# Patient Record
Sex: FEMALE | Race: WHITE | NOT HISPANIC OR LATINO | Employment: PART TIME | URBAN - METROPOLITAN AREA
[De-identification: names, ages, dates, MRNs, and addresses within clinical notes are randomized per-mention and may not be internally consistent; named-entity substitution may affect disease eponyms.]

---

## 2017-12-07 ENCOUNTER — TELEPHONE (OUTPATIENT)
Dept: INTERNAL MEDICINE | Facility: CLINIC | Age: 62
End: 2017-12-07

## 2017-12-07 NOTE — TELEPHONE ENCOUNTER
----- Message from Kamla Zhong MA sent at 12/7/2017 11:20 AM EST -----  Pt calling for lab order to have drawn next wk prior to appt 12/28

## 2017-12-13 DIAGNOSIS — Z00.00 PERIODIC HEALTH ASSESSMENT, GENERAL SCREENING, ADULT: Primary | ICD-10-CM

## 2017-12-27 ENCOUNTER — OFFICE VISIT (OUTPATIENT)
Dept: INTERNAL MEDICINE | Facility: CLINIC | Age: 62
End: 2017-12-27

## 2017-12-27 VITALS — SYSTOLIC BLOOD PRESSURE: 134 MMHG | WEIGHT: 293 LBS | DIASTOLIC BLOOD PRESSURE: 84 MMHG

## 2017-12-27 DIAGNOSIS — Z00.00 PERIODIC HEALTH ASSESSMENT, GENERAL SCREENING, ADULT: ICD-10-CM

## 2017-12-27 DIAGNOSIS — E66.01 MORBID OBESITY (HCC): Primary | ICD-10-CM

## 2017-12-27 LAB
ALBUMIN SERPL-MCNC: 4 G/DL (ref 3.5–5.2)
ALBUMIN/GLOB SERPL: 1.4 G/DL
ALP SERPL-CCNC: 93 U/L (ref 39–117)
ALT SERPL-CCNC: 26 U/L (ref 1–33)
APPEARANCE UR: CLEAR
AST SERPL-CCNC: 19 U/L (ref 1–32)
BILIRUB SERPL-MCNC: 0.3 MG/DL (ref 0.1–1.2)
BILIRUB UR QL STRIP: NEGATIVE
BUN SERPL-MCNC: 11 MG/DL (ref 8–23)
BUN/CREAT SERPL: 16.7 (ref 7–25)
CALCIUM SERPL-MCNC: 9.2 MG/DL (ref 8.6–10.5)
CHLORIDE SERPL-SCNC: 104 MMOL/L (ref 98–107)
CHOLEST SERPL-MCNC: 170 MG/DL (ref 0–200)
CO2 SERPL-SCNC: 30.1 MMOL/L (ref 22–29)
COLOR UR: YELLOW
CREAT SERPL-MCNC: 0.66 MG/DL (ref 0.57–1)
ERYTHROCYTE [DISTWIDTH] IN BLOOD BY AUTOMATED COUNT: 16.8 % (ref 11.7–13)
GLOBULIN SER CALC-MCNC: 2.8 GM/DL
GLUCOSE SERPL-MCNC: 94 MG/DL (ref 65–99)
GLUCOSE UR QL: NEGATIVE
HCT VFR BLD AUTO: 43.6 % (ref 35.6–45.5)
HDLC SERPL-MCNC: 56 MG/DL (ref 40–60)
HGB BLD-MCNC: 13.2 G/DL (ref 11.9–15.5)
HGB UR QL STRIP: NEGATIVE
KETONES UR QL STRIP: NEGATIVE
LDLC SERPL CALC-MCNC: 93 MG/DL (ref 0–100)
LEUKOCYTE ESTERASE UR QL STRIP: NEGATIVE
MCH RBC QN AUTO: 26.7 PG (ref 26.9–32)
MCHC RBC AUTO-ENTMCNC: 30.3 G/DL (ref 32.4–36.3)
MCV RBC AUTO: 88.1 FL (ref 80.5–98.2)
NITRITE UR QL STRIP: NEGATIVE
PH UR STRIP.AUTO: 6.5 [PH] (ref 5–8)
PLATELET # BLD AUTO: 250 10*3/MM3 (ref 140–500)
POTASSIUM SERPL-SCNC: 5.5 MMOL/L (ref 3.5–5.2)
PROT SERPL-MCNC: 6.8 G/DL (ref 6–8.5)
PROTEIN: NEGATIVE
RBC # BLD AUTO: 4.95 10*6/MM3 (ref 3.9–5.2)
SODIUM SERPL-SCNC: 144 MMOL/L (ref 136–145)
SP GR UR: 1.01 (ref 1–1.03)
TRIGL SERPL-MCNC: 105 MG/DL (ref 0–150)
UROBILINOGEN UR STRIP-MCNC: NORMAL MG/DL
VLDLC SERPL-MCNC: 21 MG/DL (ref 5–40)
WBC # BLD AUTO: 6.35 10*3/MM3 (ref 4.5–10.7)

## 2017-12-27 PROCEDURE — 99213 OFFICE O/P EST LOW 20 MIN: CPT

## 2017-12-27 RX ORDER — IBUPROFEN 200 MG
200 TABLET ORAL EVERY 6 HOURS PRN
COMMUNITY

## 2017-12-27 RX ORDER — ASPIRIN 81 MG/1
81 TABLET ORAL DAILY
COMMUNITY

## 2017-12-27 RX ORDER — FLUCONAZOLE 100 MG/1
TABLET ORAL
Refills: 5 | COMMUNITY
Start: 2017-10-06 | End: 2018-03-22

## 2017-12-27 RX ORDER — FEXOFENADINE HCL AND PSEUDOEPHEDRINE HCI 180; 240 MG/1; MG/1
1 TABLET, EXTENDED RELEASE ORAL DAILY
COMMUNITY
End: 2022-03-02 | Stop reason: ALTCHOICE

## 2017-12-27 RX ORDER — PIMECROLIMUS 1 %
CREAM (GRAM) TOPICAL
Refills: 5 | COMMUNITY
Start: 2017-10-09 | End: 2022-03-02 | Stop reason: ALTCHOICE

## 2017-12-27 NOTE — PROGRESS NOTES
Subjective   Kortney Charlton is a 62 y.o. female.     History of Present Illness Presents to [facility] for counselling. She is considering  Lap band or gastric sleeve surgery. She is actively researching programs    The following portions of the patient's history were reviewed and updated as appropriate: allergies, current medications, past family history, past medical history, past social history, past surgical history and problem list.    Review of Systems   Constitutional: Negative for activity change, appetite change, chills, diaphoresis, fatigue, fever and unexpected weight change.   HENT: Negative for congestion, dental problem, drooling, ear discharge, ear pain, facial swelling, hearing loss, mouth sores, nosebleeds, postnasal drip, rhinorrhea, sinus pressure, sore throat, tinnitus and trouble swallowing.    Eyes: Negative for photophobia, pain, discharge, redness, itching and visual disturbance.   Respiratory: Negative for apnea, cough, choking, chest tightness, shortness of breath and wheezing.    Cardiovascular: Negative for chest pain, palpitations and leg swelling.        No orthopnea, PND, RAINES   Gastrointestinal: Negative for abdominal pain, blood in stool, constipation, diarrhea, nausea and vomiting.   Endocrine: Negative for cold intolerance, heat intolerance, polydipsia and polyuria.   Genitourinary: Negative for decreased urine volume, dysuria, enuresis, flank pain, frequency, hematuria and urgency.   Musculoskeletal: Negative for arthralgias, back pain, gait problem, joint swelling, myalgias, neck pain and neck stiffness.   Skin: Negative for color change and rash.        No hair changes, no nail changes   Allergic/Immunologic: Negative for environmental allergies, food allergies and immunocompromised state.   Neurological: Negative for dizziness, tremors, seizures, syncope, speech difficulty, weakness, light-headedness, numbness and headaches.   Hematological: Negative for adenopathy. Does not  bruise/bleed easily.   Psychiatric/Behavioral: Negative for agitation, confusion, decreased concentration, dysphoric mood, sleep disturbance and suicidal ideas. The patient is not nervous/anxious.        Objective   Physical Exam   Constitutional: She appears well-developed and well-nourished. No distress.   HENT:   Head: Normocephalic and atraumatic.   Eyes: Conjunctivae and EOM are normal. Pupils are equal, round, and reactive to light.   Neck: No thyromegaly present.   Cardiovascular: Normal rate, regular rhythm and normal heart sounds.  Exam reveals no gallop and no friction rub.    No murmur heard.  Pulmonary/Chest: Effort normal and breath sounds normal. No respiratory distress. She has no wheezes. She has no rales. She exhibits no tenderness.   Abdominal: Soft. Bowel sounds are normal. She exhibits no distension. There is no tenderness.   Lymphadenopathy:     She has cervical adenopathy.   Skin: She is not diaphoretic.       Assessment/Plan   Diagnoses and all orders for this visit:    Morbid obesity  Comments:  Long discussion regarding the pros and cons of bariatric surgery.     Periodic health assessment, general screening, adult  -     CBC (No Diff)  -     Comprehensive Metabolic Panel  -     Lipid Panel  -     Urinalysis With / Microscopic If Indicated - Urine, Clean Catch

## 2018-03-22 ENCOUNTER — OFFICE VISIT (OUTPATIENT)
Dept: INTERNAL MEDICINE | Facility: CLINIC | Age: 63
End: 2018-03-22

## 2018-03-22 VITALS
SYSTOLIC BLOOD PRESSURE: 152 MMHG | HEART RATE: 69 BPM | HEIGHT: 68 IN | TEMPERATURE: 97.3 F | RESPIRATION RATE: 15 BRPM | DIASTOLIC BLOOD PRESSURE: 100 MMHG | WEIGHT: 293 LBS | BODY MASS INDEX: 44.41 KG/M2 | OXYGEN SATURATION: 95 %

## 2018-03-22 DIAGNOSIS — R07.9 CHEST PAIN, UNSPECIFIED TYPE: Primary | ICD-10-CM

## 2018-03-22 DIAGNOSIS — R03.0 ELEVATED BLOOD PRESSURE, SITUATIONAL: ICD-10-CM

## 2018-03-22 LAB
ANION GAP SERPL CALCULATED.3IONS-SCNC: 12 MMOL/L
BUN BLD-MCNC: 8 MG/DL (ref 8–23)
BUN/CREAT SERPL: 11.8 (ref 7–25)
CALCIUM SPEC-SCNC: 9.4 MG/DL (ref 8.6–10.5)
CHLORIDE SERPL-SCNC: 105 MMOL/L (ref 98–107)
CO2 SERPL-SCNC: 27 MMOL/L (ref 22–29)
CREAT BLD-MCNC: 0.68 MG/DL (ref 0.57–1)
GFR SERPL CREATININE-BSD FRML MDRD: 87 ML/MIN/1.73
GLUCOSE BLD-MCNC: 90 MG/DL (ref 65–99)
POTASSIUM BLD-SCNC: 4.2 MMOL/L (ref 3.5–5.2)
SODIUM BLD-SCNC: 144 MMOL/L (ref 136–145)
TSH SERPL-ACNC: 3.25 MIU/ML (ref 0.27–4.2)

## 2018-03-22 PROCEDURE — 99214 OFFICE O/P EST MOD 30 MIN: CPT | Performed by: NURSE PRACTITIONER

## 2018-03-22 PROCEDURE — 93000 ELECTROCARDIOGRAM COMPLETE: CPT | Performed by: NURSE PRACTITIONER

## 2018-03-22 PROCEDURE — 80048 BASIC METABOLIC PNL TOTAL CA: CPT | Performed by: NURSE PRACTITIONER

## 2018-03-22 RX ORDER — MULTIPLE VITAMINS W/ MINERALS TAB 9MG-400MCG
1 TAB ORAL DAILY
COMMUNITY

## 2018-03-22 NOTE — PROGRESS NOTES
Subjective   Kortney Charlton is a 62 y.o. female.     She has increased stress with mother (going back and forth to [place]). She has fallen asleep in recliner this week several nights this week. She only notices at night time. She reports the the chest discomfort doesn't always occur with back discomfort. She had similar symptoms about 3 years and had full work up. She saw cardiologist Dr Morley.       Chest Pain    This is a new problem. Episode onset: 4 days  The onset quality is sudden. The problem occurs intermittently. The problem has been unchanged. The pain is present in the lateral region (left chest wall ). The pain is at a severity of 3/10. The pain is mild. Quality: achy  Associated symptoms include back pain (middle between shoulder ) and numbness (chronic, no worst ). Pertinent negatives include no abdominal pain, cough, dizziness, exertional chest pressure, fever, headaches, nausea, orthopnea, palpitations, PND, shortness of breath, vomiting or weakness. Lower extremity edema: chronic, no changes  Associated symptoms comments: She denies any reflux or indigestion   No jaw pain . The pain is aggravated by nothing. She has tried NSAIDs (advil ) for the symptoms. The treatment provided moderate relief. Risk factors include stress, sedentary lifestyle and obesity.   Her family medical history is significant for CAD, hypertension, early MI (day) and TIA.        The following portions of the patient's history were reviewed and updated as appropriate: allergies, current medications, past family history, past medical history, past social history, past surgical history and problem list.    Review of Systems   Constitutional: Negative for chills and fever.   Respiratory: Negative for cough and shortness of breath.    Cardiovascular: Positive for chest pain. Negative for palpitations, orthopnea and PND.   Gastrointestinal: Negative for abdominal pain, nausea and vomiting.   Genitourinary:        Nocturia  (chronic).    Musculoskeletal: Positive for back pain (middle between shoulder ). Negative for neck pain and neck stiffness.   Neurological: Positive for numbness (chronic, no worst ). Negative for dizziness, weakness and headaches.   Psychiatric/Behavioral: Negative for sleep disturbance and suicidal ideas. The patient is not nervous/anxious.        Objective    Physical Exam   Constitutional: She is oriented to person, place, and time. She appears well-developed and well-nourished.   HENT:   Head: Normocephalic.   Nose: Nose normal.   Neck: Carotid bruit is not present. No thyroid mass and no thyromegaly present.   Cardiovascular: Regular rhythm and normal heart sounds.  Exam reveals no S3 and no S4.    No murmur heard.  Pulses:       Dorsalis pedis pulses are 2+ on the right side, and 2+ on the left side.        Posterior tibial pulses are 2+ on the right side, and 2+ on the left side.   Repeat bp left arm 128/82  Trace edema    Pulmonary/Chest: Effort normal and breath sounds normal. She has no decreased breath sounds. She has no wheezes. She has no rhonchi. She has no rales.       Musculoskeletal: She exhibits no edema.        Back:    Neurological: She is alert and oriented to person, place, and time. Gait normal.   Skin: Skin is warm and dry.   Psychiatric: She has a normal mood and affect.       Assessment/Plan   Kortney was seen today for chest pain.    Diagnoses and all orders for this visit:    Chest pain, unspecified type  Comments:  ECG normal; will obtain stress test; if persist will send to cardiologist (Dr Morley).   Orders:  -     ECG 12 Lead  -     Treadmill Stress Test; Future  -     TSH Rfx On Abnormal To Free T4; Future  -     Basic Metabolic Panel; Future  -     TSH Rfx On Abnormal To Free T4  -     Basic Metabolic Panel    Elevated blood pressure, situational    She was instructed to go to ER if worsening of symptoms (constant chest pain, nausea/vomiting, etc.). Her blood pressure was elevated  upon arrival but decreased at end of appointment. She has been instructed to monitor and follow low salt diet.     ECG 12 Lead  Date/Time: 3/22/2018 11:17 AM  Performed by: RAJIV NG  Authorized by: RAJIV NG   Comparison: compared with previous ECG from 9/20/2013  Similar to previous ECG  Rhythm: sinus rhythm  Rate: normal  Conduction: conduction normal  ST Segments: ST segments normal  T Waves: T waves normal  QRS axis: normal  Clinical impression: normal ECG

## 2018-03-27 ENCOUNTER — HOSPITAL ENCOUNTER (OUTPATIENT)
Dept: CARDIOLOGY | Facility: HOSPITAL | Age: 63
Discharge: HOME | End: 2018-03-27
Admitting: NURSE PRACTITIONER

## 2018-03-27 DIAGNOSIS — R07.9 CHEST PAIN, UNSPECIFIED TYPE: ICD-10-CM

## 2018-03-27 LAB
BH CV STRESS BP STAGE 1: NORMAL
BH CV STRESS BP STAGE 2: NORMAL
BH CV STRESS DURATION MIN STAGE 1: 3
BH CV STRESS DURATION MIN STAGE 2: 1
BH CV STRESS DURATION SEC STAGE 1: 0
BH CV STRESS DURATION SEC STAGE 2: 24
BH CV STRESS GRADE STAGE 1: 10
BH CV STRESS GRADE STAGE 2: 12
BH CV STRESS HR STAGE 1: 118
BH CV STRESS HR STAGE 2: 134
BH CV STRESS METS STAGE 1: 5
BH CV STRESS METS STAGE 2: 7.5
BH CV STRESS PROTOCOL 1: NORMAL
BH CV STRESS RECOVERY BP: NORMAL MMHG
BH CV STRESS RECOVERY HR: 81 BPM
BH CV STRESS SPEED STAGE 1: 1.7
BH CV STRESS SPEED STAGE 2: 2.5
BH CV STRESS STAGE 1: 1
BH CV STRESS STAGE 2: 2
MAXIMAL PREDICTED HEART RATE: 156 BPM
PERCENT MAX PREDICTED HR: 87.18 %
STRESS BASELINE BP: NORMAL MMHG
STRESS BASELINE HR: 77 BPM
STRESS PERCENT HR: 103 %
STRESS POST ESTIMATED WORKLOAD: 5.8 METS
STRESS POST EXERCISE DUR MIN: 4 MIN
STRESS POST EXERCISE DUR SEC: 24 SEC
STRESS POST PEAK BP: NORMAL MMHG
STRESS POST PEAK HR: 136 BPM
STRESS TARGET HR: 133 BPM

## 2018-03-27 PROCEDURE — 93018 CV STRESS TEST I&R ONLY: CPT | Performed by: INTERNAL MEDICINE

## 2018-03-27 PROCEDURE — 93016 CV STRESS TEST SUPVJ ONLY: CPT | Performed by: INTERNAL MEDICINE

## 2018-03-27 PROCEDURE — 93017 CV STRESS TEST TRACING ONLY: CPT

## 2018-04-11 ENCOUNTER — OFFICE VISIT (OUTPATIENT)
Dept: INTERNAL MEDICINE | Facility: CLINIC | Age: 63
End: 2018-04-11

## 2018-04-11 VITALS
DIASTOLIC BLOOD PRESSURE: 76 MMHG | BODY MASS INDEX: 44.41 KG/M2 | SYSTOLIC BLOOD PRESSURE: 154 MMHG | HEIGHT: 68 IN | WEIGHT: 293 LBS

## 2018-04-11 DIAGNOSIS — R03.0 ELEVATED BLOOD PRESSURE, SITUATIONAL: ICD-10-CM

## 2018-04-11 DIAGNOSIS — R07.9 CHEST PAIN, UNSPECIFIED TYPE: Primary | ICD-10-CM

## 2018-04-11 PROCEDURE — 99213 OFFICE O/P EST LOW 20 MIN: CPT | Performed by: NURSE PRACTITIONER

## 2018-04-11 NOTE — PROGRESS NOTES
Subjective   Kortney Charlton is a 62 y.o. female.     She has had stress test performed on a couple weeks ago and it was ok. She has continued to sleep in recliner and noticed most discomfort those days.       Chest Pain    This is a new problem. The current episode started 1 to 4 weeks ago. The problem occurs intermittently. The problem has been gradually improving. Pain location: right chest wall  The pain is at a severity of 0/10. The patient is experiencing no pain. Associated symptoms include back pain, lower extremity edema (chronic ) and numbness (chronic ). Pertinent negatives include no abdominal pain, cough, dizziness, fever, leg pain, nausea, palpitations, PND, shortness of breath or sputum production.   Pertinent negatives for past medical history include no MI.   Her family medical history is significant for early MI. Prior diagnostic workup includes exercise treadmill test.        The following portions of the patient's history were reviewed and updated as appropriate: allergies, current medications and problem list.    Review of Systems   Constitutional: Negative for fever.   Respiratory: Negative for cough, sputum production and shortness of breath.    Cardiovascular: Positive for chest pain and leg swelling (chronic ). Negative for palpitations and PND.   Gastrointestinal: Negative for abdominal pain and nausea.   Musculoskeletal: Positive for back pain.   Neurological: Positive for numbness (chronic ). Negative for dizziness and light-headedness.   Psychiatric/Behavioral: The patient is nervous/anxious.        Objective   Physical Exam   Constitutional: She is oriented to person, place, and time. She appears well-developed and well-nourished.   HENT:   Head: Normocephalic.   Nose: Nose normal.   Cardiovascular: Regular rhythm and normal heart sounds.  Exam reveals no S3 and no S4.    No murmur heard.  Trace edema   Repeat bp left arm 132/82   Pulmonary/Chest: Effort normal and breath sounds  normal. She has no decreased breath sounds. She has no wheezes. She has no rhonchi. She has no rales.   Musculoskeletal: She exhibits no edema.   Neurological: She is alert and oriented to person, place, and time. Gait normal.   Skin: Skin is warm and dry.   Psychiatric: She has a normal mood and affect.       Assessment/Plan   Kortney was seen today for chest pain.    Diagnoses and all orders for this visit:    Chest pain, unspecified type  Comments:  stress test ok; i suspect muscular; if symptoms persist will refer to cardiologist     Elevated blood pressure, situational  Comments:  will continue to monitor     I reviewed recent lab work and stress test report with patient. Needs to monitor blood pressure and work on weight loss.

## 2018-06-26 ENCOUNTER — OFFICE VISIT (OUTPATIENT)
Dept: INTERNAL MEDICINE | Facility: CLINIC | Age: 63
End: 2018-06-26

## 2018-06-26 VITALS
HEIGHT: 68 IN | BODY MASS INDEX: 44.41 KG/M2 | WEIGHT: 293 LBS | DIASTOLIC BLOOD PRESSURE: 76 MMHG | SYSTOLIC BLOOD PRESSURE: 134 MMHG

## 2018-06-26 DIAGNOSIS — E66.01 MORBID OBESITY (HCC): ICD-10-CM

## 2018-06-26 DIAGNOSIS — Z23 NEED FOR DIPHTHERIA-TETANUS-PERTUSSIS (TDAP) VACCINE, ADULT/ADOLESCENT: ICD-10-CM

## 2018-06-26 DIAGNOSIS — R07.9 CHEST PAIN, UNSPECIFIED TYPE: Primary | ICD-10-CM

## 2018-06-26 PROCEDURE — 90471 IMMUNIZATION ADMIN: CPT | Performed by: NURSE PRACTITIONER

## 2018-06-26 PROCEDURE — 99213 OFFICE O/P EST LOW 20 MIN: CPT | Performed by: NURSE PRACTITIONER

## 2018-06-26 PROCEDURE — 90715 TDAP VACCINE 7 YRS/> IM: CPT | Performed by: NURSE PRACTITIONER

## 2018-06-26 RX ORDER — FLUCONAZOLE 100 MG/1
100 TABLET ORAL AS NEEDED
COMMUNITY
End: 2020-10-27

## 2018-06-26 NOTE — PROGRESS NOTES
Subjective   Kortney Charlton is a 62 y.o. female.     She is current with [employer] system since January 2018. She has a total of 40 lbs. She is adjusted her daily regimen (stairs). Her most recent       Chest Pain    This is a new problem. The problem has been resolved. The pain is at a severity of 0/10. The patient is experiencing no pain. Pertinent negatives include no cough, dizziness, headaches, orthopnea, palpitations, PND or shortness of breath.   Hypertension   This is a new problem. Pertinent negatives include no anxiety, chest pain, headaches, orthopnea, palpitations, peripheral edema, PND or shortness of breath. Risk factors for coronary artery disease include obesity. Current antihypertension treatment includes nothing.        The following portions of the patient's history were reviewed and updated as appropriate: allergies, current medications, past family history, past medical history, past social history, past surgical history and problem list.    Review of Systems   Constitutional: Negative for activity change.        Steady weight loss    Respiratory: Negative for cough, shortness of breath and wheezing.    Cardiovascular: Positive for leg swelling (chronic, stable). Negative for chest pain, palpitations, orthopnea and PND.   Neurological: Negative for dizziness and headaches.       Objective   Physical Exam   Constitutional: She is oriented to person, place, and time. She appears well-developed and well-nourished.   HENT:   Head: Normocephalic.   Nose: Nose normal.   Neck: Carotid bruit is not present. No thyroid mass and no thyromegaly present.   Cardiovascular: Regular rhythm and normal heart sounds.  Exam reveals no S3 and no S4.    No murmur heard.  Repeat bp left arm 123/78  No pedal edema    Pulmonary/Chest: Effort normal and breath sounds normal. She has no decreased breath sounds. She has no wheezes. She has no rhonchi. She has no rales.   Musculoskeletal: She exhibits no edema.    Neurological: She is alert and oriented to person, place, and time. Gait normal.   Skin: Skin is warm and dry.   Psychiatric: She has a normal mood and affect.       Assessment/Plan   Kortney was seen today for chest pain and hypertension.    Diagnoses and all orders for this visit:    Chest pain, unspecified type  Comments:  resolved     Morbid obesity  Comments:  current with 40 lbs  weight loss since 1/2018 with nutrisystem; considering gastric sleeve, not interested in oral medications    Need for diphtheria-tetanus-pertussis (Tdap) vaccine, adult/adolescent  -     Tdap Vaccine Greater Than or Equal To 6yo IM    She will return for lab work at next appt.

## 2018-08-07 ENCOUNTER — TELEPHONE (OUTPATIENT)
Dept: OBSTETRICS AND GYNECOLOGY | Age: 63
End: 2018-08-07

## 2018-08-24 ENCOUNTER — OFFICE VISIT (OUTPATIENT)
Dept: INTERNAL MEDICINE | Facility: CLINIC | Age: 63
End: 2018-08-24

## 2018-08-24 VITALS
BODY MASS INDEX: 54.74 KG/M2 | SYSTOLIC BLOOD PRESSURE: 134 MMHG | WEIGHT: 293 LBS | TEMPERATURE: 97.8 F | DIASTOLIC BLOOD PRESSURE: 82 MMHG

## 2018-08-24 DIAGNOSIS — N63.10 BREAST MASS, RIGHT: Primary | ICD-10-CM

## 2018-08-24 PROCEDURE — 99213 OFFICE O/P EST LOW 20 MIN: CPT | Performed by: NURSE PRACTITIONER

## 2018-08-24 NOTE — PROGRESS NOTES
Subjective   Kortney Charlton is a 62 y.o. female.     She went to have mammogram at [facility] where she typically goes yearly, however when she informed them of lump of breast area they told her she had to diagnostic mammogram. She was due to see GYN this past Tuesday but had to cancel due to death in family.        Cyst   This is a new problem. The current episode started in the past 7 days. The problem has been unchanged. Pertinent negatives include no chest pain, chills, congestion, fever, myalgias or visual change. She has tried nothing for the symptoms.        The following portions of the patient's history were reviewed and updated as appropriate: allergies, current medications, past family history, past medical history, past social history, past surgical history and problem list.    Review of Systems   Constitutional: Negative for chills and fever.   HENT: Negative for congestion.    Cardiovascular: Negative for chest pain, palpitations and leg swelling.   Musculoskeletal: Negative for myalgias.   Skin:        Cyst to right breast    Neurological: Negative for dizziness and light-headedness.       Objective   Physical Exam   Constitutional: She is oriented to person, place, and time. She appears well-developed and well-nourished.   HENT:   Head: Normocephalic.   Nose: Nose normal.   Cardiovascular: Regular rhythm and normal heart sounds.  Exam reveals no S3 and no S4.    No murmur heard.  Pulmonary/Chest: Effort normal and breath sounds normal. She has no decreased breath sounds. She has no wheezes. She has no rhonchi. She has no rales. She exhibits no mass and no tenderness. Right breast exhibits no nipple discharge, no skin change and no tenderness. Left breast exhibits no nipple discharge, no skin change and no tenderness.       Less than 1cm moveable mass to right chest wall at 12 o clock, no pain   Less than 1 cm moveable to right chest wall, no pain   Musculoskeletal: She exhibits no edema.    Lymphadenopathy:     She has no axillary adenopathy.   Neurological: She is alert and oriented to person, place, and time. Gait normal.   Skin: Skin is warm and dry.   Psychiatric: She has a normal mood and affect.       Assessment/Plan   Kortney was seen today for cyst.    Diagnoses and all orders for this visit:    Breast mass, right  -     Mammo Diagnostic Bilateral With CAD; Future  -     US breast right complete; Future    I suspect cyst to right chest, however she is due to have mammogram.

## 2018-08-31 ENCOUNTER — APPOINTMENT (OUTPATIENT)
Dept: MAMMOGRAPHY | Facility: HOSPITAL | Age: 63
End: 2018-08-31

## 2018-08-31 ENCOUNTER — HOSPITAL ENCOUNTER (OUTPATIENT)
Dept: MAMMOGRAPHY | Facility: HOSPITAL | Age: 63
Discharge: HOME | End: 2018-08-31
Admitting: NURSE PRACTITIONER

## 2018-08-31 ENCOUNTER — HOSPITAL ENCOUNTER (OUTPATIENT)
Dept: ULTRASOUND IMAGING | Facility: HOSPITAL | Age: 63
Discharge: HOME | End: 2018-08-31

## 2018-08-31 ENCOUNTER — APPOINTMENT (OUTPATIENT)
Dept: ULTRASOUND IMAGING | Facility: HOSPITAL | Age: 63
End: 2018-08-31

## 2018-08-31 DIAGNOSIS — N63.10 BREAST MASS, RIGHT: ICD-10-CM

## 2018-08-31 PROCEDURE — 77066 DX MAMMO INCL CAD BI: CPT

## 2018-08-31 PROCEDURE — 76642 ULTRASOUND BREAST LIMITED: CPT

## 2018-09-05 DIAGNOSIS — N63.10 BREAST MASS, RIGHT: Primary | ICD-10-CM

## 2018-10-18 ENCOUNTER — OFFICE VISIT (OUTPATIENT)
Dept: INTERNAL MEDICINE | Facility: CLINIC | Age: 63
End: 2018-10-18

## 2018-10-18 VITALS
SYSTOLIC BLOOD PRESSURE: 130 MMHG | HEIGHT: 68 IN | DIASTOLIC BLOOD PRESSURE: 84 MMHG | WEIGHT: 293 LBS | BODY MASS INDEX: 44.41 KG/M2

## 2018-10-18 DIAGNOSIS — Z96.653 HISTORY OF TOTAL BILATERAL KNEE REPLACEMENT (TKR): Chronic | ICD-10-CM

## 2018-10-18 DIAGNOSIS — E66.01 MORBID OBESITY (HCC): Chronic | ICD-10-CM

## 2018-10-18 DIAGNOSIS — R03.0 ELEVATED BP WITHOUT DIAGNOSIS OF HYPERTENSION: Primary | ICD-10-CM

## 2018-10-18 DIAGNOSIS — I87.2 VENOUS STASIS DERMATITIS OF BOTH LOWER EXTREMITIES: ICD-10-CM

## 2018-10-18 PROCEDURE — 99214 OFFICE O/P EST MOD 30 MIN: CPT | Performed by: INTERNAL MEDICINE

## 2018-10-18 NOTE — PROGRESS NOTES
Subjective   Kortney Charlton is a 63 y.o. female.presents with a chief complaint of stasis dermatitis of her lower extremities, atopic dermatitis, morbid obesity, s/p TKR's here for follow up.     History of Present Illness     The following portions of the patient's history were reviewed and updated as appropriate: allergies, current medications, past family history, past medical history, past social history, past surgical history and problem list.    Review of Systems   Constitutional: Negative.    HENT: Negative.    Eyes: Negative.    Respiratory: Negative.    Cardiovascular: Negative.    Gastrointestinal: Negative.    Endocrine: Negative.    Genitourinary: Negative.    Musculoskeletal: Positive for arthralgias.   Skin: Positive for dry skin.   Allergic/Immunologic: Negative.    Neurological: Negative.    Hematological: Negative.    Psychiatric/Behavioral: Negative.        Objective   Physical Exam   Constitutional: She is oriented to person, place, and time. She appears well-developed and well-nourished.   HENT:   Head: Normocephalic and atraumatic.   Eyes: Pupils are equal, round, and reactive to light. EOM are normal.   Neck: Normal range of motion. Neck supple.   Cardiovascular: Normal rate, regular rhythm and normal heart sounds.    Pulmonary/Chest: Effort normal and breath sounds normal.   Abdominal: Soft. Bowel sounds are normal.   Musculoskeletal:   Bilateral TKR's   Neurological: She is alert and oriented to person, place, and time.   Skin: Skin is warm and dry.   Scattered psoriatic   Psychiatric: She has a normal mood and affect. Her behavior is normal. Judgment and thought content normal.   Nursing note and vitals reviewed.        Assessment/Plan   Diagnoses and all orders for this visit:    Elevated BP without diagnosis of hypertension  Comments:  stable for now    Morbid obesity (CMS/HCC)  Comments:  continue to encourage dieting    Venous stasis dermatitis of both lower extremities  Comments:  no  change in therapy    History of total bilateral knee replacement (TKR)  Comments:  doing very well overall

## 2019-01-14 ENCOUNTER — OFFICE VISIT (OUTPATIENT)
Dept: OBSTETRICS AND GYNECOLOGY | Age: 64
End: 2019-01-14

## 2019-01-14 VITALS
HEIGHT: 67 IN | DIASTOLIC BLOOD PRESSURE: 90 MMHG | BODY MASS INDEX: 45.99 KG/M2 | WEIGHT: 293 LBS | SYSTOLIC BLOOD PRESSURE: 140 MMHG

## 2019-01-14 DIAGNOSIS — Z12.4 ROUTINE CERVICAL SMEAR: ICD-10-CM

## 2019-01-14 DIAGNOSIS — Z11.51 SPECIAL SCREENING EXAMINATION FOR HUMAN PAPILLOMAVIRUS (HPV): ICD-10-CM

## 2019-01-14 DIAGNOSIS — N63.0 BREAST LUMP IN FEMALE: ICD-10-CM

## 2019-01-14 DIAGNOSIS — Z00.00 ANNUAL PHYSICAL EXAM: Primary | ICD-10-CM

## 2019-01-14 PROCEDURE — G0101 CA SCREEN;PELVIC/BREAST EXAM: HCPCS | Performed by: OBSTETRICS & GYNECOLOGY

## 2019-01-14 NOTE — PROGRESS NOTES
Subjective   Kortney Charlton is a 63 y.o. female is being seen today for   Chief Complaint   Patient presents with   • Establish Care     Re-establish Care--Annual exam    .    History of Present Illness  Patient presents for an annual check after many years of not being here.  We over the past history little bit as I do not have her old chart.  Family history there is no real diabetes septum 1 cousin, heart disease both parents prostate cancer in father and mother had bladder cancer.  Mother is 88 and doing okay.  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    Today she has no particular complaints just thought it was time to come back in get checked out.  Her blood pressures up a little bit today as was her last visit with her PCP and they are monitoring that.  We did talk about weight try to work on that.  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    Vitals:    19 1146   BP: 140/90       PAST MEDICAL HISTORY  Past Medical History:   Diagnosis Date   • Chest pain 2018   • Chronic nasopharyngitis    • Elevated BP without diagnosis of hypertension    • Morbid obesity (CMS/HCC)    • Osteoarthritis    • Stasis dermatitis      OB History      Para Term  AB Living    2 2 2          SAB TAB Ectopic Molar Multiple Live Births                       Past Surgical History:   Procedure Laterality Date   • HYSTEROSCOPY ENDOMETRIAL ABLATION     • KNEE SURGERY Bilateral    • LAPAROSCOPIC CHOLECYSTECTOMY  10/2011   • OVARIAN CYST REMOVAL        Family History   Problem Relation Age of Onset   • Hypertension Mother    • Arthritis Mother    • Prostate cancer Father    • Hypertension Father      Social History     Tobacco Use   Smoking Status Never Smoker   Smokeless Tobacco Never Used       Current Outpatient Medications:   •  aspirin 81 MG EC tablet, Take 81 mg by mouth Daily., Disp: , Rfl:   •  ELIDEL 1 % cream, APPLY TWICE A DAY, Disp: , Rfl: 5  •  fexofenadine-pseudoephedrine (ALLEGRA-D 24) 180-240 MG per 24 hr tablet, Take 1 tablet by mouth Daily., Disp: , Rfl:   •  Multiple Vitamins-Minerals (MULTIVITAMIN WITH MINERALS) tablet tablet, Take 1 tablet by mouth Daily., Disp: , Rfl:   •  fluconazole (DIFLUCAN) 100 MG tablet, Take 100 mg by mouth As Needed (skin rash)., Disp: , Rfl:   •  ibuprofen (ADVIL,MOTRIN) 200 MG tablet, Take 200 mg by mouth Every 6 (Six) Hours As Needed for Mild Pain ., Disp: , Rfl:   Immunization History   Administered Date(s) Administered   • PPD Test 09/05/2014, 10/21/2015, 01/23/2017, 01/28/2017   • Tdap 06/26/2018       Review of Systems   Constitutional: Negative for chills, fatigue, fever and unexpected weight change.   Respiratory: Negative for shortness of breath and wheezing.    Cardiovascular: Negative for chest pain.   Gastrointestinal: Negative for abdominal distention, abdominal pain, blood in stool, constipation, diarrhea and nausea.   Genitourinary: Negative for difficulty urinating, dyspareunia, dysuria, frequency, hematuria, menstrual problem, pelvic pain, urgency and vaginal discharge.   Skin: Positive for rash.       Objective   Physical Exam   Constitutional: She is oriented to person, place, and time. Vital signs are normal. She appears well-developed and well-nourished.   Neck: No thyromegaly present.   Cardiovascular: Normal rate, regular rhythm and normal heart sounds.   Pulmonary/Chest: Effort normal. Right breast exhibits no inverted nipple, no mass, no nipple discharge, no skin change and no tenderness.  Left breast exhibits no inverted nipple, no mass, no nipple discharge, no skin change and no tenderness. Breasts are symmetrical. There is no breast swelling.   Abdominal: Soft.   Genitourinary: Vagina normal and uterus normal. No breast tenderness, discharge or bleeding. Pelvic exam was performed with patient supine. No labial fusion. There is no rash, tenderness, lesion or injury on the right labia. There is no rash, tenderness, lesion or injury on the left labia. Cervix exhibits no motion tenderness, no discharge and no friability. Right adnexum displays no mass, no tenderness and no fullness. Left adnexum displays no mass, no tenderness and no fullness.   Neurological: She is alert and oriented to person, place, and time.   Psychiatric: She has a normal mood and affect.   Vitals reviewed.        Assessment/Plan   Kortney was seen today for establish care.    Diagnoses and all orders for this visit:    Annual physical exam    Breast lump in female      [place] she did mention there was a lump her to use they looked at everything probably couple lipomas she has appointment to go back for six-month check.  She also leaks some urine.  She is status post a TVT last some leakage and sheis somewhat related to the weight.  My exam is normal far as I could tell but the obesity was a problem.  I attempted the Pap smear but the length of vagina posed a problem.  Again mammograms up-to-date.  Colonoscopy was 3-4 years ago she is up-to-date and she's not had a bone density but there is a weight limit on the machine and the poses a problem.  So talked about trying to get that weight down via diet and exercise come back in year

## 2019-01-17 LAB
CYTOLOGIST CVX/VAG CYTO: NORMAL
CYTOLOGY CVX/VAG DOC THIN PREP: NORMAL
DX ICD CODE: NORMAL
HIV 1 & 2 AB SER-IMP: NORMAL
HPV I/H RISK 4 DNA CVX QL PROBE+SIG AMP: NEGATIVE
OTHER STN SPEC: NORMAL
PATH REPORT.FINAL DX SPEC: NORMAL
STAT OF ADQ CVX/VAG CYTO-IMP: NORMAL

## 2020-08-20 ENCOUNTER — TELEPHONE (OUTPATIENT)
Dept: INTERNAL MEDICINE | Facility: CLINIC | Age: 65
End: 2020-08-20

## 2020-08-20 NOTE — TELEPHONE ENCOUNTER
Caller: Kortney Charlton    Relationship to patient: Self    Best call back number: [phone]    What is the patient's temperature: 100.7 THIS MORNING    How was the temperature taken: ORALLY    Does the patient have any other symptoms:   [x] Yes  [] No   If yes, what are the symptoms: MUSCLE ACHES    Is the patient not acting like himself/herself:   [] Yes  [x] No      What medications has the patient tried to bring the fever down: ADVIS    Has the fever come down in response to medication: WENT DOWN TO 99.7

## 2020-08-21 ENCOUNTER — OFFICE VISIT (OUTPATIENT)
Dept: INTERNAL MEDICINE | Facility: CLINIC | Age: 65
End: 2020-08-21

## 2020-08-21 DIAGNOSIS — R50.9 FEVER, UNSPECIFIED FEVER CAUSE: Primary | ICD-10-CM

## 2020-08-21 DIAGNOSIS — R52 BODY ACHES: ICD-10-CM

## 2020-08-21 PROCEDURE — 99442 PR PHYS/QHP TELEPHONE EVALUATION 11-20 MIN: CPT | Performed by: NURSE PRACTITIONER

## 2020-08-21 NOTE — PROGRESS NOTES
Subjective     Kortney Charlton is a 64 y.o. female.         You have chosen to receive care through a telephone visit. Do you consent to use a telephone visit for your medical care today? Yes    She presents with fever and body aches x 1 day. She has not had a fever today. She was tested for covid last week on 8/13 in  for ability to RTW. At that time pt was not symptomatic. Her test result was negative. Denies all other covid sx.    Fever    This is a new problem. The current episode started in the past 7 days. The problem occurs intermittently. The problem has been gradually improving. The maximum temperature noted was 100 to 100.9 F. Associated symptoms include muscle aches. Pertinent negatives include no abdominal pain, chest pain, coughing, diarrhea, headaches, nausea, sore throat, urinary pain or vomiting. Treatments tried: advil. The treatment provided mild relief.        The following portions of the patient's history were reviewed and updated as appropriate: allergies, current medications, past social history and problem list.    Past Medical History:   Diagnosis Date   • Chest pain 03/22/2018   • Chronic nasopharyngitis    • Elevated BP without diagnosis of hypertension    • Morbid obesity (CMS/[facility])    • Osteoarthritis    • Stasis dermatitis          Current Outpatient Medications:   •  aspirin 81 MG EC tablet, Take 81 mg by mouth Daily., Disp: , Rfl:   •  ELIDEL 1 % cream, APPLY TWICE A DAY, Disp: , Rfl: 5  •  fexofenadine-pseudoephedrine (ALLEGRA-D 24) 180-240 MG per 24 hr tablet, Take 1 tablet by mouth Daily., Disp: , Rfl:   •  fluconazole (DIFLUCAN) 100 MG tablet, Take 100 mg by mouth As Needed (skin rash)., Disp: , Rfl:   •  ibuprofen (ADVIL,MOTRIN) 200 MG tablet, Take 200 mg by mouth Every 6 (Six) Hours As Needed for Mild Pain ., Disp: , Rfl:   •  Multiple Vitamins-Minerals (MULTIVITAMIN WITH MINERALS) tablet tablet, Take 1 tablet by mouth Daily., Disp: , Rfl:     Allergies   Allergen Reactions    • Other Rash     Topical Steroids       Review of Systems   Constitutional: Positive for fever.   HENT: Negative for postnasal drip, sinus pressure, sinus pain and sore throat.    Respiratory: Negative for cough and shortness of breath.    Cardiovascular: Negative for chest pain.   Gastrointestinal: Negative for abdominal pain, diarrhea, nausea and vomiting.   Genitourinary: Negative for dysuria.   Neurological: Negative for headaches.       Objective     LMP  (LMP Unknown)   Wt Readings from Last 3 Encounters:   01/14/19 (!) 177 kg (390 lb)   10/18/18 (!) 167 kg (368 lb)   08/24/18 (!) 163 kg (360 lb)     Temp Readings from Last 3 Encounters:   08/24/18 97.8 °F (36.6 °C)   03/22/18 97.3 °F (36.3 °C) (Temporal Artery )     BP Readings from Last 3 Encounters:   01/14/19 140/90   10/18/18 130/84   08/24/18 134/82     Pulse Readings from Last 3 Encounters:   03/22/18 69       Physical Exam   Constitutional: She is oriented to person, place, and time. No distress.   Neurological: She is alert and oriented to person, place, and time.   Psychiatric: She has a normal mood and affect. Her behavior is normal. Judgment and thought content normal.       Assessment/Plan     Diagnoses and all orders for this visit:    Fever, unspecified fever cause  -     COVID-19,[facility] ROUTINE, NP/OP SWAB IN TRANSPORT MEDIA OR ESWAB 72 HR TAT - Swab, Nasopharynx; Future  -     QUESTIONNAIRE SERIES    Body aches  -     COVID-19,[facility] ROUTINE, NP/OP SWAB IN TRANSPORT MEDIA OR ESWAB 72 HR TAT - Swab, Nasopharynx; Future  -     QUESTIONNAIRE SERIES    She will self quarantine. Present to  for testing.    Increase fluid intake and rest.    Call for worsening sx.    I spent 11 min on the phone with pt.

## 2020-08-21 NOTE — TELEPHONE ENCOUNTER
Dr. Morales said she needed to be seen at . I did look at her chart and the fever is new from when she was tested last Thursday.

## 2020-09-30 ENCOUNTER — TELEPHONE (OUTPATIENT)
Dept: INTERNAL MEDICINE | Facility: CLINIC | Age: 65
End: 2020-09-30

## 2020-09-30 NOTE — TELEPHONE ENCOUNTER
PT CALLED STATING SHE GOT THE SHINGLES SHOT ON Sunday, AND HAS SINCE DEVELOPED A KNOT AT THE INJECTION SITE, REDNESS AROUND THE INJECTION SITE WITH ABOUT A 3-4 INCH DIAMETER, AND TODAY HAS REDNESS IN ONE EYE. SHE ALSO HAD A FEVER OF ABOUT 101 ON Monday.    SHE WANTS TO KNOW IF SHE NEEDS TO DO ANYTHING, AND ALSO IF IT IS POSSIBLE TO GET SHINGLES FROM THE VACCINE.    PLEASE CALL BACK ASAP TO ADVISE. SHE CAN BE REACHED [phone]

## 2020-10-27 ENCOUNTER — OFFICE VISIT (OUTPATIENT)
Dept: INTERNAL MEDICINE | Facility: CLINIC | Age: 65
End: 2020-10-27

## 2020-10-27 VITALS
OXYGEN SATURATION: 95 % | WEIGHT: 293 LBS | DIASTOLIC BLOOD PRESSURE: 86 MMHG | HEART RATE: 63 BPM | BODY MASS INDEX: 61.71 KG/M2 | SYSTOLIC BLOOD PRESSURE: 122 MMHG | TEMPERATURE: 96.4 F

## 2020-10-27 DIAGNOSIS — Z00.00 INITIAL MEDICARE ANNUAL WELLNESS VISIT: Primary | ICD-10-CM

## 2020-10-27 PROCEDURE — G0439 PPPS, SUBSEQ VISIT: HCPCS | Performed by: INTERNAL MEDICINE

## 2020-10-27 NOTE — PROGRESS NOTES
The ABCs of the Annual Wellness Visit  Initial Medicare Wellness Visit    Chief Complaint   Patient presents with   • Medicare Wellness-Initial Visit   • Obesity       Subjective   History of Present Illness:  Kortney Charlton is a 65 y.o. female who presents for an Initial Medicare Wellness Visit.    HEALTH RISK ASSESSMENT    Recent Hospitalizations:  No hospitalization(s) within the last year.    Current Medical Providers:  Patient Care Team:  Tuan Morales MD as PCP - General (Internal Medicine)    Smoking Status:  Social History     Tobacco Use   Smoking Status Never Smoker   Smokeless Tobacco Never Used       Alcohol Consumption:  Social History     Substance and Sexual Activity   Alcohol Use Yes    Comment: occasional       Depression Screen:   PHQ-2/PHQ-9 Depression Screening 10/27/2020   Little interest or pleasure in doing things 0   Feeling down, depressed, or hopeless 0   Trouble falling or staying asleep, or sleeping too much 0   Feeling tired or having little energy 0   Poor appetite or overeating 0   Feeling bad about yourself - or that you are a failure or have let yourself or your family down 0   Trouble concentrating on things, such as reading the newspaper or watching television 0   Moving or speaking so slowly that other people could have noticed. Or the opposite - being so fidgety or restless that you have been moving around a lot more than usual 0   Thoughts that you would be better off dead, or of hurting yourself in some way 0   Total Score 0       Fall Risk Screen:  [facility] Fall Risk Assessment has not been completed.    Health Habits and Functional and Cognitive Screening:  Functional & Cognitive Status 10/27/2020   Do you have difficulty preparing food and eating? No   Do you have difficulty bathing yourself, getting dressed or grooming yourself? No   Do you have difficulty using the toilet? No   Do you have difficulty moving around from place to place? No   Do you have trouble with  steps or getting out of a bed or a chair? No   Current Diet Well Balanced Diet   Dental Exam Up to date   Eye Exam Up to date   Exercise (times per week) 3 times per week   Current Exercises Include Walking   Do you need help using the phone?  No   Are you deaf or do you have serious difficulty hearing?  No   Do you need help with transportation? No   Do you need help shopping? No   Do you need help preparing meals?  No   Do you need help with housework?  No   Do you need help with laundry? No   Do you need help taking your medications? No   Do you need help managing money? No   Do you ever drive or ride in a car without wearing a seat belt? No   Have you felt unusual stress, anger or loneliness in the last month? No   Who do you live with? Spouse   If you need help, do you have trouble finding someone available to you? No   Have you been bothered in the last four weeks by sexual problems? No   Do you have difficulty concentrating, remembering or making decisions? No         Does the patient have evidence of cognitive impairment? No    Asprin use counseling:Does not need ASA (and currently is not on it)    Age-appropriate Screening Schedule:  Refer to the list below for future screening recommendations based on patient's age, sex and/or medical conditions. Orders for these recommended tests are listed in the plan section. The patient has been provided with a written plan.    Health Maintenance   Topic Date Due   • ZOSTER VACCINE (1 of 2) 09/11/2003   • MAMMOGRAM  08/31/2020   • COLONOSCOPY  12/18/2024   • TDAP/TD VACCINES (2 - Td) 06/26/2028   • INFLUENZA VACCINE  Completed          The following portions of the patient's history were reviewed and updated as appropriate: allergies, current medications, past family history, past medical history, past social history, past surgical history and problem list.    Outpatient Medications Prior to Visit   Medication Sig Dispense Refill   • aspirin 81 MG EC tablet Take 81 mg  by mouth Daily.     • ELIDEL 1 % cream APPLY TWICE A DAY  5   • fexofenadine-pseudoephedrine (ALLEGRA-D 24) 180-240 MG per 24 hr tablet Take 1 tablet by mouth Daily.     • ibuprofen (ADVIL,MOTRIN) 200 MG tablet Take 200 mg by mouth Every 6 (Six) Hours As Needed for Mild Pain .     • Multiple Vitamins-Minerals (MULTIVITAMIN WITH MINERALS) tablet tablet Take 1 tablet by mouth Daily.     • fluconazole (DIFLUCAN) 100 MG tablet Take 100 mg by mouth As Needed (skin rash).       No facility-administered medications prior to visit.        Patient Active Problem List   Diagnosis   • Morbid obesity (CMS/HCC)   • Elevated BP without diagnosis of hypertension   • Venous stasis dermatitis of both lower extremities   • History of total bilateral knee replacement (TKR)   • Breast lump in female       Advanced Care Planning:  ACP discussion was held with the patient during this visit. Patient has an advance directive in EMR which is still valid.     Review of Systems    Compared to one year ago, the patient feels her physical health is the same.  Compared to one year ago, the patient feels her mental health is the same.    Reviewed chart for potential of high risk medication in the elderly: not applicable  Reviewed chart for potential of harmful drug interactions in the elderly:not applicable    Objective         Vitals:    10/27/20 1512   BP: 122/86   Pulse: 63   Temp: 96.4 °F (35.8 °C)   SpO2: 95%   Weight: (!) 179 kg (394 lb)   PainSc: 0-No pain       Body mass index is 61.71 kg/m².  Discussed the patient's BMI with her. The BMI is above average; BMI management plan is completed.    Physical Exam  Vitals signs and nursing note reviewed.   HENT:      Head: Normocephalic and atraumatic.   Cardiovascular:      Rate and Rhythm: Normal rate and regular rhythm.   Pulmonary:      Effort: Pulmonary effort is normal.      Breath sounds: Normal breath sounds.   Abdominal:      General: Abdomen is flat.      Palpations: Abdomen is soft.    Musculoskeletal: Normal range of motion.   Skin:     General: Skin is warm and dry.   Neurological:      General: No focal deficit present.      Mental Status: She is oriented to person, place, and time.   Psychiatric:         Mood and Affect: Mood normal.         Behavior: Behavior normal.               Assessment/Plan   Medicare Risks and Personalized Health Plan  CMS Preventative Services Quick Reference  Fall Risk  Glaucoma Risk  Hearing Problem    The above risks/problems have been discussed with the patient.  Pertinent information has been shared with the patient in the After Visit Summary.  Follow up plans and orders are seen below in the Assessment/Plan Section.    There are no diagnoses linked to this encounter.  Follow Up:  No follow-ups on file.     An After Visit Summary and PPPS were given to the patient.

## 2020-11-09 ENCOUNTER — OFFICE VISIT (OUTPATIENT)
Dept: INTERNAL MEDICINE | Facility: CLINIC | Age: 65
End: 2020-11-09

## 2020-11-09 VITALS
HEART RATE: 81 BPM | WEIGHT: 293 LBS | TEMPERATURE: 97.8 F | HEIGHT: 67 IN | BODY MASS INDEX: 45.99 KG/M2 | DIASTOLIC BLOOD PRESSURE: 82 MMHG | OXYGEN SATURATION: 97 % | SYSTOLIC BLOOD PRESSURE: 138 MMHG

## 2020-11-09 DIAGNOSIS — R53.83 FATIGUE, UNSPECIFIED TYPE: Primary | ICD-10-CM

## 2020-11-09 DIAGNOSIS — Z13.6 SCREENING FOR CARDIOVASCULAR CONDITION: ICD-10-CM

## 2020-11-09 DIAGNOSIS — R03.0 BORDERLINE HIGH BLOOD PRESSURE: ICD-10-CM

## 2020-11-09 LAB
ALBUMIN SERPL-MCNC: 3.7 G/DL (ref 3.5–5.2)
ALBUMIN/GLOB SERPL: 1.4 G/DL
ALP SERPL-CCNC: 87 U/L (ref 39–117)
ALT SERPL-CCNC: 20 U/L (ref 1–33)
AST SERPL-CCNC: 13 U/L (ref 1–32)
BASOPHILS # BLD AUTO: 0.05 10*3/MM3 (ref 0–0.2)
BASOPHILS NFR BLD AUTO: 0.7 % (ref 0–1.5)
BILIRUB SERPL-MCNC: 0.2 MG/DL (ref 0–1.2)
BUN SERPL-MCNC: 11 MG/DL (ref 8–23)
BUN/CREAT SERPL: 16.7 (ref 7–25)
CALCIUM SERPL-MCNC: 8.8 MG/DL (ref 8.6–10.5)
CHLORIDE SERPL-SCNC: 102 MMOL/L (ref 98–107)
CHOLEST SERPL-MCNC: 169 MG/DL (ref 0–200)
CHOLEST/HDLC SERPL: 3.25 {RATIO}
CO2 SERPL-SCNC: 29.9 MMOL/L (ref 22–29)
CREAT SERPL-MCNC: 0.66 MG/DL (ref 0.57–1)
EOSINOPHIL # BLD AUTO: 0.13 10*3/MM3 (ref 0–0.4)
EOSINOPHIL NFR BLD AUTO: 1.9 % (ref 0.3–6.2)
ERYTHROCYTE [DISTWIDTH] IN BLOOD BY AUTOMATED COUNT: 14.9 % (ref 12.3–15.4)
GLOBULIN SER CALC-MCNC: 2.7 GM/DL
GLUCOSE SERPL-MCNC: 103 MG/DL (ref 65–99)
HCT VFR BLD AUTO: 39.6 % (ref 34–46.6)
HDLC SERPL-MCNC: 52 MG/DL (ref 40–60)
HGB BLD-MCNC: 12.8 G/DL (ref 12–15.9)
IMM GRANULOCYTES # BLD AUTO: 0.04 10*3/MM3 (ref 0–0.05)
IMM GRANULOCYTES NFR BLD AUTO: 0.6 % (ref 0–0.5)
LDLC SERPL CALC-MCNC: 96 MG/DL (ref 0–100)
LYMPHOCYTES # BLD AUTO: 1.01 10*3/MM3 (ref 0.7–3.1)
LYMPHOCYTES NFR BLD AUTO: 14.5 % (ref 19.6–45.3)
MCH RBC QN AUTO: 26.2 PG (ref 26.6–33)
MCHC RBC AUTO-ENTMCNC: 32.3 G/DL (ref 31.5–35.7)
MCV RBC AUTO: 81 FL (ref 79–97)
MONOCYTES # BLD AUTO: 0.57 10*3/MM3 (ref 0.1–0.9)
MONOCYTES NFR BLD AUTO: 8.2 % (ref 5–12)
NEUTROPHILS # BLD AUTO: 5.18 10*3/MM3 (ref 1.7–7)
NEUTROPHILS NFR BLD AUTO: 74.1 % (ref 42.7–76)
NRBC BLD AUTO-RTO: 0.1 /100 WBC (ref 0–0.2)
PLATELET # BLD AUTO: 294 10*3/MM3 (ref 140–450)
POTASSIUM SERPL-SCNC: 4.4 MMOL/L (ref 3.5–5.2)
PROT SERPL-MCNC: 6.4 G/DL (ref 6–8.5)
RBC # BLD AUTO: 4.89 10*6/MM3 (ref 3.77–5.28)
SODIUM SERPL-SCNC: 137 MMOL/L (ref 136–145)
TRIGL SERPL-MCNC: 119 MG/DL (ref 0–150)
TSH SERPL DL<=0.005 MIU/L-ACNC: 5.03 UIU/ML (ref 0.27–4.2)
VLDLC SERPL CALC-MCNC: 21 MG/DL (ref 5–40)
WBC # BLD AUTO: 6.98 10*3/MM3 (ref 3.4–10.8)

## 2020-11-09 PROCEDURE — 99213 OFFICE O/P EST LOW 20 MIN: CPT | Performed by: FAMILY MEDICINE

## 2020-11-09 NOTE — PROGRESS NOTES
Subjective   Kortney Charlton is a 65 y.o. female.   CC: blood pressure  History of Present Illness   Kortney is a 65 year old female who comes in today for blood pressure check.      She went to donate blood on Sunday and was told that her blood pressure was elevated.  It was 168/104.  They had her sit and wait a bit and rechecked it and it was about the same.  She ended up going to the [facility] on Sunday evening.  She saw a NP.  BP there was 158/72.  She was given reassurance and sent home.  Has not checked her blood pressure today.  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  She feels fine and is not having any symptoms whatsoever.  No personal history of hypertension.  Her mother had hypertension.  Had taken allegra D on Sunday but takes it most days in the fall.  NP told her to stop this.      The following portions of the patient's history were reviewed and updated as appropriate: allergies, current medications, past family history, past medical history, past social history, past surgical history and problem list.    Review of Systems   Constitutional: Positive for fatigue. Negative for chills, diaphoresis and fever.   HENT: Negative for congestion.    Respiratory: Negative for cough, chest tightness, shortness of breath and wheezing.    Cardiovascular: Negative for chest pain, palpitations and leg swelling.   Gastrointestinal: Negative for abdominal pain, constipation, diarrhea, nausea and vomiting.   Skin: Negative.  Negative for rash.   Neurological: Negative for dizziness, tremors, syncope, speech difficulty, weakness, light-headedness, numbness and headaches.       Objective   Physical Exam  Vitals signs and nursing note reviewed.   Constitutional:       Appearance: Normal appearance. She is obese.   Neck:      Musculoskeletal: Normal range of motion and neck supple.   Cardiovascular:      Rate and Rhythm: Normal rate and regular rhythm.   Pulmonary:      Effort: Pulmonary effort is normal.      Breath sounds:  "Normal breath sounds.   Skin:     General: Skin is warm and dry.      Findings: No rash.   Neurological:      General: No focal deficit present.      Mental Status: She is alert and oriented to person, place, and time.   Psychiatric:         Mood and Affect: Mood normal.         Thought Content: Thought content normal.       Vitals:    11/09/20 1527   BP: 138/82   BP Location: Left arm   Patient Position: Sitting   Cuff Size: Adult   Pulse: 81   Temp: 97.8 °F (36.6 °C)   SpO2: 97%   Weight: (!) 181 kg (399 lb)   Height: 170.2 cm (67\")     Assessment/Plan   Diagnoses and all orders for this visit:    1. Fatigue, unspecified type (Primary)  -     TSH  -     CBC & Differential    2. Borderline high blood pressure  -     Comprehensive Metabolic Panel  -     Lipid Panel With / Chol / HDL Ratio    3. Screening for cardiovascular condition  -     Lipid Panel With / Chol / HDL Ratio      She will continue to monitor her blood pressure at home or at [facility].  If it remains elevated and her labs are normal she may need to see cardiologist for further evaluation.         "

## 2021-02-17 ENCOUNTER — OFFICE VISIT (OUTPATIENT)
Dept: FAMILY MEDICINE CLINIC | Facility: CLINIC | Age: 66
End: 2021-02-17

## 2021-02-17 VITALS
HEART RATE: 74 BPM | OXYGEN SATURATION: 92 % | BODY MASS INDEX: 45.99 KG/M2 | HEIGHT: 67 IN | TEMPERATURE: 97.8 F | SYSTOLIC BLOOD PRESSURE: 148 MMHG | DIASTOLIC BLOOD PRESSURE: 80 MMHG | WEIGHT: 293 LBS

## 2021-02-17 DIAGNOSIS — R79.89 ABNORMAL TSH: ICD-10-CM

## 2021-02-17 DIAGNOSIS — Z12.31 VISIT FOR SCREENING MAMMOGRAM: ICD-10-CM

## 2021-02-17 DIAGNOSIS — R92.8 ABNORMAL MAMMOGRAM: ICD-10-CM

## 2021-02-17 DIAGNOSIS — E66.01 MORBID OBESITY (HCC): Primary | ICD-10-CM

## 2021-02-17 DIAGNOSIS — I87.2 VENOUS STASIS DERMATITIS OF BOTH LOWER EXTREMITIES: ICD-10-CM

## 2021-02-17 DIAGNOSIS — R94.6 ABNORMAL RESULTS OF THYROID FUNCTION STUDIES: ICD-10-CM

## 2021-02-17 DIAGNOSIS — R03.0 ELEVATED BP WITHOUT DIAGNOSIS OF HYPERTENSION: ICD-10-CM

## 2021-02-17 DIAGNOSIS — Z78.0 POST-MENOPAUSAL: ICD-10-CM

## 2021-02-17 DIAGNOSIS — L13.1 SNEDDON-WILKINSON DISEASE: ICD-10-CM

## 2021-02-17 PROCEDURE — 99214 OFFICE O/P EST MOD 30 MIN: CPT | Performed by: FAMILY MEDICINE

## 2021-02-17 RX ORDER — FUROSEMIDE 20 MG/1
20 TABLET ORAL DAILY
Qty: 90 TABLET | Refills: 1 | Status: SHIPPED | OUTPATIENT
Start: 2021-02-17 | End: 2021-07-19

## 2021-02-17 NOTE — PROGRESS NOTES
Subjective   Kortney Charlton is a 65 y.o. female.     Chief Complaint   Patient presents with   • Establish Care       History of Present Illness   Obesity- chronic. Is working on diet and exercise. Has been able to loose 100-140 pounds at a time on different diets but it always comes back.     Borderline htn- mostly at home 130/80.     Edema- in legs and tries leg wraps. Not bothersome. Has seen cardiology and is never SOA. Can lay flat at night.     Sneddon-barragan disease- follows with derm, is a psoriatic disease. 2-3 flairs a year.     Slightly abnormal thyroid a few months ago, no symptoms.     The following portions of the patient's history were reviewed and updated as appropriate: allergies, current medications, past family history, past medical history, past social history, past surgical history and problem list.    Past Medical History:   Diagnosis Date   • Chest pain 03/22/2018   • Chronic nasopharyngitis    • Elevated BP without diagnosis of hypertension    • Morbid obesity (CMS/HCC)    • Osteoarthritis    • Stasis dermatitis        Past Surgical History:   Procedure Laterality Date   • HYSTEROSCOPY ENDOMETRIAL ABLATION     • KNEE SURGERY Bilateral    • LAPAROSCOPIC CHOLECYSTECTOMY  10/2011   • OVARIAN CYST REMOVAL         Family History   Problem Relation Age of Onset   • Hypertension Mother    • Arthritis Mother    • Prostate cancer Father    • Hypertension Father        Social History     Socioeconomic History   • Marital status:      Spouse name: Not on file   • Number of children: Not on file   • Years of education: Not on file   • Highest education level: Not on file   Tobacco Use   • Smoking status: Never Smoker   • Smokeless tobacco: Never Used   Substance and Sexual Activity   • Alcohol use: Yes     Comment: occasional   • Drug use: No   • Sexual activity: Not Currently     Partners: Male       Review of Systems   Constitutional: Negative for fatigue and fever.   HENT: Negative for  "ear pain and hearing loss.    Eyes: Negative for pain and visual disturbance.   Respiratory: Negative for chest tightness and shortness of breath.    Cardiovascular: Negative for chest pain and palpitations.   Gastrointestinal: Negative for constipation and diarrhea.   Genitourinary: Negative for dysuria and urinary incontinence.   Musculoskeletal: Negative for arthralgias and myalgias.   Skin: Negative for rash and skin lesions.   Neurological: Negative for headache and memory problem.   Psychiatric/Behavioral: Negative for depressed mood. The patient is not nervous/anxious.        Objective   Visit Vitals  /80 (BP Location: Right arm, Patient Position: Sitting)   Pulse 74   Temp 97.8 °F (36.6 °C)   Ht 170.2 cm (67.01\")   Wt (!) 188 kg (414 lb)   LMP  (LMP Unknown)   SpO2 92%   BMI 64.83 kg/m²     Body mass index is 64.83 kg/m².  Physical Exam  Constitutional:       Appearance: Normal appearance. She is well-developed.   Cardiovascular:      Rate and Rhythm: Normal rate and regular rhythm.      Heart sounds: Normal heart sounds.   Pulmonary:      Effort: Pulmonary effort is normal.      Breath sounds: Normal breath sounds.   Musculoskeletal: Normal range of motion.      Comments: Pitting edema 4+ in bilat legs.    Skin:     General: Skin is warm and dry.      Findings: No rash.   Neurological:      General: No focal deficit present.      Mental Status: She is alert and oriented to person, place, and time.   Psychiatric:         Mood and Affect: Mood normal.         Behavior: Behavior normal.           Assessment/Plan   Diagnoses and all orders for this visit:    1. Morbid obesity (CMS/HCC) (Primary)    2. Elevated BP without diagnosis of hypertension    3. Venous stasis dermatitis of both lower extremities  -     furosemide (Lasix) 20 MG tablet; Take 1 tablet by mouth Daily.  Dispense: 90 tablet; Refill: 1    4. Sneddon-Hanna disease    5. Abnormal TSH  -     Cancel: TSH Rfx On Abnormal To Free T4    6. " Visit for screening mammogram  -     Mammo Diagnostic Bilateral With CAD; Future    7. Post-menopausal  -     DEXA Bone Density Axial; Future    8. Abnormal mammogram  -     Mammo Diagnostic Bilateral With CAD; Future    9. Abnormal results of thyroid function studies   -     Cancel: TSH Rfx On Abnormal To Free T4          Pt will f/u in 1 month and will get a cmp. Risks and benefits of lasix discussed  And pt declines K. Monitor BP and f/u for highs. Pt will consider contrave or gastric bypass/sleeve surgery. Discussed diet and exercise. Will recheck TSH at that time. Will get shots utd after she finished covid shots.

## 2021-03-02 ENCOUNTER — TELEPHONE (OUTPATIENT)
Dept: FAMILY MEDICINE CLINIC | Facility: CLINIC | Age: 66
End: 2021-03-02

## 2021-03-02 DIAGNOSIS — R03.0 ELEVATED BP WITHOUT DIAGNOSIS OF HYPERTENSION: Primary | ICD-10-CM

## 2021-03-02 DIAGNOSIS — I87.2 VENOUS STASIS DERMATITIS OF BOTH LOWER EXTREMITIES: ICD-10-CM

## 2021-03-02 NOTE — TELEPHONE ENCOUNTER
lvm for pt to call and schedule lab appointment, Dr. Hastings put lab orders in computer.    Ok for hub to read

## 2021-03-02 NOTE — TELEPHONE ENCOUNTER
Patient states that Dr Hastings was wanting her to get some labs redone.  She was hoping to get that done before her appointment scheduled on 03/12.  She wants to know if the orders could be put in and someone let her know if she can get these done.  Phone number is [phone].

## 2021-03-12 ENCOUNTER — OFFICE VISIT (OUTPATIENT)
Dept: FAMILY MEDICINE CLINIC | Facility: CLINIC | Age: 66
End: 2021-03-12

## 2021-03-12 VITALS
SYSTOLIC BLOOD PRESSURE: 130 MMHG | OXYGEN SATURATION: 93 % | WEIGHT: 293 LBS | TEMPERATURE: 96.9 F | DIASTOLIC BLOOD PRESSURE: 76 MMHG | HEIGHT: 67 IN | HEART RATE: 71 BPM | BODY MASS INDEX: 45.99 KG/M2

## 2021-03-12 DIAGNOSIS — R03.0 ELEVATED BP WITHOUT DIAGNOSIS OF HYPERTENSION: ICD-10-CM

## 2021-03-12 DIAGNOSIS — I87.2 VENOUS STASIS DERMATITIS OF BOTH LOWER EXTREMITIES: ICD-10-CM

## 2021-03-12 DIAGNOSIS — E66.01 MORBID OBESITY (HCC): Primary | ICD-10-CM

## 2021-03-12 PROCEDURE — 99214 OFFICE O/P EST MOD 30 MIN: CPT | Performed by: FAMILY MEDICINE

## 2021-03-12 NOTE — PROGRESS NOTES
"Subjective   Kortney Charlton is a 65 y.o. female.     Chief Complaint   Patient presents with   • Obesity       History of Present Illness   Obesity-still working hard on diet and exercise.  Has lost 1 pound since her last visit.    Started Lasix 1 month ago. Swelling in legs much better. Got labs ahead of visit and reviewed cmp.     High blood pressure without diagnosis of hypertension -has been better at home.      The following portions of the patient's history were reviewed and updated as appropriate: allergies, current medications, past family history, past medical history, past social history, past surgical history and problem list.    Past Medical History:   Diagnosis Date   • Chest pain 03/22/2018   • Chronic nasopharyngitis    • Elevated BP without diagnosis of hypertension    • Morbid obesity (CMS/HCC)    • Osteoarthritis    • Stasis dermatitis        Past Surgical History:   Procedure Laterality Date   • HYSTEROSCOPY ENDOMETRIAL ABLATION     • KNEE SURGERY Bilateral    • LAPAROSCOPIC CHOLECYSTECTOMY  10/2011   • OVARIAN CYST REMOVAL         Family History   Problem Relation Age of Onset   • Hypertension Mother    • Arthritis Mother    • Prostate cancer Father    • Hypertension Father        Social History     Socioeconomic History   • Marital status:      Spouse name: Not on file   • Number of children: Not on file   • Years of education: Not on file   • Highest education level: Not on file   Tobacco Use   • Smoking status: Never Smoker   • Smokeless tobacco: Never Used   Substance and Sexual Activity   • Alcohol use: Yes     Comment: occasional   • Drug use: No   • Sexual activity: Not Currently     Partners: Male       Review of Systems   Constitutional: Negative for fever.   Respiratory: Negative for shortness of breath.        Objective   Visit Vitals  /76 (BP Location: Right arm, Patient Position: Sitting)   Pulse 71   Temp 96.9 °F (36.1 °C)   Ht 170.2 cm (67.01\")   Wt (!) 188 kg (413 " lb 6.4 oz)   LMP  (LMP Unknown)   SpO2 93%   BMI 64.73 kg/m²     Body mass index is 64.73 kg/m².  Physical Exam  Constitutional:       Appearance: Normal appearance. She is well-developed.   Cardiovascular:      Rate and Rhythm: Normal rate and regular rhythm.      Heart sounds: Normal heart sounds.   Pulmonary:      Effort: Pulmonary effort is normal.      Breath sounds: Normal breath sounds.   Musculoskeletal:         General: No swelling. Normal range of motion.      Left lower leg: Edema present.   Skin:     General: Skin is warm and dry.      Findings: No rash.   Neurological:      General: No focal deficit present.      Mental Status: She is alert and oriented to person, place, and time.   Psychiatric:         Mood and Affect: Mood normal.         Behavior: Behavior normal.           Assessment/Plan   Diagnoses and all orders for this visit:    1. Morbid obesity (CMS/HCC) (Primary)    2. Venous stasis dermatitis of both lower extremities    3. Elevated BP without diagnosis of hypertension    Other orders  -     Cancel: Comprehensive Metabolic Panel  -     Cancel: TSH Rfx On Abnormal To Free T4        Doing well, cont meds, reviewed labs done on Monday and f/u in 6 months.

## 2021-03-22 ENCOUNTER — BULK ORDERING (OUTPATIENT)
Dept: CASE MANAGEMENT | Facility: OTHER | Age: 66
End: 2021-03-22

## 2021-03-22 DIAGNOSIS — Z23 IMMUNIZATION DUE: ICD-10-CM

## 2021-05-11 ENCOUNTER — APPOINTMENT (OUTPATIENT)
Dept: WOMENS IMAGING | Facility: HOSPITAL | Age: 66
End: 2021-05-11

## 2021-05-11 PROCEDURE — 77067 SCR MAMMO BI INCL CAD: CPT | Performed by: RADIOLOGY

## 2021-05-11 PROCEDURE — 77063 BREAST TOMOSYNTHESIS BI: CPT | Performed by: RADIOLOGY

## 2021-05-18 DIAGNOSIS — R92.8 ABNORMAL MAMMOGRAM: ICD-10-CM

## 2021-05-18 DIAGNOSIS — Z12.31 VISIT FOR SCREENING MAMMOGRAM: ICD-10-CM

## 2021-07-19 DIAGNOSIS — I87.2 VENOUS STASIS DERMATITIS OF BOTH LOWER EXTREMITIES: ICD-10-CM

## 2021-07-19 RX ORDER — FUROSEMIDE 20 MG/1
TABLET ORAL
Qty: 90 TABLET | Refills: 0 | Status: SHIPPED | OUTPATIENT
Start: 2021-07-19 | End: 2021-09-22 | Stop reason: SDUPTHER

## 2021-09-22 ENCOUNTER — OFFICE VISIT (OUTPATIENT)
Dept: FAMILY MEDICINE CLINIC | Facility: CLINIC | Age: 66
End: 2021-09-22

## 2021-09-22 VITALS
OXYGEN SATURATION: 94 % | TEMPERATURE: 97.1 F | HEART RATE: 72 BPM | DIASTOLIC BLOOD PRESSURE: 74 MMHG | WEIGHT: 293 LBS | HEIGHT: 67 IN | BODY MASS INDEX: 45.99 KG/M2 | SYSTOLIC BLOOD PRESSURE: 142 MMHG

## 2021-09-22 DIAGNOSIS — E66.01 MORBID OBESITY (HCC): Primary | ICD-10-CM

## 2021-09-22 DIAGNOSIS — L13.1 SNEDDON-WILKINSON DISEASE: ICD-10-CM

## 2021-09-22 DIAGNOSIS — R79.89 ABNORMAL TSH: ICD-10-CM

## 2021-09-22 DIAGNOSIS — I87.2 VENOUS STASIS DERMATITIS OF BOTH LOWER EXTREMITIES: ICD-10-CM

## 2021-09-22 DIAGNOSIS — R03.0 ELEVATED BP WITHOUT DIAGNOSIS OF HYPERTENSION: ICD-10-CM

## 2021-09-22 DIAGNOSIS — L29.9 PRURITUS, UNSPECIFIED: ICD-10-CM

## 2021-09-22 PROCEDURE — 99214 OFFICE O/P EST MOD 30 MIN: CPT | Performed by: FAMILY MEDICINE

## 2021-09-22 RX ORDER — FUROSEMIDE 20 MG/1
20 TABLET ORAL DAILY
Qty: 90 TABLET | Refills: 3 | Status: SHIPPED | OUTPATIENT
Start: 2021-09-22 | End: 2022-12-08

## 2021-09-22 NOTE — PROGRESS NOTES
"Subjective   Kortney Charlton is a 66 y.o. female.     Chief Complaint   Patient presents with   • Obesity       History of Present Illness   Obesity-still working hard on diet and exercise.  Has lost 15 pounds since her last visit!     Swelling in legs much better. On meds, due labs    Sneddon-barragan disease- follows with derm, is a psoriatic disease. 2-3 flairs a year. Stable. Last flair a month ago.          The following portions of the patient's history were reviewed and updated as appropriate: allergies, current medications, past family history, past medical history, past social history, past surgical history and problem list.    Past Medical History:   Diagnosis Date   • Chest pain 03/22/2018   • Chronic nasopharyngitis    • Elevated BP without diagnosis of hypertension    • Morbid obesity (CMS/HCC)    • Osteoarthritis    • Stasis dermatitis        Past Surgical History:   Procedure Laterality Date   • HYSTEROSCOPY ENDOMETRIAL ABLATION     • KNEE SURGERY Bilateral    • LAPAROSCOPIC CHOLECYSTECTOMY  10/2011   • OVARIAN CYST REMOVAL         Family History   Problem Relation Age of Onset   • Hypertension Mother    • Arthritis Mother    • Prostate cancer Father    • Hypertension Father        Social History     Socioeconomic History   • Marital status:      Spouse name: Not on file   • Number of children: Not on file   • Years of education: Not on file   • Highest education level: Not on file   Tobacco Use   • Smoking status: Never Smoker   • Smokeless tobacco: Never Used   Substance and Sexual Activity   • Alcohol use: Yes     Comment: occasional   • Drug use: No   • Sexual activity: Not Currently     Partners: Male       Review of Systems   Constitutional: Negative for fever.   Respiratory: Negative for shortness of breath.        Objective   Visit Vitals  /74 (BP Location: Left arm, Patient Position: Sitting)   Pulse 72   Temp 97.1 °F (36.2 °C)   Ht 170.2 cm (67.01\")   Wt (!) 181 kg (398 lb " 6.4 oz)   LMP  (LMP Unknown)   SpO2 94%   BMI 62.38 kg/m²     Body mass index is 62.38 kg/m².  Physical Exam  Constitutional:       Appearance: Normal appearance. She is well-developed.   Cardiovascular:      Rate and Rhythm: Normal rate and regular rhythm.      Heart sounds: Normal heart sounds.   Pulmonary:      Effort: Pulmonary effort is normal.      Breath sounds: Normal breath sounds.   Musculoskeletal:         General: No swelling. Normal range of motion.      Right lower leg: Edema present.      Left lower leg: Edema present.   Skin:     General: Skin is warm and dry.      Findings: No rash.   Neurological:      General: No focal deficit present.      Mental Status: She is alert and oriented to person, place, and time.   Psychiatric:         Mood and Affect: Mood normal.         Behavior: Behavior normal.           Assessment/Plan   Diagnoses and all orders for this visit:    1. Morbid obesity (CMS/HCC) (Primary)  -     Comprehensive Metabolic Panel  -     TSH Rfx On Abnormal To Free T4  -     CBC & Differential    2. Venous stasis dermatitis of both lower extremities  -     Comprehensive Metabolic Panel  -     TSH Rfx On Abnormal To Free T4  -     furosemide (LASIX) 20 MG tablet; Take 1 tablet by mouth Daily.  Dispense: 90 tablet; Refill: 3  -     CBC & Differential    3. Abnormal TSH  -     Comprehensive Metabolic Panel  -     TSH Rfx On Abnormal To Free T4  -     CBC & Differential    4. Sneddon-Hanna disease  -     Comprehensive Metabolic Panel  -     TSH Rfx On Abnormal To Free T4  -     CBC & Differential    5. Elevated BP without diagnosis of hypertension  -     TSH Rfx On Abnormal To Free T4  -     CBC & Differential    6. Pruritus, unspecified   -     TSH Rfx On Abnormal To Free T4        Doing well, cont meds, and f/u in 6 months. Will add in K if needed.

## 2021-09-23 LAB
BASOPHILS # BLD AUTO: 0.06 10*3/MM3 (ref 0–0.2)
BASOPHILS NFR BLD AUTO: 0.9 % (ref 0–1.5)
EOSINOPHIL # BLD AUTO: 0.15 10*3/MM3 (ref 0–0.4)
EOSINOPHIL NFR BLD AUTO: 2.2 % (ref 0.3–6.2)
ERYTHROCYTE [DISTWIDTH] IN BLOOD BY AUTOMATED COUNT: 14.3 % (ref 12.3–15.4)
HCT VFR BLD AUTO: 44.1 % (ref 34–46.6)
HGB BLD-MCNC: 14 G/DL (ref 12–15.9)
IMM GRANULOCYTES # BLD AUTO: 0.03 10*3/MM3 (ref 0–0.05)
IMM GRANULOCYTES NFR BLD AUTO: 0.4 % (ref 0–0.5)
LYMPHOCYTES # BLD AUTO: 0.85 10*3/MM3 (ref 0.7–3.1)
LYMPHOCYTES NFR BLD AUTO: 12.4 % (ref 19.6–45.3)
MCH RBC QN AUTO: 26 PG (ref 26.6–33)
MCHC RBC AUTO-ENTMCNC: 31.7 G/DL (ref 31.5–35.7)
MCV RBC AUTO: 82 FL (ref 79–97)
MONOCYTES # BLD AUTO: 0.48 10*3/MM3 (ref 0.1–0.9)
MONOCYTES NFR BLD AUTO: 7 % (ref 5–12)
NEUTROPHILS # BLD AUTO: 5.3 10*3/MM3 (ref 1.7–7)
NEUTROPHILS NFR BLD AUTO: 77.1 % (ref 42.7–76)
NRBC BLD AUTO-RTO: 0 /100 WBC (ref 0–0.2)
PLATELET # BLD AUTO: 240 10*3/MM3 (ref 140–450)
RBC # BLD AUTO: 5.38 10*6/MM3 (ref 3.77–5.28)
REQUEST PROBLEM: NORMAL
T4 FREE SERPL-MCNC: 1.07 NG/DL (ref 0.93–1.7)
TSH SERPL DL<=0.005 MIU/L-ACNC: 4.82 UIU/ML (ref 0.27–4.2)
WBC # BLD AUTO: 6.87 10*3/MM3 (ref 3.4–10.8)

## 2021-09-24 ENCOUNTER — TELEPHONE (OUTPATIENT)
Dept: FAMILY MEDICINE CLINIC | Facility: CLINIC | Age: 66
End: 2021-09-24

## 2021-09-24 NOTE — TELEPHONE ENCOUNTER
Caller: Kortney Charlton    Relationship: Self    Best call back number: [phone]    Caller requesting test results: LAB RESULTS FROM 9/22    What test was performed: T4F, CBC 7 DIFFERENTIAL, TSH RFX, COMPREHENSIVE METABOLIC PANEL    Additional notes: PATIENT WOULD LIKE RESULTS MAILED TO HER   [address] [place] [id number]

## 2021-09-27 DIAGNOSIS — L13.1 SNEDDON-WILKINSON DISEASE: ICD-10-CM

## 2021-09-27 DIAGNOSIS — R03.0 ELEVATED BP WITHOUT DIAGNOSIS OF HYPERTENSION: Primary | ICD-10-CM

## 2021-09-28 DIAGNOSIS — L13.1 SNEDDON-WILKINSON DISEASE: ICD-10-CM

## 2021-09-28 DIAGNOSIS — R03.0 ELEVATED BP WITHOUT DIAGNOSIS OF HYPERTENSION: ICD-10-CM

## 2021-09-29 LAB
ALBUMIN SERPL-MCNC: 4.1 G/DL (ref 3.5–5.2)
ALBUMIN/GLOB SERPL: 1.6 G/DL
ALP SERPL-CCNC: 89 U/L (ref 39–117)
ALT SERPL-CCNC: 19 U/L (ref 1–33)
AST SERPL-CCNC: 17 U/L (ref 1–32)
BILIRUB SERPL-MCNC: 0.2 MG/DL (ref 0–1.2)
BUN SERPL-MCNC: 13 MG/DL (ref 8–23)
BUN/CREAT SERPL: 20.6 (ref 7–25)
CALCIUM SERPL-MCNC: 9.2 MG/DL (ref 8.6–10.5)
CHLORIDE SERPL-SCNC: 105 MMOL/L (ref 98–107)
CO2 SERPL-SCNC: 27.6 MMOL/L (ref 22–29)
CREAT SERPL-MCNC: 0.63 MG/DL (ref 0.57–1)
GLOBULIN SER CALC-MCNC: 2.6 GM/DL
GLUCOSE SERPL-MCNC: 103 MG/DL (ref 65–99)
POTASSIUM SERPL-SCNC: 4.2 MMOL/L (ref 3.5–5.2)
PROT SERPL-MCNC: 6.7 G/DL (ref 6–8.5)
SODIUM SERPL-SCNC: 144 MMOL/L (ref 136–145)

## 2021-12-03 ENCOUNTER — APPOINTMENT (OUTPATIENT)
Dept: GENERAL RADIOLOGY | Facility: HOSPITAL | Age: 66
End: 2021-12-03

## 2021-12-03 ENCOUNTER — HOSPITAL ENCOUNTER (OUTPATIENT)
Facility: HOSPITAL | Age: 66
Setting detail: OBSERVATION
Discharge: HOME | End: 2021-12-04
Attending: EMERGENCY MEDICINE | Admitting: EMERGENCY MEDICINE

## 2021-12-03 DIAGNOSIS — R07.9 CHEST PAIN IN ADULT: Primary | ICD-10-CM

## 2021-12-03 PROBLEM — R07.89 ATYPICAL CHEST PAIN: Status: ACTIVE | Noted: 2021-12-03

## 2021-12-03 LAB
ALBUMIN SERPL-MCNC: 4 G/DL (ref 3.5–5.2)
ALBUMIN/GLOB SERPL: 1.1 G/DL
ALP SERPL-CCNC: 91 U/L (ref 39–117)
ALT SERPL W P-5'-P-CCNC: 19 U/L (ref 1–33)
ANION GAP SERPL CALCULATED.3IONS-SCNC: 8.8 MMOL/L (ref 5–15)
AST SERPL-CCNC: 18 U/L (ref 1–32)
BASOPHILS # BLD AUTO: 0.05 10*3/MM3 (ref 0–0.2)
BASOPHILS NFR BLD AUTO: 0.6 % (ref 0–1.5)
BILIRUB SERPL-MCNC: 0.3 MG/DL (ref 0–1.2)
BUN SERPL-MCNC: 13 MG/DL (ref 8–23)
BUN/CREAT SERPL: 19.4 (ref 7–25)
CALCIUM SPEC-SCNC: 9.5 MG/DL (ref 8.6–10.5)
CHLORIDE SERPL-SCNC: 100 MMOL/L (ref 98–107)
CHOLEST SERPL-MCNC: 160 MG/DL (ref 0–200)
CO2 SERPL-SCNC: 30.2 MMOL/L (ref 22–29)
CREAT SERPL-MCNC: 0.67 MG/DL (ref 0.57–1)
DEPRECATED RDW RBC AUTO: 39.3 FL (ref 37–54)
EOSINOPHIL # BLD AUTO: 0.18 10*3/MM3 (ref 0–0.4)
EOSINOPHIL NFR BLD AUTO: 2.2 % (ref 0.3–6.2)
ERYTHROCYTE [DISTWIDTH] IN BLOOD BY AUTOMATED COUNT: 13.8 % (ref 12.3–15.4)
GFR SERPL CREATININE-BSD FRML MDRD: 88 ML/MIN/1.73
GLOBULIN UR ELPH-MCNC: 3.6 GM/DL
GLUCOSE SERPL-MCNC: 97 MG/DL (ref 65–99)
HCT VFR BLD AUTO: 41.9 % (ref 34–46.6)
HDLC SERPL-MCNC: 47 MG/DL (ref 40–60)
HGB BLD-MCNC: 13.6 G/DL (ref 12–15.9)
HOLD SPECIMEN: NORMAL
HOLD SPECIMEN: NORMAL
IMM GRANULOCYTES # BLD AUTO: 0.04 10*3/MM3 (ref 0–0.05)
IMM GRANULOCYTES NFR BLD AUTO: 0.5 % (ref 0–0.5)
LDLC SERPL CALC-MCNC: 89 MG/DL (ref 0–100)
LDLC/HDLC SERPL: 1.83 {RATIO}
LYMPHOCYTES # BLD AUTO: 0.99 10*3/MM3 (ref 0.7–3.1)
LYMPHOCYTES NFR BLD AUTO: 12.2 % (ref 19.6–45.3)
MCH RBC QN AUTO: 26 PG (ref 26.6–33)
MCHC RBC AUTO-ENTMCNC: 32.5 G/DL (ref 31.5–35.7)
MCV RBC AUTO: 80.1 FL (ref 79–97)
MONOCYTES # BLD AUTO: 0.59 10*3/MM3 (ref 0.1–0.9)
MONOCYTES NFR BLD AUTO: 7.3 % (ref 5–12)
NEUTROPHILS NFR BLD AUTO: 6.25 10*3/MM3 (ref 1.7–7)
NEUTROPHILS NFR BLD AUTO: 77.2 % (ref 42.7–76)
NRBC BLD AUTO-RTO: 0 /100 WBC (ref 0–0.2)
PLATELET # BLD AUTO: 295 10*3/MM3 (ref 140–450)
PMV BLD AUTO: 9.8 FL (ref 6–12)
POTASSIUM SERPL-SCNC: 4 MMOL/L (ref 3.5–5.2)
PROT SERPL-MCNC: 7.6 G/DL (ref 6–8.5)
QT INTERVAL: 422 MS
RBC # BLD AUTO: 5.23 10*6/MM3 (ref 3.77–5.28)
SODIUM SERPL-SCNC: 139 MMOL/L (ref 136–145)
TRIGL SERPL-MCNC: 134 MG/DL (ref 0–150)
TROPONIN T SERPL-MCNC: <0.01 NG/ML (ref 0–0.03)
TROPONIN T SERPL-MCNC: <0.01 NG/ML (ref 0–0.03)
VLDLC SERPL-MCNC: 24 MG/DL (ref 5–40)
WBC NRBC COR # BLD: 8.1 10*3/MM3 (ref 3.4–10.8)
WHOLE BLOOD HOLD SPECIMEN: NORMAL
WHOLE BLOOD HOLD SPECIMEN: NORMAL

## 2021-12-03 PROCEDURE — 80053 COMPREHEN METABOLIC PANEL: CPT

## 2021-12-03 PROCEDURE — 80061 LIPID PANEL: CPT | Performed by: NURSE PRACTITIONER

## 2021-12-03 PROCEDURE — 36415 COLL VENOUS BLD VENIPUNCTURE: CPT

## 2021-12-03 PROCEDURE — 84484 ASSAY OF TROPONIN QUANT: CPT | Performed by: NURSE PRACTITIONER

## 2021-12-03 PROCEDURE — G0378 HOSPITAL OBSERVATION PER HR: HCPCS

## 2021-12-03 PROCEDURE — 84484 ASSAY OF TROPONIN QUANT: CPT

## 2021-12-03 PROCEDURE — 99283 EMERGENCY DEPT VISIT LOW MDM: CPT

## 2021-12-03 PROCEDURE — 71046 X-RAY EXAM CHEST 2 VIEWS: CPT

## 2021-12-03 PROCEDURE — 93005 ELECTROCARDIOGRAM TRACING: CPT | Performed by: EMERGENCY MEDICINE

## 2021-12-03 PROCEDURE — 85025 COMPLETE CBC W/AUTO DIFF WBC: CPT

## 2021-12-03 PROCEDURE — 93005 ELECTROCARDIOGRAM TRACING: CPT

## 2021-12-03 PROCEDURE — 93010 ELECTROCARDIOGRAM REPORT: CPT | Performed by: INTERNAL MEDICINE

## 2021-12-03 RX ORDER — ASPIRIN 81 MG/1
81 TABLET ORAL DAILY
Status: DISCONTINUED | OUTPATIENT
Start: 2021-12-03 | End: 2021-12-03

## 2021-12-03 RX ORDER — SODIUM CHLORIDE 0.9 % (FLUSH) 0.9 %
10 SYRINGE (ML) INJECTION EVERY 12 HOURS SCHEDULED
Status: DISCONTINUED | OUTPATIENT
Start: 2021-12-03 | End: 2021-12-04 | Stop reason: HOSPADM

## 2021-12-03 RX ORDER — NITROGLYCERIN 0.4 MG/1
0.4 TABLET SUBLINGUAL
Status: DISCONTINUED | OUTPATIENT
Start: 2021-12-03 | End: 2021-12-04 | Stop reason: HOSPADM

## 2021-12-03 RX ORDER — ASPIRIN 325 MG
325 TABLET ORAL ONCE
Status: DISCONTINUED | OUTPATIENT
Start: 2021-12-03 | End: 2021-12-03

## 2021-12-03 RX ORDER — SODIUM CHLORIDE 0.9 % (FLUSH) 0.9 %
10 SYRINGE (ML) INJECTION AS NEEDED
Status: DISCONTINUED | OUTPATIENT
Start: 2021-12-03 | End: 2021-12-04 | Stop reason: HOSPADM

## 2021-12-03 RX ORDER — SODIUM CHLORIDE, SODIUM LACTATE, POTASSIUM CHLORIDE, CALCIUM CHLORIDE 600; 310; 30; 20 MG/100ML; MG/100ML; MG/100ML; MG/100ML
100 INJECTION, SOLUTION INTRAVENOUS CONTINUOUS
Status: DISCONTINUED | OUTPATIENT
Start: 2021-12-04 | End: 2021-12-04 | Stop reason: HOSPADM

## 2021-12-03 RX ORDER — ASPIRIN 81 MG/1
81 TABLET ORAL DAILY
Status: DISCONTINUED | OUTPATIENT
Start: 2021-12-04 | End: 2021-12-04 | Stop reason: HOSPADM

## 2021-12-03 RX ORDER — FUROSEMIDE 20 MG/1
20 TABLET ORAL DAILY
Status: DISCONTINUED | OUTPATIENT
Start: 2021-12-03 | End: 2021-12-04 | Stop reason: HOSPADM

## 2021-12-03 RX ORDER — ONDANSETRON 4 MG/1
4 TABLET, FILM COATED ORAL EVERY 6 HOURS PRN
Status: DISCONTINUED | OUTPATIENT
Start: 2021-12-03 | End: 2021-12-04 | Stop reason: HOSPADM

## 2021-12-03 RX ORDER — GUAIFENESIN/DEXTROMETHORPHAN 100-10MG/5
5 SYRUP ORAL EVERY 4 HOURS PRN
Status: DISCONTINUED | OUTPATIENT
Start: 2021-12-03 | End: 2021-12-04 | Stop reason: HOSPADM

## 2021-12-03 RX ORDER — ONDANSETRON 2 MG/ML
4 INJECTION INTRAMUSCULAR; INTRAVENOUS EVERY 6 HOURS PRN
Status: DISCONTINUED | OUTPATIENT
Start: 2021-12-03 | End: 2021-12-04 | Stop reason: HOSPADM

## 2021-12-03 NOTE — ED NOTES
Nursing report ED to floor  Kortney Charlton  66 y.o.  female    HPI :   Chief Complaint   Patient presents with   • Chest Pain       Admitting doctor:   Ayde Cooper MD    Admitting diagnosis:   The encounter diagnosis was Chest pain in adult.    Code status:   Current Code Status     Date Active Code Status Order ID Comments User Context       Not on file    Advance Care Planning Activity          Allergies:   Zoster vac recomb adjuvanted and Other    Intake and Output  No intake or output data in the 24 hours ending 12/03/21 1833    Weight:   There were no vitals filed for this visit.    Most recent vitals:   Vitals:    12/03/21 1255 12/03/21 1303   BP:  130/79   Pulse: 70    Resp: 18    Temp: 97.7 °F (36.5 °C)    SpO2: 96%        Active LDAs/IV Access:   Lines, Drains & Airways     Active LDAs     Name Placement date Placement time Site Days    Peripheral IV 12/03/21 1830 Right; Medial; Posterior Forearm 12/03/21 1830  Forearm  less than 1                Labs (abnormal labs have a star):   Labs Reviewed   COMPREHENSIVE METABOLIC PANEL - Abnormal; Notable for the following components:       Result Value    CO2 30.2 (*)     All other components within normal limits    Narrative:     GFR Normal >60  Chronic Kidney Disease <60  Kidney Failure <15     CBC WITH AUTO DIFFERENTIAL - Abnormal; Notable for the following components:    MCH 26.0 (*)     Neutrophil % 77.2 (*)     Lymphocyte % 12.2 (*)     All other components within normal limits   TROPONIN (IN-HOUSE) - Normal    Narrative:     Troponin T Reference Range:  <= 0.03 ng/mL-   Negative for AMI  >0.03 ng/mL-     Abnormal for myocardial necrosis.  Clinicians would have to utilize clinical acumen, EKG, Troponin and serial changes to determine if it is an Acute Myocardial Infarction or myocardial injury due to an underlying chronic condition.       Results may be falsely decreased if patient taking Biotin.     RAINBOW DRAW    Narrative:     The following orders  were created for panel order [place] Draw.  Procedure                               Abnormality         Status                     ---------                               -----------         ------                     Green Top (Gel)[[id number]]                                  Final result               Lavender Top[[id number]]                                     Final result               Gold Top - SST[[id number]]                                   Final result               Light Blue Top[[id number]]                                   Final result                 Please view results for these tests on the individual orders.   TROPONIN (IN-HOUSE)   CBC AND DIFFERENTIAL    Narrative:     The following orders were created for panel order CBC & Differential.  Procedure                               Abnormality         Status                     ---------                               -----------         ------                     CBC Auto Differential[[id number]]        Abnormal            Final result                 Please view results for these tests on the individual orders.   GREEN TOP   LAVENDER TOP   GOLD TOP - SST   LIGHT BLUE TOP       EKG:   ECG 12 Lead   Final Result   HEART RATE= 65  bpm   RR Interval= 916  ms   NE Interval= 162  ms   P Horizontal Axis= 18  deg   P Front Axis= 7  deg   QRSD Interval= 108  ms   QT Interval= 422  ms   QRS Axis= 13  deg   T Wave Axis= 59  deg   - ABNORMAL ECG -   Sinus rhythm   Incomplete left bundle branch block   NO SIGNIFICANT CHANGE FROM PREVIOUS ECG   Electronically Signed By: Althea Cain ([facility]) 03-Dec-2021 15:51:23   Date and Time of Study: 2021-12-03 13:00:57          Meds given in ED:   Medications   sodium chloride 0.9 % flush 10 mL (has no administration in time range)   aspirin tablet 325 mg (has no administration in time range)       Imaging results:  XR Chest 2 View    Result Date: 12/3/2021  Minimal likely atelectasis at the left base. Follow-up as clinical  indications persist.  This report was finalized on 12/3/2021 2:02 PM by Dr. Piter Fuchs M.D.        Ambulatory status:   - ad opal      Social issues:   Social History     Socioeconomic History   • Marital status:    Tobacco Use   • Smoking status: Never Smoker   • Smokeless tobacco: Never Used   Substance and Sexual Activity   • Alcohol use: Yes     Comment: occasional   • Drug use: No   • Sexual activity: Not Currently     Partners: Male       NIH Stroke Scale:        Nursing report ED to floor:       Daniela Plummer RN  12/03/21 9612

## 2021-12-03 NOTE — ED PROVIDER NOTES
" EMERGENCY DEPARTMENT ENCOUNTER    Room Number:  31/31  Date of encounter:  12/3/2021  PCP: Dorinda Hastings MD  Historian: Patient      HPI:  Chief Complaint: Chest pain  A complete HPI/ROS/PMH/PSH/SH/FH are unobtainable due to: Nothing    Context: Kortney Charlton is a 66 y.o. female who presents to the ED c/o sharp substernal chest pain that started this morning and has been off and on all day.  The pain does not radiate, there is no shortness of breath, and it really does not get better or worse with anything in particular.  She said when it comes on it is fairly sudden and it last for a few seconds to a couple minutes at a time and then goes away on its own.  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  Currently she has no pain.    Patient has no history of coronary artery disease.  She is a non-smoker.  There is a strong family history of coronary artery disease with both parents, but no personal history of coronary disease.  Patient states that she was admitted to the hospital \"a few years ago\" for chest pain\" everything checked out okay\".      PAST MEDICAL HISTORY  Active Ambulatory Problems     Diagnosis Date Noted   • Morbid obesity (HCC)    • Elevated BP without diagnosis of hypertension    • Venous stasis dermatitis of both lower extremities 10/18/2018   • History of total bilateral knee replacement (TKR) 10/18/2018   • Breast lump in female 01/14/2019   • Sneddon-Hanna disease 02/17/2021   • Abnormal TSH 02/17/2021     Resolved Ambulatory Problems     Diagnosis Date Noted   • No Resolved Ambulatory Problems     Past Medical History:   Diagnosis Date   • Chest pain 03/22/2018   • Chronic nasopharyngitis    • Osteoarthritis    • Stasis dermatitis          PAST SURGICAL HISTORY  Past Surgical History:   Procedure Laterality Date   • HYSTEROSCOPY ENDOMETRIAL ABLATION     • KNEE SURGERY Bilateral    • " LAPAROSCOPIC CHOLECYSTECTOMY  10/2011   • OVARIAN CYST REMOVAL           FAMILY HISTORY  Family History   Problem Relation Age of Onset   • Hypertension Mother    • Arthritis Mother    • Prostate cancer Father    • Hypertension Father          SOCIAL HISTORY  Social History     Socioeconomic History   • Marital status:    Tobacco Use   • Smoking status: Never Smoker   • Smokeless tobacco: Never Used   Substance and Sexual Activity   • Alcohol use: Yes     Comment: occasional   • Drug use: No   • Sexual activity: Not Currently     Partners: Male         ALLERGIES  Zoster vac recomb adjuvanted and Other        REVIEW OF SYSTEMS  Review of Systems     All systems reviewed and negative except for those discussed in HPI.       PHYSICAL EXAM    I have reviewed the triage vital signs and nursing notes.    ED Triage Vitals   Temp Heart Rate Resp BP SpO2   12/03/21 1255 12/03/21 1255 12/03/21 1255 12/03/21 1303 12/03/21 1255   97.7 °F (36.5 °C) 70 18 130/79 96 %      Temp src Heart Rate Source Patient Position BP Location FiO2 (%)   -- -- -- -- --              Physical Exam  GENERAL: not distressed, morbidly obese  HENT: nares patent  EYES: no scleral icterus  CV: regular rhythm, regular rate  RESPIRATORY: normal effort  ABDOMEN: soft  MUSCULOSKELETAL: no deformity chronic appearing lower extremity edema.  Distal pulses symmetric and intact  NEURO: alert, moves all extremities, follows commands  SKIN: warm, dry        LAB RESULTS  Recent Results (from the past 24 hour(s))   ECG 12 Lead    Collection Time: 12/03/21  1:00 PM   Result Value Ref Range    QT Interval 422 ms   Comprehensive Metabolic Panel    Collection Time: 12/03/21  2:16 PM    Specimen: Blood   Result Value Ref Range    Glucose 97 65 - 99 mg/dL    BUN 13 8 - 23 mg/dL    Creatinine 0.67 0.57 - 1.00 mg/dL    Sodium 139 136 - 145 mmol/L    Potassium 4.0 3.5 - 5.2 mmol/L    Chloride 100 98 - 107 mmol/L    CO2 30.2 (H) 22.0 - 29.0 mmol/L    Calcium 9.5 8.6 -  10.5 mg/dL    Total Protein 7.6 6.0 - 8.5 g/dL    Albumin 4.00 3.50 - 5.20 g/dL    ALT (SGPT) 19 1 - 33 U/L    AST (SGOT) 18 1 - 32 U/L    Alkaline Phosphatase 91 39 - 117 U/L    Total Bilirubin 0.3 0.0 - 1.2 mg/dL    eGFR Non African Amer 88 >60 mL/min/1.73    Globulin 3.6 gm/dL    A/G Ratio 1.1 g/dL    BUN/Creatinine Ratio 19.4 7.0 - 25.0    Anion Gap 8.8 5.0 - 15.0 mmol/L   Troponin    Collection Time: 12/03/21  2:16 PM    Specimen: Blood   Result Value Ref Range    Troponin T <0.010 0.000 - 0.030 ng/mL   Green Top (Gel)    Collection Time: 12/03/21  2:16 PM   Result Value Ref Range    Extra Tube Hold for add-ons.    Lavender Top    Collection Time: 12/03/21  2:16 PM   Result Value Ref Range    Extra Tube hold for add-on    Gold Top - SST    Collection Time: 12/03/21  2:16 PM   Result Value Ref Range    Extra Tube Hold for add-ons.    Light Blue Top    Collection Time: 12/03/21  2:16 PM   Result Value Ref Range    Extra Tube hold for add-on    CBC Auto Differential    Collection Time: 12/03/21  2:16 PM    Specimen: Blood   Result Value Ref Range    WBC 8.10 3.40 - 10.80 10*3/mm3    RBC 5.23 3.77 - 5.28 10*6/mm3    Hemoglobin 13.6 12.0 - 15.9 g/dL    Hematocrit 41.9 34.0 - 46.6 %    MCV 80.1 79.0 - 97.0 fL    MCH 26.0 (L) 26.6 - 33.0 pg    MCHC 32.5 31.5 - 35.7 g/dL    RDW 13.8 12.3 - 15.4 %    RDW-SD 39.3 37.0 - 54.0 fl    MPV 9.8 6.0 - 12.0 fL    Platelets 295 140 - 450 10*3/mm3    Neutrophil % 77.2 (H) 42.7 - 76.0 %    Lymphocyte % 12.2 (L) 19.6 - 45.3 %    Monocyte % 7.3 5.0 - 12.0 %    Eosinophil % 2.2 0.3 - 6.2 %    Basophil % 0.6 0.0 - 1.5 %    Immature Grans % 0.5 0.0 - 0.5 %    Neutrophils, Absolute 6.25 1.70 - 7.00 10*3/mm3    Lymphocytes, Absolute 0.99 0.70 - 3.10 10*3/mm3    Monocytes, Absolute 0.59 0.10 - 0.90 10*3/mm3    Eosinophils, Absolute 0.18 0.00 - 0.40 10*3/mm3    Basophils, Absolute 0.05 0.00 - 0.20 10*3/mm3    Immature Grans, Absolute 0.04 0.00 - 0.05 10*3/mm3    nRBC 0.0 0.0 - 0.2 /100 WBC        Ordered the above labs and independently reviewed the results.        RADIOLOGY  XR Chest 2 View    Result Date: 12/3/2021  XR CHEST 2 VW-  HISTORY: Female who is 66 years-old,  chest pain  TECHNIQUE: Frontal and lateral views of the chest  COMPARISON: 10/11/2014  FINDINGS: The heart size is normal. Aorta is calcified. Heart, mediastinum and pulmonary vasculature are unremarkable. Minimal likely atelectasis at the left base. No pleural effusion, or pneumothorax. No acute osseous process.      Minimal likely atelectasis at the left base. Follow-up as clinical indications persist.  This report was finalized on 12/3/2021 2:02 PM by Dr. Piter Fuchs M.D.        I ordered the above noted radiological studies. Reviewed by me and discussed with radiologist.  See dictation for official radiology interpretation.      PROCEDURES    Procedures      MEDICATIONS GIVEN IN ER    Medications   sodium chloride 0.9 % flush 10 mL (has no administration in time range)   aspirin tablet 325 mg (has no administration in time range)         PROGRESS, DATA ANALYSIS, CONSULTS, AND MEDICAL DECISION MAKING    All labs have been independently reviewed by me.  All radiology studies have been reviewed by me and discussed with radiologist dictating the report.   EKG's independently viewed and interpreted by me.  Discussion below represents my analysis of pertinent findings related to patient's condition, differential diagnosis, treatment plan and final disposition.        ED Course as of 12/03/21 1832   Fri Dec 03, 2021   1830 CBC and chemistry normal [DP]   1831 Troponin negative [DP]   1831 EKG on arrival  Normal sinus rhythm  Normal WV and QRS.  There is a nonspecific IVCD, no acute ST segment changes are present to suggest ischemia.  This is slightly changed when compared to comparison from March 22, 2018 [DP]   1831 Chest x-ray is negative [DP]   1831 Patient has a heart score of 4, and I am concerned enough about her pain to  obtain further evaluation in the hospital.  I spoke with a nurse practitioner in the ED observation unit who agrees to admit the patient for further evaluation and cardiology consult.  Patient is chest pain-free at this time and second troponin is pending [DP]      ED Course User Index  [DP] Boom Gonsalez MD           PPE: The patient wore a surgical mask throughout the entire patient encounter. I wore an N95.    AS OF 18:32 EST VITALS:    BP - 130/79  HR - 70  TEMP - 97.7 °F (36.5 °C)  O2 SATS - 96%        DIAGNOSIS  Final diagnoses:   Chest pain in adult         DISPOSITION  Admit to ED observation           Boom Gonsalez MD  12/03/21 7214

## 2021-12-03 NOTE — CASE MANAGEMENT/SOCIAL WORK
Discharge Planning Assessment  [facility]     Patient Name: Kortney Charlton  MRN: [id number]  Today's Date: 12/3/2021    Admit Date: 12/3/2021     Discharge Needs Assessment     Row Name 12/03/21 1846       Living Environment    Lives With spouse    Name(s) of Who Lives With Patient Varinder (spouse)    Current Living Arrangements home/apartment/condo    Primary Care Provided by self    Provides Primary Care For no one    Family Caregiver if Needed spouse    Family Caregiver Names Varinder (spouse)    Quality of Family Relationships helpful; involved; supportive    Able to Return to Prior Arrangements yes    Living Arrangement Comments Resides with spouse in private residence.       Resource/Environmental Concerns    Resource/Environmental Concerns none    Transportation Concerns car, none       Transition Planning    Patient/Family Anticipates Transition to home with family    Patient/Family Anticipated Services at Transition none    Transportation Anticipated family or friend will provide; car, drives self       Discharge Needs Assessment    Readmission Within the Last 30 Days no previous admission in last 30 days    Equipment Currently Used at Home none    Concerns to be Addressed no discharge needs identified; denies needs/concerns at this time    Anticipated Changes Related to Illness none    Equipment Needed After Discharge none               Discharge Plan     Row Name 12/03/21 1846       Plan    Plan Plan to return home with spouse upon discharge. No environmental/financial/medication concerns. Denies DC needs. Can arrange own transportation.    Patient/Family in Agreement with Plan yes    Plan Comments Home with spouse upon discharge without services. Can arrange own transportation. No DC needs identified.              Continued Care and Services - Admitted Since 12/3/2021    Coordination has not been started for this encounter.          Demographic Summary     Row Name 12/03/21 1844       General  Information    Admission Type observation    Arrived From urgent care    Required Notices Provided Observation Status Notice    Expected Length of Stay (LOS) Less than 24 hours. Admitted to ED Observation Unit.    Referral Source admission list    Reason for Consult discharge planning    Preferred Language English     Used During This Interaction no    General Information Comments Facesheet verified. Role of CCP explained. Appropriate PPE worn at all times.       Contact Information    Permission Granted to Share Info With family/designee    Contact Information Obtained for other (see comments)    Contact Information Comments Verified contact information.               Functional Status     Row Name 12/03/21 1847       Functional Status    Usual Activity Tolerance good    Current Activity Tolerance moderate       Functional Status, IADL    Medications independent    Meal Preparation independent    Housekeeping independent    Laundry independent    Shopping independent       Mental Status    General Appearance WDL WDL       Mental Status Summary    Recent Changes in Mental Status/Cognitive Functioning no changes       Employment/    Employment Status employed full-time    Shift Worked first shift    Current or Previous Occupation education    Employment/ Comments [employer]               Psychosocial     Row Name 12/03/21 1848       Values/Beliefs    Spiritual, Cultural Beliefs, Bahai Practices, Values that Affect Care no       Behavior WDL    Behavior WDL WDL       Emotion Mood WDL    Emotion/Mood/Affect WDL WDL       Speech WDL    Speech WDL WDL       Perceptual State WDL    Perceptual State WDL WDL       Thought Process WDL    Thought Process WDL WDL       Intellectual Performance WDL    Intellectual Performance WDL WDL       Coping/Stress    Major Change/Loss/Stressor none    Patient Personal Strengths expressive of emotions; expressive of needs    Sources of Support  spouse    Reaction to Health Status accepting; adjusting    Understanding of Condition and Treatment adequate understanding of medical condition; adequate understanding of treatment       Developmental Stage (Eriksson's)    Developmental Stage Stage 8 (65 years-death/Late Adulthood) Integrity vs. Despair       C-SSRS (Recent)    Q1 Wished to be Dead (Past Month) no    Q2 Suicidal Thoughts (Past Month) no    Q6 Suicide Behavior (Lifetime) no       Violence Risk    Feels Like Hurting Others no    Previous Attempt to Harm Others no               Abuse/Neglect     Row Name 12/03/21 1846       Personal Safety    Feels Unsafe at Home or Work/School no    Feels Threatened by Someone no    Does Anyone Try to Keep You From Having Contact with Others or Doing Things Outside Your Home? no    Physical Signs of Abuse Present no               Legal     Row Name 12/03/21 1846       Financial/Legal    Source of Income salary/wages               Substance Abuse    No documentation.                Patient Forms    No documentation.                   Fredis Montelongo RN

## 2021-12-03 NOTE — ED NOTES
Covid test performed @ [facility] at the [facility] this morning and both negative, per Dr. Gonsalez no repeat of Covid test needed.         Daniela Plummer RN  12/03/21 1813       Daniela Plummer RN  12/03/21 1828

## 2021-12-03 NOTE — ED TRIAGE NOTES
Patient went to [facility] today, they did a ekg and also a covid test that were both normal. She states that she went in for chest pain that comes and goes. She states the pain is in the center of her chest and sometimes goes into her back..    Patient has on mask nurse has on proper ppe.

## 2021-12-04 ENCOUNTER — READMISSION MANAGEMENT (OUTPATIENT)
Dept: CALL CENTER | Facility: HOSPITAL | Age: 66
End: 2021-12-04

## 2021-12-04 ENCOUNTER — APPOINTMENT (OUTPATIENT)
Dept: NUCLEAR MEDICINE | Facility: HOSPITAL | Age: 66
End: 2021-12-04

## 2021-12-04 VITALS
WEIGHT: 293 LBS | HEART RATE: 82 BPM | OXYGEN SATURATION: 95 % | DIASTOLIC BLOOD PRESSURE: 54 MMHG | HEIGHT: 67 IN | SYSTOLIC BLOOD PRESSURE: 109 MMHG | RESPIRATION RATE: 16 BRPM | BODY MASS INDEX: 45.99 KG/M2 | TEMPERATURE: 97.9 F

## 2021-12-04 LAB
D DIMER PPP FEU-MCNC: 0.67 MCGFEU/ML (ref 0–0.49)
DEPRECATED RDW RBC AUTO: 41.2 FL (ref 37–54)
ERYTHROCYTE [DISTWIDTH] IN BLOOD BY AUTOMATED COUNT: 14.1 % (ref 12.3–15.4)
HBA1C MFR BLD: 6.27 % (ref 4.8–5.6)
HCT VFR BLD AUTO: 40.6 % (ref 34–46.6)
HGB BLD-MCNC: 13.2 G/DL (ref 12–15.9)
MCH RBC QN AUTO: 26.4 PG (ref 26.6–33)
MCHC RBC AUTO-ENTMCNC: 32.5 G/DL (ref 31.5–35.7)
MCV RBC AUTO: 81.2 FL (ref 79–97)
PLATELET # BLD AUTO: 288 10*3/MM3 (ref 140–450)
PMV BLD AUTO: 10.5 FL (ref 6–12)
RBC # BLD AUTO: 5 10*6/MM3 (ref 3.77–5.28)
TROPONIN T SERPL-MCNC: <0.01 NG/ML (ref 0–0.03)
WBC NRBC COR # BLD: 8.67 10*3/MM3 (ref 3.4–10.8)

## 2021-12-04 PROCEDURE — 99204 OFFICE O/P NEW MOD 45 MIN: CPT | Performed by: INTERNAL MEDICINE

## 2021-12-04 PROCEDURE — 84484 ASSAY OF TROPONIN QUANT: CPT | Performed by: PHYSICIAN ASSISTANT

## 2021-12-04 PROCEDURE — 85379 FIBRIN DEGRADATION QUANT: CPT | Performed by: NURSE PRACTITIONER

## 2021-12-04 PROCEDURE — G0378 HOSPITAL OBSERVATION PER HR: HCPCS

## 2021-12-04 PROCEDURE — 85027 COMPLETE CBC AUTOMATED: CPT | Performed by: NURSE PRACTITIONER

## 2021-12-04 PROCEDURE — 83036 HEMOGLOBIN GLYCOSYLATED A1C: CPT | Performed by: NURSE PRACTITIONER

## 2021-12-04 RX ADMIN — SODIUM CHLORIDE, POTASSIUM CHLORIDE, SODIUM LACTATE AND CALCIUM CHLORIDE 100 ML/HR: 600; 310; 30; 20 INJECTION, SOLUTION INTRAVENOUS at 00:25

## 2021-12-04 RX ADMIN — GUAIFENESIN SYRUP AND DEXTROMETHORPHAN 5 ML: 100; 10 SYRUP ORAL at 00:19

## 2021-12-04 NOTE — CONSULTS
Patient Name: Kortney Charlton  Age/Sex: 66 y.o. female  : 1955  MRN: [id number]    Date of Admission: 12/3/2021  Date of Encounter Visit: 21  Encounter Provider: Dustin Herr MD  Place of Service: [facility]      Referring Provider: No ref. provider found  Patient Care Team:  Dorinda Hastings MD as PCP - General (Family Medicine)    Subjective:     Admitted/Consulted for: chest pain    Chief Complaint: chest pain     History of Present Illness:   66 y.o. female with a history of elevated blood pressure without diagnosis of hypertension, morbid obesity, and osteoarthritis. She denies history of hypertension, hypolipidemia and DM. She denies family history of cardiac disease. She has not been seen by a cardiologist before, but has had a stress test in 2018 that was ordered by her PCP. No EKG evidence of myocardial ischemic was found on this test.    She presented to the ED with complaints of sharp, intermittent substernal chest pain. Patient reports the first time she felt the pain, she was watching TV and it lasted for a few minutes before it self resolved. 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 Patient denies any alleviating or worsening factors. Patient was given 324mg of aspirin in the ER per ACS protocol. Patient's troponin has been negative x3. EKG shows SR without ST abnormalities. CXR was unremarkable.  No additional issues with chest discomfort overnight or this morning.      Previous testing:   Cardiac Stress Test on 3/27/18  The Duke Treadmill Score of 4.4 is consistent with a Moderate risk for ischemic heart disease.  No ECG evidence of myocardial ischemia.Negative clinical evidence of myocardial ischemia.  Overall, the patient's exercise capacity was moderately impaired.    Past Medical History:  Past Medical History:   Diagnosis Date   • Chest pain  03/22/2018   • Chronic nasopharyngitis    • Elevated BP without diagnosis of hypertension    • Morbid obesity (HCC)    • Osteoarthritis    • Stasis dermatitis        Past Surgical History:   Procedure Laterality Date   • HYSTEROSCOPY ENDOMETRIAL ABLATION     • KNEE SURGERY Bilateral    • LAPAROSCOPIC CHOLECYSTECTOMY  10/2011   • OVARIAN CYST REMOVAL         Home Medications:   Medications Prior to Admission   Medication Sig Dispense Refill Last Dose   • aspirin 81 MG EC tablet Take 81 mg by mouth Daily.   12/3/2021 at Unknown time   • ELIDEL 1 % cream APPLY TWICE A DAY  5 12/3/2021 at Unknown time   • fexofenadine-pseudoephedrine (ALLEGRA-D 24) 180-240 MG per 24 hr tablet Take 1 tablet by mouth Daily.   12/2/2021 at Unknown time   • furosemide (LASIX) 20 MG tablet Take 1 tablet by mouth Daily. 90 tablet 3 12/3/2021 at Unknown time   • ibuprofen (ADVIL,MOTRIN) 200 MG tablet Take 200 mg by mouth Every 6 (Six) Hours As Needed for Mild Pain .   Past Month at Unknown time   • Multiple Vitamins-Minerals (MULTIVITAMIN WITH MINERALS) tablet tablet Take 1 tablet by mouth Daily.   12/3/2021 at Unknown time       Allergies:  Allergies   Allergen Reactions   • Zoster Vac Recomb Adjuvanted Other (See Comments)     Redness around sight, fever the next day.     • Other Rash     Topical Steroids       Past Social History:  Social History     Socioeconomic History   • Marital status:    Tobacco Use   • Smoking status: Never Smoker   • Smokeless tobacco: Never Used   Substance and Sexual Activity   • Alcohol use: Yes     Comment: occasional   • Drug use: No   • Sexual activity: Not Currently     Partners: Male        Past Family History:  Family History   Problem Relation Age of Onset   • Hypertension Mother    • Arthritis Mother    • Prostate cancer Father    • Hypertension Father          Review of Systems:  All systems reviewed. Pertinent positives identified in HPI. All other systems are negative.       Objective:  "    Objective:  Temp:  [97.7 °F (36.5 °C)-98.6 °F (37 °C)] 97.88 °F (36.6 °C)  Heart Rate:  [68-76] 76  Resp:  [16-18] 16  BP: (118-139)/(46-79) 129/46  No intake or output data in the 24 hours ending 12/04/21 1013  Body mass index is 60.3 kg/m².      12/03/21  2223   Weight: (!) 175 kg (385 lb)           Physical Exam:   Temp:  [97.7 °F (36.5 °C)-98.6 °F (37 °C)] 97.88 °F (36.6 °C)  Heart Rate:  [68-76] 76  Resp:  [16-18] 16  BP: (118-139)/(46-79) 129/46  No intake or output data in the 24 hours ending 12/04/21 1013  Flowsheet Rows      First Filed Value   Admission Height 170.2 cm (67\") Documented at 12/03/2021 2223   Admission Weight 175 kg (385 lb) Documented at 12/03/2021 2223          General Appearance:   Morbidly obese.  No distress.   Head:    Normocephalic, without obvious abnormality, atraumatic       Neck/Lymph   No adenopathy, supple, no thyromegaly, no carotid bruit, no    JVD   Lungs:     Clear to auscultation bilaterally, no wheezes, rales, or     rhonchi    Cardiac:    Normal rate, regular rhythm, no murmur, no rub, no gallop   Chest Wall:    No abnormalities observed   GI:     Normal bowel sounds, soft, nontender, nondistended,            no rebound tenderness   Extremities:   No cyanosis, clubbing 1+ bilateral lower extremity edema.   Circulatory/Peripheral Vascular :   Pulses palpable and equal bilaterally   Integumentary:   No bleeding or rash. Normal temperature.  Venous stasis changes bilateral lower extremities.       Neurologic:   Cranial nerves 2 - 12 grossly intact, sensation intact             Lab Review:     Results from last 7 days   Lab Units 12/03/21  1416   SODIUM mmol/L 139   POTASSIUM mmol/L 4.0   CHLORIDE mmol/L 100   CO2 mmol/L 30.2*   BUN mg/dL 13   CREATININE mg/dL 0.67   GLUCOSE mg/dL 97   CALCIUM mg/dL 9.5       Results from last 7 days   Lab Units 12/04/21  0018 12/03/21  1830 12/03/21  1416   TROPONIN T ng/mL <0.010 <0.010 <0.010     Results from last 7 days   Lab Units " 12/04/21  0508   WBC 10*3/mm3 8.67   HEMOGLOBIN g/dL 13.2   HEMATOCRIT % 40.6   PLATELETS 10*3/mm3 288         Results from last 7 days   Lab Units 12/03/21  1830   CHOLESTEROL mg/dL 160         Results from last 7 days   Lab Units 12/03/21  1830   CHOLESTEROL mg/dL 160   TRIGLYCERIDES mg/dL 134   HDL CHOL mg/dL 47   LDL CHOL mg/dL 89                   Imaging:    Imaging Results (Most Recent)     Procedure Component Value Units Date/Time    XR Chest 2 View [[id number]] Collected: 12/03/21 1401     Updated: 12/03/21 1405    Narrative:      XR CHEST 2 VW-     HISTORY: Female who is 66 years-old,  chest pain     TECHNIQUE: Frontal and lateral views of the chest     COMPARISON: 10/11/2014     FINDINGS: The heart size is normal. Aorta is calcified. Heart,  mediastinum and pulmonary vasculature are unremarkable. Minimal likely  atelectasis at the left base. No pleural effusion, or pneumothorax. No  acute osseous process.       Impression:      Minimal likely atelectasis at the left base. Follow-up as  clinical indications persist.     This report was finalized on 12/3/2021 2:02 PM by Dr. Piter Fuchs M.D.                 EKG:         BASELINE:       I personally viewed and interpreted the patient's EKG/Telemetry data.    Assessment:   Assessment/Plan   1.  Atypical chest pain  2.  Morbid obesity  3.  Essential hypertension: Blood pressure currently normal.      -Patient's chest pain is noncardiac.  It is short in duration, sharp and very atypical.  It does not increase with exertion.  Cardiac biomarkers and electrocardiogram are normal.  I would not recommend ischemic work-up.  -I would recommend getting her set up for a D-dimer here and if negative she can be discharged home.  She can follow-up with me in a couple months if she wants.    Thank you for allowing me to participate in the care of Kortney Charlton. Feel free to contact me directly with any further questions or concerns.    Dustin Herr,  MD [facility]  12/04/21  10:13 EST

## 2021-12-04 NOTE — PROGRESS NOTES
ED OBSERVATION PROGRESS/DISCHARGE SUMMARY    Date of Admission: 12/3/2021  PCP: Dorinda Hastings MD    Final Diagnosis Atypical Chest pain    Patient seen at:  Subjective   Patient is a pleasant afebrile ambulatory 66-year-old  female being evaluated in the observation unit for chest pain.  She had reassuring labs and EKGs overnight.  She denies any pain today.  She was seen by cardiology and cleared from their standpoint.  Plan to discharge home today.  Patient is agreeable to plan    Hospital Outcome: Improved    ROS:  General: no fevers, chills  Respiratory: no cough, dyspnea  Cardiovascular: no chest pain, palpitations  Abdomen: No abdominal pain, nausea, vomiting, or diarrhea  Neurologic: No focal weakness    Objective   Physical Exam:  I have reviewed the vital signs.  Temp:  [97.7 °F (36.5 °C)-98.6 °F (37 °C)] 97.88 °F (36.6 °C)  Heart Rate:  [68-76] 76  Resp:  [16-18] 16  BP: (118-139)/(46-79) 129/46  General Appearance:    Alert, cooperative, no distress  Head:    Normocephalic, atraumatic  Eyes:    Sclerae anicteric  Neck:   Supple, no mass  Lungs: Clear to auscultation bilaterally, respirations unlabored  Heart: Regular rate and rhythm, S1 and S2 normal, no murmur, rub or gallop  Abdomen:  Soft, non-tender, bowel sounds active, nondistended, obese  Extremities: No clubbing, cyanosis, or edema to lower extremities  Pulses:  2+ and symmetric in distal lower extremities, 1+ BLE edema  Skin: No rashes   Neurologic: Oriented x3, Normal strength to extremities    Results Review:    I have reviewed the labs, radiology results and diagnostic studies.    Results from last 7 days   Lab Units 12/04/21  0508   WBC 10*3/mm3 8.67   HEMOGLOBIN g/dL 13.2   HEMATOCRIT % 40.6   PLATELETS 10*3/mm3 288     Results from last 7 days   Lab Units 12/03/21  1416   SODIUM mmol/L 139   POTASSIUM mmol/L 4.0   CHLORIDE mmol/L 100   CO2 mmol/L 30.2*   BUN mg/dL 13   CREATININE mg/dL 0.67   CALCIUM mg/dL 9.5   BILIRUBIN  mg/dL 0.3   ALK PHOS U/L 91   ALT (SGPT) U/L 19   AST (SGOT) U/L 18   GLUCOSE mg/dL 97     Imaging Results (Last 24 Hours)     Procedure Component Value Units Date/Time    XR Chest 2 View [[id number]] Collected: 12/03/21 1401     Updated: 12/03/21 1405    Narrative:      XR CHEST 2 VW-     HISTORY: Female who is 66 years-old,  chest pain     TECHNIQUE: Frontal and lateral views of the chest     COMPARISON: 10/11/2014     FINDINGS: The heart size is normal. Aorta is calcified. Heart,  mediastinum and pulmonary vasculature are unremarkable. Minimal likely  atelectasis at the left base. No pleural effusion, or pneumothorax. No  acute osseous process.       Impression:      Minimal likely atelectasis at the left base. Follow-up as  clinical indications persist.     This report was finalized on 12/3/2021 2:02 PM by Dr. Piter Fuchs M.D.             I have reviewed the medications.  ---------------------------------------------------------------------------------------------  Assessment/Plan   Assessment/Problem List    Atypical chest pain      Plan:    1. Chest pain  -atypical in nature  -troponin <0.010x2, trend  -Seen by Dr. Herr with cardiology, agrees chest pain is non-cardiac in nature  -lipid panel pending  -aspirin  -monitor   - d dimer not high enough o get a CTA per cardiology recommendations     2. Chronic bilateral lower extremity edema  -continue home Lasix      DVT prophylaxis:  Mechanical DVT prophylaxis orders are present.      Disposition: Home    Follow-up after Discharge: Cardiology in a few months    This note will serve as a discharge summary.     Ana Perrin, APRN 12/04/21 12:10 EST          I have worn appropriate PPE during this patient encounter, sanitized my hands both with entering and exiting patient's room.

## 2021-12-04 NOTE — OUTREACH NOTE
Prep Survey      Responses   [facility] patient discharged from? [place]   Is LACE score < 7 ? Yes   Emergency Room discharge w/ pulse ox? No   Eligibility [facility]   Date of Admission 12/03/21   Date of Discharge 12/04/21   Discharge Disposition Home or Self Care   Discharge diagnosis Atypical chest pain   Does the patient have one of the following disease processes/diagnoses(primary or secondary)? Other   Does the patient have Home health ordered? No   Is there a DME ordered? No   Prep survey completed? Yes          Brigid Chandler RN

## 2021-12-04 NOTE — H&P
[facility]   HISTORY AND PHYSICAL    Patient Name: Kortney Charlton  : 1955  MRN: [id number]  Primary Care Physician:  Dorinda Hastings MD  Date of admission: 12/3/2021    Subjective   Subjective     Chief Complaint: chest pain    History of Present Illness     Patient is a 67 YO female with history of osteoarhritis s/p bilateral TKA, obesity, and BLE stasis dermatitis and edema, who presented to the ED today complaining of chest pain for one day. Patient states last night around 11pm while she was watching TV she experienced sudden onset left sided substernal chest pain that was localized to a small area. Patient states pain lasted a few minutes and went away on its own. Patient states pain was intermittent throughout the day. 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 She states she last experienced the pain around 2pm today with some radiation to her back. Patient can point to exactly where the pain occurs but states pain is not reproducible with palpation. Denies associated shortness of breath, nausea, and diaphoresis. Patient denies history of HTN, HLD, and DM. She does have family history of heart disease but denies personal history. Patient takes Lasix for BLE edema. Patient had treadmill stress test in 2018.     Troponin <0.010 x2, ECG on arrival to ED without acute ST segment elevation or depression. Chest x ray negative acute.    Review of Systems   All other systems reviewed and are negative.       Personal History     Past Medical History:   Diagnosis Date   • Chest pain 2018   • Chronic nasopharyngitis    • Elevated BP without diagnosis of hypertension    • Morbid obesity (HCC)    • Osteoarthritis    • Stasis dermatitis        Past Surgical History:   Procedure Laterality Date   • HYSTEROSCOPY ENDOMETRIAL ABLATION     • KNEE SURGERY Bilateral    • LAPAROSCOPIC CHOLECYSTECTOMY  10/2011   • OVARIAN CYST REMOVAL         Family  History: family history includes Arthritis in her mother; Hypertension in her father and mother; Prostate cancer in her father. Otherwise pertinent FHx was reviewed and not pertinent to current issue.    Social History:  reports that she has never smoked. She has never used smokeless tobacco. She reports current alcohol use. She reports that she does not use drugs.    Home Medications:  aspirin, fexofenadine-pseudoephedrine, furosemide, ibuprofen, multivitamin with minerals, and pimecrolimus    Allergies:  Allergies   Allergen Reactions   • Zoster Vac Recomb Adjuvanted Other (See Comments)     Redness around sight, fever the next day.     • Other Rash     Topical Steroids       Objective    Objective     Vitals:   Temp:  [97.7 °F (36.5 °C)] 97.7 °F (36.5 °C)  Heart Rate:  [70] 70  Resp:  [18] 18  BP: (130)/(79) 130/79    Physical Exam     GENERAL: 65 YO female, a/o x 4, NAD  SKIN: Warm pink and dry, BLE erythema c/w stasis dermatitis  HEENT:  PERRLA, EOM intact, conjunctiva normal, sclera clear  NECK: supple, no JVD  LUNGS: Clear to auscultation bilaterally without wheezes, rales or rhonchi.  No accessory muscle use and no nasal flaring.  CARDIAC:  Regular rate and rhythm, S1-S2.  No murmurs, rubs or gallops.  BLE edema peripheral edema.  Equal pulses bilaterally.  CHEST: no tenderness to palpation  ABDOMEN: Soft, nontender, nondistended.  No guarding or rebound tenderness.  Normal bowel sounds.  MUSCULOSKELETAL: Moves all extremities well.  No deformity.  NEURO: Cranial nerves II through XII grossly intact.  No gross focal deficits.  Alert.  Normal speech and motor.  PSYCH: Normal mood and affect        Result Review    Result Review:  I have personally reviewed the results from the time of this admission to 12/3/2021 19:43 EST and agree with these findings:  [x]  Laboratory  []  Microbiology  [x]  Radiology  [x]  EKG/Telemetry   []  Cardiology/Vascular   []  Pathology  [x]  Old records  []  Other:  Most notable  findings include: Troponin <0.010 x2    ECG without ST depression or elevation.    XR Chest 2 View    Result Date: 12/3/2021  Minimal likely atelectasis at the left base. Follow-up as clinical indications persist.  This report was finalized on 12/3/2021 2:02 PM by Dr. Piter Fuchs M.D.        Stress test only, exercise 3/27/2018    Interpretation Summary    · The Duke Treadmill Score of 4.4 is consistent with a Moderate risk for ischemic heart disease.  · No ECG evidence of myocardial ischemia.Negative clinical evidence of myocardial ischemia.  · Overall, the patient's exercise capacity was moderately impaired.        Assessment/Plan   Assessment / Plan     Brief Patient Summary:  Kortney Charlton is a 66 y.o. female who is being admitted to the observation unit for chest pain.    Active Hospital Problems:  Active Hospital Problems    Diagnosis    • Atypical chest pain      Plan:     1. Chest pain  -atypical in nature  -troponin <0.010x2, trend  -consult cardiology  -NPO at midnight for possible stress test  -lipid panel pending  -aspirin  -monitor     2. Chronic bilateral lower extremity edema  -continue home Lasix      DVT prophylaxis:  Mechanical DVT prophylaxis orders are present.    CODE STATUS:    Level Of Support Discussed With: Patient  Code Status (Patient has no pulse and is not breathing): CPR (Attempt to Resuscitate)  Medical Interventions (Patient has pulse or is breathing): Full Support    Admission Status:  I believe this patient meets observation status.    NHAN Hart

## 2021-12-04 NOTE — PLAN OF CARE
Goal Outcome Evaluation:   Pt came to ED after going to [facility] for chest pain, EKG performed at [facility], no chest pain since ED and coming to the observation unit. Troponin drawn in ED and in observation unit, troponin's were negative. Vitals stable, Aox4, up without assistance, NPO since midnight, IVF started. Plan for cardiology to see this morning and possible stress test.

## 2021-12-06 ENCOUNTER — TRANSITIONAL CARE MANAGEMENT TELEPHONE ENCOUNTER (OUTPATIENT)
Dept: CALL CENTER | Facility: HOSPITAL | Age: 66
End: 2021-12-06

## 2021-12-06 NOTE — OUTREACH NOTE
Call Center TCM Note      Responses   [facility] patient discharged from? [place]   Does the patient have one of the following disease processes/diagnoses(primary or secondary)? Other   TCM attempt successful? Yes   Call start time 1525   Call end time 1527   Discharge diagnosis Atypical chest pain   Person spoke with today (if not patient) and relationship Patient   Meds reviewed with patient/caregiver? Yes   Is the patient having any side effects they believe may be caused by any medication additions or changes? No   Does the patient have all medications ordered at discharge? N/A   Is the patient taking all medications as directed (includes completed medication regime)? Yes   Does the patient have a primary care provider?  Yes   Does the patient have an appointment with their PCP within 7 days of discharge? No   What is preventing the patient from scheduling follow up appointments within 7 days of discharge? Unsure of when or with whom   Nursing Interventions Educated patient on importance of making appointment,  Advised patient to make appointment   Has the patient kept scheduled appointments due by today? N/A   Psychosocial issues? No   Did the patient receive a copy of their discharge instructions? Yes   Nursing interventions Reviewed instructions with patient   What is the patient's perception of their health status since discharge? Improving   Is the patient/caregiver able to teach back signs and symptoms related to disease process for when to call PCP? Yes   Is the patient/caregiver able to teach back signs and symptoms related to disease process for when to call 911? Yes   Is the patient/caregiver able to teach back the hierarchy of who to call/visit for symptoms/problems? PCP, Specialist, Home health nurse, Urgent Care, ED, 911 Yes   If the patient is a current smoker, are they able to teach back resources for cessation? Not a smoker   TCM call completed? Yes   Wrap up additional comments Pt  states she is doing better,  no chest pain since hospitalization. No questions/concerns.          Amna Augustin RN    12/6/2021, 15:27 EST

## 2022-01-04 ENCOUNTER — OFFICE VISIT (OUTPATIENT)
Dept: FAMILY MEDICINE CLINIC | Facility: CLINIC | Age: 67
End: 2022-01-04

## 2022-01-04 VITALS
WEIGHT: 293 LBS | DIASTOLIC BLOOD PRESSURE: 76 MMHG | TEMPERATURE: 97.1 F | SYSTOLIC BLOOD PRESSURE: 152 MMHG | BODY MASS INDEX: 45.99 KG/M2 | HEIGHT: 67 IN | OXYGEN SATURATION: 97 % | HEART RATE: 79 BPM

## 2022-01-04 DIAGNOSIS — J01.10 SUBACUTE FRONTAL SINUSITIS: Primary | ICD-10-CM

## 2022-01-04 DIAGNOSIS — R07.89 ATYPICAL CHEST PAIN: ICD-10-CM

## 2022-01-04 PROCEDURE — 99214 OFFICE O/P EST MOD 30 MIN: CPT | Performed by: FAMILY MEDICINE

## 2022-01-04 RX ORDER — AMOXICILLIN AND CLAVULANATE POTASSIUM 875; 125 MG/1; MG/1
1 TABLET, FILM COATED ORAL 2 TIMES DAILY
Qty: 20 TABLET | Refills: 0 | Status: SHIPPED | OUTPATIENT
Start: 2022-01-04 | End: 2022-03-02 | Stop reason: ALTCHOICE

## 2022-01-04 RX ORDER — PREDNISONE 20 MG/1
60 TABLET ORAL DAILY
Qty: 15 TABLET | Refills: 0 | Status: SHIPPED | OUTPATIENT
Start: 2022-01-04 | End: 2022-03-02 | Stop reason: ALTCHOICE

## 2022-01-04 NOTE — PROGRESS NOTES
Transitional Care Follow Up Visit  Subjective     Kortney Charlton is a 66 y.o. female who presents for a transitional care management visit.    Within 48 business hours after discharge our office contacted her via telephone to coordinate her care and needs.      I reviewed and discussed the details of that call along with the discharge summary, hospital problems, inpatient lab results, inpatient diagnostic studies, and consultation reports with Kortney.     Current outpatient and discharge medications have been reconciled for the patient.  Reviewed by: Dorinda Hastings MD      Date of TCM Phone Call 12/4/2021   [facility]   Date of Admission 12/3/2021   Date of Discharge 12/4/2021   Discharge Disposition Home or Self Care     Risk for Readmission (LACE) No data recorded    History of Present Illness   Course During Hospital Stay: Patient presented to the ER with chest pain and no shortness of breath.  She did have a family history of coronary artery disease.  Troponin and EKG negative.  Patient was admitted for further evaluation.  Cardiology was consulted.  Patient was found to have a normal stress test in 2018.  The cardiologist decided not to do a further stress test.  It was decided the chest pain was noncardiac in nature.  PE was ruled out with D-dimer.  She is to follow-up with cardiology in a few months. She feels back to baseline and thinks she may have been anxious.     Cough and sinus pain for 2 months. Has tested twice for covid and its been neg. Just not resolving.      The following portions of the patient's history were reviewed and updated as appropriate: allergies, current medications, past family history, past medical history, past social history, past surgical history and problem list.    Review of Systems   Constitutional: Negative for fever.   Respiratory: Negative for shortness of breath.    Cardiovascular: Negative for chest pain.       Objective   Physical  Exam  Constitutional:       Appearance: Normal appearance. She is well-developed.   Cardiovascular:      Rate and Rhythm: Normal rate and regular rhythm.      Heart sounds: Normal heart sounds.   Pulmonary:      Effort: Pulmonary effort is normal.      Breath sounds: Normal breath sounds.   Musculoskeletal:         General: No swelling. Normal range of motion.   Skin:     General: Skin is warm and dry.      Findings: No rash.   Neurological:      General: No focal deficit present.      Mental Status: She is alert and oriented to person, place, and time.   Psychiatric:         Mood and Affect: Mood normal.         Behavior: Behavior normal.         Assessment/Plan   Diagnoses and all orders for this visit:    1. Subacute frontal sinusitis (Primary)  -     amoxicillin-clavulanate (Augmentin) 875-125 MG per tablet; Take 1 tablet by mouth 2 (Two) Times a Day.  Dispense: 20 tablet; Refill: 0  -     predniSONE (DELTASONE) 20 MG tablet; Take 3 tablets by mouth Daily.  Dispense: 15 tablet; Refill: 0    2. Atypical chest pain  -     Ambulatory Referral to Cardiology      Rest, fluids and follow up if worse or no better.   ER warnings given.

## 2022-03-02 ENCOUNTER — OFFICE VISIT (OUTPATIENT)
Dept: CARDIOLOGY | Facility: CLINIC | Age: 67
End: 2022-03-02

## 2022-03-02 VITALS
DIASTOLIC BLOOD PRESSURE: 62 MMHG | HEART RATE: 70 BPM | WEIGHT: 293 LBS | SYSTOLIC BLOOD PRESSURE: 128 MMHG | HEIGHT: 67 IN | BODY MASS INDEX: 45.99 KG/M2

## 2022-03-02 DIAGNOSIS — R06.09 DOE (DYSPNEA ON EXERTION): Primary | ICD-10-CM

## 2022-03-02 DIAGNOSIS — I87.2 VENOUS STASIS DERMATITIS OF BOTH LOWER EXTREMITIES: ICD-10-CM

## 2022-03-02 PROCEDURE — 99214 OFFICE O/P EST MOD 30 MIN: CPT | Performed by: INTERNAL MEDICINE

## 2022-03-02 PROCEDURE — 93000 ELECTROCARDIOGRAM COMPLETE: CPT | Performed by: INTERNAL MEDICINE

## 2022-03-02 RX ORDER — APREMILAST 30 MG/1
1 TABLET, FILM COATED ORAL 2 TIMES DAILY
COMMUNITY
End: 2022-03-22 | Stop reason: ALTCHOICE

## 2022-03-02 RX ORDER — LOPERAMIDE HYDROCHLORIDE 2 MG/1
1 TABLET ORAL AS NEEDED
COMMUNITY

## 2022-03-02 RX ORDER — DAPSONE 25 MG/1
50 TABLET ORAL DAILY
COMMUNITY
Start: 2022-01-27

## 2022-03-02 RX ORDER — DAPSONE 50 MG/G
GEL TOPICAL
COMMUNITY
Start: 2022-02-11

## 2022-03-02 NOTE — PROGRESS NOTES
Kortney PADILLA Latesha  1955  Date of Office Visit: 03/02/22  Encounter Provider: Dustin Herr MD  Place of Service: [facility] CARDIOLOGY      CHIEF COMPLAINT:  Dyspnea on exertion  Chest pain    HISTORY OF PRESENT ILLNESS: 66-year-old female who presented in December 2021 to the ER with a complaint of chest discomfort.  I saw her at that time as she was admitted to the observation unit.  She has a medical history of hypertension, morbid obesity and and osteoarthritis.  She also had a prior stress test in 2018 with no evidence of ischemia.  She reported sharp intermittent substernal chest discomfort while watching TV that lasted a few minutes.  It resolved on its own.  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  Her cardiac biomarkers were negative.  Her EKG was normal.  This was felt to be noncardiac in etiology and she was subsequently discharged home.  She presents back and denies any chest discomfort.  She still has dyspnea on exertion that is moderate in intensity.  She does not exercise much.  Her blood pressure and heart rate are normal.  She has not lost any significant amount of weight    Review of Systems   Constitutional: Negative for fever and malaise/fatigue.   HENT: Negative for nosebleeds and sore throat.    Eyes: Negative for blurred vision and double vision.   Cardiovascular: Negative for chest pain, claudication, palpitations and syncope.   Respiratory: Negative for cough, shortness of breath and snoring.    Endocrine: Negative for cold intolerance, heat intolerance and polydipsia.   Skin: Negative for itching, poor wound healing and rash.   Musculoskeletal: Negative for joint pain, joint swelling, muscle weakness and myalgias.   Gastrointestinal: Negative for abdominal pain, melena, nausea and vomiting.   Neurological: Negative for light-headedness, loss of balance, seizures, vertigo and weakness.  "  Psychiatric/Behavioral: Negative for altered mental status and depression.       Past Medical History:   Diagnosis Date   • Chest pain 03/22/2018   • Chronic nasopharyngitis    • Elevated BP without diagnosis of hypertension    • Morbid obesity (HCC)    • Osteoarthritis    • Stasis dermatitis        The following portions of the patient's history were reviewed and updated as appropriate: Social history , Family history and Surgical history     Current Outpatient Medications on File Prior to Visit   Medication Sig Dispense Refill   • amoxicillin-clavulanate (Augmentin) 875-125 MG per tablet Take 1 tablet by mouth 2 (Two) Times a Day. 20 tablet 0   • aspirin 81 MG EC tablet Take 81 mg by mouth Daily.     • ELIDEL 1 % cream APPLY TWICE A DAY  5   • fexofenadine-pseudoephedrine (ALLEGRA-D 24) 180-240 MG per 24 hr tablet Take 1 tablet by mouth Daily.     • furosemide (LASIX) 20 MG tablet Take 1 tablet by mouth Daily. 90 tablet 3   • ibuprofen (ADVIL,MOTRIN) 200 MG tablet Take 200 mg by mouth Every 6 (Six) Hours As Needed for Mild Pain .     • Multiple Vitamins-Minerals (MULTIVITAMIN WITH MINERALS) tablet tablet Take 1 tablet by mouth Daily.     • predniSONE (DELTASONE) 20 MG tablet Take 3 tablets by mouth Daily. 15 tablet 0     No current facility-administered medications on file prior to visit.       Allergies   Allergen Reactions   • Zoster Vac Recomb Adjuvanted Other (See Comments)     Redness around sight, fever the next day.     • Other Rash     Topical Steroids       Vitals:    03/02/22 1145   BP: 128/62   Pulse: 70   Weight: (!) 175 kg (386 lb)   Height: 170.2 cm (67\")     Body mass index is 60.46 kg/m².   Constitutional:       Appearance: Well-developed.      Comments: Morbidly obese.   Eyes:      General: No scleral icterus.     Conjunctiva/sclera: Conjunctivae normal.   HENT:      Head: Normocephalic and atraumatic.   Neck:      Thyroid: No thyromegaly.      Vascular: Normal carotid pulses. No carotid bruit, " hepatojugular reflux or JVD.      Trachea: No tracheal deviation.   Pulmonary:      Effort: No respiratory distress.      Breath sounds: Normal breath sounds. No decreased breath sounds. No wheezing. No rhonchi. No rales.   Chest:      Chest wall: Not tender to palpatation.   Cardiovascular:      Normal rate. Regular rhythm.      No gallop.   Pulses:     Carotid: 2+ bilaterally.     Radial: 2+ bilaterally.     Femoral: 2+ bilaterally.     Dorsalis pedis: 2+ bilaterally.     Posterior tibial: 2+ bilaterally.  Edema:     Peripheral edema absent.   Abdominal:      General: Bowel sounds are normal. There is no distension.      Palpations: Abdomen is soft.      Tenderness: There is no abdominal tenderness.   Musculoskeletal:         General: No deformity.      Cervical back: Normal range of motion and neck supple. Skin:     Findings: No erythema or rash.   Neurological:      Mental Status: Alert and oriented to person, place, and time.      Sensory: No sensory deficit.   Psychiatric:         Behavior: Behavior normal.         Lab Results   Component Value Date    WBC 8.67 12/04/2021    HGB 13.2 12/04/2021    HCT 40.6 12/04/2021    MCV 81.2 12/04/2021     12/04/2021       Lab Results   Component Value Date    GLUCOSE 97 12/03/2021    BUN 13 12/03/2021    CREATININE 0.67 12/03/2021    EGFRIFNONA 88 12/03/2021    EGFRIFAFRI 114 09/28/2021    BCR 19.4 12/03/2021    K 4.0 12/03/2021    CO2 30.2 (H) 12/03/2021    CALCIUM 9.5 12/03/2021    PROTENTOTREF 6.7 09/28/2021    ALBUMIN 4.00 12/03/2021    LABIL2 1.6 09/28/2021    AST 18 12/03/2021    ALT 19 12/03/2021       Lab Results   Component Value Date    GLUCOSE 97 12/03/2021    CALCIUM 9.5 12/03/2021     12/03/2021    K 4.0 12/03/2021    CO2 30.2 (H) 12/03/2021     12/03/2021    BUN 13 12/03/2021    CREATININE 0.67 12/03/2021    EGFRIFAFRI 114 09/28/2021    EGFRIFNONA 88 12/03/2021    BCR 19.4 12/03/2021    ANIONGAP 8.8 12/03/2021       Lab Results   Component  Value Date    CHOL 160 12/03/2021    CHLPL 169 11/09/2020    TRIG 134 12/03/2021    HDL 47 12/03/2021    LDL 89 12/03/2021         ECG 12 Lead    Date/Time: 3/2/2022 12:02 PM  Performed by: Dustin Herr MD  Authorized by: Dustin Herr MD   Comparison: compared with previous ECG from 12/3/2021  Similar to previous ECG  Rhythm: sinus rhythm  Rate: normal  QRS axis: normal  Other findings: low voltage    Clinical impression: non-specific ECG                 DISCUSSION/SUMMARY  Very pleasant 66-year-old female who presents to me for evaluation of dyspnea on exertion and a prior history of chest pain.  She does not have any orthopnea or PND.  She has chronic bilateral lower extremity edema that is unchanged from prior.  She states her chest pain has resolved.  She does not feel like her dyspnea is any worse than it has been in the past    1.  RANIES: Likely secondary to deconditioning and morbid obesity.  -In the setting of prior chest pain and dyspnea we will get a transthoracic echocardiogram.  I would not recommend ischemic work-up.  EKG is normal.    2.  Morbid obesity: Dietary modification and weight loss have been recommended.  Patient is aware.

## 2022-03-18 ENCOUNTER — HOSPITAL ENCOUNTER (OUTPATIENT)
Dept: CARDIOLOGY | Facility: HOSPITAL | Age: 67
Discharge: HOME | End: 2022-03-18
Admitting: INTERNAL MEDICINE

## 2022-03-18 VITALS
DIASTOLIC BLOOD PRESSURE: 88 MMHG | BODY MASS INDEX: 45.99 KG/M2 | WEIGHT: 293 LBS | HEART RATE: 86 BPM | HEIGHT: 67 IN | SYSTOLIC BLOOD PRESSURE: 140 MMHG

## 2022-03-18 DIAGNOSIS — R06.09 DOE (DYSPNEA ON EXERTION): ICD-10-CM

## 2022-03-18 LAB
AORTIC ARCH: 2.6 CM
ASCENDING AORTA: 3.1 CM
BH CV ECHO MEAS - ACS: 2.12 CM
BH CV ECHO MEAS - AO MAX PG: 13.6 MMHG
BH CV ECHO MEAS - AO MEAN PG: 7.1 MMHG
BH CV ECHO MEAS - AO ROOT DIAM: 3.5 CM
BH CV ECHO MEAS - AO V2 MAX: 184.6 CM/SEC
BH CV ECHO MEAS - AO V2 VTI: 44.2 CM
BH CV ECHO MEAS - AVA(I,D): 1.75 CM2
BH CV ECHO MEAS - EDV(CUBED): 263.2 ML
BH CV ECHO MEAS - EDV(MOD-SP2): 114 ML
BH CV ECHO MEAS - EDV(MOD-SP4): 138 ML
BH CV ECHO MEAS - EF(MOD-BP): 59 %
BH CV ECHO MEAS - EF(MOD-SP2): 55.3 %
BH CV ECHO MEAS - EF(MOD-SP4): 63 %
BH CV ECHO MEAS - ESV(CUBED): 74.3 ML
BH CV ECHO MEAS - ESV(MOD-SP2): 51 ML
BH CV ECHO MEAS - ESV(MOD-SP4): 51 ML
BH CV ECHO MEAS - FS: 34.4 %
BH CV ECHO MEAS - IVS/LVPW: 0.95 CM
BH CV ECHO MEAS - IVSD: 1.22 CM
BH CV ECHO MEAS - LAT PEAK E' VEL: 8.6 CM/SEC
BH CV ECHO MEAS - LV DIASTOLIC VOL/BSA (35-75): 51.6 CM2
BH CV ECHO MEAS - LV MASS(C)D: 371.6 GRAMS
BH CV ECHO MEAS - LV MAX PG: 4.8 MMHG
BH CV ECHO MEAS - LV MEAN PG: 2.8 MMHG
BH CV ECHO MEAS - LV SYSTOLIC VOL/BSA (12-30): 19.1 CM2
BH CV ECHO MEAS - LV V1 MAX: 109.3 CM/SEC
BH CV ECHO MEAS - LV V1 VTI: 24.9 CM
BH CV ECHO MEAS - LVIDD: 6.4 CM
BH CV ECHO MEAS - LVIDS: 4.2 CM
BH CV ECHO MEAS - LVOT AREA: 3.1 CM2
BH CV ECHO MEAS - LVOT DIAM: 1.99 CM
BH CV ECHO MEAS - LVPWD: 1.29 CM
BH CV ECHO MEAS - MED PEAK E' VEL: 6.7 CM/SEC
BH CV ECHO MEAS - MR MAX PG: 99.3 MMHG
BH CV ECHO MEAS - MR MAX VEL: 498.2 CM/SEC
BH CV ECHO MEAS - MV A DUR: 0.1 SEC
BH CV ECHO MEAS - MV A MAX VEL: 114 CM/SEC
BH CV ECHO MEAS - MV DEC SLOPE: 201 CM/SEC2
BH CV ECHO MEAS - MV DEC TIME: 0.3 MSEC
BH CV ECHO MEAS - MV E MAX VEL: 83.1 CM/SEC
BH CV ECHO MEAS - MV E/A: 0.73
BH CV ECHO MEAS - MV MAX PG: 4.7 MMHG
BH CV ECHO MEAS - MV MEAN PG: 1.65 MMHG
BH CV ECHO MEAS - MV V2 VTI: 30.9 CM
BH CV ECHO MEAS - MVA(VTI): 2.5 CM2
BH CV ECHO MEAS - PA ACC TIME: 0.12 SEC
BH CV ECHO MEAS - PA PR(ACCEL): 25.5 MMHG
BH CV ECHO MEAS - PA V2 MAX: 131.7 CM/SEC
BH CV ECHO MEAS - PULM A REVS DUR: 0.1 SEC
BH CV ECHO MEAS - PULM A REVS VEL: 24.8 CM/SEC
BH CV ECHO MEAS - PULM DIAS VEL: 45.4 CM/SEC
BH CV ECHO MEAS - PULM SYS VEL: 64.7 CM/SEC
BH CV ECHO MEAS - RAP SYSTOLE: 3 MMHG
BH CV ECHO MEAS - RV MAX PG: 2.6 MMHG
BH CV ECHO MEAS - RV V1 MAX: 81 CM/SEC
BH CV ECHO MEAS - RV V1 VTI: 20.5 CM
BH CV ECHO MEAS - RVOT DIAM: 2.15 CM
BH CV ECHO MEAS - SI(MOD-SP2): 23.6 ML/M2
BH CV ECHO MEAS - SI(MOD-SP4): 32.5 ML/M2
BH CV ECHO MEAS - SUP REN AO DIAM: 2.6 CM
BH CV ECHO MEAS - SV(LVOT): 77.6 ML
BH CV ECHO MEAS - SV(MOD-SP2): 63 ML
BH CV ECHO MEAS - SV(MOD-SP4): 87 ML
BH CV ECHO MEAS - SV(RVOT): 74.2 ML
BH CV ECHO MEAS - TAPSE (>1.6): 2.7 CM
BH CV ECHO MEASUREMENTS AVERAGE E/E' RATIO: 10.86
BH CV XLRA - RV BASE: 3 CM
BH CV XLRA - RV LENGTH: 7.2 CM
BH CV XLRA - RV MID: 2.6 CM
BH CV XLRA - TDI S': 14.8 CM/SEC
LEFT ATRIUM VOLUME INDEX: 20.4 ML/M2
MAXIMAL PREDICTED HEART RATE: 152 BPM
SINUS: 2.7 CM
STJ: 2.7 CM
STRESS TARGET HR: 129 BPM

## 2022-03-18 PROCEDURE — 93306 TTE W/DOPPLER COMPLETE: CPT | Performed by: INTERNAL MEDICINE

## 2022-03-18 PROCEDURE — 93306 TTE W/DOPPLER COMPLETE: CPT

## 2022-03-22 ENCOUNTER — OFFICE VISIT (OUTPATIENT)
Dept: FAMILY MEDICINE CLINIC | Facility: CLINIC | Age: 67
End: 2022-03-22

## 2022-03-22 VITALS
OXYGEN SATURATION: 95 % | TEMPERATURE: 97.8 F | WEIGHT: 293 LBS | HEART RATE: 84 BPM | BODY MASS INDEX: 45.99 KG/M2 | DIASTOLIC BLOOD PRESSURE: 78 MMHG | HEIGHT: 67 IN | SYSTOLIC BLOOD PRESSURE: 110 MMHG

## 2022-03-22 DIAGNOSIS — Z12.31 VISIT FOR SCREENING MAMMOGRAM: ICD-10-CM

## 2022-03-22 DIAGNOSIS — Z23 IMMUNIZATION DUE: ICD-10-CM

## 2022-03-22 DIAGNOSIS — E66.01 MORBID OBESITY: ICD-10-CM

## 2022-03-22 DIAGNOSIS — Z78.0 POST-MENOPAUSAL: ICD-10-CM

## 2022-03-22 DIAGNOSIS — I87.2 VENOUS STASIS DERMATITIS OF BOTH LOWER EXTREMITIES: ICD-10-CM

## 2022-03-22 DIAGNOSIS — L13.1 SNEDDON-WILKINSON DISEASE: ICD-10-CM

## 2022-03-22 DIAGNOSIS — Z13.6 SCREENING FOR CARDIOVASCULAR CONDITION: ICD-10-CM

## 2022-03-22 DIAGNOSIS — Z11.59 ENCOUNTER FOR HEPATITIS C SCREENING TEST FOR LOW RISK PATIENT: ICD-10-CM

## 2022-03-22 DIAGNOSIS — E07.89 OTHER SPECIFIED DISORDERS OF THYROID: ICD-10-CM

## 2022-03-22 DIAGNOSIS — R39.15 URINARY URGENCY: ICD-10-CM

## 2022-03-22 DIAGNOSIS — R79.89 ABNORMAL TSH: ICD-10-CM

## 2022-03-22 DIAGNOSIS — Z00.00 MEDICARE ANNUAL WELLNESS VISIT, SUBSEQUENT: Primary | ICD-10-CM

## 2022-03-22 PROCEDURE — 90732 PPSV23 VACC 2 YRS+ SUBQ/IM: CPT | Performed by: FAMILY MEDICINE

## 2022-03-22 PROCEDURE — 99214 OFFICE O/P EST MOD 30 MIN: CPT | Performed by: FAMILY MEDICINE

## 2022-03-22 PROCEDURE — 1159F MED LIST DOCD IN RCRD: CPT | Performed by: FAMILY MEDICINE

## 2022-03-22 PROCEDURE — 1170F FXNL STATUS ASSESSED: CPT | Performed by: FAMILY MEDICINE

## 2022-03-22 PROCEDURE — G0009 ADMIN PNEUMOCOCCAL VACCINE: HCPCS | Performed by: FAMILY MEDICINE

## 2022-03-22 PROCEDURE — G0439 PPPS, SUBSEQ VISIT: HCPCS | Performed by: FAMILY MEDICINE

## 2022-03-22 NOTE — PROGRESS NOTES
QUICK REFERENCE INFORMATION:  The ABCs of the Annual Wellness Visit    Subsequent Medicare Wellness Visit    HEALTH RISK ASSESSMENT    1955    Recent Hospitalizations:  No hospitalization(s) within the last year..    Obesity-still working hard on diet and exercise.        Swelling in legs much better. On meds, due labs     Sneddon-barragan disease- follows with derm, is a psoriatic disease. 2-3 flairs a year. Stable. Doing so much better    Having a little urinary urgency and wanting u/a.     Current Medical Providers:  Patient Care Team:  Dorinda Hastings MD as PCP - General (Family Medicine)        Smoking Status:  Social History     Tobacco Use   Smoking Status Never Smoker   Smokeless Tobacco Never Used       Alcohol Consumption:  Social History     Substance and Sexual Activity   Alcohol Use Yes    Comment: maybe 2x year       Depression Screen:   PHQ-2/PHQ-9 Depression Screening 3/22/2022   Retired Total Score -   Little Interest or Pleasure in Doing Things 0-->not at all   Feeling Down, Depressed or Hopeless 0-->not at all   PHQ-9: Brief Depression Severity Measure Score 0       Health Habits and Functional and Cognitive Screening:  Functional & Cognitive Status 3/22/2022   Do you have difficulty preparing food and eating? No   Do you have difficulty bathing yourself, getting dressed or grooming yourself? No   Do you have difficulty using the toilet? No   Do you have difficulty moving around from place to place? No   Do you have trouble with steps or getting out of a bed or a chair? No   Current Diet Well Balanced Diet   Dental Exam Up to date   Eye Exam Up to date   Exercise (times per week) 0 times per week   Current Exercises Include No Regular Exercise   Do you need help using the phone?  No   Are you deaf or do you have serious difficulty hearing?  No   Do you need help with transportation? No   Do you need help shopping? No   Do you need help preparing meals?  No   Do you need help with  housework?  No   Do you need help with laundry? No   Do you need help taking your medications? No   Do you need help managing money? No   Do you ever drive or ride in a car without wearing a seat belt? No   Have you felt unusual stress, anger or loneliness in the last month? No   Who do you live with? Spouse   If you need help, do you have trouble finding someone available to you? No   Have you been bothered in the last four weeks by sexual problems? No   Do you have difficulty concentrating, remembering or making decisions? No           Does the patient have evidence of cognitive impairment? No    Aspirin use counseling: Taking ASA appropriately as indicated      Recent Lab Results:  CMP:  Lab Results   Component Value Date    BUN 13 12/03/2021    CREATININE 0.67 12/03/2021    EGFRIFNONA 88 12/03/2021    EGFRIFAFRI 114 09/28/2021    BCR 19.4 12/03/2021     12/03/2021    K 4.0 12/03/2021    CO2 30.2 (H) 12/03/2021    CALCIUM 9.5 12/03/2021    PROTENTOTREF 6.7 09/28/2021    ALBUMIN 4.00 12/03/2021    LABGLOBREF 2.6 09/28/2021    LABIL2 1.6 09/28/2021    BILITOT 0.3 12/03/2021    ALKPHOS 91 12/03/2021    AST 18 12/03/2021    ALT 19 12/03/2021     Lipid Panel:  Lab Results   Component Value Date    CHOL 160 12/03/2021    TRIG 134 12/03/2021    HDL 47 12/03/2021    VLDL 24 12/03/2021    LDLHDL 1.83 12/03/2021     HbA1c:  Lab Results   Component Value Date    HGBA1C 6.27 (H) 12/04/2021       Visual Acuity:  No exam data present    Age-appropriate Screening Schedule:  Refer to the list below for future screening recommendations based on patient's age, sex and/or medical conditions. Orders for these recommended tests are listed in the plan section. The patient has been provided with a written plan.    Health Maintenance   Topic Date Due   • DXA SCAN  Never done   • MAMMOGRAM  05/11/2023   • TDAP/TD VACCINES (2 - Td or Tdap) 06/26/2028   • INFLUENZA VACCINE  Completed        Subjective   History of Present  Illness    Kortney Charlton is a 66 y.o. female who presents for an Subsequent Wellness Visit.    The following portions of the patient's history were reviewed and updated as appropriate: allergies, current medications, past family history, past medical history, past social history, past surgical history and problem list.    Outpatient Medications Prior to Visit   Medication Sig Dispense Refill   • aspirin 81 MG EC tablet Take 81 mg by mouth Daily.     • dapsone 25 MG tablet Take 50 mg by mouth Daily.     • Dapsone 5 % topical gel APPLY TO FACE ONCE DAILY     • Fexofenadine HCl (ALLEGRA ALLERGY PO)      • furosemide (LASIX) 20 MG tablet Take 1 tablet by mouth Daily. 90 tablet 3   • ibuprofen (ADVIL,MOTRIN) 200 MG tablet Take 200 mg by mouth Every 6 (Six) Hours As Needed for Mild Pain .     • loperamide (IMODIUM A-D) 2 MG tablet Take 1 tablet by mouth As Needed.     • Multiple Vitamins-Minerals (MULTIVITAMIN WITH MINERALS) tablet tablet Take 1 tablet by mouth Daily.     • Apremilast (Otezla) 30 MG tablet Take 1 tablet by mouth 2 (Two) Times a Day.       No facility-administered medications prior to visit.       Patient Active Problem List   Diagnosis   • Morbid obesity (HCC)   • Elevated BP without diagnosis of hypertension   • Venous stasis dermatitis of both lower extremities   • History of total bilateral knee replacement (TKR)   • Breast lump in female   • Sneddon-Hanna disease   • Abnormal TSH   • Atypical chest pain   • Medicare annual wellness visit, subsequent       Advance Care Planning:  ACP discussion was held with the patient during this visit. Patient does not have an advance directive, information provided.    Identification of Risk Factors:  Risk factors include: Advance Directive Discussion.    Review of Systems   Respiratory: Negative for shortness of breath.    Cardiovascular: Negative for chest pain.       Compared to one year ago, the patient feels her physical health is worse.  Compared to  "one year ago, the patient feels her mental health is the same.    Objective     Physical Exam  Constitutional:       Appearance: Normal appearance. She is well-developed.   Cardiovascular:      Rate and Rhythm: Normal rate and regular rhythm.      Heart sounds: Normal heart sounds.   Pulmonary:      Effort: Pulmonary effort is normal.      Breath sounds: Normal breath sounds.   Musculoskeletal:         General: No swelling. Normal range of motion.   Skin:     General: Skin is warm and dry.      Findings: No rash.   Neurological:      General: No focal deficit present.      Mental Status: She is alert and oriented to person, place, and time.   Psychiatric:         Mood and Affect: Mood normal.         Behavior: Behavior normal.         Vitals:    03/22/22 1047   BP: 110/78   BP Location: Left arm   Patient Position: Sitting   Pulse: 84   Temp: 97.8 °F (36.6 °C)   SpO2: 95%   Weight: (!) 177 kg (391 lb 3.2 oz)   Height: 170.2 cm (67.01\")   PainSc: 0-No pain       Patient's Body mass index is 61.26 kg/m². indicating that she is morbidly obese (BMI > 40 or > 35 with obesity - related health condition). Obesity-related health conditions include the following: none. Obesity is worsening. BMI is is above average; BMI management plan is completed. We discussed portion control and increasing exercise..      Assessment/Plan   Patient Self-Management and Personalized Health Advice  The patient has been provided with information about: diet and exercise and preventive services including:   · Annual Wellness Visit (AWV).    Visit Diagnoses:    ICD-10-CM ICD-9-CM   1. Medicare annual wellness visit, subsequent  Z00.00 V70.0   2. Morbid obesity (HCC)  E66.01 278.01   3. Sneddon-Hanna disease  L13.1 694.1   4. Venous stasis dermatitis of both lower extremities  I87.2 454.1   5. Encounter for hepatitis C screening test for low risk patient  Z11.59 V73.89   6. Visit for screening mammogram  Z12.31 V76.12   7. Post-menopausal  " Z78.0 V49.81   8. Screening for cardiovascular condition  Z13.6 V81.2   9. Immunization due  Z23 V05.9   10. Urinary urgency  R39.15 788.63   11. Abnormal TSH  R79.89 790.6   12. Other specified disorders of thyroid   E07.89 246.8       Orders Placed This Encounter   Procedures   • Urine Culture - Urine, Urine, Clean Catch     Order Specific Question:   Release to patient     Answer:   Immediate   • Mammo Screening Bilateral With CAD     Standing Status:   Future     Standing Expiration Date:   3/22/2023     Scheduling Instructions:      Needs after may 12th.     Order Specific Question:   Reason for Exam:     Answer:   screen     Order Specific Question:   Release to patient     Answer:   Immediate   • DEXA Bone Density Axial     Standing Status:   Future     Standing Expiration Date:   3/22/2023     Order Specific Question:   Reason for Exam:     Answer:   screen     Order Specific Question:   Release to patient     Answer:   Immediate   • Pneumococcal Polysaccharide Vaccine 23-Valent Greater Than or Equal To 3yo Subcutaneous / IM   • HCV Antibody Reflex To LETITIA     Order Specific Question:   Release to patient     Answer:   Immediate   • Comprehensive Metabolic Panel     Order Specific Question:   Release to patient     Answer:   Immediate   • Lipid Panel   • TSH Rfx On Abnormal To Free T4     Order Specific Question:   Release to patient     Answer:   Immediate   • Vitamin B12     Order Specific Question:   Release to patient     Answer:   Immediate   • CBC & Differential     Order Specific Question:   Manual Differential     Answer:   No   • Urinalysis With Microscopic - Urine, Clean Catch     Order Specific Question:   Release to patient     Answer:   Immediate       Outpatient Encounter Medications as of 3/22/2022   Medication Sig Dispense Refill   • aspirin 81 MG EC tablet Take 81 mg by mouth Daily.     • dapsone 25 MG tablet Take 50 mg by mouth Daily.     • Dapsone 5 % topical gel APPLY TO FACE ONCE DAILY      • Fexofenadine HCl (ALLEGRA ALLERGY PO)      • furosemide (LASIX) 20 MG tablet Take 1 tablet by mouth Daily. 90 tablet 3   • ibuprofen (ADVIL,MOTRIN) 200 MG tablet Take 200 mg by mouth Every 6 (Six) Hours As Needed for Mild Pain .     • loperamide (IMODIUM A-D) 2 MG tablet Take 1 tablet by mouth As Needed.     • Multiple Vitamins-Minerals (MULTIVITAMIN WITH MINERALS) tablet tablet Take 1 tablet by mouth Daily.     • [DISCONTINUED] Apremilast (Otezla) 30 MG tablet Take 1 tablet by mouth 2 (Two) Times a Day.       No facility-administered encounter medications on file as of 3/22/2022.       Reviewed use of high risk medication in the elderly: yes  Reviewed for potential of harmful drug interactions in the elderly: yes    Follow Up:  Return in about 6 months (around 9/22/2022) for Recheck.     An After Visit Summary and PPPS with all of these plans were given to the patient.       stable, cont meds, f/u in 6 months. Discussed risk factors.

## 2022-03-25 ENCOUNTER — TELEPHONE (OUTPATIENT)
Dept: CARDIOLOGY | Facility: CLINIC | Age: 67
End: 2022-03-25

## 2022-03-25 LAB
ALBUMIN SERPL-MCNC: 4 G/DL (ref 3.8–4.8)
ALBUMIN/GLOB SERPL: 1.3 {RATIO} (ref 1.2–2.2)
ALP SERPL-CCNC: 86 IU/L (ref 44–121)
ALT SERPL-CCNC: 15 IU/L (ref 0–32)
APPEARANCE UR: CLEAR
AST SERPL-CCNC: 16 IU/L (ref 0–40)
BACTERIA #/AREA URNS HPF: NORMAL /[HPF]
BACTERIA UR CULT: ABNORMAL
BACTERIA UR CULT: ABNORMAL
BASOPHILS # BLD AUTO: 0.1 X10E3/UL (ref 0–0.2)
BASOPHILS NFR BLD AUTO: 1 %
BILIRUB SERPL-MCNC: 0.4 MG/DL (ref 0–1.2)
BILIRUB UR QL STRIP: NEGATIVE
BUN SERPL-MCNC: 16 MG/DL (ref 8–27)
BUN/CREAT SERPL: 22 (ref 12–28)
CALCIUM SERPL-MCNC: 9.3 MG/DL (ref 8.7–10.3)
CASTS URNS QL MICRO: NORMAL /LPF
CHLORIDE SERPL-SCNC: 102 MMOL/L (ref 96–106)
CHOLEST SERPL-MCNC: 173 MG/DL (ref 100–199)
CO2 SERPL-SCNC: 26 MMOL/L (ref 20–29)
COLOR UR: YELLOW
CREAT SERPL-MCNC: 0.72 MG/DL (ref 0.57–1)
EGFRCR SERPLBLD CKD-EPI 2021: 91 ML/MIN/1.73
EOSINOPHIL # BLD AUTO: 0.1 X10E3/UL (ref 0–0.4)
EOSINOPHIL NFR BLD AUTO: 2 %
EPI CELLS #/AREA URNS HPF: NORMAL /HPF (ref 0–10)
ERYTHROCYTE [DISTWIDTH] IN BLOOD BY AUTOMATED COUNT: 15.8 % (ref 11.7–15.4)
GLOBULIN SER CALC-MCNC: 3 G/DL (ref 1.5–4.5)
GLUCOSE SERPL-MCNC: 99 MG/DL (ref 65–99)
GLUCOSE UR QL STRIP: NEGATIVE
HCT VFR BLD AUTO: 43.5 % (ref 34–46.6)
HCV AB S/CO SERPL IA: <0.1 S/CO RATIO (ref 0–0.9)
HCV AB SERPL QL IA: NORMAL
HDLC SERPL-MCNC: 51 MG/DL
HGB BLD-MCNC: 13.4 G/DL (ref 11.1–15.9)
HGB UR QL STRIP: NEGATIVE
IMM GRANULOCYTES # BLD AUTO: 0 X10E3/UL (ref 0–0.1)
IMM GRANULOCYTES NFR BLD AUTO: 0 %
KETONES UR QL STRIP: NEGATIVE
LDLC SERPL CALC-MCNC: 100 MG/DL (ref 0–99)
LEUKOCYTE ESTERASE UR QL STRIP: NEGATIVE
LYMPHOCYTES # BLD AUTO: 1 X10E3/UL (ref 0.7–3.1)
LYMPHOCYTES NFR BLD AUTO: 15 %
MCH RBC QN AUTO: 25.1 PG (ref 26.6–33)
MCHC RBC AUTO-ENTMCNC: 30.8 G/DL (ref 31.5–35.7)
MCV RBC AUTO: 82 FL (ref 79–97)
MICRO URNS: NORMAL
MICRO URNS: NORMAL
MONOCYTES # BLD AUTO: 0.4 X10E3/UL (ref 0.1–0.9)
MONOCYTES NFR BLD AUTO: 7 %
NEUTROPHILS # BLD AUTO: 4.9 X10E3/UL (ref 1.4–7)
NEUTROPHILS NFR BLD AUTO: 75 %
NITRITE UR QL STRIP: NEGATIVE
OTHER ANTIBIOTIC SUSC ISLT: ABNORMAL
PH UR STRIP: 6 [PH] (ref 5–7.5)
PLATELET # BLD AUTO: 282 X10E3/UL (ref 150–450)
POTASSIUM SERPL-SCNC: 5 MMOL/L (ref 3.5–5.2)
PROT SERPL-MCNC: 7 G/DL (ref 6–8.5)
PROT UR QL STRIP: NEGATIVE
RBC # BLD AUTO: 5.34 X10E6/UL (ref 3.77–5.28)
RBC #/AREA URNS HPF: NORMAL /HPF (ref 0–2)
SODIUM SERPL-SCNC: 141 MMOL/L (ref 134–144)
SP GR UR STRIP: 1.02 (ref 1–1.03)
TRIGL SERPL-MCNC: 124 MG/DL (ref 0–149)
TSH SERPL DL<=0.005 MIU/L-ACNC: 4.14 UIU/ML (ref 0.45–4.5)
UROBILINOGEN UR STRIP-MCNC: 0.2 MG/DL (ref 0.2–1)
VIT B12 SERPL-MCNC: 643 PG/ML (ref 232–1245)
VLDLC SERPL CALC-MCNC: 22 MG/DL (ref 5–40)
WBC # BLD AUTO: 6.5 X10E3/UL (ref 3.4–10.8)
WBC #/AREA URNS HPF: NORMAL /HPF (ref 0–5)

## 2022-03-25 NOTE — TELEPHONE ENCOUNTER
----- Message from Dustin Herr MD sent at 3/23/2022  2:43 PM EDT -----  Normal-appearing echo.  Please let her know.

## 2022-03-28 RX ORDER — NITROFURANTOIN 25; 75 MG/1; MG/1
100 CAPSULE ORAL 2 TIMES DAILY
Qty: 14 CAPSULE | Refills: 0 | Status: SHIPPED | OUTPATIENT
Start: 2022-03-28 | End: 2022-03-28

## 2022-03-28 RX ORDER — NITROFURANTOIN 25; 75 MG/1; MG/1
100 CAPSULE ORAL 2 TIMES DAILY
Qty: 14 CAPSULE | Refills: 0 | Status: SHIPPED | OUTPATIENT
Start: 2022-03-28 | End: 2022-10-12

## 2022-06-16 ENCOUNTER — TELEPHONE (OUTPATIENT)
Dept: FAMILY MEDICINE CLINIC | Facility: CLINIC | Age: 67
End: 2022-06-16

## 2022-06-16 NOTE — TELEPHONE ENCOUNTER
PATIENT CALLED TO DISCUSS THE BILLING/CODING FOR HER MARCH 22ND APPT       MARCH 22ND:    APPT WAS A  6 MO F/U    SHE ALSO HAD:   UTI :  HAD A CULTURE     ALSO PAIN IN HIP ALONG WITH SOME    FINGERTIPS THAT WERE  NUMB    PATIENT ALSO HAD A THYROID CHECK AS WELL.     MEDICARE DENIED SOME OF THE LABS      CMP   URINALYSIS  VIT B12  VENEPUNCTURE      BUT PAID FOR :   CBC   LIPID   THYROID     THEY ADVISED TO CONTACT THE OFFICE AND SEE IF THESE COULD BE CODED DIFFERENTLY      PLEASE CALL AND DISCUSS    Kortney Charlton (Self) [phone] (M)

## 2022-07-13 ENCOUNTER — TELEPHONE (OUTPATIENT)
Dept: FAMILY MEDICINE CLINIC | Facility: CLINIC | Age: 67
End: 2022-07-13

## 2022-07-13 NOTE — TELEPHONE ENCOUNTER
Caller: Kortney Charlton    Relationship: Self    Best call back number: [phone] (M)    What is the best time to reach you: ANY     Who are you requesting to speak with (clinical staff, provider,  specific staff member): XAVIER     What was the call regarding: PATIENT GOT DISCONNECTED FROM DELETTA, ALREADY KNOWS ABOUT THE HUB TO READ. PATIENT WOULD NOT SAY WHAT THE CALL IS IN REGARD TO.     Do you require a callback: YES

## 2022-07-13 NOTE — TELEPHONE ENCOUNTER
Pt returned call and states she got cut off while talking to Deletta. Hub was informed of below message and stated they will get pt rescheduled.

## 2022-07-13 NOTE — TELEPHONE ENCOUNTER
OK for HUB to read and schedule:    LMTCB-  Your provider will be out of the office 9/27/22  & your appointment needs to be rescheduled. Please call our office at [phone] to reschedule with one of the other providers or in October when Dr. Hastings returns.

## 2022-07-14 ENCOUNTER — TELEPHONE (OUTPATIENT)
Dept: FAMILY MEDICINE CLINIC | Facility: CLINIC | Age: 67
End: 2022-07-14

## 2022-07-14 NOTE — TELEPHONE ENCOUNTER
Caller: Kortney Charlton    Relationship to patient: Self    Best call back number: [phone] (M)    Date of exposure: Monday     Date of positive COVID19 test: TODAY     COVID19 symptoms: STUFFY, CONGESTED, REALLY DEEP COUGH, NOTHING ELSE RIGHT NOW.     Date of initial quarantine: QUARANTINED WITH  SINCE Monday     Additional information or concerns: PATIENT IS CALLING BECAUSE HER  TESTED POSITIVE, AND NOW SHE HAS TESTED POSITIVE THIS MORNING. HER  WAS PUT ON THE PAXLOVID PRESCRIPTION AND SHE IS WONDERING IF SHE NEEDS THE SAME PRESCRIPTION.     What is the patients preferred pharmacy: [facility] [id number] [place] - [address] AT [facility] - [phone] [facility] [phone]   [phone]

## 2022-09-29 ENCOUNTER — APPOINTMENT (OUTPATIENT)
Dept: BONE DENSITY | Facility: HOSPITAL | Age: 67
End: 2022-09-29

## 2022-09-29 ENCOUNTER — HOSPITAL ENCOUNTER (OUTPATIENT)
Dept: MAMMOGRAPHY | Facility: HOSPITAL | Age: 67
Discharge: HOME | End: 2022-09-29
Admitting: FAMILY MEDICINE

## 2022-09-29 DIAGNOSIS — Z12.31 VISIT FOR SCREENING MAMMOGRAM: ICD-10-CM

## 2022-09-29 PROCEDURE — 77063 BREAST TOMOSYNTHESIS BI: CPT

## 2022-09-29 PROCEDURE — 77067 SCR MAMMO BI INCL CAD: CPT

## 2022-10-03 ENCOUNTER — TELEPHONE (OUTPATIENT)
Dept: FAMILY MEDICINE CLINIC | Facility: CLINIC | Age: 67
End: 2022-10-03

## 2022-10-03 DIAGNOSIS — R92.8 ABNORMAL MAMMOGRAM OF LEFT BREAST: Primary | ICD-10-CM

## 2022-10-12 ENCOUNTER — OFFICE VISIT (OUTPATIENT)
Dept: FAMILY MEDICINE CLINIC | Facility: CLINIC | Age: 67
End: 2022-10-12

## 2022-10-12 VITALS
BODY MASS INDEX: 45.99 KG/M2 | DIASTOLIC BLOOD PRESSURE: 76 MMHG | WEIGHT: 293 LBS | OXYGEN SATURATION: 94 % | TEMPERATURE: 98 F | HEIGHT: 67 IN | HEART RATE: 64 BPM | SYSTOLIC BLOOD PRESSURE: 122 MMHG

## 2022-10-12 DIAGNOSIS — R19.7 DIARRHEA IN ADULT PATIENT: ICD-10-CM

## 2022-10-12 DIAGNOSIS — L13.1 SNEDDON-WILKINSON DISEASE: ICD-10-CM

## 2022-10-12 DIAGNOSIS — I87.2 VENOUS STASIS DERMATITIS OF BOTH LOWER EXTREMITIES: ICD-10-CM

## 2022-10-12 DIAGNOSIS — R39.15 URINARY URGENCY: ICD-10-CM

## 2022-10-12 DIAGNOSIS — K52.9 CHRONIC DIARRHEA: ICD-10-CM

## 2022-10-12 DIAGNOSIS — R73.01 FASTING HYPERGLYCEMIA: ICD-10-CM

## 2022-10-12 DIAGNOSIS — E66.01 MORBID OBESITY: Primary | ICD-10-CM

## 2022-10-12 LAB
BILIRUB BLD-MCNC: NEGATIVE MG/DL
CLARITY, POC: CLEAR
COLOR UR: YELLOW
EXPIRATION DATE: NORMAL
GLUCOSE UR STRIP-MCNC: NEGATIVE MG/DL
KETONES UR QL: NEGATIVE
LEUKOCYTE EST, POC: NEGATIVE
Lab: NORMAL
NITRITE UR-MCNC: NEGATIVE MG/ML
PH UR: 6 [PH] (ref 5–8)
PROT UR STRIP-MCNC: NEGATIVE MG/DL
RBC # UR STRIP: NEGATIVE /UL
SP GR UR: 1.02 (ref 1–1.03)
UROBILINOGEN UR QL: NORMAL

## 2022-10-12 PROCEDURE — 99214 OFFICE O/P EST MOD 30 MIN: CPT | Performed by: FAMILY MEDICINE

## 2022-10-12 PROCEDURE — 81003 URINALYSIS AUTO W/O SCOPE: CPT | Performed by: FAMILY MEDICINE

## 2022-10-12 RX ORDER — COLESEVELAM 180 1/1
1875 TABLET ORAL 2 TIMES DAILY WITH MEALS
Qty: 540 TABLET | Refills: 1 | Status: SHIPPED | OUTPATIENT
Start: 2022-10-12

## 2022-10-12 NOTE — PROGRESS NOTES
Subjective   Kortney Charlton is a 67 y.o. female.     Chief Complaint   Patient presents with   • Diarrhea       History of Present Illness   Obesity-still working hard on diet and exercise.  Has lost 20 pounds on purpose. Following with a weight loss clinic.       Swelling in legs much better. On meds, due labs     Sneddon-barragan disease- follows with derm, is a psoriatic disease. 2-3 flairs a year. Stable. Doing so much better    Having frequent episodes of diarrhea for 10 years. 3-4 times a week. No blood or mucus. utd on c-scope but due in 2 years. Had this when she had her last c-scope and it was thought she had ibs. She does not have a gallbladder. It really started after this.     Pt has some urinary urgency for a week and thinks it might be the lasix but is wanting a u/a. No other UTI symptoms.     The following portions of the patient's history were reviewed and updated as appropriate: allergies, current medications, past family history, past medical history, past social history, past surgical history and problem list.    Past Medical History:   Diagnosis Date   • Allergic topical steroids   • Chest pain 03/22/2018   • Chronic nasopharyngitis    • Elevated BP without diagnosis of hypertension    • Morbid obesity (HCC)    • Osteoarthritis    • Sneddon-Barragan disease    • Stasis dermatitis        Past Surgical History:   Procedure Laterality Date   • COLONOSCOPY  2015   • ENDOMETRIAL ABLATION  2000   • HYSTEROSCOPY ENDOMETRIAL ABLATION     • JOINT REPLACEMENT  R-2013 L-2015   • KNEE SURGERY Bilateral    • LAPAROSCOPIC CHOLECYSTECTOMY  10/2011   • OVARIAN CYST REMOVAL         Family History   Problem Relation Age of Onset   • Hypertension Mother    • Arthritis Mother    • Cancer Mother    • Heart disease Mother    • Hyperlipidemia Mother    • Stroke Mother    • Thyroid disease Mother    • Prostate cancer Father    • Hypertension Father    • Arthritis Father    • Cancer Father    • Heart disease Father   "  • Arthritis Maternal Aunt        Social History     Socioeconomic History   • Marital status:    Tobacco Use   • Smoking status: Never   • Smokeless tobacco: Never   Substance and Sexual Activity   • Alcohol use: Yes     Comment: maybe 2x year   • Drug use: No   • Sexual activity: Not Currently     Partners: Male     Birth control/protection: Surgical, Post-menopausal     Comment: Ablation       Review of Systems   Constitutional: Negative for fever.   Respiratory: Negative for shortness of breath.        Objective   Visit Vitals  /76 (BP Location: Left arm, Patient Position: Sitting)   Pulse 64   Temp 98 °F (36.7 °C)   Ht 170.2 cm (67.01\")   Wt (!) 169 kg (372 lb)   LMP  (LMP Unknown)   SpO2 94%   BMI 58.25 kg/m²     Body mass index is 58.25 kg/m².  Physical Exam  Constitutional:       Appearance: Normal appearance. She is well-developed.   Cardiovascular:      Rate and Rhythm: Normal rate and regular rhythm.      Heart sounds: Normal heart sounds.   Pulmonary:      Effort: Pulmonary effort is normal.      Breath sounds: Normal breath sounds.   Musculoskeletal:         General: No swelling. Normal range of motion.   Skin:     General: Skin is warm and dry.      Findings: No rash.   Neurological:      General: No focal deficit present.      Mental Status: She is alert and oriented to person, place, and time.   Psychiatric:         Mood and Affect: Mood normal.         Behavior: Behavior normal.           Assessment & Plan   Diagnoses and all orders for this visit:    1. Morbid obesity (HCC) (Primary)  -     colesevelam (Welchol) 625 MG tablet; Take 3 tablets by mouth 2 (Two) Times a Day With Meals.  Dispense: 540 tablet; Refill: 1    2. Venous stasis dermatitis of both lower extremities  -     CBC & Differential  -     Comprehensive Metabolic Panel    3. Sneddon-Hanna disease    4. Diarrhea in adult patient    5. Chronic diarrhea  -     colesevelam (Welchol) 625 MG tablet; Take 3 tablets by mouth " 2 (Two) Times a Day With Meals.  Dispense: 540 tablet; Refill: 1    6. Fasting hyperglycemia  -     Hemoglobin A1c        Discussed risks and benefits and will f/u if worse or no better. Use smallest amount affective. F/U in 6 months.

## 2022-10-13 LAB
ALBUMIN SERPL-MCNC: 4.2 G/DL (ref 3.5–5.2)
ALBUMIN/GLOB SERPL: 1.8 G/DL
ALP SERPL-CCNC: 94 U/L (ref 39–117)
ALT SERPL-CCNC: 16 U/L (ref 1–33)
AST SERPL-CCNC: 15 U/L (ref 1–32)
BASOPHILS # BLD AUTO: 0.05 10*3/MM3 (ref 0–0.2)
BASOPHILS NFR BLD AUTO: 0.7 % (ref 0–1.5)
BILIRUB SERPL-MCNC: 0.4 MG/DL (ref 0–1.2)
BUN SERPL-MCNC: 12 MG/DL (ref 8–23)
BUN/CREAT SERPL: 17.6 (ref 7–25)
CALCIUM SERPL-MCNC: 9.6 MG/DL (ref 8.6–10.5)
CHLORIDE SERPL-SCNC: 104 MMOL/L (ref 98–107)
CO2 SERPL-SCNC: 30.9 MMOL/L (ref 22–29)
CREAT SERPL-MCNC: 0.68 MG/DL (ref 0.57–1)
EGFRCR SERPLBLD CKD-EPI 2021: 94.4 ML/MIN/1.73
EOSINOPHIL # BLD AUTO: 0.12 10*3/MM3 (ref 0–0.4)
EOSINOPHIL NFR BLD AUTO: 1.8 % (ref 0.3–6.2)
ERYTHROCYTE [DISTWIDTH] IN BLOOD BY AUTOMATED COUNT: 14.2 % (ref 12.3–15.4)
GLOBULIN SER CALC-MCNC: 2.3 GM/DL
GLUCOSE SERPL-MCNC: 93 MG/DL (ref 65–99)
HBA1C MFR BLD: 5.2 % (ref 4.8–5.6)
HCT VFR BLD AUTO: 43.9 % (ref 34–46.6)
HGB BLD-MCNC: 13.1 G/DL (ref 12–15.9)
IMM GRANULOCYTES # BLD AUTO: 0.04 10*3/MM3 (ref 0–0.05)
IMM GRANULOCYTES NFR BLD AUTO: 0.6 % (ref 0–0.5)
LYMPHOCYTES # BLD AUTO: 1.18 10*3/MM3 (ref 0.7–3.1)
LYMPHOCYTES NFR BLD AUTO: 17.5 % (ref 19.6–45.3)
MCH RBC QN AUTO: 26.4 PG (ref 26.6–33)
MCHC RBC AUTO-ENTMCNC: 29.8 G/DL (ref 31.5–35.7)
MCV RBC AUTO: 88.3 FL (ref 79–97)
MONOCYTES # BLD AUTO: 0.48 10*3/MM3 (ref 0.1–0.9)
MONOCYTES NFR BLD AUTO: 7.1 % (ref 5–12)
NEUTROPHILS # BLD AUTO: 4.86 10*3/MM3 (ref 1.7–7)
NEUTROPHILS NFR BLD AUTO: 72.3 % (ref 42.7–76)
NRBC BLD AUTO-RTO: 0 /100 WBC (ref 0–0.2)
PLATELET # BLD AUTO: 278 10*3/MM3 (ref 140–450)
POTASSIUM SERPL-SCNC: 4.7 MMOL/L (ref 3.5–5.2)
PROT SERPL-MCNC: 6.5 G/DL (ref 6–8.5)
RBC # BLD AUTO: 4.97 10*6/MM3 (ref 3.77–5.28)
SODIUM SERPL-SCNC: 143 MMOL/L (ref 136–145)
WBC # BLD AUTO: 6.73 10*3/MM3 (ref 3.4–10.8)

## 2022-10-26 ENCOUNTER — HOSPITAL ENCOUNTER (OUTPATIENT)
Dept: ULTRASOUND IMAGING | Facility: HOSPITAL | Age: 67
Discharge: HOME | End: 2022-10-26

## 2022-10-26 ENCOUNTER — HOSPITAL ENCOUNTER (OUTPATIENT)
Dept: MAMMOGRAPHY | Facility: HOSPITAL | Age: 67
Discharge: HOME | End: 2022-10-26

## 2022-10-26 DIAGNOSIS — R92.8 ABNORMAL MAMMOGRAM OF LEFT BREAST: ICD-10-CM

## 2022-10-26 DIAGNOSIS — R92.8 ABNORMAL MAMMOGRAM OF LEFT BREAST: Primary | ICD-10-CM

## 2022-10-26 PROCEDURE — 77065 DX MAMMO INCL CAD UNI: CPT

## 2022-10-26 PROCEDURE — 76642 ULTRASOUND BREAST LIMITED: CPT

## 2022-10-26 PROCEDURE — G0279 TOMOSYNTHESIS, MAMMO: HCPCS

## 2022-11-11 ENCOUNTER — TRANSCRIBE ORDERS (OUTPATIENT)
Dept: ADMINISTRATIVE | Facility: HOSPITAL | Age: 67
End: 2022-11-11

## 2022-11-11 ENCOUNTER — LAB (OUTPATIENT)
Dept: LAB | Facility: HOSPITAL | Age: 67
End: 2022-11-11

## 2022-11-11 DIAGNOSIS — Z79.899 ENCOUNTER FOR LONG-TERM (CURRENT) USE OF OTHER MEDICATIONS: ICD-10-CM

## 2022-11-11 DIAGNOSIS — L40.1 IMPETIGO HERPETIFORMIS: ICD-10-CM

## 2022-11-11 DIAGNOSIS — L40.1 IMPETIGO HERPETIFORMIS: Primary | ICD-10-CM

## 2022-11-11 LAB
HCT VFR BLD AUTO: 42.2 % (ref 34–46.6)
HGB BLD-MCNC: 13.1 G/DL (ref 12–15.9)

## 2022-11-11 PROCEDURE — 85018 HEMOGLOBIN: CPT

## 2022-11-11 PROCEDURE — 85014 HEMATOCRIT: CPT

## 2022-11-11 PROCEDURE — 36415 COLL VENOUS BLD VENIPUNCTURE: CPT

## 2022-12-08 DIAGNOSIS — I87.2 VENOUS STASIS DERMATITIS OF BOTH LOWER EXTREMITIES: ICD-10-CM

## 2022-12-08 RX ORDER — FUROSEMIDE 20 MG/1
TABLET ORAL
Qty: 90 TABLET | Refills: 3 | Status: SHIPPED | OUTPATIENT
Start: 2022-12-08

## 2022-12-08 NOTE — TELEPHONE ENCOUNTER
Rx Refill Note  Requested Prescriptions     Pending Prescriptions Disp Refills   • furosemide (LASIX) 20 MG tablet [Pharmacy Med Name: FUROSEMIDE 20 MG TABLET] 90 tablet 3     Sig: TAKE 1 TABLET BY MOUTH EVERY DAY      Last office visit with prescribing clinician: 10/12/2022   Last telemedicine visit with prescribing clinician: 4/12/2023

## 2023-01-13 ENCOUNTER — HOSPITAL ENCOUNTER (OUTPATIENT)
Dept: BONE DENSITY | Facility: HOSPITAL | Age: 68
Discharge: HOME | End: 2023-01-13
Admitting: FAMILY MEDICINE
Payer: MEDICARE

## 2023-01-13 DIAGNOSIS — Z78.0 POST-MENOPAUSAL: ICD-10-CM

## 2023-01-13 PROCEDURE — 77080 DXA BONE DENSITY AXIAL: CPT

## 2023-04-12 ENCOUNTER — OFFICE VISIT (OUTPATIENT)
Dept: FAMILY MEDICINE CLINIC | Facility: CLINIC | Age: 68
End: 2023-04-12
Payer: MEDICARE

## 2023-04-12 VITALS
DIASTOLIC BLOOD PRESSURE: 70 MMHG | TEMPERATURE: 98.1 F | BODY MASS INDEX: 45.99 KG/M2 | WEIGHT: 293 LBS | OXYGEN SATURATION: 94 % | SYSTOLIC BLOOD PRESSURE: 130 MMHG | HEIGHT: 67 IN | HEART RATE: 84 BPM

## 2023-04-12 DIAGNOSIS — L13.1 SNEDDON-WILKINSON DISEASE: ICD-10-CM

## 2023-04-12 DIAGNOSIS — E66.01 MORBID OBESITY: Primary | ICD-10-CM

## 2023-04-12 DIAGNOSIS — R20.0 NUMBNESS AND TINGLING IN LEFT HAND: ICD-10-CM

## 2023-04-12 DIAGNOSIS — R73.03 PREDIABETES: ICD-10-CM

## 2023-04-12 DIAGNOSIS — R20.2 NUMBNESS AND TINGLING IN LEFT HAND: ICD-10-CM

## 2023-04-12 DIAGNOSIS — Z23 IMMUNIZATION DUE: ICD-10-CM

## 2023-04-12 DIAGNOSIS — K52.9 CHRONIC DIARRHEA: ICD-10-CM

## 2023-04-12 DIAGNOSIS — Z00.00 MEDICARE ANNUAL WELLNESS VISIT, SUBSEQUENT: ICD-10-CM

## 2023-04-12 DIAGNOSIS — Z13.6 SCREENING FOR CARDIOVASCULAR CONDITION: ICD-10-CM

## 2023-04-12 PROBLEM — H43.812 VITREOUS DEGENERATION OF LEFT EYE: Status: ACTIVE | Noted: 2022-11-22

## 2023-04-12 PROBLEM — H52.03 HYPERMETROPIA, BILATERAL: Status: ACTIVE | Noted: 2022-11-22

## 2023-04-12 RX ORDER — POLYMYXIN B SULFATE AND TRIMETHOPRIM 1; 10000 MG/ML; [USP'U]/ML
1 SOLUTION OPHTHALMIC EVERY 6 HOURS
COMMUNITY
Start: 2023-02-24

## 2023-04-12 RX ORDER — COLESEVELAM 180 1/1
1875 TABLET ORAL 2 TIMES DAILY WITH MEALS
Qty: 540 TABLET | Refills: 1 | Status: SHIPPED | OUTPATIENT
Start: 2023-04-12

## 2023-04-12 NOTE — PROGRESS NOTES
The ABCs of the Annual Wellness Visit  Subsequent Medicare Wellness Visit    Subjective    Kortney Charlton is a 67 y.o. female who presents for a Subsequent Medicare Wellness Visit.    The following portions of the patient's history were reviewed and   updated as appropriate: allergies, current medications, past family history, past medical history, past social history, past surgical history and problem list.    Compared to one year ago, the patient feels her physical   health is the same.    Compared to one year ago, the patient feels her mental   health is the same.    Recent Hospitalizations:  She was not admitted to the hospital during the last year.       Current Medical Providers:  Patient Care Team:  Dorinda Hastings MD as PCP - General (Family Medicine)    Outpatient Medications Prior to Visit   Medication Sig Dispense Refill   • aspirin 81 MG EC tablet Take 1 tablet by mouth Daily.     • dapsone 25 MG tablet Take 2 tablets by mouth 2 (Two) Times a Day.     • Dapsone 5 % topical gel APPLY TO FACE ONCE DAILY     • Fexofenadine HCl (ALLEGRA ALLERGY PO)      • furosemide (LASIX) 20 MG tablet TAKE 1 TABLET BY MOUTH EVERY DAY 90 tablet 3   • ibuprofen (ADVIL,MOTRIN) 200 MG tablet Take 1 tablet by mouth Every 6 (Six) Hours As Needed for Mild Pain.     • loperamide (IMODIUM A-D) 2 MG tablet Take 1 tablet by mouth As Needed.     • Multiple Vitamins-Minerals (MULTIVITAMIN WITH MINERALS) tablet tablet Take 1 tablet by mouth Daily.     • trimethoprim-polymyxin b (POLYTRIM) 29036-1.1 UNIT/ML-% ophthalmic solution Apply 1 drop to eye(s) as directed by provider Every 6 (Six) Hours.     • colesevelam (Welchol) 625 MG tablet Take 3 tablets by mouth 2 (Two) Times a Day With Meals. 540 tablet 1     No facility-administered medications prior to visit.       No opioid medication identified on active medication list. I have reviewed chart for other potential  high risk medication/s and harmful drug interactions in the  "elderly.          Aspirin is on active medication list. Aspirin use is indicated based on review of current medical condition/s. Pros and cons of this therapy have been discussed today. Benefits of this medication outweigh potential harm.  Patient has been encouraged to continue taking this medication.  .      Patient Active Problem List   Diagnosis   • Morbid obesity   • Elevated BP without diagnosis of hypertension   • Venous stasis dermatitis of both lower extremities   • History of total bilateral knee replacement (TKR)   • Breast lump in female   • Sneddon-Hanna disease   • Abnormal TSH   • Atypical chest pain   • Medicare annual wellness visit, subsequent   • Diarrhea in adult patient   • Chronic diarrhea   • Hypermetropia, bilateral   • Vitreous degeneration of left eye   • Prediabetes   • Numbness and tingling in left hand     Advance Care Planning   Advance Care Planning     Advance Directive is not on file.  ACP discussion was held with the patient during this visit. Patient does not have an advance directive, information provided.     Objective    Vitals:    04/12/23 1400   BP: 130/70   BP Location: Left arm   Patient Position: Sitting   Pulse: 84   Temp: 98.1 °F (36.7 °C)   SpO2: 94%   Weight: (!) 173 kg (381 lb)   Height: 170.2 cm (67.01\")   PainSc: 0-No pain     Estimated body mass index is 59.66 kg/m² as calculated from the following:    Height as of this encounter: 170.2 cm (67.01\").    Weight as of this encounter: 173 kg (381 lb).    Class 3 Severe Obesity (BMI >=40). Obesity-related health conditions include the following: none. Obesity is worsening. BMI is is above average; BMI management plan is completed. We discussed portion control and increasing exercise.      Does the patient have evidence of cognitive impairment? No          HEALTH RISK ASSESSMENT    Smoking Status:  Social History     Tobacco Use   Smoking Status Never   Smokeless Tobacco Never     Alcohol Consumption:  Social History "     Substance and Sexual Activity   Alcohol Use Yes    Comment: maybe 2x year     Fall Risk Screen:    NINI Fall Risk Assessment was completed, and patient is at LOW risk for falls.Assessment completed on:2023    Depression Screenin/12/2023     2:00 PM   PHQ-2/PHQ-9 Depression Screening   Little Interest or Pleasure in Doing Things 0-->not at all   Feeling Down, Depressed or Hopeless 0-->not at all   PHQ-9: Brief Depression Severity Measure Score 0       Health Habits and Functional and Cognitive Screenin/12/2023     1:00 PM   Functional & Cognitive Status   Do you have difficulty preparing food and eating? No   Do you have difficulty bathing yourself, getting dressed or grooming yourself? No   Do you have difficulty using the toilet? No   Do you have difficulty moving around from place to place? No   Do you have trouble with steps or getting out of a bed or a chair? No   Current Diet Well Balanced Diet   Dental Exam Up to date   Eye Exam Up to date   Exercise (times per week) 0 times per week   Current Exercises Include No Regular Exercise   Do you need help using the phone?  No   Are you deaf or do you have serious difficulty hearing?  No   Do you need help with transportation? No   Do you need help shopping? No   Do you need help preparing meals?  No   Do you need help with housework?  No   Do you need help with laundry? No   Do you need help taking your medications? No   Do you need help managing money? No   Do you ever drive or ride in a car without wearing a seat belt? No   Have you felt unusual stress, anger or loneliness in the last month? No   Who do you live with? Spouse   If you need help, do you have trouble finding someone available to you? No   Have you been bothered in the last four weeks by sexual problems? No   Do you have difficulty concentrating, remembering or making decisions? No       Age-appropriate Screening Schedule:  Refer to the list below for future screening  "recommendations based on patient's age, sex and/or medical conditions. Orders for these recommended tests are listed in the plan section. The patient has been provided with a written plan.    Health Maintenance   Topic Date Due   • COVID-19 Vaccine (4 - Booster for Moderna series) 01/11/2022   • INFLUENZA VACCINE  08/01/2023   • ANNUAL WELLNESS VISIT  04/12/2024   • MAMMOGRAM  10/26/2024   • COLORECTAL CANCER SCREENING  12/18/2024   • DXA SCAN  01/13/2025   • TDAP/TD VACCINES (2 - Td or Tdap) 06/26/2028   • HEPATITIS C SCREENING  Completed   • Pneumococcal Vaccine 65+  Completed                  CMS Preventative Services Quick Reference  Risk Factors Identified During Encounter  None Identified  The above risks/problems have been discussed with the patient.  Pertinent information has been shared with the patient in the After Visit Summary.  An After Visit Summary and PPPS were made available to the patient.    Follow Up:   Next Medicare Wellness visit to be scheduled in 1 year.       Additional E&M Note during same encounter follows:  Patient has multiple medical problems which are significant and separately identifiable that require additional work above and beyond the Medicare Wellness Visit.      Chief Complaint  Medicare Wellness-subsequent    Subjective        HPI  Kortney Charlton is also being seen today for     Obesity-still working hard on diet and exercise. Gained a little weight over easter.       Swelling in legs much better. On meds, due labs     Sneddon-barragan disease- follows with derm, is a psoriatic disease. 2-3 flairs a year. Stable.     Numbness in left hand, intermittent, does not have flick sign. It is daily. No neck pain.     Review of Systems   Respiratory: Negative for shortness of breath.    Cardiovascular: Negative for chest pain.       Objective   Vital Signs:  /70 (BP Location: Left arm, Patient Position: Sitting)   Pulse 84   Temp 98.1 °F (36.7 °C)   Ht 170.2 cm (67.01\")   " Wt (!) 173 kg (381 lb)   SpO2 94%   BMI 59.66 kg/m²     Physical Exam  Constitutional:       Appearance: Normal appearance. She is well-developed.   Cardiovascular:      Rate and Rhythm: Normal rate and regular rhythm.      Heart sounds: Normal heart sounds.   Pulmonary:      Effort: Pulmonary effort is normal.      Breath sounds: Normal breath sounds.   Musculoskeletal:         General: No swelling. Normal range of motion.   Skin:     General: Skin is warm and dry.      Findings: No rash.   Neurological:      General: No focal deficit present.      Mental Status: She is alert and oriented to person, place, and time.   Psychiatric:         Mood and Affect: Mood normal.         Behavior: Behavior normal.                         Assessment and Plan   Diagnoses and all orders for this visit:    1. Morbid obesity (Primary)  -     colesevelam (Welchol) 625 MG tablet; Take 3 tablets by mouth 2 (Two) Times a Day With Meals.  Dispense: 540 tablet; Refill: 1    2. Sneddon-Hanna disease    3. Prediabetes  -     CBC & Differential  -     Comprehensive Metabolic Panel  -     Lipid Panel  -     Hemoglobin A1c    4. Screening for cardiovascular condition  -     CBC & Differential  -     Comprehensive Metabolic Panel  -     Lipid Panel    5. Immunization due  -     Pneumococcal Conjugate Vaccine 20-[facility] All    6. Medicare annual wellness visit, subsequent    7. Chronic diarrhea  -     colesevelam (Welchol) 625 MG tablet; Take 3 tablets by mouth 2 (Two) Times a Day With Meals.  Dispense: 540 tablet; Refill: 1    8. Numbness and tingling in left hand  -     Ambulatory Referral to Neurology             Follow Up   Return in about 1 year (around 4/12/2024) for Recheck.  Patient was given instructions and counseling regarding her condition or for health maintenance advice. Please see specific information pulled into the AVS if appropriate.     Discussed risk factors and f/u yearly.

## 2023-04-13 LAB
ALBUMIN SERPL-MCNC: 4 G/DL (ref 3.5–5.2)
ALBUMIN/GLOB SERPL: 1.4 G/DL
ALP SERPL-CCNC: 83 U/L (ref 39–117)
ALT SERPL-CCNC: 19 U/L (ref 1–33)
AST SERPL-CCNC: 19 U/L (ref 1–32)
BASOPHILS # BLD AUTO: 0.03 10*3/MM3 (ref 0–0.2)
BASOPHILS NFR BLD AUTO: 0.5 % (ref 0–1.5)
BILIRUB SERPL-MCNC: 0.5 MG/DL (ref 0–1.2)
BUN SERPL-MCNC: 14 MG/DL (ref 8–23)
BUN/CREAT SERPL: 23 (ref 7–25)
CALCIUM SERPL-MCNC: 9.5 MG/DL (ref 8.6–10.5)
CHLORIDE SERPL-SCNC: 104 MMOL/L (ref 98–107)
CHOLEST SERPL-MCNC: 161 MG/DL (ref 0–200)
CO2 SERPL-SCNC: 30.2 MMOL/L (ref 22–29)
CREAT SERPL-MCNC: 0.61 MG/DL (ref 0.57–1)
EGFRCR SERPLBLD CKD-EPI 2021: 96.9 ML/MIN/1.73
EOSINOPHIL # BLD AUTO: 0.1 10*3/MM3 (ref 0–0.4)
EOSINOPHIL NFR BLD AUTO: 1.8 % (ref 0.3–6.2)
ERYTHROCYTE [DISTWIDTH] IN BLOOD BY AUTOMATED COUNT: 14.6 % (ref 12.3–15.4)
GLOBULIN SER CALC-MCNC: 2.8 GM/DL
GLUCOSE SERPL-MCNC: 89 MG/DL (ref 65–99)
HBA1C MFR BLD: 4.5 % (ref 4.8–5.6)
HCT VFR BLD AUTO: 37.9 % (ref 34–46.6)
HDLC SERPL-MCNC: 50 MG/DL (ref 40–60)
HGB BLD-MCNC: 11.8 G/DL (ref 12–15.9)
IMM GRANULOCYTES # BLD AUTO: 0.04 10*3/MM3 (ref 0–0.05)
IMM GRANULOCYTES NFR BLD AUTO: 0.7 % (ref 0–0.5)
LDLC SERPL CALC-MCNC: 84 MG/DL (ref 0–100)
LYMPHOCYTES # BLD AUTO: 1.11 10*3/MM3 (ref 0.7–3.1)
LYMPHOCYTES NFR BLD AUTO: 19.6 % (ref 19.6–45.3)
MCH RBC QN AUTO: 27.4 PG (ref 26.6–33)
MCHC RBC AUTO-ENTMCNC: 31.1 G/DL (ref 31.5–35.7)
MCV RBC AUTO: 88.1 FL (ref 79–97)
MONOCYTES # BLD AUTO: 0.54 10*3/MM3 (ref 0.1–0.9)
MONOCYTES NFR BLD AUTO: 9.5 % (ref 5–12)
NEUTROPHILS # BLD AUTO: 3.84 10*3/MM3 (ref 1.7–7)
NEUTROPHILS NFR BLD AUTO: 67.9 % (ref 42.7–76)
NRBC BLD AUTO-RTO: 0 /100 WBC (ref 0–0.2)
PLATELET # BLD AUTO: 228 10*3/MM3 (ref 140–450)
POTASSIUM SERPL-SCNC: 4.3 MMOL/L (ref 3.5–5.2)
PROT SERPL-MCNC: 6.8 G/DL (ref 6–8.5)
RBC # BLD AUTO: 4.3 10*6/MM3 (ref 3.77–5.28)
SODIUM SERPL-SCNC: 142 MMOL/L (ref 136–145)
TRIGL SERPL-MCNC: 155 MG/DL (ref 0–150)
VLDLC SERPL CALC-MCNC: 27 MG/DL (ref 5–40)
WBC # BLD AUTO: 5.66 10*3/MM3 (ref 3.4–10.8)

## 2023-04-27 ENCOUNTER — HOSPITAL ENCOUNTER (OUTPATIENT)
Dept: ULTRASOUND IMAGING | Facility: HOSPITAL | Age: 68
Discharge: HOME | End: 2023-04-27
Payer: MEDICARE

## 2023-04-27 DIAGNOSIS — R92.8 ABNORMAL MAMMOGRAM OF LEFT BREAST: ICD-10-CM

## 2023-04-27 PROCEDURE — 76642 ULTRASOUND BREAST LIMITED: CPT

## 2023-04-28 DIAGNOSIS — R92.8 ABNORMAL MAMMOGRAM: Primary | ICD-10-CM

## 2023-05-22 ENCOUNTER — OFFICE VISIT (OUTPATIENT)
Dept: NEUROLOGY | Facility: CLINIC | Age: 68
End: 2023-05-22
Payer: MEDICARE

## 2023-05-22 VITALS
HEIGHT: 67 IN | SYSTOLIC BLOOD PRESSURE: 126 MMHG | DIASTOLIC BLOOD PRESSURE: 74 MMHG | WEIGHT: 293 LBS | BODY MASS INDEX: 45.99 KG/M2

## 2023-05-22 DIAGNOSIS — R20.2 NUMBNESS AND TINGLING IN LEFT HAND: Primary | ICD-10-CM

## 2023-05-22 DIAGNOSIS — R20.0 NUMBNESS AND TINGLING IN LEFT HAND: Primary | ICD-10-CM

## 2023-05-22 PROCEDURE — 99203 OFFICE O/P NEW LOW 30 MIN: CPT | Performed by: PSYCHIATRY & NEUROLOGY

## 2023-05-22 PROCEDURE — 1160F RVW MEDS BY RX/DR IN RCRD: CPT | Performed by: PSYCHIATRY & NEUROLOGY

## 2023-05-22 PROCEDURE — 1159F MED LIST DOCD IN RCRD: CPT | Performed by: PSYCHIATRY & NEUROLOGY

## 2023-05-22 NOTE — LETTER
May 22, 2023     Dorinda Hastings MD  [id number] [facility]  [address]  [place] [id number]    Patient: Kortney Charlton   YOB: 1955   Date of Visit: 5/22/2023       Dear Dr. Venkata MD:    Thank you for referring Kortney Charlton to me for evaluation. Below are the relevant portions of my assessment and plan of care.    If you have questions, please do not hesitate to call me. I look forward to following Kortney along with you.         Sincerely,        Mariel Loya MD        CC: No Recipients    Mariel Loya MD  05/22/23 1509  Signed  Chief Complaint  Numbness (And tingling left hand)    Subjective           Kortney Charlton presents to [facility] for   HISTORY OF PRESENT ILLNESS:    Kortney Charlton is a 67 year old right handed woman who presents to neurology clinic for initial evaluation and treatment of numbness and tingling in left hand.  Her symptoms started in her left hand starting 4-5 years ago.  No associated injury.  The symptoms started to come on slowly with time.  Her Hgba1c was 6.27 in 2021 but most recently has normalized and is actual low at 4.5%.  She has had normal CMP, TSH, Vit B12 and globulin levels.  She went to chiropractor which was not helpful.  She has an itching sensation in her palm and numbness mainly in her first 3 fingers.  She notices more tingling than numbness.  She can still pick things up without dropping and can function with her hands without significant issues but she notices the tingling.  She has lost about 30 lbs over the past 6 months.  No family history of similar problem.  She does type on the computer and plays on her IPad.  She does not have significant pain to take pain medications at this time.       Past Medical History:   Diagnosis Date   • Allergic topical steroids   • Chest pain 03/22/2018   • Chronic nasopharyngitis    • Elevated BP without diagnosis of hypertension    • Morbid obesity    • Neuromuscular  "disorder 2010   • Osteoarthritis    • Sneddon-Hanna disease    • Stasis dermatitis         Family History   Problem Relation Age of Onset   • Hypertension Mother    • Arthritis Mother    • Cancer Mother         bladder   • Heart disease Mother    • Hyperlipidemia Mother    • Stroke Mother    • Thyroid disease Mother    • Prostate cancer Father    • Hypertension Father    • Arthritis Father    • Cancer Father         prostate, bone   • Heart disease Father    • Arthritis Maternal Aunt         Social History     Socioeconomic History   • Marital status:    Tobacco Use   • Smoking status: Never   • Smokeless tobacco: Never   Substance and Sexual Activity   • Alcohol use: Yes     Comment: maybe 2x year   • Drug use: No   • Sexual activity: Not Currently     Partners: Male     Birth control/protection: Surgical, Post-menopausal     Comment: Ablation        I have reviewed and confirmed the accuracy of the ROS as documented by the MA/LPN/RN Mariel Loya MD    Review of Systems   Neurological: Positive for numbness. Negative for dizziness, tremors, seizures, syncope, facial asymmetry, speech difficulty, weakness, light-headedness, headache, memory problem and confusion.   Psychiatric/Behavioral: Negative for agitation, behavioral problems, decreased concentration, dysphoric mood, hallucinations, self-injury, sleep disturbance, suicidal ideas, negative for hyperactivity, depressed mood and stress. The patient is not nervous/anxious.         Objective    Vital Signs:   /74   Ht 170.2 cm (67.01\")   Wt (!) 176 kg (388 lb 8 oz)   BMI 60.83 kg/m²       PHYSICAL EXAM:    General   Mental Status - Alert. General Appearance - Well developed, Well groomed, Oriented and Cooperative. Orientation - Oriented X3.       Head and Neck  Head - normocephalic, atraumatic with no lesions or palpable masses.  Neck    Global Assessment - supple.       Eye   Sclera/Conjunctiva - Bilateral - Normal.    ENMT  Mouth and Throat "   Oral Cavity/Oropharynx: Oropharynx - the soft palate,uvula and tongue are normal in appearance.    Chest and Lung Exam   Chest - lung clear to auscultation bilaterally.    Cardiovascular   Cardiovascular examination reveals  - normal heart sounds, regular rate and rhythm.    Neurologic   Mental Status: Speech - Normal. Cognitive function - appropriate fund of knowledge. No impairment of attention, Impairment of concentration, impairment of long term memory or impairment of short term memory.  Cranial Nerves:   II Optic: Visual acuity - Left - Normal. Right - Normal. Visual fields - Normal (to confrontation).  III Oculomotor: Pupillary constriction - Left - Normal. Right - Normal.  VII Facial: - Normal Bilaterally.   IX Glossopharyngeal / X Vagus - Normal.  XI Accessory: Trapezius - Bilateral - Normal. Sternocleidomastoid - Bilateral - Normal.  XII Hypoglossal - Bilateral - Normal.  Eye Movements: - Normal Bilaterally.  Sensory:   Light Touch: Intact - Globally with some numbness in her left hand.   Motor:   Bulk and Contour: - Normal.  Tone: - Normal.  Tremor: Not present.  Strength: 5/5 normal muscle strength - All Muscles.   General Assessment of Reflexes: - deep tendon reflexes are normal. Coordination - No Impairment of finger-to-nose or Impairment of rapid alternating movements. Gait - Normal.      Result Review :                Assessment and Plan    Problem List Items Addressed This Visit        Symptoms and Signs    Numbness and tingling in left hand - Primary    Current Assessment & Plan     67 year old right handed woman with numbness and tingling in left hand.  Her symptoms started in her left hand starting 4-5 years ago.  No associated injury.  The symptoms started to come on slowly with time.  Her Hgba1c was 6.27 in 2021 but most recently has normalized and is actual low at 4.5%.  She has had normal CMP, TSH, Vit B12 and globulin levels.  She went to chiropractor which was not helpful.  She has an  itching sensation in her palm and numbness mainly in her first 3 fingers.  She notices more tingling than numbness.  She can still pick things up without dropping and can function with her hands without significant issues but she notices the tingling.  She has lost about 30 lbs over the past 6 months.  No family history of similar problem.  She does type on the computer and plays on her IPad.  She does not have significant pain to take pain medications at this time.  I will order an EMG/NCS of the more symptomatic left hand at this time but I am also ok with doing both hands for comparison.           Relevant Orders    EMG & Nerve Conduction Test       Follow Up   No follow-ups on file.  Patient was given instructions and counseling regarding her condition or for health maintenance advice. Please see specific information pulled into the AVS if appropriate.

## 2023-05-22 NOTE — ASSESSMENT & PLAN NOTE
67 year old right handed woman with numbness and tingling in left hand.  Her symptoms started in her left hand starting 4-5 years ago.  No associated injury.  The symptoms started to come on slowly with time.  Her Hgba1c was 6.27 in 2021 but most recently has normalized and is actual low at 4.5%.  She has had normal CMP, TSH, Vit B12 and globulin levels.  She went to chiropractor which was not helpful.  She has an itching sensation in her palm and numbness mainly in her first 3 fingers.  She notices more tingling than numbness.  She can still pick things up without dropping and can function with her hands without significant issues but she notices the tingling.  She has lost about 30 lbs over the past 6 months.  No family history of similar problem.  She does type on the computer and plays on her IPad.  She does not have significant pain to take pain medications at this time.  I will order an EMG/NCS of the more symptomatic left hand at this time but I am also ok with doing both hands for comparison.

## 2023-05-22 NOTE — PROGRESS NOTES
Chief Complaint  Numbness (And tingling left hand)    Subjective          Kortney Charlton presents to [facility] NEUROLOGY for   HISTORY OF PRESENT ILLNESS:    Kortney Charlton is a 67 year old right handed woman who presents to neurology clinic for initial evaluation and treatment of numbness and tingling in left hand.  Her symptoms started in her left hand starting 4-5 years ago.  No associated injury.  The symptoms started to come on slowly with time.  Her Hgba1c was 6.27 in 2021 but most recently has normalized and is actual low at 4.5%.  She has had normal CMP, TSH, Vit B12 and globulin levels.  She went to chiropractor which was not helpful.  She has an itching sensation in her palm and numbness mainly in her first 3 fingers.  She notices more tingling than numbness.  She can still pick things up without dropping and can function with her hands without significant issues but she notices the tingling.  She has lost about 30 lbs over the past 6 months.  No family history of similar problem.  She does type on the computer and plays on her IPad.  She does not have significant pain to take pain medications at this time.       Past Medical History:   Diagnosis Date   • Allergic topical steroids   • Chest pain 03/22/2018   • Chronic nasopharyngitis    • Elevated BP without diagnosis of hypertension    • Morbid obesity    • Neuromuscular disorder 2010   • Osteoarthritis    • Sneddon-Hanna disease    • Stasis dermatitis         Family History   Problem Relation Age of Onset   • Hypertension Mother    • Arthritis Mother    • Cancer Mother         bladder   • Heart disease Mother    • Hyperlipidemia Mother    • Stroke Mother    • Thyroid disease Mother    • Prostate cancer Father    • Hypertension Father    • Arthritis Father    • Cancer Father         prostate, bone   • Heart disease Father    • Arthritis Maternal Aunt         Social History     Socioeconomic History   • Marital status:   "  Tobacco Use   • Smoking status: Never   • Smokeless tobacco: Never   Substance and Sexual Activity   • Alcohol use: Yes     Comment: maybe 2x year   • Drug use: No   • Sexual activity: Not Currently     Partners: Male     Birth control/protection: Surgical, Post-menopausal     Comment: Ablation        I have reviewed and confirmed the accuracy of the ROS as documented by the MA/LPN/RN Mariel Loya MD    Review of Systems   Neurological: Positive for numbness. Negative for dizziness, tremors, seizures, syncope, facial asymmetry, speech difficulty, weakness, light-headedness, headache, memory problem and confusion.   Psychiatric/Behavioral: Negative for agitation, behavioral problems, decreased concentration, dysphoric mood, hallucinations, self-injury, sleep disturbance, suicidal ideas, negative for hyperactivity, depressed mood and stress. The patient is not nervous/anxious.         Objective   Vital Signs:   /74   Ht 170.2 cm (67.01\")   Wt (!) 176 kg (388 lb 8 oz)   BMI 60.83 kg/m²       PHYSICAL EXAM:    General   Mental Status - Alert. General Appearance - Well developed, Well groomed, Oriented and Cooperative. Orientation - Oriented X3.       Head and Neck  Head - normocephalic, atraumatic with no lesions or palpable masses.  Neck    Global Assessment - supple.       Eye   Sclera/Conjunctiva - Bilateral - Normal.    ENMT  Mouth and Throat   Oral Cavity/Oropharynx: Oropharynx - the soft palate,uvula and tongue are normal in appearance.    Chest and Lung Exam   Chest - lung clear to auscultation bilaterally.    Cardiovascular   Cardiovascular examination reveals  - normal heart sounds, regular rate and rhythm.    Neurologic   Mental Status: Speech - Normal. Cognitive function - appropriate fund of knowledge. No impairment of attention, Impairment of concentration, impairment of long term memory or impairment of short term memory.  Cranial Nerves:   II Optic: Visual acuity - Left - Normal. Right - " Normal. Visual fields - Normal (to confrontation).  III Oculomotor: Pupillary constriction - Left - Normal. Right - Normal.  VII Facial: - Normal Bilaterally.   IX Glossopharyngeal / X Vagus - Normal.  XI Accessory: Trapezius - Bilateral - Normal. Sternocleidomastoid - Bilateral - Normal.  XII Hypoglossal - Bilateral - Normal.  Eye Movements: - Normal Bilaterally.  Sensory:   Light Touch: Intact - Globally with some numbness in her left hand.   Motor:   Bulk and Contour: - Normal.  Tone: - Normal.  Tremor: Not present.  Strength: 5/5 normal muscle strength - All Muscles.   General Assessment of Reflexes: - deep tendon reflexes are normal. Coordination - No Impairment of finger-to-nose or Impairment of rapid alternating movements. Gait - Normal.      Result Review :                 Assessment and Plan    Problem List Items Addressed This Visit        Symptoms and Signs    Numbness and tingling in left hand - Primary    Current Assessment & Plan     67 year old right handed woman with numbness and tingling in left hand.  Her symptoms started in her left hand starting 4-5 years ago.  No associated injury.  The symptoms started to come on slowly with time.  Her Hgba1c was 6.27 in 2021 but most recently has normalized and is actual low at 4.5%.  She has had normal CMP, TSH, Vit B12 and globulin levels.  She went to chiropractor which was not helpful.  She has an itching sensation in her palm and numbness mainly in her first 3 fingers.  She notices more tingling than numbness.  She can still pick things up without dropping and can function with her hands without significant issues but she notices the tingling.  She has lost about 30 lbs over the past 6 months.  No family history of similar problem.  She does type on the computer and plays on her IPad.  She does not have significant pain to take pain medications at this time.  I will order an EMG/NCS of the more symptomatic left hand at this time but I am also ok with  doing both hands for comparison.           Relevant Orders    EMG & Nerve Conduction Test       Follow Up   No follow-ups on file.  Patient was given instructions and counseling regarding her condition or for health maintenance advice. Please see specific information pulled into the AVS if appropriate.

## 2023-05-31 ENCOUNTER — PATIENT ROUNDING (BHMG ONLY) (OUTPATIENT)
Dept: NEUROLOGY | Facility: CLINIC | Age: 68
End: 2023-05-31

## 2023-08-24 ENCOUNTER — HOSPITAL ENCOUNTER (OUTPATIENT)
Dept: INFUSION THERAPY | Facility: HOSPITAL | Age: 68
Discharge: HOME | End: 2023-08-24
Admitting: PSYCHIATRY & NEUROLOGY
Payer: MEDICARE

## 2023-08-24 DIAGNOSIS — R20.2 NUMBNESS AND TINGLING IN LEFT HAND: ICD-10-CM

## 2023-08-24 DIAGNOSIS — R20.0 NUMBNESS AND TINGLING IN LEFT HAND: ICD-10-CM

## 2023-08-24 PROCEDURE — 95886 MUSC TEST DONE W/N TEST COMP: CPT | Performed by: PSYCHIATRY & NEUROLOGY

## 2023-08-24 PROCEDURE — 95909 NRV CNDJ TST 5-6 STUDIES: CPT | Performed by: PSYCHIATRY & NEUROLOGY

## 2023-08-24 PROCEDURE — 95886 MUSC TEST DONE W/N TEST COMP: CPT

## 2023-08-24 PROCEDURE — 95909 NRV CNDJ TST 5-6 STUDIES: CPT

## 2023-08-24 NOTE — PROCEDURES
EMG and Nerve Conduction Studies    I.      Instrument used: Neuromax 1002  II.     Please see data sheets for tabular summary of NCS and details on methods, temperatures and lab standards.   III.    EMG muscles tested for upper extremity studies include the deltoid, biceps, triceps, pronator teres, extensor digitorum communis, first dorsal interosseous and abductor pollicis brevis.    IV.   EMG muscles tested for lower extremity studies include the vastus lateralis, tibialis anterior, peroneus longus, medial gastrocnemius and extensor digitorum brevis.    V.    Additional muscles tested as needed.  Paraspinal muscles tested as needed.   VI.   Please see data sheets for tabular summary of EMG findings.   VII. The complete report includes the data sheets.      Indication: Numbness and tingling in the left hand  History: 67-year-old woman with numbness and tingling in the left hand with occasional numbness on the right.  No diabetes or thyroid disease history.  No particular neck pain.      Ht: 170.2 cm  Wt: 388 pounds  HbA1C:   Lab Results   Component Value Date    HGBA1C 4.50 (L) 04/12/2023     TSH:   Lab Results   Component Value Date    TSH 4.140 03/22/2022       Technical summary:  Nerve conduction studies were obtained in the left arm with 2 comparisons on the right.  Skin temperatures were at least 32 øC measured on the palms.  Needle examination was obtained on selected muscles of the left arm.    Results:  1.  Prolonged median antidromic sensory distal latencies bilaterally at 5.4 ms on the left and 4.7 ms on the right with low amplitudes of 9.3 æV on the left and 13 æV on the right.  2.  Normal ulnar antidromic sensory distal latencies bilaterally with low amplitudes of 11.3 æV on the left and 10.3 æV on the right.  3.  Severely prolonged left median motor distal latency at 6.3 ms with a normal conduction velocity.  The amplitude was low at 4.0 mV from wrist stimulation.  4.  Normal left ulnar motor  conduction velocities with a normal distal latency and amplitudes.  5.  Needle examination of selected muscles in the left arm showed normal insertional activities throughout.  There was a mild increased number of large motor units in the abductor pollicis brevis with increased firing rate and reduced interference pattern.  A few large motor units were seen in the first dorsal interosseous with questionably reduced interference pattern.  The remaining muscles tested showed normal motor units and recruitment patterns.  Cervical paraspinals at C7 showed no abnormality.    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  There was also at least moderate right median neuropathy at the wrist as well as low amplitude ulnar sensory potentials bilaterally consistent with a more diffuse peripheral neuropathy.  There were no additional changes regarding a a left cervical radiculopathy.  Study results were discussed with the patient    Holden Mitchell M.D.              Dictated utilizing Dragon dictation.

## 2023-09-26 ENCOUNTER — HOSPITAL ENCOUNTER (OUTPATIENT)
Dept: ULTRASOUND IMAGING | Facility: HOSPITAL | Age: 68
Discharge: HOME | End: 2023-09-26
Payer: MEDICARE

## 2023-09-26 ENCOUNTER — HOSPITAL ENCOUNTER (OUTPATIENT)
Dept: MAMMOGRAPHY | Facility: HOSPITAL | Age: 68
Discharge: HOME | End: 2023-09-26
Payer: MEDICARE

## 2023-09-26 DIAGNOSIS — R92.8 ABNORMAL MAMMOGRAM: ICD-10-CM

## 2023-09-26 PROCEDURE — 77066 DX MAMMO INCL CAD BI: CPT

## 2023-09-26 PROCEDURE — 76642 ULTRASOUND BREAST LIMITED: CPT

## 2023-09-26 PROCEDURE — G0279 TOMOSYNTHESIS, MAMMO: HCPCS

## 2023-09-27 DIAGNOSIS — N63.0 BREAST LUMP IN FEMALE: Primary | ICD-10-CM

## 2023-10-15 DIAGNOSIS — E66.01 MORBID OBESITY: ICD-10-CM

## 2023-10-15 DIAGNOSIS — K52.9 CHRONIC DIARRHEA: ICD-10-CM

## 2023-10-16 RX ORDER — COLESEVELAM 180 1/1
TABLET ORAL
Qty: 540 TABLET | Refills: 1 | Status: SHIPPED | OUTPATIENT
Start: 2023-10-16

## 2023-10-23 ENCOUNTER — TELEPHONE (OUTPATIENT)
Dept: FAMILY MEDICINE CLINIC | Facility: CLINIC | Age: 68
End: 2023-10-23
Payer: MEDICARE

## 2023-10-23 DIAGNOSIS — R92.8 ABNORMAL MAMMOGRAM: ICD-10-CM

## 2023-10-23 DIAGNOSIS — N63.0 BREAST LUMP IN FEMALE: Primary | ICD-10-CM

## 2023-10-23 NOTE — TELEPHONE ENCOUNTER
Ok to relay    Called patient to let her know that sending referral to Dr. Roger office for biopsy will delay her diagnosis and her treatment. If she is ok with this let us know. If not we are recommending two breast surgeons and we can move forward either way.

## 2023-11-05 NOTE — PROGRESS NOTES
"Assessment/Plan:     68 y.o. female with:    (1) Abnormal breast imaging:  - Left breast 1.7cm mass at 4:00 5cmfn.   - Bilateral Screening Mammogram 09/2022 [facility] 0. Left Diagnostic Mammogram, Left Breast US 10/2022 B[facility]ADS 3. Left Breast US 04/2023 [facility] 3.   - Bilateral Diagnostic Mammogram, Left Breast US 09/2023 BIRADS 4. Recommend left breast US guided breast biopsy. Order placed. I will call her with the results.   - Hernestomahin Bandar lifetime breast cancer risk: will calculate pending final pathology.    (2) Right breast mass:  - On exam, at 2:00 16cmfn in the right breast, palpable abnormality.   - No correlation noted on diagnostic mammogram 09/2023.   - Of note, patient states this has been there for several years. Right breast ultrasound in 2018 shows similar correlation.  - Recommend right breast ultrasound as a follow-up. Order placed.     Referring Provider: Dorinda Hastings MD    Chief complaint: abnormal breast imaging    HPI: Ms. Kortney Charlton is a 68 year old female here today for evaluation of abnormal breast imaging. This was initially detected as an imaging abnormality on routine screening. Her work-up is detailed in the breast history section below.   She denies any breast lumps, pain, skin changes, or nipple discharge. Reports she has \"cystic tissue\".  She has had regular annual mammograms. She denies any prior history of breast biopsies.   She denies any family history of breast cancer or ovarian cancer.     TIMELINE OF WORKUP:  9/29/2022 Bilateral Screening Mammogram:  FINDINGS: Bilateral digital CC and MLO mammographic and Tomosynthesis images were obtained. Comparison is made to prior examinations dated 05/11/2021 and 08/31/2018.   The parenchyma of both breasts is largely fatty-replaced. In the middle third lateral aspect of the left breast there is an area of focal asymmetry. I see no suspicious calcifications or areas of architectural distortion in either breast. There is no  evidence " for skin thickening, nipple retraction or axillary adenopathy.    IMPRESSION:  1. There is an area of focal asymmetry in the middle third lateral aspect of the left breast. Further evaluation with spot compression CC mammographic and Tomosynthesis images and possible targeted left breast sonography is recommended.  2. There are no findings suspicious for malignancy in the right breast.  BI-RADS Category 0: Incomplete. Needs additional imaging evaluation.    10/26/2022 Left Diagnostic Mammogram, Left Breast US:  FINDINGS:  MAMMOGRAM: Left CC and MLO spot compression and lateral 2-D and Tomosynthesis views were obtained.   There are scattered areas of fibroglandular density.   In the slightly lower outer middle left breast, there is a persistent 1.3 cm oval mass. With additional views, this is not significantly changed dating back to 8/31/2018. This area was further assessed with ultrasound.  ULTRASOUND:  Targeted sonographic evaluation of the left breast was performed from 3:00 to 4:00 in the region of mammographic abnormality.   At 4:00, 5 cm from the nipple, there are adjacent complicated cysts together measuring to 1.6 x 0.5 x 1.0 cm, which are probably benign.   At 3:00, 8 cm from the nipple, there is a 0.7 x 0.6 x 1.0 cm complicated cyst corresponding to the mammographically stable mass.  IMPRESSION:  Adjacent complicated cysts at 4:00 in the left breast are probably benign. Recommend short-term follow-up targeted left breast ultrasound in 6 months to document six-month stability.  BI-RADS Category 3: Probably benign    4/27/2023 Left Breast US:  FINDINGS:  Targeted sonographic evaluation of the left breast was performed for follow-up.   At 4:00, 5 cm from the nipple, there are adjacent complicated cysts together measuring up to 1.5 x 0.5 x 0.5 cm, overall similar to 02/26/2022 when accounting for differences in technique, previously measuring up to 1.6 cm. This area remains probably benign.  IMPRESSION:  No  significant change in probably benign adjacent complicated cysts together measuring up to 1.5 cm at 4:00 in the left breast. Recommend follow-up bilateral diagnostic mammogram and targeted left breast ultrasound in September 2023 to document one-year stability.  BI-RADS Category 3: Probably benign    9/26/2023 Bilateral Diagnostic Mammogram, Left Breast US:  FINDINGS: Bilateral digital CC and MLO mammographic and tomosynthesis images were obtained. Comparison is made to prior mammography dated 10/26/2022, 09/29/2022 and 08/31/2018.  The parenchyma of both breasts is largely fatty replaced. I see no new or dominant masses or suspicious calcifications. There is no evidence for architectural distortion or deformity. The previously noted area of focal asymmetry in the middle-third lateral aspect of the left breast measures on the order of 1.1 cm in greatest dimension compared to 1.1 cm previously and is not appreciably changed in morphology. No architectural distortion is appreciated. No suspicious calcifications  are seen in either breast. No evidence for skin thickening, nipple retraction or axillary adenopathy is appreciated.  ULTRASOUND: Targeted sonographic evaluation of the left breast was performed through the 4-o'clock position on the order of 5 cm from the nipple. Comparison is made to prior left breast sonography dated 04/27/2023.  At this location there is revisualization of an oval macrolobulated complex heterogenous hypoechoic mass that measures 1.7 x 0.4 x 0.7 cm compared to prior measurements of 1.5 x 0.5 x 0.5 cm. No internal vascularity is appreciated.  IMPRESSION:  1. There is mild interval sonographic increase in size of a mass in the left breast at the 4-o'clock position on the order of 5 cm from the nipple which measures 1.7 cm compared to 1.5 cm previously. Further evaluation with ultrasound-guided left breast biopsy is recommended.    2. There are no findings suspicious for malignancy in the right  breast.  BI-RADS Category 4: Suspicious abnormality.     MEDICAL HISTORY:     Gynecologic History:   .P:2 AB:0  Age at first childbirth: 24  Lactation/How long: yes  Age at menarche: 9  Age at menopause: 53  Total years of oral contraceptive use: N/A  Total years of hormone replacement therapy: N/A    Past Medical History:   Past Medical History:   Diagnosis Date    Allergic topical steroids    Anemia Episodic    Breast lump in female 2019    Breast mass     Chest pain 2018    Cholelithiasis     Chronic nasopharyngitis     Elevated BP without diagnosis of hypertension     Hypertension     Medicare annual wellness visit, subsequent 2022    Morbid obesity     Neuromuscular disorder     Osteoarthritis     Sneddon-Hanna disease     Stasis dermatitis       Past Surgical History:    Past Surgical History:   Procedure Laterality Date    COLONOSCOPY      ENDOMETRIAL ABLATION      HYSTEROSCOPY ENDOMETRIAL ABLATION      JOINT REPLACEMENT  -2013 L-    KNEE SURGERY Bilateral     LAPAROSCOPIC CHOLECYSTECTOMY  10/2011    OVARIAN CYST REMOVAL       Family History:    Family History   Problem Relation Age of Onset    Hypertension Mother     Arthritis Mother     Cancer Mother         bladder    Heart disease Mother     Hyperlipidemia Mother     Stroke Mother     Thyroid disease Mother     Prostate cancer Father     Hypertension Father     Arthritis Father     Cancer Father         prostate, bone    Heart disease Father     Arthritis Maternal Aunt      Social History:   Social History     Socioeconomic History    Marital status:    Tobacco Use    Smoking status: Never     Passive exposure: Never    Smokeless tobacco: Never   Vaping Use    Vaping Use: Never used   Substance and Sexual Activity    Alcohol use: Yes     Comment: maybe 2x year    Drug use: Never    Sexual activity: Not Currently     Partners: Male     Birth control/protection: Post-menopausal, Surgical     Comment:  Ablation     ALLERGIES:   Allergies   Allergen Reactions    Zoster Vac Recomb Adjuvanted Other (See Comments)     Redness around sight, fever the next day.      Compazine [Prochlorperazine Edisylate] Hallucinations    Other Rash     Topical Steroids     MEDICATIONS:    Current Outpatient Medications:     aspirin 81 MG EC tablet, Take 1 tablet by mouth Daily., Disp: , Rfl:     colesevelam (WELCHOL) 625 MG tablet, TAKE 3 TABLETS BY MOUTH TWICE A DAY WITH MEALS, Disp: 540 tablet, Rfl: 1    dapsone 25 MG tablet, Take 2 tablets by mouth 2 (Two) Times a Day., Disp: , Rfl:     Dapsone 5 % topical gel, APPLY TO FACE ONCE DAILY, Disp: , Rfl:     Fexofenadine HCl (ALLEGRA ALLERGY PO), , Disp: , Rfl:     furosemide (LASIX) 20 MG tablet, TAKE 1 TABLET BY MOUTH EVERY DAY, Disp: 90 tablet, Rfl: 3    ibuprofen (ADVIL,MOTRIN) 200 MG tablet, Take 1 tablet by mouth Every 6 (Six) Hours As Needed for Mild Pain., Disp: , Rfl:     loperamide (IMODIUM A-D) 2 MG tablet, Take 1 tablet by mouth As Needed., Disp: , Rfl:     Multiple Vitamins-Minerals (MULTIVITAMIN WITH MINERALS) tablet tablet, Take 1 tablet by mouth Daily., Disp: , Rfl:     LABS:    Labs from 2023 reviewed.    ROS:   Constitutional: Negative for fevers or chills  HENT: Negative for hearing loss or runny nose  Eyes: Negative for vision changes or scleral icterus  Respiratory: Negative for cough or shortness of breath  Cardiovascular: Negative for chest pain or heart palpitations  Gastrointestinal: Negative for abdominal pain, nausea, vomiting, constipation, melena, or hematochezia  Genitourinary: Negative for hematuria or dysuria  Musculoskeletal: Negative for joint swelling or gait instability  Neurologic: Negative for tremors or seizures  Psychiatric: Negative for suicidal ideations or depression  All other systems reviewed and negative    PHYSICAL EXAM:   ECO - Asymptomatic  Constitutional: Well-developed, well-nourished, no acute distress  Eyes: Conjunctiva  normal, sclera nonicteric  ENMT: Hearing grossly normal, oral mucosa moist  Neck: Supple, no palpable mass, trachea midline  Respiratory: Clear to auscultation, normal inspiratory effort  Cardiovascular: Regular rate, no murmur, no peripheral edema, no jugular venous distention  Breast: symmetric  Right: No visible abnormalities on inspection while seated or with arms raised. No skin changes or nipple abnormalities. At 2:00 16cmfn, small palpable nodule.   Left: No visible abnormalities on inspection while seated or with arms raised. No masses, skin changes, or nipple abnormalities.  No clinical chest wall involvement.  Lymphatics (palpable nodes): No cervical, supraclavicular, or axillary lymphadenopathy  Skin: Warm, dry, no rash on visualized skin surfaces  Musculoskeletal: Symmetric strength, normal gait  Psychiatric: Alert and oriented ×3, normal affect     Saira Lewis PA-C    [facility] - General Surgery   [address], [address]  [place] [address]    [address], [address]  [place] [address]    Office: [phone]  Fax: [phone]

## 2023-11-06 ENCOUNTER — OFFICE VISIT (OUTPATIENT)
Dept: SURGERY | Facility: CLINIC | Age: 68
End: 2023-11-06
Payer: MEDICARE

## 2023-11-06 VITALS
DIASTOLIC BLOOD PRESSURE: 68 MMHG | WEIGHT: 293 LBS | BODY MASS INDEX: 45.99 KG/M2 | SYSTOLIC BLOOD PRESSURE: 138 MMHG | HEIGHT: 67 IN

## 2023-11-06 DIAGNOSIS — N63.0 SUBCUTANEOUS NODULE OF BREAST: ICD-10-CM

## 2023-11-06 DIAGNOSIS — R92.8 ABNORMAL FINDING ON BREAST IMAGING: Primary | ICD-10-CM

## 2023-11-06 DIAGNOSIS — N64.89 OTHER SPECIFIED DISORDERS OF BREAST: ICD-10-CM

## 2023-11-06 PROCEDURE — 99204 OFFICE O/P NEW MOD 45 MIN: CPT

## 2023-11-07 ENCOUNTER — TELEPHONE (OUTPATIENT)
Dept: SURGERY | Facility: CLINIC | Age: 68
End: 2023-11-07
Payer: MEDICARE

## 2023-11-07 NOTE — PROGRESS NOTES
11/16/23 0001   Pre-Procedure Phone Call   Procedure Time Verified Yes   Arrival Time 1330   Procedure Location Verified Yes   Medical History Reviewed Yes   NPO Status Reinforced No   Ride and Caregiver Arranged No   Patient Knows to Bring Current Medications No   Bring Outside Films Requested No

## 2023-11-08 ENCOUNTER — HOSPITAL ENCOUNTER (OUTPATIENT)
Dept: ULTRASOUND IMAGING | Facility: HOSPITAL | Age: 68
Discharge: HOME | End: 2023-11-08
Payer: MEDICARE

## 2023-11-08 DIAGNOSIS — N63.0 SUBCUTANEOUS NODULE OF BREAST: ICD-10-CM

## 2023-11-08 DIAGNOSIS — N64.89 OTHER SPECIFIED DISORDERS OF BREAST: ICD-10-CM

## 2023-11-08 PROCEDURE — 76642 ULTRASOUND BREAST LIMITED: CPT

## 2023-11-09 ENCOUNTER — TELEPHONE (OUTPATIENT)
Dept: SURGERY | Facility: CLINIC | Age: 68
End: 2023-11-09
Payer: MEDICARE

## 2023-11-09 NOTE — TELEPHONE ENCOUNTER
I spoke with Kortney to relay the results from her recent breast ultrasound, Kortney verbalized understanding & will move forward with the breast biopsy.

## 2023-11-09 NOTE — TELEPHONE ENCOUNTER
----- Message from Saira Lewis PA-C sent at 11/8/2023  4:43 PM EST -----  Regarding: results  Hi,     Can you please call her and let her know that her right breast ultrasound did confirm a 2 cm mass, consistent with a lipoma, as expected.  This is benign.     She should proceed with left breast ultrasound-guided biopsy on 11/16/2023.  I will follow-up with her once those results are available.      Thanks,  Saira

## 2023-11-16 ENCOUNTER — HOSPITAL ENCOUNTER (OUTPATIENT)
Dept: ULTRASOUND IMAGING | Facility: HOSPITAL | Age: 68
Discharge: HOME | End: 2023-11-16
Payer: MEDICARE

## 2023-11-16 VITALS
SYSTOLIC BLOOD PRESSURE: 152 MMHG | HEART RATE: 67 BPM | RESPIRATION RATE: 16 BRPM | BODY MASS INDEX: 45.99 KG/M2 | DIASTOLIC BLOOD PRESSURE: 83 MMHG | TEMPERATURE: 98 F | HEIGHT: 67 IN | OXYGEN SATURATION: 94 % | WEIGHT: 293 LBS

## 2023-11-16 DIAGNOSIS — R92.8 ABNORMAL FINDING ON BREAST IMAGING: ICD-10-CM

## 2023-11-16 PROCEDURE — 25010000002 LIDOCAINE 1 % SOLUTION: Performed by: RADIOLOGY

## 2023-11-16 PROCEDURE — A4648 IMPLANTABLE TISSUE MARKER: HCPCS

## 2023-11-16 PROCEDURE — 88305 TISSUE EXAM BY PATHOLOGIST: CPT

## 2023-11-16 RX ORDER — LIDOCAINE HYDROCHLORIDE 10 MG/ML
10 INJECTION, SOLUTION INFILTRATION; PERINEURAL ONCE
Status: COMPLETED | OUTPATIENT
Start: 2023-11-16 | End: 2023-11-16

## 2023-11-16 RX ORDER — DIAZEPAM 5 MG/1
5 TABLET ORAL ONCE AS NEEDED
Status: DISCONTINUED | OUTPATIENT
Start: 2023-11-16 | End: 2023-11-17 | Stop reason: HOSPADM

## 2023-11-16 RX ADMIN — LIDOCAINE HYDROCHLORIDE 10 ML: 10; .005 INJECTION, SOLUTION EPIDURAL; INFILTRATION; INTRACAUDAL; PERINEURAL at 14:49

## 2023-11-16 RX ADMIN — LIDOCAINE HYDROCHLORIDE 9 ML: 10 INJECTION, SOLUTION INFILTRATION; PERINEURAL at 14:48

## 2023-11-16 NOTE — NURSING NOTE
Biopsy site to left outer lower breast clear with Exofin dry and intact. No firmness or swelling noted at or around biopsy site. Denies pain. Ice pack with protective covering applied to biopsy site. Discharge instructions discussed with understanding voiced by patient. Copies provided to patient. No distress noted. To home via private vehicle.

## 2023-11-16 NOTE — H&P
Name: Korntey Charlton ADMIT: 2023   : 1955  PCP: Dorinda Hastings MD    MRN: [id number] LOS: 0 days   AGE/SEX: 68 y.o. female  ROOM: Room/bed info not found       Chief complaint left breast lesion    Present Illness or Internal History:  Patient is a 68 y.o. female presents with a left breast lesion.     Past Surgical History:  Past Surgical History:   Procedure Laterality Date    COLONOSCOPY      ENDOMETRIAL ABLATION      HYSTEROSCOPY ENDOMETRIAL ABLATION      JOINT REPLACEMENT  R-2013 L-    KNEE SURGERY Bilateral     LAPAROSCOPIC CHOLECYSTECTOMY  10/2011    OVARIAN CYST REMOVAL         Past Medical History:  Past Medical History:   Diagnosis Date    Allergic topical steroids    Anemia Episodic    Breast lump in female 2019    Breast mass     Chest pain 2018    Cholelithiasis     Chronic nasopharyngitis     Elevated BP without diagnosis of hypertension     Hypertension     Medicare annual wellness visit, subsequent 2022    Morbid obesity     Neuromuscular disorder     Osteoarthritis     Sneddon-Hanna disease     Stasis dermatitis        Home Medications:  (Not in a hospital admission)      Allergies:  Zoster vac recomb adjuvanted, Compazine [prochlorperazine edisylate], and Other    Family History:  Family History   Problem Relation Age of Onset    Hypertension Mother     Arthritis Mother     Cancer Mother         bladder    Heart disease Mother     Hyperlipidemia Mother     Stroke Mother     Thyroid disease Mother     Prostate cancer Father     Hypertension Father     Arthritis Father     Cancer Father         prostate, bone    Heart disease Father     Arthritis Maternal Aunt        Social History:  Social History     Tobacco Use    Smoking status: Never     Passive exposure: Never    Smokeless tobacco: Never   Vaping Use    Vaping Use: Never used   Substance Use Topics    Alcohol use: Yes     Comment: maybe 2x year    Drug use: Never         Objective     Physical Exam:    No exam performed today,    Vital Signs  Temp:  [98 °F (36.7 °C)] 98 °F (36.7 °C)  Heart Rate:  [71] 71  Resp:  [16] 16  BP: (152)/(83) 152/83    Anticipated Surgical Procedure:  Ultrasound guided left breast biopsy with clip placement    The risks, benefits and alternatives of this procedure have been discussed with the patient or responsible party: Yes        Boo Elizalde Jr., MD  11/16/23  14:42 EST

## 2023-11-17 ENCOUNTER — TELEPHONE (OUTPATIENT)
Dept: MAMMOGRAPHY | Facility: HOSPITAL | Age: 68
End: 2023-11-17
Payer: MEDICARE

## 2023-11-17 LAB
LAB AP CASE REPORT: NORMAL
LAB AP CLINICAL INFORMATION: NORMAL
PATH REPORT.FINAL DX SPEC: NORMAL
PATH REPORT.GROSS SPEC: NORMAL

## 2023-11-17 NOTE — TELEPHONE ENCOUNTER
Left message checking on patient after biopsy yesterday. Requested call back with any questions or concerns.

## 2023-11-20 ENCOUNTER — TELEPHONE (OUTPATIENT)
Dept: SURGERY | Facility: CLINIC | Age: 68
End: 2023-11-20
Payer: MEDICARE

## 2023-11-20 NOTE — TELEPHONE ENCOUNTER
Called patient to review recent left breast biopsy results. Left breast biopsy returned as benign breast parenchyma with a collection of sclerotic and attenuated cyst.  This is concordant with imaging findings.  Recommend routine mammogram follow-up.  She will be due for annual screening mammogram in September 2024.    TC less than 20%, she is not considered high risk for breast cancer.    She can follow-up in our office as needed.    Saira Lewis PA-C

## 2023-11-30 DIAGNOSIS — I87.2 VENOUS STASIS DERMATITIS OF BOTH LOWER EXTREMITIES: ICD-10-CM

## 2023-11-30 RX ORDER — FUROSEMIDE 20 MG/1
TABLET ORAL
Qty: 90 TABLET | Refills: 3 | Status: SHIPPED | OUTPATIENT
Start: 2023-11-30

## 2023-12-09 ENCOUNTER — HOSPITAL ENCOUNTER (OUTPATIENT)
Facility: HOSPITAL | Age: 68
Discharge: HOME | End: 2023-12-09
Attending: EMERGENCY MEDICINE
Payer: MEDICARE

## 2023-12-09 VITALS
TEMPERATURE: 97.9 F | HEART RATE: 86 BPM | BODY MASS INDEX: 45.99 KG/M2 | WEIGHT: 293 LBS | RESPIRATION RATE: 17 BRPM | SYSTOLIC BLOOD PRESSURE: 130 MMHG | DIASTOLIC BLOOD PRESSURE: 96 MMHG | OXYGEN SATURATION: 96 % | HEIGHT: 67 IN

## 2023-12-09 DIAGNOSIS — S05.01XA ABRASION OF RIGHT CORNEA, INITIAL ENCOUNTER: Primary | ICD-10-CM

## 2023-12-09 PROCEDURE — G0463 HOSPITAL OUTPT CLINIC VISIT: HCPCS | Performed by: NURSE PRACTITIONER

## 2023-12-09 RX ORDER — TETRACAINE HYDROCHLORIDE 5 MG/ML
2 SOLUTION OPHTHALMIC ONCE
Status: COMPLETED | OUTPATIENT
Start: 2023-12-09 | End: 2023-12-09

## 2023-12-09 RX ORDER — CIPROFLOXACIN HYDROCHLORIDE 3.5 MG/ML
1 SOLUTION/ DROPS TOPICAL
Qty: 1 EACH | Refills: 0 | Status: SHIPPED | OUTPATIENT
Start: 2023-12-09

## 2023-12-09 RX ADMIN — FLUORESCEIN SODIUM 1 STRIP: 1 STRIP OPHTHALMIC at 15:30

## 2023-12-09 RX ADMIN — TETRACAINE HYDROCHLORIDE 2 DROP: 5 SOLUTION OPHTHALMIC at 15:30

## 2023-12-09 NOTE — DISCHARGE INSTRUCTIONS
Please wait 7 days before reinserting her contact lenses and follow-up with your ophthalmologist if your symptoms do not improve

## 2023-12-09 NOTE — FSED PROVIDER NOTE
Subjective   History of Present Illness  Patient is 68-year-old female who presents complaining of right eye irritation.  States she wears hard contact lenses and felt it after she put her contact lens in.  She did take her contact lens out but states it feels like there is something still in there.  She denies any vision changes, discharge.      Review of Systems   Eyes:  Positive for pain.   All other systems reviewed and are negative.      Past Medical History:   Diagnosis Date    Allergic topical steroids    Anemia Episodic    Breast lump in female 01/14/2019    Breast mass     Chest pain 03/22/2018    Cholelithiasis     Chronic nasopharyngitis     Elevated BP without diagnosis of hypertension     Hypertension     Medicare annual wellness visit, subsequent 03/22/2022    Morbid obesity     Neuromuscular disorder 2010    Osteoarthritis     Sneddon-Hanna disease     Stasis dermatitis        Allergies   Allergen Reactions    Zoster Vac Recomb Adjuvanted Other (See Comments)     Redness around sight, fever the next day.      Compazine [Prochlorperazine Edisylate] Hallucinations    Other Rash     Topical Steroids       Past Surgical History:   Procedure Laterality Date    COLONOSCOPY  2015    ENDOMETRIAL ABLATION  2000    HYSTEROSCOPY ENDOMETRIAL ABLATION      JOINT REPLACEMENT  R-2013 L-2015    KNEE SURGERY Bilateral     LAPAROSCOPIC CHOLECYSTECTOMY  10/2011    OVARIAN CYST REMOVAL         Family History   Problem Relation Age of Onset    Hypertension Mother     Arthritis Mother     Cancer Mother         bladder    Heart disease Mother     Hyperlipidemia Mother     Stroke Mother     Thyroid disease Mother     Prostate cancer Father     Hypertension Father     Arthritis Father     Cancer Father         prostate, bone    Heart disease Father     Arthritis Maternal Aunt        Social History     Socioeconomic History    Marital status:    Tobacco Use    Smoking status: Never     Passive exposure: Never     Smokeless tobacco: Never   Vaping Use    Vaping Use: Never used   Substance and Sexual Activity    Alcohol use: Yes     Comment: maybe 2x year    Drug use: Never    Sexual activity: Not Currently     Partners: Male     Birth control/protection: Post-menopausal, Surgical     Comment: Ablation           Objective   Physical Exam  Vitals and nursing note reviewed.   Constitutional:       Appearance: Normal appearance.   HENT:      Head: Normocephalic and atraumatic.   Pulmonary:      Effort: Pulmonary effort is normal.   Musculoskeletal:      Cervical back: Normal range of motion and neck supple.   Skin:     General: Skin is warm and dry.      Capillary Refill: Capillary refill takes less than 2 seconds.   Neurological:      General: No focal deficit present.      Mental Status: She is alert and oriented to person, place, and time.         Procedures           ED Course                                           Medical Decision Making  Eye was stained with fluorescein and corneal abrasion noted to right eye.  Patient was advised to discontinue contact use and will be prescribed Ciloxan eyedrops.  She is to follow-up with her ophthalmologist if her symptoms do not improve and was given strict return precautions.    Problems Addressed:  Abrasion of right cornea, initial encounter: complicated acute illness or injury    Risk  Prescription drug management.        Final diagnoses:   Abrasion of right cornea, initial encounter       ED Disposition  ED Disposition       ED Disposition   Discharge    Condition   Stable    Comment   --               No follow-up provider specified.       Medication List        New Prescriptions      ciprofloxacin 0.3 % ophthalmic solution  Commonly known as: CILOXAN  Administer 1 drop to the right eye Every 2 (Two) Hours. Administer 1 drop to the right eye every 2 hours while awake for 2 days and then every 4 hours while awake for 5 days               Where to Get Your Medications        These  medications were sent to [facility]/pharmacy #[id number] - [facility], [place] - [address] AT [facility] - [phone] [facility] [phone]   [address], [facility] [address]      Phone: [phone]   ciprofloxacin 0.3 % ophthalmic solution

## 2024-02-20 ENCOUNTER — OFFICE VISIT (OUTPATIENT)
Dept: NEUROLOGY | Facility: CLINIC | Age: 69
End: 2024-02-20
Payer: MEDICARE

## 2024-02-20 VITALS
SYSTOLIC BLOOD PRESSURE: 148 MMHG | OXYGEN SATURATION: 96 % | DIASTOLIC BLOOD PRESSURE: 72 MMHG | HEART RATE: 68 BPM | HEIGHT: 67 IN | BODY MASS INDEX: 45.99 KG/M2 | WEIGHT: 293 LBS

## 2024-02-20 DIAGNOSIS — R20.2 NUMBNESS AND TINGLING IN LEFT HAND: Primary | ICD-10-CM

## 2024-02-20 DIAGNOSIS — R20.0 NUMBNESS AND TINGLING IN LEFT HAND: Primary | ICD-10-CM

## 2024-02-20 DIAGNOSIS — G56.03 BILATERAL CARPAL TUNNEL SYNDROME: ICD-10-CM

## 2024-02-20 PROCEDURE — 99214 OFFICE O/P EST MOD 30 MIN: CPT | Performed by: PSYCHIATRY & NEUROLOGY

## 2024-02-20 PROCEDURE — 1159F MED LIST DOCD IN RCRD: CPT | Performed by: PSYCHIATRY & NEUROLOGY

## 2024-02-20 PROCEDURE — 1160F RVW MEDS BY RX/DR IN RCRD: CPT | Performed by: PSYCHIATRY & NEUROLOGY

## 2024-02-20 NOTE — ASSESSMENT & PLAN NOTE
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 There was also at least moderate right median neuropathy at the wrist as well as low amplitude ulnar sensory potentials bilaterally consistent with a more diffuse peripheral neuropathy. There were no additional changes regarding a a left cervical radiculopathy.  Her symptoms started in her left hand starting 4-5 years ago.  No associated injury.  The symptoms started to come on slowly with time.  Her Hgba1c was 6.27 in 2021 but most recently has normalized and is actual low at 4.5%.  She has had normal CMP, TSH, Vit B12 and globulin levels.  She went to chiropractor which was not helpful.  She has an itching sensation in her palm and numbness mainly in her first 3 fingers.  She notices more tingling than numbness.  She can still pick things up without dropping and can function with her hands without significant issues but she notices the tingling.  No family history of similar problem.  She does type on the computer and plays on her IPad.  She does not have significant pain to take pain medications at this time.  I spoke with her about the results of her testing.  I will refer her to hand surgery for further evaluation and treatment.  I did discuss using carpal tunnel wrist splints as well.

## 2024-02-20 NOTE — PROGRESS NOTES
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 There was also at least moderate right median neuropathy at the wrist as well as low amplitude ulnar sensory potentials bilaterally consistent with a more diffuse peripheral neuropathy. There were no additional changes regarding a a left cervical radiculopathy.  Her symptoms started in her left hand starting 4-5 years ago.  No associated injury.  The symptoms started to come on slowly with time.  Her Hgba1c was 6.27 in 2021 but most recently has normalized and is actual low at 4.5%.  She has had normal CMP, TSH, Vit B12 and globulin levels.  She went to chiropractor which was not helpful.  She has an itching sensation in her palm and numbness mainly in her first 3 fingers.  She notices more tingling than numbness.  She can still pick things up without dropping and can function with her hands without significant issues but she notices the tingling.  No family history of similar problem.  She does type on the computer and plays on her IPad.  She does not have significant pain to take pain medications at this time.      Past Medical History:   Diagnosis Date    Allergic topical steroids    Anemia Episodic    Breast lump in female 01/14/2019    Breast mass     Chest pain 03/22/2018    Cholelithiasis     Chronic nasopharyngitis     Elevated BP without diagnosis of hypertension     Hypertension     Medicare annual wellness visit, subsequent 03/22/2022    Morbid obesity     Neuromuscular disorder 2010    Osteoarthritis     Sneddon-Hanna disease     Stasis dermatitis         Family History   Problem Relation Age  "of Onset    Hypertension Mother     Arthritis Mother     Cancer Mother         bladder    Heart disease Mother     Hyperlipidemia Mother     Stroke Mother     Thyroid disease Mother     Dementia Mother     Prostate cancer Father     Hypertension Father     Arthritis Father     Cancer Father         prostate, bone    Heart disease Father     Arthritis Maternal Aunt     Migraines Sister         Social History     Socioeconomic History    Marital status:    Tobacco Use    Smoking status: Never     Passive exposure: Never    Smokeless tobacco: Never   Vaping Use    Vaping Use: Never used   Substance and Sexual Activity    Alcohol use: Yes     Comment: maybe 2x year    Drug use: Never    Sexual activity: Not Currently     Partners: Male     Birth control/protection: Post-menopausal, Surgical     Comment: Ablation        I have reviewed and confirmed the accuracy of the ROS as documented by the MA/LPN/RN Mariel Loya MD   Review of Systems   Neurological:  Positive for weakness and numbness. Negative for dizziness, tremors, seizures, syncope, facial asymmetry, speech difficulty, light-headedness, headache, memory problem and confusion.   Psychiatric/Behavioral:  Positive for sleep disturbance. Negative for agitation, behavioral problems, decreased concentration, dysphoric mood, hallucinations, self-injury, suicidal ideas, negative for hyperactivity, depressed mood and stress. The patient is not nervous/anxious.         Objective   Vital Signs:   /72   Pulse 68   Ht 170.2 cm (67.01\")   Wt (!) 186 kg (410 lb)   SpO2 96%   BMI 64.20 kg/m²       PHYSICAL EXAM:    General   Mental Status - Alert. General Appearance - Well developed, Well groomed, Oriented and Cooperative. Orientation - Oriented X3.       Head and Neck  Head - normocephalic, atraumatic with no lesions or palpable masses.  Neck    Global Assessment - supple.       Eye   Sclera/Conjunctiva - Bilateral - Normal.    ENMT  Mouth and Throat "   Oral Cavity/Oropharynx: Oropharynx - the soft palate,uvula and tongue are normal in appearance.    Chest and Lung Exam   Chest - lung clear to auscultation bilaterally.    Cardiovascular   Cardiovascular examination reveals  - normal heart sounds, regular rate and rhythm.    Neurologic   Mental Status: Speech - Normal. Cognitive function - appropriate fund of knowledge. No impairment of attention, Impairment of concentration, impairment of long term memory or impairment of short term memory.  Cranial Nerves:   II Optic: Visual acuity - Left - Normal. Right - Normal. Visual fields - Normal (to confrontation).  III Oculomotor: Pupillary constriction - Left - Normal. Right - Normal.  VII Facial: - Normal Bilaterally.   IX Glossopharyngeal / X Vagus - Normal.  XI Accessory: Trapezius - Bilateral - Normal. Sternocleidomastoid - Bilateral - Normal.  XII Hypoglossal - Bilateral - Normal.  Eye Movements: - Normal Bilaterally.  Sensory:   Light Touch: Intact - Globally.  Motor:   Bulk and Contour: - Normal.  Tone: - Normal.  Tremor: Not present.  Strength: 5/5 normal muscle strength - All Muscles.   General Assessment of Reflexes: - deep tendon reflexes are normal. Coordination - No Impairment of finger-to-nose or Impairment of rapid alternating movements. Gait - Normal.         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 There was also at least moderate right median neuropathy at the wrist as well as low amplitude ulnar sensory potentials bilaterally consistent with a more diffuse peripheral neuropathy. There were no additional changes regarding a a left cervical radiculopathy.  Her symptoms started in her left hand starting 4-5 years  ago.  No associated injury.  The symptoms started to come on slowly with time.  Her Hgba1c was 6.27 in 2021 but most recently has normalized and is actual low at 4.5%.  She has had normal CMP, TSH, Vit B12 and globulin levels.  She went to chiropractor which was not helpful.  She has an itching sensation in her palm and numbness mainly in her first 3 fingers.  She notices more tingling than numbness.  She can still pick things up without dropping and can function with her hands without significant issues but she notices the tingling.  No family history of similar problem.  She does type on the computer and plays on her IPad.  She does not have significant pain to take pain medications at this time.  I spoke with her about the results of her testing.  I will refer her to hand surgery for further evaluation and treatment.  I did discuss using carpal tunnel wrist splints as well.           Relevant Orders    Ambulatory Referral to Hand Surgery (Completed)       Symptoms and Signs    Numbness and tingling in left hand - Primary    Relevant Orders    Ambulatory Referral to Hand Surgery (Completed)     I spent 31 minutes caring for Kortney on this date of service. This time includes time spent by me in the following activities:preparing for the visit, reviewing tests, obtaining and/or reviewing a separately obtained history, performing a medically appropriate examination and/or evaluation , counseling and educating the patient/family/caregiver, documenting information in the medical record, and care coordination    Follow Up   Return if symptoms worsen or fail to improve.  Patient was given instructions and counseling regarding her condition or for health maintenance advice. Please see specific information pulled into the AVS if appropriate.

## 2024-03-12 ENCOUNTER — APPOINTMENT (OUTPATIENT)
Dept: CT IMAGING | Facility: HOSPITAL | Age: 69
End: 2024-03-12
Payer: MEDICARE

## 2024-03-12 ENCOUNTER — HOSPITAL ENCOUNTER (EMERGENCY)
Facility: HOSPITAL | Age: 69
Discharge: HOME | End: 2024-03-12
Attending: EMERGENCY MEDICINE
Payer: MEDICARE

## 2024-03-12 VITALS
TEMPERATURE: 98.2 F | HEIGHT: 67 IN | BODY MASS INDEX: 45.99 KG/M2 | SYSTOLIC BLOOD PRESSURE: 156 MMHG | OXYGEN SATURATION: 96 % | HEART RATE: 62 BPM | RESPIRATION RATE: 16 BRPM | WEIGHT: 293 LBS | DIASTOLIC BLOOD PRESSURE: 74 MMHG

## 2024-03-12 DIAGNOSIS — I26.99 OTHER ACUTE PULMONARY EMBOLISM, UNSPECIFIED WHETHER ACUTE COR PULMONALE PRESENT: Primary | ICD-10-CM

## 2024-03-12 LAB
ALBUMIN SERPL-MCNC: 4.1 G/DL (ref 3.5–5.2)
ALBUMIN/GLOB SERPL: 1.3 G/DL
ALP SERPL-CCNC: 91 U/L (ref 39–117)
ALT SERPL W P-5'-P-CCNC: 28 U/L (ref 1–33)
ANION GAP SERPL CALCULATED.3IONS-SCNC: 7.7 MMOL/L (ref 5–15)
AST SERPL-CCNC: 26 U/L (ref 1–32)
BASOPHILS # BLD AUTO: 0.04 10*3/MM3 (ref 0–0.2)
BASOPHILS NFR BLD AUTO: 0.6 % (ref 0–1.5)
BILIRUB SERPL-MCNC: 0.4 MG/DL (ref 0–1.2)
BUN SERPL-MCNC: 14 MG/DL (ref 8–23)
BUN/CREAT SERPL: 19.7 (ref 7–25)
CALCIUM SPEC-SCNC: 9.3 MG/DL (ref 8.6–10.5)
CHLORIDE SERPL-SCNC: 102 MMOL/L (ref 98–107)
CO2 SERPL-SCNC: 29.3 MMOL/L (ref 22–29)
CREAT SERPL-MCNC: 0.71 MG/DL (ref 0.57–1)
DEPRECATED RDW RBC AUTO: 53.2 FL (ref 37–54)
EGFRCR SERPLBLD CKD-EPI 2021: 91.6 ML/MIN/1.73
EOSINOPHIL # BLD AUTO: 0.13 10*3/MM3 (ref 0–0.4)
EOSINOPHIL NFR BLD AUTO: 2 % (ref 0.3–6.2)
ERYTHROCYTE [DISTWIDTH] IN BLOOD BY AUTOMATED COUNT: 16 % (ref 12.3–15.4)
GLOBULIN UR ELPH-MCNC: 3.1 GM/DL
GLUCOSE SERPL-MCNC: 87 MG/DL (ref 65–99)
HCT VFR BLD AUTO: 42.2 % (ref 34–46.6)
HGB BLD-MCNC: 12.5 G/DL (ref 12–15.9)
IMM GRANULOCYTES # BLD AUTO: 0.03 10*3/MM3 (ref 0–0.05)
IMM GRANULOCYTES NFR BLD AUTO: 0.5 % (ref 0–0.5)
LYMPHOCYTES # BLD AUTO: 1.33 10*3/MM3 (ref 0.7–3.1)
LYMPHOCYTES NFR BLD AUTO: 20 % (ref 19.6–45.3)
MCH RBC QN AUTO: 27.1 PG (ref 26.6–33)
MCHC RBC AUTO-ENTMCNC: 29.6 G/DL (ref 31.5–35.7)
MCV RBC AUTO: 91.5 FL (ref 79–97)
MONOCYTES # BLD AUTO: 0.56 10*3/MM3 (ref 0.1–0.9)
MONOCYTES NFR BLD AUTO: 8.4 % (ref 5–12)
NEUTROPHILS NFR BLD AUTO: 4.57 10*3/MM3 (ref 1.7–7)
NEUTROPHILS NFR BLD AUTO: 68.5 % (ref 42.7–76)
PLATELET # BLD AUTO: 268 10*3/MM3 (ref 140–450)
PMV BLD AUTO: 10 FL (ref 6–12)
POTASSIUM SERPL-SCNC: 4 MMOL/L (ref 3.5–5.2)
PROT SERPL-MCNC: 7.2 G/DL (ref 6–8.5)
RBC # BLD AUTO: 4.61 10*6/MM3 (ref 3.77–5.28)
SODIUM SERPL-SCNC: 139 MMOL/L (ref 136–145)
TROPONIN T SERPL HS-MCNC: 12 NG/L
WBC NRBC COR # BLD AUTO: 6.66 10*3/MM3 (ref 3.4–10.8)

## 2024-03-12 PROCEDURE — 36415 COLL VENOUS BLD VENIPUNCTURE: CPT

## 2024-03-12 PROCEDURE — 25810000003 SODIUM CHLORIDE 0.9 % SOLUTION: Performed by: NURSE PRACTITIONER

## 2024-03-12 PROCEDURE — 85025 COMPLETE CBC W/AUTO DIFF WBC: CPT | Performed by: NURSE PRACTITIONER

## 2024-03-12 PROCEDURE — 93005 ELECTROCARDIOGRAM TRACING: CPT | Performed by: NURSE PRACTITIONER

## 2024-03-12 PROCEDURE — 96360 HYDRATION IV INFUSION INIT: CPT

## 2024-03-12 PROCEDURE — 84484 ASSAY OF TROPONIN QUANT: CPT | Performed by: NURSE PRACTITIONER

## 2024-03-12 PROCEDURE — 93010 ELECTROCARDIOGRAM REPORT: CPT | Performed by: INTERNAL MEDICINE

## 2024-03-12 PROCEDURE — 99285 EMERGENCY DEPT VISIT HI MDM: CPT

## 2024-03-12 PROCEDURE — 80053 COMPREHEN METABOLIC PANEL: CPT | Performed by: NURSE PRACTITIONER

## 2024-03-12 PROCEDURE — 25510000001 IOPAMIDOL PER 1 ML: Performed by: EMERGENCY MEDICINE

## 2024-03-12 PROCEDURE — 99284 EMERGENCY DEPT VISIT MOD MDM: CPT | Performed by: NURSE PRACTITIONER

## 2024-03-12 PROCEDURE — 71275 CT ANGIOGRAPHY CHEST: CPT

## 2024-03-12 RX ORDER — SODIUM CHLORIDE 0.9 % (FLUSH) 0.9 %
10 SYRINGE (ML) INJECTION AS NEEDED
Status: DISCONTINUED | OUTPATIENT
Start: 2024-03-12 | End: 2024-03-12 | Stop reason: HOSPADM

## 2024-03-12 RX ADMIN — SODIUM CHLORIDE 1000 ML: 9 INJECTION, SOLUTION INTRAVENOUS at 16:02

## 2024-03-12 RX ADMIN — APIXABAN 10 MG: 5 TABLET, FILM COATED ORAL at 17:48

## 2024-03-12 RX ADMIN — IOPAMIDOL 100 ML: 755 INJECTION, SOLUTION INTRAVENOUS at 17:08

## 2024-03-12 NOTE — FSED PROVIDER NOTE
Subjective   History of Present Illness  Patient is a 68-year-old female who presents complaining of right-sided mid back pain at the base of her scapula.  States she has pain when taking a deep breath and sometimes when she moves her arm.  She denies any pain in her chest, states she is concerned about a possible PE.  She denies any known injury, bending, lifting, twisting.  Denies hemoptysis.  Denies recent travel or calf swelling.      Review of Systems   Musculoskeletal:  Positive for back pain.   All other systems reviewed and are negative.      Past Medical History:   Diagnosis Date    Allergic topical steroids    Anemia Episodic    Breast lump in female 01/14/2019    Breast mass     Chest pain 03/22/2018    Cholelithiasis     Chronic nasopharyngitis     Elevated BP without diagnosis of hypertension     Hypertension     Medicare annual wellness visit, subsequent 03/22/2022    Morbid obesity     Neuromuscular disorder 2010    Osteoarthritis     Sneddon-Hanna disease     Stasis dermatitis        Allergies   Allergen Reactions    Zoster Vac Recomb Adjuvanted Other (See Comments)     Redness around sight, fever the next day.      Compazine [Prochlorperazine Edisylate] Hallucinations    Prochlorperazine Hallucinations    Other Rash     Topical Steroids       Past Surgical History:   Procedure Laterality Date    COLONOSCOPY  2015    ENDOMETRIAL ABLATION  2000    HYSTEROSCOPY ENDOMETRIAL ABLATION      JOINT REPLACEMENT  R-2013 L-2015    KNEE SURGERY Bilateral     LAPAROSCOPIC CHOLECYSTECTOMY  10/2011    OVARIAN CYST REMOVAL         Family History   Problem Relation Age of Onset    Hypertension Mother     Arthritis Mother     Cancer Mother         bladder    Heart disease Mother     Hyperlipidemia Mother     Stroke Mother     Thyroid disease Mother     Dementia Mother     Prostate cancer Father     Hypertension Father     Arthritis Father     Cancer Father         prostate, bone    Heart disease Father      Arthritis Maternal Aunt     Migraines Sister        Social History     Socioeconomic History    Marital status:    Tobacco Use    Smoking status: Never     Passive exposure: Never    Smokeless tobacco: Never   Vaping Use    Vaping status: Never Used   Substance and Sexual Activity    Alcohol use: Yes     Comment: maybe 2x year    Drug use: Never    Sexual activity: Not Currently     Partners: Male     Birth control/protection: Post-menopausal, Surgical     Comment: Ablation           Objective   Physical Exam  Vitals and nursing note reviewed.   Constitutional:       Appearance: Normal appearance.   HENT:      Head: Normocephalic and atraumatic.   Cardiovascular:      Rate and Rhythm: Normal rate and regular rhythm.   Pulmonary:      Effort: Pulmonary effort is normal.      Breath sounds: Normal breath sounds.   Musculoskeletal:      Cervical back: Normal range of motion and neck supple.   Skin:     General: Skin is warm and dry.      Capillary Refill: Capillary refill takes less than 2 seconds.   Neurological:      General: No focal deficit present.      Mental Status: She is alert and oriented to person, place, and time.         Procedures           ED Course                                           Medical Decision Making  Patient noted to have small subsegmental PE on the right side.  No right heart strain, other laboratory findings reassuring.  Negative troponin.  Patient is not tachycardic, hypoxic.  She will be started on Eliquis and discharged home.  She is in no acute distress.  Internal referral placed urgently for pulmonology follow-up.  Patient was given strict return precautions.    Problems Addressed:  Other acute pulmonary embolism, unspecified whether acute cor pulmonale present: complicated acute illness or injury    Amount and/or Complexity of Data Reviewed  Labs: ordered.  Radiology: ordered.  ECG/medicine tests: ordered.    Risk  Prescription drug management.        Final diagnoses:    Other acute pulmonary embolism, unspecified whether acute cor pulmonale present       ED Disposition  ED Disposition       ED Disposition   Discharge    Condition   Stable    Comment   --               No follow-up provider specified.       Medication List        New Prescriptions      Apixaban Starter Pack tablet therapy pack  Take two 5 mg tablets by mouth every 12 hours for 7 days. Followed by one 5 mg tablet every 12 hours. (Dispense starter pack if available)               Where to Get Your Medications        These medications were sent to [facility]/pharmacy #[id number] - [facility], [place] - [address] AT [facility] [phone] [facility] [phone]   [address] [facility] [address]      Phone: [phone]   Apixaban Starter Pack tablet therapy pack

## 2024-03-13 LAB
QT INTERVAL: 447 MS
QTC INTERVAL: 447 MS

## 2024-03-15 DIAGNOSIS — R91.1 LUNG NODULE SEEN ON IMAGING STUDY: Primary | ICD-10-CM

## 2024-03-26 ENCOUNTER — OFFICE VISIT (OUTPATIENT)
Dept: OTHER | Facility: HOSPITAL | Age: 69
End: 2024-03-26
Payer: MEDICARE

## 2024-03-26 VITALS
HEIGHT: 67 IN | HEART RATE: 66 BPM | BODY MASS INDEX: 45.99 KG/M2 | OXYGEN SATURATION: 96 % | SYSTOLIC BLOOD PRESSURE: 116 MMHG | WEIGHT: 293 LBS | DIASTOLIC BLOOD PRESSURE: 67 MMHG

## 2024-03-26 DIAGNOSIS — R91.8 GROUND GLASS OPACITY PRESENT ON IMAGING OF LUNG: Primary | ICD-10-CM

## 2024-03-26 NOTE — PROGRESS NOTES
"Chief Complaint  RUL GGO     Subjective        Kortney Charlton presents to [facility] MULTI-DISCIPLINARY [facility]IC  History of Present Illness  Ms. Charlton is a pleasant 68-year-old lady who presents today to thoracic surgery multidisciplinary clinic in regards to a 1.6 right upper lobe groundglass opacity.  This was seen on a CTA of the chest performed in the emergency department to evaluate a pulmonary embolus.  She is a nurse.  She was complaining of some scapular pain and presented to the emergency department in early March 2024 worried that she had a pulmonary embolus.  Upon evaluation of her CTA, she had a small subsegmental pulmonary embolus of the right middle lobe and incidentally she was found to have a 1.6 cm groundglass opacity in the right upper lobe.  She is a never smoker although she has significant secondhand smoking exposure.  She has no chemical exposure through her occupation.  She denies any shortness of breath, fevers or chills.  She denies any hemoptysis. She has a history of subcorneal pustular dermatosis (Sneddon-barragan disease)  Objective   Vital Signs:  /67 (BP Location: Right arm, Patient Position: Sitting, Cuff Size: Large Adult)   Pulse 66   Ht 170.2 cm (67\")   Wt (!) 186 kg (410 lb)   SpO2 96%   BMI 64.22 kg/m²   Estimated body mass index is 64.22 kg/m² as calculated from the following:    Height as of this encounter: 170.2 cm (67\").    Weight as of this encounter: 186 kg (410 lb).               Physical Exam  Constitutional:       Appearance: Normal appearance.   Cardiovascular:      Rate and Rhythm: Normal rate and regular rhythm.      Pulses: Normal pulses.      Heart sounds: Normal heart sounds.   Pulmonary:      Effort: Pulmonary effort is normal.      Breath sounds: Normal breath sounds.   Skin:     Capillary Refill: Capillary refill takes less than 2 seconds.   Neurological:      General: No focal deficit present.      Mental Status: She is alert " and oriented to person, place, and time.   Psychiatric:         Mood and Affect: Mood normal.         Behavior: Behavior normal.         Thought Content: Thought content normal.         Judgment: Judgment normal.        Result Review :    CTA of the chest was visualized and reviewed by myself.  Agree the interpretation.           Assessment and Plan     Diagnoses and all orders for this visit:    1. Ground glass opacity present on imaging of lung (Primary)  -     CT Chest Without Contrast; Future    Ms. Charlton is a pleasant 68-year-old lady who presents today with a 1.6 cm right upper lobe GGO.  She is a never smoker and has minimal environmental and chemical exposure.  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    unfortunately, this is a GGO, it would not be active on CT/PET scan.  In addition,  It is a pure GGO and I do not think a biopsy would be successful of this lesion.  I would recommend continued surveillance of this lesion.  We will order a noncontrasted CT scan of the chest and perform this in approximately 6 months.  I will see her back in clinic to evaluate this  right upper lobe GGO approxi-6 months.     I spent 60 minutes caring for Kortney on this date of service. This time includes time spent by me in the following activities:preparing for the visit, reviewing tests, obtaining and/or reviewing a separately obtained history, performing a medically appropriate examination and/or evaluation , counseling and educating the patient/family/caregiver, ordering medications, tests, or procedures, referring and communicating with other health care professionals , documenting information in the medical record, independently interpreting results and communicating that information with the patient/family/caregiver, and care coordination  Follow Up     No follow-ups on file.  Patient was given instructions and counseling regarding  her condition or for health maintenance advice. Please see specific information pulled into the AVS if appropriate.

## 2024-04-08 ENCOUNTER — TELEPHONE (OUTPATIENT)
Dept: FAMILY MEDICINE CLINIC | Facility: CLINIC | Age: 69
End: 2024-04-08
Payer: MEDICARE

## 2024-04-08 NOTE — TELEPHONE ENCOUNTER
Patient was in the hospital 3 weeks ago and they gave her Eliquis, she has an appt next week and only has 3 days worth of the Eliquis left.  She is wanting to know if she can get a refill of the Eliquis until then.  The hospital gave her the Eliquis not us.  Please call patient when done.

## 2024-04-08 NOTE — TELEPHONE ENCOUNTER
Spoke to patient, she wanted to make sure she had enough samples that would last her until her appointment. I informed her that enough was provided. She understands that she has to wait until her appointment to be prescribed new medications.

## 2024-04-08 NOTE — TELEPHONE ENCOUNTER
Name: Kortney Charlton      Relationship: Self      Best Callback Number: [phone]      HUB PROVIDED THE RELAY MESSAGE FROM THE OFFICE      PATIENT: HAS FURTHER QUESTIONS AND WOULD LIKE A CALL BACK AT THE FOLLOWING PHONE [phone]    ADDITIONAL INFORMATION: PATIENT CALLED AND SAID THAT SHE IS SCHEDULED FOR 04/17 FOR A MEDICARE WELLNESS AND WOULD LIKE TO GET THE REFILLS THEN    SHE IS WANTING TO KNOW IF THERE ARE ENOUGH SAMPLES TO LAST HER UNTIL THAT VISIT

## 2024-04-08 NOTE — TELEPHONE ENCOUNTER
Ok to relay    Attempted to call the patient, left vm to call back about medication refill.   Patient needs an appointment for provider to prescribe any new medications. So she will not be able to fill the medication until her appointment. We do have some samples that we can give her until then. They will be left at the front for .

## 2024-04-16 DIAGNOSIS — E66.01 MORBID OBESITY: ICD-10-CM

## 2024-04-16 DIAGNOSIS — K52.9 CHRONIC DIARRHEA: ICD-10-CM

## 2024-04-17 ENCOUNTER — OFFICE VISIT (OUTPATIENT)
Dept: FAMILY MEDICINE CLINIC | Facility: CLINIC | Age: 69
End: 2024-04-17
Payer: MEDICARE

## 2024-04-17 VITALS
HEIGHT: 67 IN | BODY MASS INDEX: 45.99 KG/M2 | WEIGHT: 293 LBS | HEART RATE: 71 BPM | SYSTOLIC BLOOD PRESSURE: 142 MMHG | OXYGEN SATURATION: 91 % | TEMPERATURE: 96.2 F | DIASTOLIC BLOOD PRESSURE: 78 MMHG

## 2024-04-17 DIAGNOSIS — L13.1 SNEDDON-WILKINSON DISEASE: ICD-10-CM

## 2024-04-17 DIAGNOSIS — I26.99 OTHER ACUTE PULMONARY EMBOLISM, UNSPECIFIED WHETHER ACUTE COR PULMONALE PRESENT: ICD-10-CM

## 2024-04-17 DIAGNOSIS — Z00.00 MEDICARE ANNUAL WELLNESS VISIT, SUBSEQUENT: Primary | ICD-10-CM

## 2024-04-17 DIAGNOSIS — R19.7 DIARRHEA FOLLOWING GASTROINTESTINAL SURGERY: ICD-10-CM

## 2024-04-17 DIAGNOSIS — Z98.890 DIARRHEA FOLLOWING GASTROINTESTINAL SURGERY: ICD-10-CM

## 2024-04-17 DIAGNOSIS — Z12.11 COLON CANCER SCREENING: ICD-10-CM

## 2024-04-17 DIAGNOSIS — Z13.6 SCREENING FOR CARDIOVASCULAR CONDITION: ICD-10-CM

## 2024-04-17 DIAGNOSIS — I87.2 VENOUS STASIS DERMATITIS OF BOTH LOWER EXTREMITIES: ICD-10-CM

## 2024-04-17 DIAGNOSIS — E66.01 MORBID OBESITY: ICD-10-CM

## 2024-04-17 DIAGNOSIS — R73.03 PREDIABETES: ICD-10-CM

## 2024-04-17 RX ORDER — MONTELUKAST SODIUM 4 MG/1
2 TABLET, CHEWABLE ORAL 2 TIMES DAILY
Qty: 360 TABLET | Refills: 1 | Status: SHIPPED | OUTPATIENT
Start: 2024-04-17

## 2024-04-17 RX ORDER — COLESEVELAM 180 1/1
TABLET ORAL
Qty: 540 TABLET | Refills: 1 | Status: SHIPPED | OUTPATIENT
Start: 2024-04-17 | End: 2024-04-17

## 2024-04-17 NOTE — PROGRESS NOTES
The ABCs of the Annual Wellness Visit  Subsequent Medicare Wellness Visit    Subjective    Kortney Charlton is a 68 y.o. female who presents for a Subsequent Medicare Wellness Visit.    The following portions of the patient's history were reviewed and   updated as appropriate: allergies, current medications, past family history, past medical history, past social history, past surgical history, and problem list.    Compared to one year ago, the patient feels her physical   health is the same.    Compared to one year ago, the patient feels her mental   health is the same.    Recent Hospitalizations:  This patient has had a [facility] admission record on file within the last 365 days.    Current Medical Providers:  Patient Care Team:  Dorinda Hastings MD as PCP - General (Family Medicine)    Outpatient Medications Prior to Visit   Medication Sig Dispense Refill    dapsone 25 MG tablet Take 2 tablets by mouth 2 (Two) Times a Day.      Dapsone 5 % topical gel APPLY TO FACE ONCE DAILY      Fexofenadine HCl (ALLEGRA ALLERGY PO)       furosemide (LASIX) 20 MG tablet TAKE 1 TABLET BY MOUTH EVERY DAY 90 tablet 3    loperamide (IMODIUM A-D) 2 MG tablet Take 1 tablet by mouth As Needed.      Multiple Vitamins-Minerals (MULTIVITAMIN WITH MINERALS) tablet tablet Take 1 tablet by mouth Daily.      Tildrakizumab-asmn (ILUMYA SC) Inject  under the skin into the appropriate area as directed Every 3 (Three) Months.      Apixaban Starter Pack tablet therapy pack Take two 5 mg tablets by mouth every 12 hours for 7 days. Followed by one 5 mg tablet every 12 hours. (Dispense starter pack if available) 74 tablet 0    colesevelam (WELCHOL) 625 MG tablet TAKE 3 TABLETS BY MOUTH TWICE A DAY WITH MEALS 540 tablet 1    ibuprofen (ADVIL,MOTRIN) 200 MG tablet Take 1 tablet by mouth Every 6 (Six) Hours As Needed for Mild Pain.      aspirin 81 MG EC tablet Take 1 tablet by mouth Daily.       No facility-administered medications prior to  "visit.       No opioid medication identified on active medication list. I have reviewed chart for other potential  high risk medication/s and harmful drug interactions in the elderly.        Aspirin is on active medication list. Aspirin use is indicated based on review of current medical condition/s. Pros and cons of this therapy have been discussed today. Benefits of this medication outweigh potential harm.  Patient has been encouraged to continue taking this medication.  .      Patient Active Problem List   Diagnosis    Morbid obesity    Elevated BP without diagnosis of hypertension    Venous stasis dermatitis of both lower extremities    History of total bilateral knee replacement (TKR)    Breast lump in female    Sneddon-Hanna disease    Abnormal TSH    Atypical chest pain    Medicare annual wellness visit, subsequent    Diarrhea in adult patient    Chronic diarrhea    Hypermetropia, bilateral    Vitreous degeneration of left eye    Prediabetes    Numbness and tingling in left hand    Bilateral carpal tunnel syndrome     Advance Care Planning   Advance Care Planning     Advance Directive is not on file.  ACP discussion was held with the patient during this visit. Patient does not have an advance directive, information provided.     Objective    Vitals:    04/17/24 1134   BP: 142/78   BP Location: Left arm   Patient Position: Sitting   Cuff Size: Large Adult   Pulse: 71   Temp: 96.2 °F (35.7 °C)   SpO2: 91%   Weight: (!) 184 kg (406 lb 9.6 oz)   Height: 170.2 cm (67.01\")   PainSc: 0-No pain     Estimated body mass index is 63.67 kg/m² as calculated from the following:    Height as of this encounter: 170.2 cm (67.01\").    Weight as of this encounter: 184 kg (406 lb 9.6 oz).    Class 3 Severe Obesity (BMI >=40). Obesity-related health conditions include the following: osteoarthritis. Obesity is improving with lifestyle modifications. BMI is is above average; BMI management plan is completed. We discussed " portion control and increasing exercise.      Does the patient have evidence of cognitive impairment? No          HEALTH RISK ASSESSMENT    Smoking Status:  Social History     Tobacco Use   Smoking Status Never    Passive exposure: Never   Smokeless Tobacco Never     Alcohol Consumption:  Social History     Substance and Sexual Activity   Alcohol Use Yes    Comment: maybe 2x year     Fall Risk Screen:    NINI Fall Risk Assessment was completed, and patient is at LOW risk for falls.Assessment completed on:2024    Depression Screenin/17/2024    11:00 AM   PHQ-2/PHQ-9 Depression Screening   Little Interest or Pleasure in Doing Things 0-->not at all   Feeling Down, Depressed or Hopeless 0-->not at all   PHQ-9: Brief Depression Severity Measure Score 0       Health Habits and Functional and Cognitive Screenin/17/2024    11:00 AM   Functional & Cognitive Status   Do you have difficulty preparing food and eating? No   Do you have difficulty bathing yourself, getting dressed or grooming yourself? No   Do you have difficulty using the toilet? No   Do you have difficulty moving around from place to place? No   Do you have trouble with steps or getting out of a bed or a chair? No   Current Diet Well Balanced Diet   Dental Exam Up to date   Eye Exam Up to date   Exercise (times per week) 5 times per week   Current Exercises Include Treadmill   Do you need help using the phone?  No   Are you deaf or do you have serious difficulty hearing?  No   Do you need help to go to places out of walking distance? Yes   Do you need help shopping? No   Do you need help preparing meals?  No   Do you need help with housework?  No   Do you need help with laundry? No   Do you need help taking your medications? No   Do you need help managing money? No   Do you ever drive or ride in a car without wearing a seat belt? No   Have you felt unusual stress, anger or loneliness in the last month? No   Who do you live with? Spouse    If you need help, do you have trouble finding someone available to you? No   Have you been bothered in the last four weeks by sexual problems? No   Do you have difficulty concentrating, remembering or making decisions? No       Age-appropriate Screening Schedule:  Refer to the list below for future screening recommendations based on patient's age, sex and/or medical conditions. Orders for these recommended tests are listed in the plan section. The patient has been provided with a written plan.    Health Maintenance   Topic Date Due    RSV Vaccine - Adults (1 - 1-dose 60+ series) Never done    COVID-19 Vaccine (4 - 2023-24 season) 09/01/2023    BMI FOLLOWUP  04/12/2024    INFLUENZA VACCINE  08/01/2024    COLORECTAL CANCER SCREENING  12/18/2024    DXA SCAN  01/13/2025    ANNUAL WELLNESS VISIT  04/17/2025    MAMMOGRAM  09/26/2025    TDAP/TD VACCINES (2 - Td or Tdap) 06/26/2028    HEPATITIS C SCREENING  Completed    Pneumococcal Vaccine 65+  Completed                  CMS Preventative Services Quick Reference  Risk Factors Identified During Encounter  None Identified  The above risks/problems have been discussed with the patient.  Pertinent information has been shared with the patient in the After Visit Summary.  An After Visit Summary and PPPS were made available to the patient.    Follow Up:   Next Medicare Wellness visit to be scheduled in 1 year.       Additional E&M Note during same encounter follows:  Patient has multiple medical problems which are significant and separately identifiable that require additional work above and beyond the Medicare Wellness Visit.      Chief Complaint  Medicare Wellness-subsequent (Needing refills of eliquis)    Subjective        HPI  Kortney Charlton is also being seen today for     Obesity-still working hard on diet and exercise.       Swelling in legs much better. On meds, due labs     Sneddon-barragan disease- follows with derm, is a psoriatic disease. 2-3 flairs a year.  "Stable.     PE- has been following with pulmonology. On blood thinner. Has never had a blood clot before. Needing 6 months of treatment.     Diarrhea from gallbladder removal much better on meds but expensive and wanting to change meds.     Review of Systems   Constitutional:  Negative for fever.   Respiratory:  Negative for shortness of breath.    Cardiovascular:  Negative for chest pain.       Objective   Vital Signs:  /78 (BP Location: Left arm, Patient Position: Sitting, Cuff Size: Large Adult)   Pulse 71   Temp 96.2 °F (35.7 °C)   Ht 170.2 cm (67.01\")   Wt (!) 184 kg (406 lb 9.6 oz)   SpO2 91%   BMI 63.67 kg/m²     Physical Exam  Constitutional:       Appearance: Normal appearance. She is well-developed.   Cardiovascular:      Rate and Rhythm: Normal rate and regular rhythm.      Heart sounds: Normal heart sounds.   Pulmonary:      Effort: Pulmonary effort is normal.      Breath sounds: Normal breath sounds.   Musculoskeletal:         General: No swelling. Normal range of motion.   Skin:     General: Skin is warm and dry.      Findings: No rash.   Neurological:      General: No focal deficit present.      Mental Status: She is alert and oriented to person, place, and time.   Psychiatric:         Mood and Affect: Mood normal.         Behavior: Behavior normal.                         Assessment and Plan   Diagnoses and all orders for this visit:    1. Medicare annual wellness visit, subsequent (Primary)    2. Morbid obesity    3. Prediabetes  -     CBC & Differential  -     Comprehensive Metabolic Panel  -     Lipid Panel  -     Hemoglobin A1c    4. Venous stasis dermatitis of both lower extremities    5. Sneddon-Hanna disease    6. Screening for cardiovascular condition  -     Comprehensive Metabolic Panel  -     Lipid Panel    7. Colon cancer screening  -     Ambulatory Referral For Screening Colonoscopy    8. Diarrhea following gastrointestinal surgery  -     colestipol (COLESTID) 1 g tablet; " Take 2 tablets by mouth 2 (Two) Times a Day.  Dispense: 360 tablet; Refill: 1    9. Other acute pulmonary embolism, unspecified whether acute cor pulmonale present  -     apixaban (ELIQUIS) 5 MG tablet tablet; Take 1 tablet by mouth 2 (Two) Times a Day.  Dispense: 60 tablet; Refill: 5             Follow Up   Return in about 1 year (around 4/17/2025) for Medicare Wellness, Recheck.  Patient was given instructions and counseling regarding her condition or for health maintenance advice. Please see specific information pulled into the AVS if appropriate.       Discussed risk factors, cont meds, f/u in 6-12 months. Gen surg is ordering mamm per pt.

## 2024-04-18 LAB
ALBUMIN SERPL-MCNC: 4 G/DL (ref 3.5–5.2)
ALBUMIN/GLOB SERPL: 1.5 G/DL
ALP SERPL-CCNC: 90 U/L (ref 39–117)
ALT SERPL-CCNC: 30 U/L (ref 1–33)
AST SERPL-CCNC: 26 U/L (ref 1–32)
BASOPHILS # BLD AUTO: 0.04 10*3/MM3 (ref 0–0.2)
BASOPHILS NFR BLD AUTO: 0.6 % (ref 0–1.5)
BILIRUB SERPL-MCNC: 0.5 MG/DL (ref 0–1.2)
BUN SERPL-MCNC: 10 MG/DL (ref 8–23)
BUN/CREAT SERPL: 12.7 (ref 7–25)
CALCIUM SERPL-MCNC: 9.4 MG/DL (ref 8.6–10.5)
CHLORIDE SERPL-SCNC: 106 MMOL/L (ref 98–107)
CHOLEST SERPL-MCNC: 146 MG/DL (ref 0–200)
CO2 SERPL-SCNC: 31 MMOL/L (ref 22–29)
CREAT SERPL-MCNC: 0.79 MG/DL (ref 0.57–1)
EGFRCR SERPLBLD CKD-EPI 2021: 80.6 ML/MIN/1.73
EOSINOPHIL # BLD AUTO: 0.09 10*3/MM3 (ref 0–0.4)
EOSINOPHIL NFR BLD AUTO: 1.4 % (ref 0.3–6.2)
ERYTHROCYTE [DISTWIDTH] IN BLOOD BY AUTOMATED COUNT: 14 % (ref 12.3–15.4)
GLOBULIN SER CALC-MCNC: 2.6 GM/DL
GLUCOSE SERPL-MCNC: 96 MG/DL (ref 65–99)
HBA1C MFR BLD: 5.3 % (ref 4.8–5.6)
HCT VFR BLD AUTO: 41.8 % (ref 34–46.6)
HDLC SERPL-MCNC: 48 MG/DL (ref 40–60)
HGB BLD-MCNC: 12.8 G/DL (ref 12–15.9)
IMM GRANULOCYTES # BLD AUTO: 0.04 10*3/MM3 (ref 0–0.05)
IMM GRANULOCYTES NFR BLD AUTO: 0.6 % (ref 0–0.5)
LDLC SERPL CALC-MCNC: 75 MG/DL (ref 0–100)
LYMPHOCYTES # BLD AUTO: 0.98 10*3/MM3 (ref 0.7–3.1)
LYMPHOCYTES NFR BLD AUTO: 15.6 % (ref 19.6–45.3)
MCH RBC QN AUTO: 27.3 PG (ref 26.6–33)
MCHC RBC AUTO-ENTMCNC: 30.6 G/DL (ref 31.5–35.7)
MCV RBC AUTO: 89.1 FL (ref 79–97)
MONOCYTES # BLD AUTO: 0.51 10*3/MM3 (ref 0.1–0.9)
MONOCYTES NFR BLD AUTO: 8.1 % (ref 5–12)
NEUTROPHILS # BLD AUTO: 4.64 10*3/MM3 (ref 1.7–7)
NEUTROPHILS NFR BLD AUTO: 73.7 % (ref 42.7–76)
NRBC BLD AUTO-RTO: 0 /100 WBC (ref 0–0.2)
PLATELET # BLD AUTO: 236 10*3/MM3 (ref 140–450)
POTASSIUM SERPL-SCNC: 5.3 MMOL/L (ref 3.5–5.2)
PROT SERPL-MCNC: 6.6 G/DL (ref 6–8.5)
RBC # BLD AUTO: 4.69 10*6/MM3 (ref 3.77–5.28)
SODIUM SERPL-SCNC: 147 MMOL/L (ref 136–145)
TRIGL SERPL-MCNC: 127 MG/DL (ref 0–150)
VLDLC SERPL CALC-MCNC: 23 MG/DL (ref 5–40)
WBC # BLD AUTO: 6.3 10*3/MM3 (ref 3.4–10.8)

## 2024-07-25 ENCOUNTER — TELEPHONE (OUTPATIENT)
Dept: SURGERY | Facility: CLINIC | Age: 69
End: 2024-07-25
Payer: MEDICARE

## 2024-07-29 ENCOUNTER — TELEPHONE (OUTPATIENT)
Dept: SURGERY | Facility: CLINIC | Age: 69
End: 2024-07-29
Payer: MEDICARE

## 2024-08-29 DIAGNOSIS — I87.2 VENOUS STASIS DERMATITIS OF BOTH LOWER EXTREMITIES: ICD-10-CM

## 2024-08-29 RX ORDER — FUROSEMIDE 20 MG
TABLET ORAL
Qty: 90 TABLET | Refills: 3 | Status: SHIPPED | OUTPATIENT
Start: 2024-08-29

## 2024-09-17 ENCOUNTER — HOSPITAL ENCOUNTER (OUTPATIENT)
Facility: HOSPITAL | Age: 69
Discharge: HOME | End: 2024-09-17
Attending: EMERGENCY MEDICINE | Admitting: EMERGENCY MEDICINE
Payer: MEDICARE

## 2024-09-17 ENCOUNTER — TELEPHONE (OUTPATIENT)
Dept: FAMILY MEDICINE CLINIC | Facility: CLINIC | Age: 69
End: 2024-09-17

## 2024-09-17 VITALS
WEIGHT: 293 LBS | BODY MASS INDEX: 45.99 KG/M2 | SYSTOLIC BLOOD PRESSURE: 148 MMHG | OXYGEN SATURATION: 92 % | DIASTOLIC BLOOD PRESSURE: 64 MMHG | HEART RATE: 83 BPM | RESPIRATION RATE: 22 BRPM | HEIGHT: 67 IN | TEMPERATURE: 99.5 F

## 2024-09-17 DIAGNOSIS — U07.1 COVID-19 VIRUS RNA TEST RESULT POSITIVE AT LIMIT OF DETECTION: Primary | ICD-10-CM

## 2024-09-17 LAB — STREP A PCR: NOT DETECTED

## 2024-09-17 PROCEDURE — 87651 STREP A DNA AMP PROBE: CPT

## 2024-09-17 PROCEDURE — G0463 HOSPITAL OUTPT CLINIC VISIT: HCPCS | Performed by: EMERGENCY MEDICINE

## 2024-09-17 NOTE — TELEPHONE ENCOUNTER
Caller: Kortney Charlton    Relationship to patient: Self    Best call back number: [phone]     Date of positive COVID19 test: 09/16/24    Date of possible COVID19 exposure: UNSURE    COVID19 symptoms: SYMPTOMS OF RUNNY NOSE, SORE THROAT, HEADACHE, COUGH, DIARRHEA BEGAN YESTERDAY.      Date of initial quarantine: 09/17/24    Additional information or concerns: PATIENT IS WANTING TO KNOW IF SHE CAN GET A PRESCRIPTION FOR PAXLOVID.    What is the patients preferred pharmacy: [id number] [place] [place] - [address] AT [facility] - [phone] [facility] [phone]  [phone]       PLEASE ADVISE.

## 2024-09-17 NOTE — TELEPHONE ENCOUNTER
PATIENT'S SPOUSE IS CALLING TO CHECK ON THE STATUS OF THIS. PLEASE LET THEM KNOW IF AND WHEN MEDICATION HAS BEEN SENT.

## 2024-09-25 ENCOUNTER — HOSPITAL ENCOUNTER (OUTPATIENT)
Dept: CT IMAGING | Facility: HOSPITAL | Age: 69
Discharge: HOME | End: 2024-09-25
Admitting: STUDENT IN AN ORGANIZED HEALTH CARE EDUCATION/TRAINING PROGRAM
Payer: MEDICARE

## 2024-09-25 DIAGNOSIS — R91.8 GROUND GLASS OPACITY PRESENT ON IMAGING OF LUNG: ICD-10-CM

## 2024-09-25 PROCEDURE — 71250 CT THORAX DX C-: CPT

## 2024-10-01 ENCOUNTER — TELEPHONE (OUTPATIENT)
Dept: SURGERY | Facility: CLINIC | Age: 69
End: 2024-10-01
Payer: MEDICARE

## 2024-10-01 NOTE — TELEPHONE ENCOUNTER
Previous patient of Dr. Gonzalez. Patient is to have Cataract surgery on 10/23. Patient is scheduled to see you on 10/21. She states she was put on Eliquis from her PE in March by the ER and only had to be on it for 6 months. Her PCP will not have her stop it until Thoracic takes a look and patient does not want to wait until she sees you, as that will be too close to her surgery. States she was not sure who else to call to seek advice regarding this

## 2024-10-13 DIAGNOSIS — Z98.890 DIARRHEA FOLLOWING GASTROINTESTINAL SURGERY: ICD-10-CM

## 2024-10-13 DIAGNOSIS — R19.7 DIARRHEA FOLLOWING GASTROINTESTINAL SURGERY: ICD-10-CM

## 2024-10-14 RX ORDER — MONTELUKAST SODIUM 4 MG/1
2 TABLET, CHEWABLE ORAL 2 TIMES DAILY
Qty: 360 TABLET | Refills: 1 | Status: SHIPPED | OUTPATIENT
Start: 2024-10-14

## 2024-10-16 ENCOUNTER — OFFICE VISIT (OUTPATIENT)
Dept: FAMILY MEDICINE CLINIC | Facility: CLINIC | Age: 69
End: 2024-10-16
Payer: MEDICARE

## 2024-10-16 VITALS
BODY MASS INDEX: 45.99 KG/M2 | HEIGHT: 67 IN | DIASTOLIC BLOOD PRESSURE: 70 MMHG | WEIGHT: 293 LBS | SYSTOLIC BLOOD PRESSURE: 132 MMHG | HEART RATE: 69 BPM | TEMPERATURE: 96 F | OXYGEN SATURATION: 92 %

## 2024-10-16 DIAGNOSIS — Z98.890 DIARRHEA FOLLOWING GASTROINTESTINAL SURGERY: Primary | ICD-10-CM

## 2024-10-16 DIAGNOSIS — R19.7 DIARRHEA FOLLOWING GASTROINTESTINAL SURGERY: Primary | ICD-10-CM

## 2024-10-16 DIAGNOSIS — M79.89 LEG SWELLING: ICD-10-CM

## 2024-10-16 DIAGNOSIS — Z23 IMMUNIZATION DUE: ICD-10-CM

## 2024-10-16 DIAGNOSIS — Z86.711 HISTORY OF PULMONARY EMBOLISM: ICD-10-CM

## 2024-10-16 DIAGNOSIS — Z13.220 ENCOUNTER FOR LIPID SCREENING FOR CARDIOVASCULAR DISEASE: ICD-10-CM

## 2024-10-16 DIAGNOSIS — Z13.6 ENCOUNTER FOR LIPID SCREENING FOR CARDIOVASCULAR DISEASE: ICD-10-CM

## 2024-10-16 PROBLEM — H52.13 BILATERAL MYOPIA: Status: ACTIVE | Noted: 2022-11-22

## 2024-10-16 PROBLEM — H25.13 AGE-RELATED NUCLEAR CATARACT OF BOTH EYES: Status: ACTIVE | Noted: 2022-11-22

## 2024-10-16 PROBLEM — H52.223 REGULAR ASTIGMATISM OF BOTH EYES: Status: ACTIVE | Noted: 2022-11-22

## 2024-10-16 PROBLEM — H52.4 PRESBYOPIA: Status: ACTIVE | Noted: 2022-11-22

## 2024-10-16 PROCEDURE — 99213 OFFICE O/P EST LOW 20 MIN: CPT

## 2024-10-16 PROCEDURE — 1126F AMNT PAIN NOTED NONE PRSNT: CPT

## 2024-10-16 PROCEDURE — G0008 ADMIN INFLUENZA VIRUS VAC: HCPCS

## 2024-10-16 PROCEDURE — 90662 IIV NO PRSV INCREASED AG IM: CPT

## 2024-10-16 RX ORDER — DIPHENOXYLATE HYDROCHLORIDE AND ATROPINE SULFATE 2.5; .025 MG/1; MG/1
1 TABLET ORAL DAILY
COMMUNITY

## 2024-10-16 RX ORDER — IBUPROFEN 200 MG
TABLET ORAL EVERY 6 HOURS
COMMUNITY

## 2024-10-16 RX ORDER — AMOXICILLIN 500 MG/1
CAPSULE ORAL
COMMUNITY
Start: 2024-10-07

## 2024-10-16 NOTE — PROGRESS NOTES
"Chief Complaint  Follow-up (6 month check up)    Subjective          History of Present Illness      Obesity BMI 63.82  still working hard on diet and exercise.   Interested in weight loss medication but unsure if insurance will cover it and needs something that will help her lose more than 45 lbs.     BLE edema  Swelling in legs doing the same.  Current on lasix.  Wears Compression socks. Swelling overall never changes/improves.       PE-  has been following with pulmonology.   Stopped eliquis, could take baby aspirin if wanted to.      Cataracts surgery next week on right eye.    Diarrhea  Diarrhea from gallbladder removal much better on meds. Some days worse than others.       When she wakes up from a nap, about 10 seconds after nap, speech is slightly slurred said , she does not notice, clears up right away.  Has never been tested for sleep apnea.    Objective   Vital Signs:  /70 (BP Location: Left arm, Patient Position: Sitting, Cuff Size: Large Adult)   Pulse 69   Temp 96 °F (35.6 °C) (Temporal)   Ht 170.2 cm (67.01\")   Wt (!) 185 kg (407 lb 9.6 oz)   SpO2 92%   BMI 63.82 kg/m²   Estimated body mass index is 63.82 kg/m² as calculated from the following:    Height as of this encounter: 170.2 cm (67.01\").    Weight as of this encounter: 185 kg (407 lb 9.6 oz).            Physical Exam  Vitals reviewed.   Constitutional:       General: She is not in acute distress.     Appearance: Normal appearance. She is obese.   HENT:      Head: Normocephalic and atraumatic.      Nose: Nose normal. No congestion.      Mouth/Throat:      Mouth: Mucous membranes are moist.      Pharynx: No oropharyngeal exudate or posterior oropharyngeal erythema.   Eyes:      Conjunctiva/sclera: Conjunctivae normal.      Pupils: Pupils are equal, round, and reactive to light.   Cardiovascular:      Rate and Rhythm: Normal rate and regular rhythm.      Pulses: Normal pulses.      Heart sounds: No murmur heard.     No gallop. "   Pulmonary:      Effort: Pulmonary effort is normal. No respiratory distress.      Breath sounds: Normal breath sounds. No wheezing.   Abdominal:      General: Bowel sounds are normal. There is no distension.      Palpations: Abdomen is soft.      Tenderness: There is no abdominal tenderness.   Musculoskeletal:         General: Normal range of motion.      Cervical back: Normal range of motion and neck supple. No tenderness.      Right lower leg: Edema present.      Left lower leg: Edema present.   Skin:     General: Skin is warm and dry.   Neurological:      Mental Status: She is alert and oriented to person, place, and time. Mental status is at baseline.   Psychiatric:         Mood and Affect: Mood normal.      Result Review :                Assessment and Plan   Diagnoses and all orders for this visit:    1. Diarrhea following gastrointestinal surgery (Primary)    2. Leg swelling  -     Comprehensive Metabolic Panel  -     Lipid Panel    3. Encounter for lipid screening for cardiovascular disease  -     Lipid Panel    4. Body mass index (BMI) of 60.0 to 69.9 in adult  -     Ambulatory Referral to Bariatric Surgery    Will follow-up with lab values.  Encourage patient to contact insurance, see if they cover any weight loss medications.  Referral placed today for bariatric clinic.  The patient refused referral  for sleep study.  Patient refused referral for lymphedema clinic.  Will follow-up with lab results.  If patient changes her mind, let me know and I can place referrals.       Follow Up   Return in about 6 months (around 4/17/2025) for Medicare Wellness.  Patient was given instructions and counseling regarding her condition or for health maintenance advice. Please see specific information pulled into the AVS if appropriate.

## 2024-10-17 ENCOUNTER — TELEPHONE (OUTPATIENT)
Dept: FAMILY MEDICINE CLINIC | Facility: CLINIC | Age: 69
End: 2024-10-17

## 2024-10-17 LAB
ALBUMIN SERPL-MCNC: 3.9 G/DL (ref 3.5–5.2)
ALBUMIN/GLOB SERPL: 1.3 G/DL
ALP SERPL-CCNC: 87 U/L (ref 39–117)
ALT SERPL-CCNC: 28 U/L (ref 1–33)
AST SERPL-CCNC: 27 U/L (ref 1–32)
BILIRUB SERPL-MCNC: 0.5 MG/DL (ref 0–1.2)
BUN SERPL-MCNC: 12 MG/DL (ref 8–23)
BUN/CREAT SERPL: 17.9 (ref 7–25)
CALCIUM SERPL-MCNC: 9 MG/DL (ref 8.6–10.5)
CHLORIDE SERPL-SCNC: 102 MMOL/L (ref 98–107)
CHOLEST SERPL-MCNC: 148 MG/DL (ref 0–200)
CO2 SERPL-SCNC: 29.5 MMOL/L (ref 22–29)
CREAT SERPL-MCNC: 0.67 MG/DL (ref 0.57–1)
EGFRCR SERPLBLD CKD-EPI 2021: 93.6 ML/MIN/1.73
GLOBULIN SER CALC-MCNC: 2.9 GM/DL
GLUCOSE SERPL-MCNC: 103 MG/DL (ref 65–99)
HDLC SERPL-MCNC: 46 MG/DL (ref 40–60)
LDLC SERPL CALC-MCNC: 80 MG/DL (ref 0–100)
POTASSIUM SERPL-SCNC: 4.5 MMOL/L (ref 3.5–5.2)
PROT SERPL-MCNC: 6.8 G/DL (ref 6–8.5)
SODIUM SERPL-SCNC: 142 MMOL/L (ref 136–145)
TRIGL SERPL-MCNC: 121 MG/DL (ref 0–150)
VLDLC SERPL CALC-MCNC: 22 MG/DL (ref 5–40)

## 2024-10-17 NOTE — TELEPHONE ENCOUNTER
Caller: ANGIE    Relationship: Other    Best call back number:[phone]     THEY ARE FAXING A CLEARANCE FORM FROM [facility] FOR DR. OLIVIA AND WANTED THE OFFICE TO BE AWARE.     
Received and placed on  desk for review.    [facility] [place]UST[place]  
Yes

## 2024-10-18 ENCOUNTER — TELEPHONE (OUTPATIENT)
Dept: FAMILY MEDICINE CLINIC | Facility: CLINIC | Age: 69
End: 2024-10-18
Payer: MEDICARE

## 2024-10-18 NOTE — TELEPHONE ENCOUNTER
----- Message from Senait Marroquin sent at 10/17/2024  9:31 PM EDT -----  All your blood work looks good.

## 2024-10-21 ENCOUNTER — OFFICE VISIT (OUTPATIENT)
Dept: SURGERY | Facility: CLINIC | Age: 69
End: 2024-10-21
Payer: MEDICARE

## 2024-10-21 VITALS
SYSTOLIC BLOOD PRESSURE: 131 MMHG | HEART RATE: 78 BPM | DIASTOLIC BLOOD PRESSURE: 50 MMHG | BODY MASS INDEX: 45.99 KG/M2 | WEIGHT: 293 LBS | OXYGEN SATURATION: 94 % | HEIGHT: 67 IN

## 2024-10-21 DIAGNOSIS — R91.8 GROUND GLASS OPACITY PRESENT ON IMAGING OF LUNG: Primary | ICD-10-CM

## 2024-10-21 PROCEDURE — 1159F MED LIST DOCD IN RCRD: CPT | Performed by: THORACIC SURGERY (CARDIOTHORACIC VASCULAR SURGERY)

## 2024-10-21 PROCEDURE — 99214 OFFICE O/P EST MOD 30 MIN: CPT | Performed by: THORACIC SURGERY (CARDIOTHORACIC VASCULAR SURGERY)

## 2024-10-21 PROCEDURE — 1160F RVW MEDS BY RX/DR IN RCRD: CPT | Performed by: THORACIC SURGERY (CARDIOTHORACIC VASCULAR SURGERY)

## 2024-10-21 RX ORDER — MUPIROCIN 20 MG/G
OINTMENT TOPICAL
COMMUNITY
Start: 2024-07-26

## 2024-10-21 NOTE — LETTER
"October 22, 2024     Dorinda Hastings MD  [address] [facility]  [address]  [facility] [address]    Patient: Kortney Charlton   YOB: 1955   Date of Visit: 10/21/2024     Dear Dorinda Hastings MD:       Thank you for referring Kortney Charlton to me for evaluation. Below are the relevant portions of my assessment and plan of care.    If you have questions, please do not hesitate to call me. I look forward to following Kortney along with you.         Sincerely,        Radha Marcus MD        CC: No Recipients    Radha Marcus MD  10/22/24 1222  Sign when Signing Visit  Chief Complaint  Ground glass opacity    Subjective       Kortney Charlton presents to [facility] THORACIC SURGERY  History of Present Illness  Kortney Charlton is a pleasant 69-year-old lady who is being followed for a groundglass opacity in the right upper lobe.  She is in her usual state of health.  Denies significant respiratory issues.  She is a never smoker.  Objective  Vital Signs:  /50 (BP Location: Right arm)   Pulse 78   Ht 170.2 cm (67.01\")   Wt (!) 186 kg (411 lb)   SpO2 94%   BMI 64.36 kg/m²   Estimated body mass index is 64.36 kg/m² as calculated from the following:    Height as of this encounter: 170.2 cm (67.01\").    Weight as of this encounter: 186 kg (411 lb).            Physical Exam  Vitals and nursing note reviewed.   Constitutional:       Appearance: She is well-developed.   HENT:      Head: Normocephalic and atraumatic.      Nose: Nose normal.   Eyes:      Conjunctiva/sclera: Conjunctivae normal.   Cardiovascular:      Rate and Rhythm: Normal rate.   Pulmonary:      Effort: Pulmonary effort is normal.   Abdominal:      Palpations: Abdomen is soft.   Musculoskeletal:      Cervical back: Neck supple.   Skin:     General: Skin is warm and dry.   Neurological:      Mental Status: She is alert and oriented to person, place, and time.   Psychiatric:         Behavior: Behavior normal.       "   Thought Content: Thought content normal.         Judgment: Judgment normal.        Result Review:          Please add to the results section of independently reviewed the CT of the chest performed on 9/25/2024 which demonstrates a groundglass opacity in the right upper lobe that has increased from 1.6 to 1.8 cm.  There are no other lesions or nodules.  There is no mediastinal or hilar lymphadenopathy.     Assessment and Plan   Diagnoses and all orders for this visit:    1. Ground glass opacity present on imaging of lung (Primary)  -     CT Chest Without Contrast; Future  -     Case Request; Standing  -     Case Request    Other orders  -     Follow Anesthesia Guidlines / Standing Orders; Standing  -     Follow Anesthesia Guidelines / Protocol; Future    Kortney Charlton is a pleasant 69-year-old lady who presents with an enlarging groundglass opacity in the right upper lobe concerning for bronchogenic malignancy given its appearance.  I have recommended an Ion bronchoscopy for biopsy of this lesion.  She will need a CT of the chest to facilitate this.  Risks and benefits of the procedure including pneumothorax were discussed with the patient today.  She would like to proceed.       I spent 30 minutes caring for Kortney on this date of service. This time includes time spent by me in the following activities:preparing for the visit, reviewing tests, obtaining and/or reviewing a separately obtained history, performing a medically appropriate examination and/or evaluation , counseling and educating the patient/family/caregiver, ordering medications, tests, or procedures, documenting information in the medical record, and independently interpreting results and communicating that information with the patient/family/caregiver  Follow Up   No follow-ups on file.  Patient was given instructions and counseling regarding her condition or for health maintenance advice. Please see specific information pulled into the AVS if  appropriate.

## 2024-10-22 PROBLEM — R91.8 GROUND GLASS OPACITY PRESENT ON IMAGING OF LUNG: Status: ACTIVE | Noted: 2024-10-21

## 2024-10-22 NOTE — H&P (VIEW-ONLY)
"Chief Complaint  Ground glass opacity    Subjective        Kortney Charlton presents to [facility] THORACIC SURGERY  History of Present Illness  Kortney Charlton is a pleasant 69-year-old lady who is being followed for a groundglass opacity in the right upper lobe.  She is in her usual state of health.  Denies significant respiratory issues.  She is a never smoker.  Objective   Vital Signs:  /50 (BP Location: Right arm)   Pulse 78   Ht 170.2 cm (67.01\")   Wt (!) 186 kg (411 lb)   SpO2 94%   BMI 64.36 kg/m²   Estimated body mass index is 64.36 kg/m² as calculated from the following:    Height as of this encounter: 170.2 cm (67.01\").    Weight as of this encounter: 186 kg (411 lb).            Physical Exam  Vitals and nursing note reviewed.   Constitutional:       Appearance: She is well-developed.   HENT:      Head: Normocephalic and atraumatic.      Nose: Nose normal.   Eyes:      Conjunctiva/sclera: Conjunctivae normal.   Cardiovascular:      Rate and Rhythm: Normal rate.   Pulmonary:      Effort: Pulmonary effort is normal.   Abdominal:      Palpations: Abdomen is soft.   Musculoskeletal:      Cervical back: Neck supple.   Skin:     General: Skin is warm and dry.   Neurological:      Mental Status: She is alert and oriented to person, place, and time.   Psychiatric:         Behavior: Behavior normal.         Thought Content: Thought content normal.         Judgment: Judgment normal.        Result Review :          Please add to the results section of independently reviewed the CT of the chest performed on 9/25/2024 which demonstrates a groundglass opacity in the right upper lobe that has increased from 1.6 to 1.8 cm.  There are no other lesions or nodules.  There is no mediastinal or hilar lymphadenopathy.     Assessment and Plan   Diagnoses and all orders for this visit:    1. Ground glass opacity present on imaging of lung (Primary)  -     CT Chest Without Contrast; Future  -     " Case Request; Standing  -     Case Request    Other orders  -     Follow Anesthesia Guidlines / Standing Orders; Standing  -     Follow Anesthesia Guidelines / Protocol; Future    Kortney Charlton is a pleasant 69-year-old lady who presents with an enlarging groundglass opacity in the right upper lobe concerning for bronchogenic malignancy given its appearance.  I have recommended an Ion bronchoscopy for biopsy of this lesion.  She will need a CT of the chest to facilitate this.  Risks and benefits of the procedure including pneumothorax were discussed with the patient today.  She would like to proceed.       I spent 30 minutes caring for Kortney on this date of service. This time includes time spent by me in the following activities:preparing for the visit, reviewing tests, obtaining and/or reviewing a separately obtained history, performing a medically appropriate examination and/or evaluation , counseling and educating the patient/family/caregiver, ordering medications, tests, or procedures, documenting information in the medical record, and independently interpreting results and communicating that information with the patient/family/caregiver  Follow Up   No follow-ups on file.  Patient was given instructions and counseling regarding her condition or for health maintenance advice. Please see specific information pulled into the AVS if appropriate.

## 2024-10-22 NOTE — PROGRESS NOTES
"Chief Complaint  Ground glass opacity    Subjective        Kortney Charlton presents to [facility] THORACIC SURGERY  History of Present Illness  Kortney Charlton is a pleasant 69-year-old lady who is being followed for a groundglass opacity in the right upper lobe.  She is in her usual state of health.  Denies significant respiratory issues.  She is a never smoker.  Objective   Vital Signs:  /50 (BP Location: Right arm)   Pulse 78   Ht 170.2 cm (67.01\")   Wt (!) 186 kg (411 lb)   SpO2 94%   BMI 64.36 kg/m²   Estimated body mass index is 64.36 kg/m² as calculated from the following:    Height as of this encounter: 170.2 cm (67.01\").    Weight as of this encounter: 186 kg (411 lb).            Physical Exam  Vitals and nursing note reviewed.   Constitutional:       Appearance: She is well-developed.   HENT:      Head: Normocephalic and atraumatic.      Nose: Nose normal.   Eyes:      Conjunctiva/sclera: Conjunctivae normal.   Cardiovascular:      Rate and Rhythm: Normal rate.   Pulmonary:      Effort: Pulmonary effort is normal.   Abdominal:      Palpations: Abdomen is soft.   Musculoskeletal:      Cervical back: Neck supple.   Skin:     General: Skin is warm and dry.   Neurological:      Mental Status: She is alert and oriented to person, place, and time.   Psychiatric:         Behavior: Behavior normal.         Thought Content: Thought content normal.         Judgment: Judgment normal.        Result Review :          Please add to the results section of independently reviewed the CT of the chest performed on 9/25/2024 which demonstrates a groundglass opacity in the right upper lobe that has increased from 1.6 to 1.8 cm.  There are no other lesions or nodules.  There is no mediastinal or hilar lymphadenopathy.     Assessment and Plan   Diagnoses and all orders for this visit:    1. Ground glass opacity present on imaging of lung (Primary)  -     CT Chest Without Contrast; Future  -     " Case Request; Standing  -     Case Request    Other orders  -     Follow Anesthesia Guidlines / Standing Orders; Standing  -     Follow Anesthesia Guidelines / Protocol; Future    Kortney Charlton is a pleasant 69-year-old lady who presents with an enlarging groundglass opacity in the right upper lobe concerning for bronchogenic malignancy given its appearance.  I have recommended an Ion bronchoscopy for biopsy of this lesion.  She will need a CT of the chest to facilitate this.  Risks and benefits of the procedure including pneumothorax were discussed with the patient today.  She would like to proceed.       I spent 30 minutes caring for Kortney on this date of service. This time includes time spent by me in the following activities:preparing for the visit, reviewing tests, obtaining and/or reviewing a separately obtained history, performing a medically appropriate examination and/or evaluation , counseling and educating the patient/family/caregiver, ordering medications, tests, or procedures, documenting information in the medical record, and independently interpreting results and communicating that information with the patient/family/caregiver  Follow Up   No follow-ups on file.  Patient was given instructions and counseling regarding her condition or for health maintenance advice. Please see specific information pulled into the AVS if appropriate.

## 2024-10-28 ENCOUNTER — HOSPITAL ENCOUNTER (OUTPATIENT)
Dept: CARDIOLOGY | Facility: HOSPITAL | Age: 69
Discharge: HOME | End: 2024-10-28
Payer: MEDICARE

## 2024-10-28 ENCOUNTER — HOSPITAL ENCOUNTER (OUTPATIENT)
Dept: PET IMAGING | Facility: HOSPITAL | Age: 69
Discharge: HOME | End: 2024-10-28
Payer: MEDICARE

## 2024-10-28 DIAGNOSIS — R91.8 GROUND GLASS OPACITY PRESENT ON IMAGING OF LUNG: ICD-10-CM

## 2024-10-28 LAB
QT INTERVAL: 335 MS
QTC INTERVAL: 508 MS

## 2024-10-28 PROCEDURE — 93005 ELECTROCARDIOGRAM TRACING: CPT | Performed by: THORACIC SURGERY (CARDIOTHORACIC VASCULAR SURGERY)

## 2024-10-28 PROCEDURE — 71250 CT THORAX DX C-: CPT

## 2024-10-30 ENCOUNTER — ANESTHESIA EVENT (OUTPATIENT)
Dept: GASTROENTEROLOGY | Facility: HOSPITAL | Age: 69
End: 2024-10-30
Payer: MEDICARE

## 2024-10-30 ENCOUNTER — HOSPITAL ENCOUNTER (OUTPATIENT)
Facility: HOSPITAL | Age: 69
LOS: 1 days | Discharge: HOME | End: 2024-11-01
Attending: THORACIC SURGERY (CARDIOTHORACIC VASCULAR SURGERY) | Admitting: THORACIC SURGERY (CARDIOTHORACIC VASCULAR SURGERY)
Payer: MEDICARE

## 2024-10-30 ENCOUNTER — ANESTHESIA (OUTPATIENT)
Dept: GASTROENTEROLOGY | Facility: HOSPITAL | Age: 69
End: 2024-10-30
Payer: MEDICARE

## 2024-10-30 ENCOUNTER — APPOINTMENT (OUTPATIENT)
Dept: GENERAL RADIOLOGY | Facility: HOSPITAL | Age: 69
End: 2024-10-30
Payer: MEDICARE

## 2024-10-30 DIAGNOSIS — R91.8 GROUND GLASS OPACITY PRESENT ON IMAGING OF LUNG: ICD-10-CM

## 2024-10-30 PROBLEM — J93.9 PNEUMOTHORAX: Status: ACTIVE | Noted: 2024-10-30

## 2024-10-30 LAB
HCT VFR BLD AUTO: 43.2 % (ref 34–46.6)
HGB BLD-MCNC: 12.7 G/DL (ref 12–15.9)

## 2024-10-30 PROCEDURE — 88305 TISSUE EXAM BY PATHOLOGIST: CPT | Performed by: THORACIC SURGERY (CARDIOTHORACIC VASCULAR SURGERY)

## 2024-10-30 PROCEDURE — 25810000003 SODIUM CHLORIDE 0.9 % SOLUTION: Performed by: THORACIC SURGERY (CARDIOTHORACIC VASCULAR SURGERY)

## 2024-10-30 PROCEDURE — 25010000002 FENTANYL CITRATE (PF) 50 MCG/ML SOLUTION: Performed by: ANESTHESIOLOGIST ASSISTANT

## 2024-10-30 PROCEDURE — 88173 CYTOPATH EVAL FNA REPORT: CPT | Performed by: THORACIC SURGERY (CARDIOTHORACIC VASCULAR SURGERY)

## 2024-10-30 PROCEDURE — 25010000002 PROPOFOL 200 MG/20ML EMULSION: Performed by: ANESTHESIOLOGIST ASSISTANT

## 2024-10-30 PROCEDURE — 76000 FLUOROSCOPY <1 HR PHYS/QHP: CPT

## 2024-10-30 PROCEDURE — 88172 CYTP DX EVAL FNA 1ST EA SITE: CPT | Performed by: THORACIC SURGERY (CARDIOTHORACIC VASCULAR SURGERY)

## 2024-10-30 PROCEDURE — 25010000002 ONDANSETRON PER 1 MG: Performed by: ANESTHESIOLOGIST ASSISTANT

## 2024-10-30 PROCEDURE — 31624 DX BRONCHOSCOPE/LAVAGE: CPT | Performed by: THORACIC SURGERY (CARDIOTHORACIC VASCULAR SURGERY)

## 2024-10-30 PROCEDURE — 88177 CYTP FNA EVAL EA ADDL: CPT | Performed by: THORACIC SURGERY (CARDIOTHORACIC VASCULAR SURGERY)

## 2024-10-30 PROCEDURE — 71045 X-RAY EXAM CHEST 1 VIEW: CPT

## 2024-10-30 PROCEDURE — 25010000002 SUGAMMADEX 200 MG/2ML SOLUTION: Performed by: ANESTHESIOLOGIST ASSISTANT

## 2024-10-30 PROCEDURE — 88334 PATH CONSLTJ SURG CYTO XM EA: CPT | Performed by: THORACIC SURGERY (CARDIOTHORACIC VASCULAR SURGERY)

## 2024-10-30 PROCEDURE — 31628 BRONCHOSCOPY/LUNG BX EACH: CPT | Performed by: THORACIC SURGERY (CARDIOTHORACIC VASCULAR SURGERY)

## 2024-10-30 PROCEDURE — 31629 BRONCHOSCOPY/NEEDLE BX EACH: CPT | Performed by: THORACIC SURGERY (CARDIOTHORACIC VASCULAR SURGERY)

## 2024-10-30 PROCEDURE — 31627 NAVIGATIONAL BRONCHOSCOPY: CPT | Performed by: THORACIC SURGERY (CARDIOTHORACIC VASCULAR SURGERY)

## 2024-10-30 PROCEDURE — 85014 HEMATOCRIT: CPT | Performed by: THORACIC SURGERY (CARDIOTHORACIC VASCULAR SURGERY)

## 2024-10-30 PROCEDURE — 88333 PATH CONSLTJ SURG CYTO XM 1: CPT | Performed by: THORACIC SURGERY (CARDIOTHORACIC VASCULAR SURGERY)

## 2024-10-30 PROCEDURE — 88108 CYTOPATH CONCENTRATE TECH: CPT | Performed by: THORACIC SURGERY (CARDIOTHORACIC VASCULAR SURGERY)

## 2024-10-30 PROCEDURE — 25010000002 LIDOCAINE PF 1% 1 % SOLUTION: Performed by: ANESTHESIOLOGIST ASSISTANT

## 2024-10-30 PROCEDURE — 25810000003 LACTATED RINGERS PER 1000 ML: Performed by: ANESTHESIOLOGIST ASSISTANT

## 2024-10-30 PROCEDURE — 25010000002 PROPOFOL 1000 MG/100ML EMULSION: Performed by: ANESTHESIOLOGIST ASSISTANT

## 2024-10-30 PROCEDURE — 85018 HEMOGLOBIN: CPT | Performed by: THORACIC SURGERY (CARDIOTHORACIC VASCULAR SURGERY)

## 2024-10-30 RX ORDER — ACETAMINOPHEN 325 MG/1
650 TABLET ORAL ONCE AS NEEDED
Status: COMPLETED | OUTPATIENT
Start: 2024-10-30 | End: 2024-10-31

## 2024-10-30 RX ORDER — IPRATROPIUM BROMIDE AND ALBUTEROL SULFATE 2.5; .5 MG/3ML; MG/3ML
3 SOLUTION RESPIRATORY (INHALATION) ONCE AS NEEDED
Status: DISCONTINUED | OUTPATIENT
Start: 2024-10-30 | End: 2024-10-30 | Stop reason: HOSPADM

## 2024-10-30 RX ORDER — BISACODYL 10 MG
10 SUPPOSITORY, RECTAL RECTAL DAILY PRN
Status: DISCONTINUED | OUTPATIENT
Start: 2024-10-30 | End: 2024-11-01 | Stop reason: HOSPADM

## 2024-10-30 RX ORDER — HEPARIN SODIUM 5000 [USP'U]/ML
5000 INJECTION, SOLUTION INTRAVENOUS; SUBCUTANEOUS EVERY 8 HOURS SCHEDULED
Status: DISCONTINUED | OUTPATIENT
Start: 2024-10-31 | End: 2024-11-01 | Stop reason: HOSPADM

## 2024-10-30 RX ORDER — LABETALOL HYDROCHLORIDE 5 MG/ML
5 INJECTION, SOLUTION INTRAVENOUS
Status: DISCONTINUED | OUTPATIENT
Start: 2024-10-30 | End: 2024-10-30 | Stop reason: HOSPADM

## 2024-10-30 RX ORDER — TRAMADOL HYDROCHLORIDE 50 MG/1
50 TABLET ORAL EVERY 8 HOURS PRN
Status: DISCONTINUED | OUTPATIENT
Start: 2024-10-30 | End: 2024-11-01 | Stop reason: HOSPADM

## 2024-10-30 RX ORDER — SODIUM CHLORIDE, SODIUM LACTATE, POTASSIUM CHLORIDE, CALCIUM CHLORIDE 600; 310; 30; 20 MG/100ML; MG/100ML; MG/100ML; MG/100ML
INJECTION, SOLUTION INTRAVENOUS CONTINUOUS PRN
Status: DISCONTINUED | OUTPATIENT
Start: 2024-10-30 | End: 2024-10-30 | Stop reason: SURG

## 2024-10-30 RX ORDER — PHENYLEPHRINE HCL IN 0.9% NACL 1 MG/10 ML
SYRINGE (ML) INTRAVENOUS AS NEEDED
Status: DISCONTINUED | OUTPATIENT
Start: 2024-10-30 | End: 2024-10-30 | Stop reason: SURG

## 2024-10-30 RX ORDER — SODIUM CHLORIDE 0.9 % (FLUSH) 0.9 %
10 SYRINGE (ML) INJECTION EVERY 12 HOURS SCHEDULED
Status: DISCONTINUED | OUTPATIENT
Start: 2024-10-30 | End: 2024-10-30 | Stop reason: HOSPADM

## 2024-10-30 RX ORDER — EPHEDRINE SULFATE 5 MG/ML
5 INJECTION INTRAVENOUS ONCE AS NEEDED
Status: DISCONTINUED | OUTPATIENT
Start: 2024-10-30 | End: 2024-10-30 | Stop reason: HOSPADM

## 2024-10-30 RX ORDER — FLUMAZENIL 0.1 MG/ML
0.5 INJECTION INTRAVENOUS AS NEEDED
Status: DISCONTINUED | OUTPATIENT
Start: 2024-10-30 | End: 2024-10-30 | Stop reason: HOSPADM

## 2024-10-30 RX ORDER — DIPHENHYDRAMINE HCL 25 MG
25 CAPSULE ORAL
Status: DISCONTINUED | OUTPATIENT
Start: 2024-10-30 | End: 2024-10-30 | Stop reason: HOSPADM

## 2024-10-30 RX ORDER — ONDANSETRON 2 MG/ML
4 INJECTION INTRAMUSCULAR; INTRAVENOUS ONCE AS NEEDED
Status: DISCONTINUED | OUTPATIENT
Start: 2024-10-30 | End: 2024-10-30 | Stop reason: HOSPADM

## 2024-10-30 RX ORDER — IPRATROPIUM BROMIDE AND ALBUTEROL SULFATE 2.5; .5 MG/3ML; MG/3ML
3 SOLUTION RESPIRATORY (INHALATION)
Status: DISCONTINUED | OUTPATIENT
Start: 2024-10-30 | End: 2024-10-30

## 2024-10-30 RX ORDER — CHOLESTYRAMINE LIGHT 4 G/5.7G
1 POWDER, FOR SUSPENSION ORAL DAILY
Status: DISCONTINUED | OUTPATIENT
Start: 2024-10-30 | End: 2024-11-01 | Stop reason: HOSPADM

## 2024-10-30 RX ORDER — LOPERAMIDE HYDROCHLORIDE 2 MG/1
4 CAPSULE ORAL 3 TIMES DAILY PRN
Status: DISCONTINUED | OUTPATIENT
Start: 2024-10-30 | End: 2024-11-01 | Stop reason: HOSPADM

## 2024-10-30 RX ORDER — NALOXONE HCL 0.4 MG/ML
0.4 VIAL (ML) INJECTION AS NEEDED
Status: DISCONTINUED | OUTPATIENT
Start: 2024-10-30 | End: 2024-10-30 | Stop reason: HOSPADM

## 2024-10-30 RX ORDER — SODIUM CHLORIDE 0.9 % (FLUSH) 0.9 %
10 SYRINGE (ML) INJECTION AS NEEDED
Status: DISCONTINUED | OUTPATIENT
Start: 2024-10-30 | End: 2024-10-30 | Stop reason: HOSPADM

## 2024-10-30 RX ORDER — FUROSEMIDE 20 MG/1
20 TABLET ORAL DAILY
Status: DISCONTINUED | OUTPATIENT
Start: 2024-10-31 | End: 2024-11-01 | Stop reason: HOSPADM

## 2024-10-30 RX ORDER — MIDAZOLAM HYDROCHLORIDE 1 MG/ML
1 INJECTION, SOLUTION INTRAMUSCULAR; INTRAVENOUS
Status: DISCONTINUED | OUTPATIENT
Start: 2024-10-30 | End: 2024-10-30 | Stop reason: HOSPADM

## 2024-10-30 RX ORDER — ACETAMINOPHEN 650 MG/1
650 SUPPOSITORY RECTAL EVERY 4 HOURS PRN
Status: COMPLETED | OUTPATIENT
Start: 2024-10-30 | End: 2024-10-31

## 2024-10-30 RX ORDER — MEPERIDINE HYDROCHLORIDE 25 MG/ML
12.5 INJECTION INTRAMUSCULAR; INTRAVENOUS; SUBCUTANEOUS
Status: DISCONTINUED | OUTPATIENT
Start: 2024-10-30 | End: 2024-10-30 | Stop reason: HOSPADM

## 2024-10-30 RX ORDER — NITROGLYCERIN 0.4 MG/1
0.4 TABLET SUBLINGUAL
Status: DISCONTINUED | OUTPATIENT
Start: 2024-10-30 | End: 2024-11-01 | Stop reason: HOSPADM

## 2024-10-30 RX ORDER — HYDRALAZINE HYDROCHLORIDE 20 MG/ML
5 INJECTION INTRAMUSCULAR; INTRAVENOUS
Status: DISCONTINUED | OUTPATIENT
Start: 2024-10-30 | End: 2024-10-30 | Stop reason: HOSPADM

## 2024-10-30 RX ORDER — DIPHENHYDRAMINE HYDROCHLORIDE 50 MG/ML
12.5 INJECTION INTRAMUSCULAR; INTRAVENOUS
Status: DISCONTINUED | OUTPATIENT
Start: 2024-10-30 | End: 2024-10-30 | Stop reason: HOSPADM

## 2024-10-30 RX ORDER — PROPOFOL 10 MG/ML
INJECTION, EMULSION INTRAVENOUS AS NEEDED
Status: DISCONTINUED | OUTPATIENT
Start: 2024-10-30 | End: 2024-10-30 | Stop reason: SURG

## 2024-10-30 RX ORDER — DAPSONE 25 MG/1
50 TABLET ORAL 2 TIMES DAILY
Status: DISCONTINUED | OUTPATIENT
Start: 2024-10-30 | End: 2024-11-01 | Stop reason: HOSPADM

## 2024-10-30 RX ORDER — ONDANSETRON 2 MG/ML
4 INJECTION INTRAMUSCULAR; INTRAVENOUS EVERY 6 HOURS PRN
Status: DISCONTINUED | OUTPATIENT
Start: 2024-10-30 | End: 2024-11-01 | Stop reason: HOSPADM

## 2024-10-30 RX ORDER — CETIRIZINE HYDROCHLORIDE 10 MG/1
10 TABLET ORAL DAILY
Status: DISCONTINUED | OUTPATIENT
Start: 2024-10-31 | End: 2024-11-01 | Stop reason: HOSPADM

## 2024-10-30 RX ORDER — ACETAMINOPHEN 160 MG/5ML
650 SOLUTION ORAL EVERY 8 HOURS
Status: DISCONTINUED | OUTPATIENT
Start: 2024-10-30 | End: 2024-11-01 | Stop reason: HOSPADM

## 2024-10-30 RX ORDER — IPRATROPIUM BROMIDE AND ALBUTEROL SULFATE 2.5; .5 MG/3ML; MG/3ML
3 SOLUTION RESPIRATORY (INHALATION)
Status: DISCONTINUED | OUTPATIENT
Start: 2024-10-30 | End: 2024-11-01 | Stop reason: HOSPADM

## 2024-10-30 RX ORDER — SODIUM CHLORIDE 9 MG/ML
50 INJECTION, SOLUTION INTRAVENOUS CONTINUOUS
Status: DISPENSED | OUTPATIENT
Start: 2024-10-30 | End: 2024-10-30

## 2024-10-30 RX ORDER — PROPOFOL 10 MG/ML
INJECTION, EMULSION INTRAVENOUS CONTINUOUS PRN
Status: DISCONTINUED | OUTPATIENT
Start: 2024-10-30 | End: 2024-10-30 | Stop reason: SURG

## 2024-10-30 RX ORDER — IBUPROFEN 400 MG/1
600 TABLET, FILM COATED ORAL ONCE AS NEEDED
Status: DISCONTINUED | OUTPATIENT
Start: 2024-10-30 | End: 2024-10-30 | Stop reason: HOSPADM

## 2024-10-30 RX ORDER — ONDANSETRON 2 MG/ML
INJECTION INTRAMUSCULAR; INTRAVENOUS AS NEEDED
Status: DISCONTINUED | OUTPATIENT
Start: 2024-10-30 | End: 2024-10-30 | Stop reason: SURG

## 2024-10-30 RX ORDER — LIDOCAINE HYDROCHLORIDE 10 MG/ML
INJECTION, SOLUTION EPIDURAL; INFILTRATION; INTRACAUDAL; PERINEURAL AS NEEDED
Status: DISCONTINUED | OUTPATIENT
Start: 2024-10-30 | End: 2024-10-30 | Stop reason: SURG

## 2024-10-30 RX ORDER — METOCLOPRAMIDE HYDROCHLORIDE 5 MG/ML
10 INJECTION INTRAMUSCULAR; INTRAVENOUS ONCE AS NEEDED
Status: DISCONTINUED | OUTPATIENT
Start: 2024-10-30 | End: 2024-10-30 | Stop reason: HOSPADM

## 2024-10-30 RX ORDER — AMOXICILLIN 250 MG
2 CAPSULE ORAL NIGHTLY
Status: DISCONTINUED | OUTPATIENT
Start: 2024-10-30 | End: 2024-11-01 | Stop reason: HOSPADM

## 2024-10-30 RX ORDER — CALCIUM CARBONATE 500 MG/1
2 TABLET, CHEWABLE ORAL 3 TIMES DAILY PRN
Status: DISCONTINUED | OUTPATIENT
Start: 2024-10-30 | End: 2024-11-01 | Stop reason: HOSPADM

## 2024-10-30 RX ORDER — DIPHENHYDRAMINE HYDROCHLORIDE 50 MG/ML
12.5 INJECTION INTRAMUSCULAR; INTRAVENOUS ONCE AS NEEDED
Status: DISCONTINUED | OUTPATIENT
Start: 2024-10-30 | End: 2024-10-30 | Stop reason: HOSPADM

## 2024-10-30 RX ORDER — ACETAMINOPHEN 650 MG/1
650 SUPPOSITORY RECTAL EVERY 8 HOURS
Status: DISCONTINUED | OUTPATIENT
Start: 2024-10-30 | End: 2024-11-01 | Stop reason: HOSPADM

## 2024-10-30 RX ORDER — ONDANSETRON 4 MG/1
4 TABLET, ORALLY DISINTEGRATING ORAL EVERY 6 HOURS PRN
Status: DISCONTINUED | OUTPATIENT
Start: 2024-10-30 | End: 2024-11-01 | Stop reason: HOSPADM

## 2024-10-30 RX ORDER — FENTANYL CITRATE 50 UG/ML
INJECTION, SOLUTION INTRAMUSCULAR; INTRAVENOUS AS NEEDED
Status: DISCONTINUED | OUTPATIENT
Start: 2024-10-30 | End: 2024-10-30 | Stop reason: SURG

## 2024-10-30 RX ORDER — OXYCODONE HYDROCHLORIDE 5 MG/1
10 TABLET ORAL ONCE AS NEEDED
Status: DISCONTINUED | OUTPATIENT
Start: 2024-10-30 | End: 2024-10-30 | Stop reason: HOSPADM

## 2024-10-30 RX ORDER — ACETAMINOPHEN 325 MG/1
650 TABLET ORAL EVERY 8 HOURS
Status: DISCONTINUED | OUTPATIENT
Start: 2024-10-30 | End: 2024-11-01 | Stop reason: HOSPADM

## 2024-10-30 RX ORDER — FENTANYL CITRATE 50 UG/ML
100 INJECTION, SOLUTION INTRAMUSCULAR; INTRAVENOUS
Status: DISCONTINUED | OUTPATIENT
Start: 2024-10-30 | End: 2024-10-30 | Stop reason: HOSPADM

## 2024-10-30 RX ORDER — ROCURONIUM BROMIDE 10 MG/ML
INJECTION, SOLUTION INTRAVENOUS AS NEEDED
Status: DISCONTINUED | OUTPATIENT
Start: 2024-10-30 | End: 2024-10-30 | Stop reason: SURG

## 2024-10-30 RX ADMIN — Medication 100 MCG: at 09:40

## 2024-10-30 RX ADMIN — PROPOFOL 200 MG: 10 INJECTION, EMULSION INTRAVENOUS at 08:39

## 2024-10-30 RX ADMIN — PROPOFOL INJECTABLE EMULSION 150 MCG/KG/MIN: 10 INJECTION, EMULSION INTRAVENOUS at 08:54

## 2024-10-30 RX ADMIN — SODIUM CHLORIDE 50 ML/HR: 9 INJECTION, SOLUTION INTRAVENOUS at 07:25

## 2024-10-30 RX ADMIN — DAPSONE 50 MG: 25 TABLET ORAL at 19:03

## 2024-10-30 RX ADMIN — FENTANYL CITRATE 100 MCG: 50 INJECTION, SOLUTION INTRAMUSCULAR; INTRAVENOUS at 08:39

## 2024-10-30 RX ADMIN — SUGAMMADEX 400 MG: 100 INJECTION, SOLUTION INTRAVENOUS at 09:44

## 2024-10-30 RX ADMIN — LIDOCAINE HYDROCHLORIDE 50 MG: 10 INJECTION, SOLUTION EPIDURAL; INFILTRATION; INTRACAUDAL; PERINEURAL at 08:39

## 2024-10-30 RX ADMIN — SODIUM CHLORIDE, SODIUM LACTATE, POTASSIUM CHLORIDE, AND CALCIUM CHLORIDE: .6; .31; .03; .02 INJECTION, SOLUTION INTRAVENOUS at 08:36

## 2024-10-30 RX ADMIN — ROCURONIUM BROMIDE 50 MG: 10 INJECTION, SOLUTION INTRAVENOUS at 09:35

## 2024-10-30 RX ADMIN — ONDANSETRON 4 MG: 2 INJECTION, SOLUTION INTRAMUSCULAR; INTRAVENOUS at 09:28

## 2024-10-30 RX ADMIN — ROCURONIUM BROMIDE 50 MG: 10 INJECTION, SOLUTION INTRAVENOUS at 08:39

## 2024-10-30 RX ADMIN — ACETAMINOPHEN 650 MG: 325 TABLET, FILM COATED ORAL at 17:25

## 2024-10-30 RX ADMIN — CHOLESTYRAMINE 4 G: 4 POWDER, FOR SUSPENSION ORAL at 17:25

## 2024-10-30 NOTE — ANESTHESIA PROCEDURE NOTES
Airway  Date/Time: 10/30/2024 8:40 AM    Indications and Patient Condition  Indications for airway management: airway protection    Preoxygenated: yes  MILS maintained throughout  Mask difficulty assessment: 0 - not attempted    Final Airway Details  Final airway type: endotracheal airway      Successful airway: ETT     Successful intubation technique: video laryngoscopy  Facilitating devices/methods: intubating stylet  Endotracheal tube insertion site: oral  Blade: Brown  ETT size (mm): 8.5  Cormack-Lehane Classification: grade I - full view of glottis  Measured from: teeth  ETT/EBT  to teeth (cm): 22  Number of attempts at approach: 1  Assessment: lips, teeth, and gum same as pre-op and atraumatic intubation

## 2024-10-30 NOTE — PLAN OF CARE
Goal Outcome Evaluation:  Plan of Care Reviewed With: patient   Pt admitted for observation r/t pneumothorax overnight after bronchoscopy     Progress: improving

## 2024-10-30 NOTE — OP NOTE
BRONCHOSCOPY WITH ION ROBOT  Procedure Report    Patient Name:  Kortney Charlton  YOB: 1955    Date of Surgery:  10/30/2024     Indications: Groundglass opacity of the right upper lobe    Pre-op Diagnosis:   Ground glass opacity present on imaging of lung [R91.8]       Post-Op Diagnosis Codes:     * Ground glass opacity present on imaging of lung [R91.8]    Procedure/CPT® Codes:      Procedure(s):  BRONCHOSCOPY WITH ION ROBOT, LAVAGE, FINE NEEDLE ASPIRATIONS AND BIOPSIES    Staff:  Surgeon(s):  Radha Marcus MD    Anesthesia: General    Estimated Blood Loss: minimal    Implants:    Nothing was implanted during the procedure    Specimen:          Specimens       ID Source Type Tests Collected By Collected At Frozen?    A Lung, Right Upper Lobe Fine Needle Aspirate FINE NEEDLE ASPIRATION   Radha Marcus MD 10/30/24 0902 No    Description: RUL SLIDES    This specimen was not marked as sent.    B Lung, Right Upper Lobe Fine Needle Aspirate FINE NEEDLE ASPIRATION   Radha Marcus MD 10/30/24 0908 No    Description: RUL formalin    This specimen was not marked as sent.    C Lung, Right Upper Lobe Tissue TISSUE PATHOLOGY EXAM   Radha Marcus MD 10/30/24 0914 No    Description: RUL TOUCH PREP    This specimen was not marked as sent.    D Lung, Right Upper Lobe Lavage NON-GYNECOLOGIC CYTOLOGY   Radha Marcus MD 10/30/24 0942 No    Description: RUL BAL    This specimen was not marked as sent.              Findings: Rapid onsite evaluation with atypical cells    Complications: None.    Description of Procedure:  Kortney Charlton was identified in the preoperative holding area and again her consent for the procedure was verified.  Prior to bringing the patient to the endoscopy suite, planning was performed to allow navigation to the right l upper lobe groundglass opacity.  She was transferred to the endoscopy suite placed on the endoscopy table in supine position.  A general anesthetic was  successfully administered and She was intubated without difficulty.  A timeout was performed.    The Olympus endoscope was introduced in the patient's airway and examination was conducted to the secondary azra.  All the secretions were evacuated to facilitate robotic navigation.  The Ion robotic navigation system was docked to the patient in standard fashion.  The airway was registered.  We were able to navigate to the lesion.  Radial endobronchial ultrasound was used to confirm that we were in the lesion we had good concentric signal.  Multiple biopsies of the mass were performed using a 21-gauge biopsy needle as well as cryobiopsy in a concentric location around the entire mass.  Fluoroscopy was used while passing instruments and tools.  Rapid onsite evaluation demonstrated atypical cells.  A bronchoalveolar lavage was performed.    The scope was withdrawn and replaced with the Olympus video endoscope.  A small amount of blood was evacuated from the airway.  There was no evidence of significant bleeding.  The patient tolerated this procedure well, was extubated and transferred to recovery room in stable condition.      Radha Marcus MD     Date: 10/30/2024  Time: 09:54 EDT

## 2024-10-30 NOTE — DISCHARGE INSTRUCTIONS
Final results of your biopsy take up to 5 business days to process; your endoscopic physician will contact you with your results.  ION Bronchoscopy is recommended in the following instances:  To diagnose different types of lung disorders (such as sarcoidosis or tuberculosis)  To diagnose or 'stage' cancer   Due to effects of sedation, do not drive or operate heavy machinery for 24 hours.  Avoid heavy lifting (>10 lbs.) or strenuous activity for 48 hours.  Continue to avoid non-steroidal anti-inflammatory medications (NSAIDS) for 5-7 days after the procedure unless told otherwise by your endoscopic and primary care physicians.  Some patients have a temporary sore throat after the procedure; this is a normal finding and over-the-counter lozenges help soothe symptoms.  You may resume normal diet once you are discharged.   Avoid red-colored foods for 24 hours.   You may resume your blood thinner medications (if applicable) as directed by your endoscopic and primary care physicians.  It is normal to have a very small amount of blood-tinged sputum immediately after the procedure. This should resolve within a few hours.  Although complications are rare, there is a small risk of pain, collapsed lung, bleeding and infection. Contact your endoscopic physician if you have these signs or symptoms Temperature greater than 102°F  Worsening pain not relieved by medications  Go to your nearest emergency room is you experience:  Shaking, chills or a temperature over 102°F.    New, sudden difficulty breathing.    New pain when taking a deep breath.    New, sudden chest pain.  Worsening cough that produces large amounts of blood.  Due to effects of sedation, do not drive or operate heavy machinery for 24 hours.  For further questions please call Dr. Marcus's office [phone]

## 2024-10-30 NOTE — ANESTHESIA PREPROCEDURE EVALUATION
Anesthesia Evaluation     Patient summary reviewed and Nursing notes reviewed   NPO Solid Status: > 8 hours             Airway   Mallampati: II  TM distance: >3 FB  Neck ROM: full  No difficulty expected  Dental - normal exam     Pulmonary - negative pulmonary ROS and normal exam   (-) not a smoker    ROS comment: Lung lesion  Cardiovascular - normal exam    ECG reviewed    (+) hypertension  (-) angina, RIANES      Neuro/Psych- negative ROS  GI/Hepatic/Renal/Endo    (+) morbid obesity    Musculoskeletal     Abdominal  - normal exam    Bowel sounds: normal.   Substance History - negative use     OB/GYN negative ob/gyn ROS         Other   arthritis,                 Anesthesia Plan    ASA 3     general       Anesthetic plan, risks, benefits, and alternatives have been provided, discussed and informed consent has been obtained with: patient.    CODE STATUS:

## 2024-10-30 NOTE — ANESTHESIA POSTPROCEDURE EVALUATION
Patient: Kortney Charlton    Procedure Summary       Date: 10/30/24 Room / Location: [facility] ENDOSCOPY 3 / [facility] ENDOSCOPY    Anesthesia Start: 0836 Anesthesia Stop: 1002    Procedure: BRONCHOSCOPY WITH ION ROBOT, LAVAGE, FINE NEEDLE ASPIRATIONS AND BIOPSIES Diagnosis:       Ground glass opacity present on imaging of lung      (Ground glass opacity present on imaging of lung [R91.8])    Surgeons: Radha Marcus MD Provider: Estevan Shepherd MD    Anesthesia Type: general ASA Status: 3            Anesthesia Type: general    Vitals  Vitals Value Taken Time   /57 10/30/24 1227   Temp 97.8 °F (36.6 °C) 10/30/24 1006   Pulse 66 10/30/24 1233   Resp 15 10/30/24 1027   SpO2 95 % 10/30/24 1233   Vitals shown include unfiled device data.        Post Anesthesia Care and Evaluation    Patient location during evaluation: PACU  Patient participation: complete - patient participated  Level of consciousness: awake  Pain score: 0  Pain management: adequate  Anesthetic complications: No anesthetic complications  PONV Status: none  Cardiovascular status: acceptable  Respiratory status: acceptable  Hydration status: acceptable

## 2024-10-31 ENCOUNTER — APPOINTMENT (OUTPATIENT)
Dept: GENERAL RADIOLOGY | Facility: HOSPITAL | Age: 69
End: 2024-10-31
Payer: MEDICARE

## 2024-10-31 LAB
ANION GAP SERPL CALCULATED.3IONS-SCNC: 7.1 MMOL/L (ref 5–15)
BASOPHILS # BLD AUTO: 0.03 10*3/MM3 (ref 0–0.2)
BASOPHILS NFR BLD AUTO: 0.4 % (ref 0–1.5)
BEAKER LAB AP INTRAOPERATIVE CONSULTATION: NORMAL
BUN SERPL-MCNC: 9 MG/DL (ref 8–23)
BUN/CREAT SERPL: 13.6 (ref 7–25)
CALCIUM SPEC-SCNC: 9 MG/DL (ref 8.6–10.5)
CHLORIDE SERPL-SCNC: 104 MMOL/L (ref 98–107)
CO2 SERPL-SCNC: 29.9 MMOL/L (ref 22–29)
CREAT SERPL-MCNC: 0.66 MG/DL (ref 0.57–1)
CYTO UR: NORMAL
DEPRECATED RDW RBC AUTO: 52.7 FL (ref 37–54)
EGFRCR SERPLBLD CKD-EPI 2021: 93.9 ML/MIN/1.73
EOSINOPHIL # BLD AUTO: 0.11 10*3/MM3 (ref 0–0.4)
EOSINOPHIL NFR BLD AUTO: 1.3 % (ref 0.3–6.2)
ERYTHROCYTE [DISTWIDTH] IN BLOOD BY AUTOMATED COUNT: 15.1 % (ref 12.3–15.4)
GLUCOSE SERPL-MCNC: 120 MG/DL (ref 65–99)
HCT VFR BLD AUTO: 42.9 % (ref 34–46.6)
HGB BLD-MCNC: 12 G/DL (ref 12–15.9)
IMM GRANULOCYTES # BLD AUTO: 0.05 10*3/MM3 (ref 0–0.05)
IMM GRANULOCYTES NFR BLD AUTO: 0.6 % (ref 0–0.5)
LAB AP CASE REPORT: NORMAL
LAB AP CASE REPORT: NORMAL
LYMPHOCYTES # BLD AUTO: 0.81 10*3/MM3 (ref 0.7–3.1)
LYMPHOCYTES NFR BLD AUTO: 9.7 % (ref 19.6–45.3)
Lab: NORMAL
MCH RBC QN AUTO: 26.7 PG (ref 26.6–33)
MCHC RBC AUTO-ENTMCNC: 28 G/DL (ref 31.5–35.7)
MCV RBC AUTO: 95.5 FL (ref 79–97)
MONOCYTES # BLD AUTO: 0.5 10*3/MM3 (ref 0.1–0.9)
MONOCYTES NFR BLD AUTO: 6 % (ref 5–12)
NEUTROPHILS NFR BLD AUTO: 6.89 10*3/MM3 (ref 1.7–7)
NEUTROPHILS NFR BLD AUTO: 82 % (ref 42.7–76)
NRBC BLD AUTO-RTO: 0 /100 WBC (ref 0–0.2)
PATH REPORT.FINAL DX SPEC: NORMAL
PATH REPORT.FINAL DX SPEC: NORMAL
PATH REPORT.GROSS SPEC: NORMAL
PATH REPORT.GROSS SPEC: NORMAL
PLATELET # BLD AUTO: 233 10*3/MM3 (ref 140–450)
PMV BLD AUTO: 10 FL (ref 6–12)
POTASSIUM SERPL-SCNC: 5 MMOL/L (ref 3.5–5.2)
RBC # BLD AUTO: 4.49 10*6/MM3 (ref 3.77–5.28)
SODIUM SERPL-SCNC: 141 MMOL/L (ref 136–145)
WBC NRBC COR # BLD AUTO: 8.39 10*3/MM3 (ref 3.4–10.8)

## 2024-10-31 PROCEDURE — 94761 N-INVAS EAR/PLS OXIMETRY MLT: CPT

## 2024-10-31 PROCEDURE — 85025 COMPLETE CBC W/AUTO DIFF WBC: CPT | Performed by: THORACIC SURGERY (CARDIOTHORACIC VASCULAR SURGERY)

## 2024-10-31 PROCEDURE — 94618 PULMONARY STRESS TESTING: CPT

## 2024-10-31 PROCEDURE — 71045 X-RAY EXAM CHEST 1 VIEW: CPT

## 2024-10-31 PROCEDURE — 94664 DEMO&/EVAL PT USE INHALER: CPT

## 2024-10-31 PROCEDURE — 94799 UNLISTED PULMONARY SVC/PX: CPT

## 2024-10-31 PROCEDURE — 80048 BASIC METABOLIC PNL TOTAL CA: CPT | Performed by: THORACIC SURGERY (CARDIOTHORACIC VASCULAR SURGERY)

## 2024-10-31 PROCEDURE — 25010000002 HEPARIN (PORCINE) PER 1000 UNITS: Performed by: THORACIC SURGERY (CARDIOTHORACIC VASCULAR SURGERY)

## 2024-10-31 PROCEDURE — 99232 SBSQ HOSP IP/OBS MODERATE 35: CPT | Performed by: THORACIC SURGERY (CARDIOTHORACIC VASCULAR SURGERY)

## 2024-10-31 RX ADMIN — IPRATROPIUM BROMIDE AND ALBUTEROL SULFATE 3 ML: .5; 3 SOLUTION RESPIRATORY (INHALATION) at 16:10

## 2024-10-31 RX ADMIN — HEPARIN SODIUM 5000 UNITS: 5000 INJECTION INTRAVENOUS; SUBCUTANEOUS at 05:36

## 2024-10-31 RX ADMIN — IPRATROPIUM BROMIDE AND ALBUTEROL SULFATE 3 ML: .5; 3 SOLUTION RESPIRATORY (INHALATION) at 07:06

## 2024-10-31 RX ADMIN — FUROSEMIDE 20 MG: 20 TABLET ORAL at 08:17

## 2024-10-31 RX ADMIN — DAPSONE 50 MG: 25 TABLET ORAL at 08:16

## 2024-10-31 RX ADMIN — DAPSONE 50 MG: 25 TABLET ORAL at 21:14

## 2024-10-31 RX ADMIN — CHOLESTYRAMINE 4 G: 4 POWDER, FOR SUSPENSION ORAL at 08:17

## 2024-10-31 RX ADMIN — ACETAMINOPHEN 650 MG: 325 TABLET, FILM COATED ORAL at 08:17

## 2024-10-31 RX ADMIN — IPRATROPIUM BROMIDE AND ALBUTEROL SULFATE 3 ML: .5; 3 SOLUTION RESPIRATORY (INHALATION) at 23:54

## 2024-10-31 RX ADMIN — CETIRIZINE HYDROCHLORIDE 10 MG: 10 TABLET, FILM COATED ORAL at 08:16

## 2024-10-31 RX ADMIN — ACETAMINOPHEN 650 MG: 325 TABLET, FILM COATED ORAL at 13:34

## 2024-10-31 RX ADMIN — HEPARIN SODIUM 5000 UNITS: 5000 INJECTION INTRAVENOUS; SUBCUTANEOUS at 14:57

## 2024-10-31 RX ADMIN — ACETAMINOPHEN 650 MG: 325 TABLET, FILM COATED ORAL at 00:20

## 2024-10-31 NOTE — PLAN OF CARE
Goal Outcome Evaluation:  Plan of Care Reviewed With: patient   Awaiting second CXR before d/c     Progress: improving

## 2024-10-31 NOTE — PROCEDURES
Exercise Oximetry    Patient Name:Kortney Charlton   MRN: [id number]   Date: 10/31/24             ROOM AIR BASELINE   SpO2% 91   Heart Rate 62   Blood Pressure      EXERCISE ON ROOM AIR SpO2% EXERCISE ON O2 @ LPM SpO2%   1 MINUTE 90 1 MINUTE    2 MINUTES 89 2 MINUTES    3 MINUTES 91 3 MINUTES    4 MINUTES 88 4 MINUTES    5 MINUTES 91 5 MINUTES    6 MINUTES 90 6 MINUTES               Distance Walked  6mins Distance Walked   Dyspnea (Ilya Scale)   Dyspnea (Ilya Scale)   Fatigue (Ilya Scale)   Fatigue (Ilya Scale)   SpO2% Post Exercise  91 SpO2% Post Exercise   HR Post Exercise  102 HR Post Exercise   Time to Recovery  NA Time to Recovery     Comments: Patient was able to maintain spo2 of 88% and above on room air for duration of the walk exercise, at this time patient does not qualify for home o2.

## 2024-10-31 NOTE — PROGRESS NOTES
"    Chief Complaint: pneumothorax after ion      Subjective  Doing well.  No complaints.  Vital Signs:  Temp:  [97.7 °F (36.5 °C)-98.6 °F (37 °C)] 98.2 °F (36.8 °C)  Heart Rate:  [67-80] 67  Resp:  [9-24] 20  BP: (122-140)/(61-83) 122/61    Intake & Output (last day)         10/30 0701  10/31 0700 10/31 0701 11/01 0700    P.O.  480    I.V. (mL/kg) 1000 (5.3)     Total Intake(mL/kg) 1000 (5.3) 480 (2.6)    Net +1000 +480          Urine Unmeasured Occurrence 1 x             Objective:  General Appearance:  Comfortable, well-appearing and in no acute distress.    Vital signs: (most recent): Blood pressure 122/61, pulse 67, temperature 98.2 °F (36.8 °C), temperature source Oral, resp. rate 20, height 170.2 cm (67\"), weight (!) 187 kg (411 lb 12.8 oz), SpO2 95%, not currently breastfeeding.  Vital signs are normal.    Lungs:  Normal effort and normal respiratory rate.    Heart: Normal rate.    Abdomen: Abdomen is soft.    Neurological: Patient is alert and oriented to person, place and time.    Skin:  Warm and dry.                Results Review:     I reviewed the patient's new clinical results.  I reviewed the patient's new imaging results and agree with the interpretation.  I reviewed the patient's other test results and agree with the interpretation    Imaging Results (Last 24 Hours)       Procedure Component Value Units Date/Time    XR Chest 1 View [[id number]] Collected: 10/31/24 1259     Updated: 10/31/24 1303    Narrative:      XR CHEST 1 VW    Date of Exam: 10/31/2024 12:15 PM EDT    Indication: Chest tube management    Comparison: None available.    Findings:  Right apical pneumothorax which measures 22 mm from the apex is stable. There is no identifiable chest tube. Lung fields are clear of acute infiltrates or effusions. Heart and pulmonary vessels appear within normal limits for technique.      Impression:      Impression:  Stable right apical pneumothorax.      Electronically Signed: Martha Gregg MD    " 10/31/2024 1:01 PM EDT    Workstation ID: [id number]    XR Chest 1 View [[id number]] Collected: 10/31/24 0721     Updated: 10/31/24 0726    Narrative:      XR CHEST 1 VW    Date of Exam: 10/31/2024 5:56 AM EDT    Indication: Post Op Lung Surgery    Comparison: 10/30/2024    Findings:  There is mild interval increase in size of a right apical pneumothorax currently measuring approximately 22 mm from the apex. There are no infiltrates or consolidations. There are left basilar discoid atelectasis. The cardiopulmonary silhouette is   midline and stable in size.      Impression:      Impression:  Mild interval increase in size of a right apical pneumothorax      Electronically Signed: Monster Donnelly MD    10/31/2024 7:24 AM EDT    Workstation ID: [id number]               Assessment & Plan       Ground glass opacity present on imaging of lung    Pneumothorax       Assessment & Plan  Ms. Charlton is a pleasant 69-year-old lady who is status post Ion bronchoscopy with postprocedural pneumothorax.  Her pneumothorax is stable on her chest x-ray from this morning to this afternoon.  She did have a slight increase overnight, likely secondary to shifting of air.  I would like to keep her 1 more night for observation.  We will plan to do a walking oximetry.  Will repeat a chest x-ray in the morning.  As long as her chest x-ray is unchanged, she will be discharged in the morning.  Radha Marcus MD  [facility]  10/31/24  14:47 EDT

## 2024-10-31 NOTE — PLAN OF CARE
Goal Outcome Evaluation:              Outcome Evaluation: Pt without issues this shift. Plan to leave after chest xray.  Will continue to monitor.

## 2024-11-01 ENCOUNTER — APPOINTMENT (OUTPATIENT)
Dept: ULTRASOUND IMAGING | Facility: HOSPITAL | Age: 69
End: 2024-11-01
Payer: MEDICARE

## 2024-11-01 ENCOUNTER — APPOINTMENT (OUTPATIENT)
Dept: GENERAL RADIOLOGY | Facility: HOSPITAL | Age: 69
End: 2024-11-01
Payer: MEDICARE

## 2024-11-01 ENCOUNTER — HOSPITAL ENCOUNTER (EMERGENCY)
Facility: HOSPITAL | Age: 69
Discharge: HOME | End: 2024-11-01
Attending: EMERGENCY MEDICINE | Admitting: EMERGENCY MEDICINE
Payer: MEDICARE

## 2024-11-01 VITALS
HEART RATE: 80 BPM | TEMPERATURE: 98 F | DIASTOLIC BLOOD PRESSURE: 75 MMHG | OXYGEN SATURATION: 92 % | BODY MASS INDEX: 45.99 KG/M2 | RESPIRATION RATE: 15 BRPM | WEIGHT: 293 LBS | HEIGHT: 67 IN | SYSTOLIC BLOOD PRESSURE: 141 MMHG

## 2024-11-01 VITALS
RESPIRATION RATE: 20 BRPM | HEART RATE: 80 BPM | BODY MASS INDEX: 45.99 KG/M2 | WEIGHT: 293 LBS | TEMPERATURE: 98.2 F | HEIGHT: 67 IN | DIASTOLIC BLOOD PRESSURE: 70 MMHG | OXYGEN SATURATION: 100 % | SYSTOLIC BLOOD PRESSURE: 155 MMHG

## 2024-11-01 DIAGNOSIS — I80.8 SUPERFICIAL THROMBOPHLEBITIS OF RIGHT UPPER EXTREMITY: Primary | ICD-10-CM

## 2024-11-01 LAB
LAB AP CASE REPORT: NORMAL
PATH REPORT.FINAL DX SPEC: NORMAL
PATH REPORT.GROSS SPEC: NORMAL

## 2024-11-01 PROCEDURE — 25010000002 HEPARIN (PORCINE) PER 1000 UNITS: Performed by: THORACIC SURGERY (CARDIOTHORACIC VASCULAR SURGERY)

## 2024-11-01 PROCEDURE — 93971 EXTREMITY STUDY: CPT

## 2024-11-01 PROCEDURE — G0463 HOSPITAL OUTPT CLINIC VISIT: HCPCS | Performed by: EMERGENCY MEDICINE

## 2024-11-01 PROCEDURE — 99284 EMERGENCY DEPT VISIT MOD MDM: CPT

## 2024-11-01 PROCEDURE — 94664 DEMO&/EVAL PT USE INHALER: CPT

## 2024-11-01 PROCEDURE — 94761 N-INVAS EAR/PLS OXIMETRY MLT: CPT

## 2024-11-01 PROCEDURE — 94799 UNLISTED PULMONARY SVC/PX: CPT

## 2024-11-01 PROCEDURE — 71045 X-RAY EXAM CHEST 1 VIEW: CPT

## 2024-11-01 PROCEDURE — 94640 AIRWAY INHALATION TREATMENT: CPT

## 2024-11-01 RX ORDER — CEPHALEXIN 500 MG/1
500 CAPSULE ORAL 2 TIMES DAILY
Qty: 14 CAPSULE | Refills: 0 | Status: SHIPPED | OUTPATIENT
Start: 2024-11-01 | End: 2024-11-08

## 2024-11-01 RX ORDER — ASPIRIN 81 MG/1
81 TABLET ORAL DAILY
Qty: 7 TABLET | Refills: 0 | Status: SHIPPED | OUTPATIENT
Start: 2024-11-01 | End: 2024-11-08

## 2024-11-01 RX ADMIN — DAPSONE 50 MG: 25 TABLET ORAL at 07:28

## 2024-11-01 RX ADMIN — ACETAMINOPHEN 650 MG: 325 TABLET, FILM COATED ORAL at 00:05

## 2024-11-01 RX ADMIN — HEPARIN SODIUM 5000 UNITS: 5000 INJECTION INTRAVENOUS; SUBCUTANEOUS at 05:06

## 2024-11-01 RX ADMIN — FUROSEMIDE 20 MG: 20 TABLET ORAL at 07:29

## 2024-11-01 RX ADMIN — CHOLESTYRAMINE 4 G: 4 POWDER, FOR SUSPENSION ORAL at 07:29

## 2024-11-01 RX ADMIN — HEPARIN SODIUM 5000 UNITS: 5000 INJECTION INTRAVENOUS; SUBCUTANEOUS at 00:06

## 2024-11-01 RX ADMIN — IPRATROPIUM BROMIDE AND ALBUTEROL SULFATE 3 ML: .5; 3 SOLUTION RESPIRATORY (INHALATION) at 07:33

## 2024-11-01 NOTE — PLAN OF CARE
Goal Outcome Evaluation:  Plan of Care Reviewed With: patient   Follow-up CXR completed. Pneumothorax stable and small, no interventions pending.  Discharging pt on result     Progress: improving

## 2024-11-01 NOTE — CASE MANAGEMENT/SOCIAL WORK
Case Management Discharge Note      Final Note: home with family         Selected Continued Care - Discharged on 11/1/2024 Admission date: 10/30/2024 - Discharge disposition: Home or Self Care         Transportation Services  Private: Car    Final Discharge Disposition Code: 01 - home or self-care

## 2024-11-01 NOTE — PLAN OF CARE
Goal Outcome Evaluation:              Outcome Evaluation: Pt continues with daily chest xray.  No other issues this shift.  Will continue to monitor.

## 2024-11-02 NOTE — DISCHARGE INSTRUCTIONS
Warm compresses three times daily x15 minutes as directed  Return ED fever, swelling, increased pain, worse condition, any other concerns

## 2024-11-02 NOTE — FSED PROVIDER NOTE
Subjective   History of Present Illness  68yo female pmh significant PE recently discharged hospital earlier this date secondary to iatrogenic pneumothorax s/p bronchoscopy with FNA/biopsy, presents ED c/o right forearm tenderness/palpable cord at site of recent peripheral IV access.  ROS otherwise noncontributory.  Denies fever/chest pain/soa.    History provided by:  Patient  Arm Pain  Associated symptoms: no chest pain and no shortness of breath        Review of Systems   Constitutional: Negative.    HENT: Negative.     Eyes: Negative.    Respiratory: Negative.  Negative for shortness of breath.    Cardiovascular: Negative.  Negative for chest pain.   Gastrointestinal: Negative.    Skin:  Positive for color change.   All other systems reviewed and are negative.      Past Medical History:   Diagnosis Date    Allergic topical steroids    Anemia Episodic    Breast lump in female 01/14/2019    Breast mass     Chest pain 03/22/2018    Cholelithiasis     Chronic nasopharyngitis     Elevated BP without diagnosis of hypertension     Hypertension     Medicare annual wellness visit, subsequent 03/22/2022    Morbid obesity     Neuromuscular disorder 2010    Osteoarthritis     Sneddon-Hanna disease     Stasis dermatitis     Urinary tract infection 2022    Visual impairment Have a cataract       Allergies   Allergen Reactions    Zoster Vac Recomb Adjuvanted Other (See Comments)     Redness around sight, fever the next day.      Compazine [Prochlorperazine Edisylate] Hallucinations    Corticosteroids Rash    Other Rash     Topical Steroids       Past Surgical History:   Procedure Laterality Date    BRONCHOSCOPY WITH ION ROBOTIC ASSIST N/A 10/30/2024    Procedure: BRONCHOSCOPY WITH ION ROBOT, LAVAGE, FINE NEEDLE ASPIRATIONS AND BIOPSIES;  Surgeon: Radha Marcus MD;  Location: [facility];  Service: Robotics - Pulmonary;  Laterality: N/A;    COLONOSCOPY  2015    ENDOMETRIAL ABLATION  2000    HYSTEROSCOPY ENDOMETRIAL  ABLATION      JOINT REPLACEMENT  R-2013 L-2015    KNEE SURGERY Bilateral     LAPAROSCOPIC CHOLECYSTECTOMY  10/2011    OVARIAN CYST REMOVAL         Family History   Problem Relation Age of Onset    Hypertension Mother     Arthritis Mother     Cancer Mother         bladder    Heart disease Mother     Hyperlipidemia Mother     Stroke Mother     Thyroid disease Mother     Dementia Mother     Prostate cancer Father     Hypertension Father     Arthritis Father     Cancer Father         prostate, bone    Heart disease Father     Arthritis Maternal Aunt     Migraines Sister     Hypertension Sister        Social History     Socioeconomic History    Marital status:    Tobacco Use    Smoking status: Never     Passive exposure: Never    Smokeless tobacco: Never   Vaping Use    Vaping status: Never Used   Substance and Sexual Activity    Alcohol use: Yes     Comment: maybe 2x year    Drug use: Never    Sexual activity: Not Currently     Partners: Male     Birth control/protection: Post-menopausal, Surgical     Comment: Ablation           Objective   Physical Exam  Vitals and nursing note reviewed.   Constitutional:       Appearance: Normal appearance.   HENT:      Head: Normocephalic and atraumatic.      Right Ear: External ear normal.      Left Ear: External ear normal.      Nose: Nose normal.      Mouth/Throat:      Mouth: Mucous membranes are moist.      Pharynx: Oropharynx is clear.   Eyes:      Conjunctiva/sclera: Conjunctivae normal.      Pupils: Pupils are equal, round, and reactive to light.   Cardiovascular:      Rate and Rhythm: Normal rate and regular rhythm.      Pulses: Normal pulses.      Heart sounds: Normal heart sounds. No murmur heard.     No friction rub. No gallop.   Pulmonary:      Effort: Pulmonary effort is normal. No respiratory distress.      Breath sounds: Normal breath sounds. No wheezing, rhonchi or rales.   Abdominal:      General: Bowel sounds are normal. There is no distension.       Palpations: Abdomen is soft.      Tenderness: There is no abdominal tenderness.   Musculoskeletal:         General: No swelling or deformity.        Arms:       Cervical back: Normal range of motion and neck supple. No rigidity.      Comments: Mild blanching erythema/increased calor/palpable cord at site of previous peripheral IV.  Negative induration/fluctuance/discharge   Lymphadenopathy:      Cervical: No cervical adenopathy.   Skin:     General: Skin is warm and dry.   Neurological:      General: No focal deficit present.      Mental Status: She is alert and oriented to person, place, and time.      GCS: GCS eye subscore is 4. GCS verbal subscore is 5. GCS motor subscore is 6.         Procedures           ED Course      US Venous Doppler Upper Extremity Right (duplex)    Result Date: 11/1/2024  Narrative: Patient: SULEMA BLANDON  Time Out: 21:05 Exam(s): US VENOUS RIGHT UPPER EXTREMITY EXAM:   US Duplex Right Upper Extremity Veins CLINICAL HISTORY:    Reason for exam: arm swelling. Pain redness. TECHNIQUE:   Real-time duplex ultrasound scan of the right upper extremity veins integrating B-mode two-dimensional vascular structure, Doppler spectral analysis, color flow Doppler imaging and compression. COMPARISON:   No relevant prior studies available. FINDINGS:   Deep veins:  Unremarkable.  No DVT in the internal jugular, subclavian, axillary, or brachial veins.  The veins demonstrate normal color flow, are normally compressible, with normal phasic flow and or augmentation response.   Superficial veins:  There is a thrombosed superficial vein in the right forearm consistent with superficial thrombophlebitis.   Soft tissues:  No acute findings. IMPRESSION:     1.  There is a thrombosed superficial vein in the right forearm consistent with superficial thrombophlebitis.  This corresponds to the location of the recent IV. 2.  No evidence of deep venous thrombosis.     Impression: Electronically signed by Marcin  MD Toy on 11-01-24 at 2105    XR Chest 1 View    Result Date: 11/1/2024  Narrative: XR CHEST 1 VW Date of Exam: 11/1/2024 6:26 AM EDT Indication: pneumothorax Comparison: 10/31/2024 Findings: Small right apical pneumothorax measuring 2.1 cm in the right apex not significantly changed from the prior study. Stable mild cardiomegaly. Lungs are without consolidation. Negative for pneumothorax on the left. No pleural effusion.     Impression: Impression: Stable small right apical pneumothorax measuring 2.1 cm. Electronically Signed: Jemal Mahajan MD  11/1/2024 12:09 PM EDT  Workstation ID: [id number]    Walking Oximetry    Result Date: 10/31/2024  Narrative: Maryam Simmons, AMIE     10/31/2024  4:16 PM Exercise Oximetry Patient Name:Kortney Charlton MRN: [id number] Date: 10/31/24         ROOM AIR BASELINE SpO2% 91 Heart Rate 62 Blood Pressure  EXERCISE ON ROOM AIR SpO2% EXERCISE ON O2 @ LPM SpO2% 1 MINUTE 90 1 MINUTE  2 MINUTES 89 2 MINUTES  3 MINUTES 91 3 MINUTES  4 MINUTES 88 4 MINUTES  5 MINUTES 91 5 MINUTES  6 MINUTES 90 6 MINUTES           Distance Walked  6mins Distance Walked Dyspnea (Ilya Scale)   Dyspnea (Ilya Scale) Fatigue (Ilya Scale)   Fatigue (Ilya Scale) SpO2% Post Exercise  91 SpO2% Post Exercise HR Post Exercise  102 HR Post Exercise Time to Recovery  NA Time to Recovery Comments: Patient was able to maintain spo2 of 88% and above on room air for duration of the walk exercise, at this time patient does not qualify for home o2.    XR Chest 1 View    Result Date: 10/31/2024  Narrative: XR CHEST 1 VW Date of Exam: 10/31/2024 12:15 PM EDT Indication: Chest tube management Comparison: None available. Findings: Right apical pneumothorax which measures 22 mm from the apex is stable. There is no identifiable chest tube. Lung fields are clear of acute infiltrates or effusions. Heart and pulmonary vessels appear within normal limits for technique.     Impression: Impression: Stable right apical  pneumothorax. Electronically Signed: Martha Gregg MD  10/31/2024 1:01 PM EDT  Workstation ID: [id number]    XR Chest 1 View    Result Date: 10/31/2024  Narrative: XR CHEST 1 VW Date of Exam: 10/31/2024 5:56 AM EDT Indication: Post Op Lung Surgery Comparison: 10/30/2024 Findings: There is mild interval increase in size of a right apical pneumothorax currently measuring approximately 22 mm from the apex. There are no infiltrates or consolidations. There are left basilar discoid atelectasis. The cardiopulmonary silhouette is midline and stable in size.     Impression: Impression: Mild interval increase in size of a right apical pneumothorax Electronically Signed: Monster Donnelly MD  10/31/2024 7:24 AM EDT  Workstation ID: [id number]    XR Chest 1 View    Result Date: 10/30/2024  Narrative: XR CHEST 1 VW Date of Exam: 10/30/2024 12:05 PM EDT Indication: repeat scan for possible pneumo Comparison: AP chest 10/30/2024. CT chest 10/20/2024 Findings: Right apical-lateral pneumothorax measures about 16 mm maximal thickness compared to 18 mm on prior. It appears no larger than on the prior study. There is mild scattered atelectasis in the right lung. No mediastinal shift. Heart size is stable. Left lung remains clear.     Impression: 1. Stable appearance of right apical-lateral pneumothorax compared to earlier today. Electronically Signed: Elizabeth Mills MD  10/30/2024 1:02 PM EDT  Workstation ID: [id number]    XR Chest 1 View    Result Date: 10/30/2024  Narrative: XR CHEST 1 VW Date of Exam: 10/30/2024 10:04 AM EDT Indication: s/p ion shortness of breath. On oxygen. Comparison: CT chest 10/28/2024. PA and lateral chest radiograph 12/3/2021 Findings: Right apical/lateral pneumothorax is present, small to moderate, measuring about 18 mm thickness at the apical-lateral margin, and 8 mm at the lateral margin. Mild atelectatic changes have developed within the right mid to lower lung zone. Left lung appears clear. No  pneumomediastinum is identified. Mild cardiomegaly.     Impression: Impression: 1. Small to moderate right apical pneumothorax with scattered areas of atelectasis in the right lung. I notified the patient's nurse and endoscopy via telephone at the time of this dictation. She stated she would notify Dr. Marcus. Electronically Signed: Elizabeth Mills MD  10/30/2024 10:29 AM EDT  Workstation ID: [id number]    FL < 1 Hour    Result Date: 10/30/2024  Narrative: This procedure was auto-finalized with no dictation required.    CT Chest Without Contrast Diagnostic    Result Date: 10/29/2024  Narrative: CT CHEST WITHOUT CONTRAST  HISTORY: Groundglass opacity, bronchoscopy planning  TECHNIQUE: Radiation dose reduction techniques were utilized, including automated exposure control and exposure modulation based on body size. CT scan of the chest without the administration of IV contrast was performed. Coronal and sagittal reformatted images obtained. Ion bronchoscopy protocol  COMPARISON: 9/25/2024  FINDINGS: There is a 1.8 cm groundglass opacity in the right upper lobe again demonstrated not significantly changed. There is stable micronodule in the right lower lobe image 243. Calcified mediastinal lymph nodes. No pleural effusion. Coronary artery calcifications are present. Limited imaging of the upper abdomen is unremarkable. No acute bony abnormality      Impression: Stable 1.8 cm groundglass opacity right upper lobe. Correlate with bronchoscopy findings    Radiation dose reduction techniques were utilized, including automated exposure control and exposure modulation based on body size.   This report was finalized on 10/29/2024 4:11 PM by Dr. Marcin Mitchell M.D on Workstation: [id number]                                          Medical Decision Making  RUE duplex US: negative dvt; (+) superficial thrombophlebitis.  Stable dc with warm compresses/asa daily/keflex 500mg bid x7d.  Return precautions provided.    Problems  Addressed:  Superficial thrombophlebitis of right upper extremity: complicated acute illness or injury    Risk  OTC drugs.  Prescription drug management.        Final diagnoses:   Superficial thrombophlebitis of right upper extremity       ED Disposition  ED Disposition       ED Disposition   Discharge    Condition   Good    Comment   --               Dorinda Hastings MD  [id number]  [place] [id number] [phone]    In 2 days           Medication List        New Prescriptions      aspirin 81 MG EC tablet  Take 1 tablet by mouth Daily for 7 days.     cephalexin 500 MG capsule  Commonly known as: KEFLEX  Take 1 capsule by mouth 2 (Two) Times a Day for 7 days.            Changed      furosemide 20 MG tablet  Commonly known as: LASIX  TAKE 1 TABLET BY MOUTH EVERY DAY  What changed: when to take this               Where to Get Your Medications        These medications were sent to [facility]/pharmacy #[id number] - [facility], [place] - [address] AT [facility] [phone] [facility] [phone]   [address] [facility] [address]      Phone: [phone]   aspirin 81 MG EC tablet  cephalexin 500 MG capsule

## 2024-11-07 ENCOUNTER — PREP FOR SURGERY (OUTPATIENT)
Dept: OTHER | Facility: HOSPITAL | Age: 69
End: 2024-11-07
Payer: MEDICARE

## 2024-11-07 ENCOUNTER — OFFICE VISIT (OUTPATIENT)
Dept: OTHER | Facility: HOSPITAL | Age: 69
End: 2024-11-07
Payer: MEDICARE

## 2024-11-07 VITALS
SYSTOLIC BLOOD PRESSURE: 133 MMHG | OXYGEN SATURATION: 90 % | DIASTOLIC BLOOD PRESSURE: 73 MMHG | WEIGHT: 293 LBS | BODY MASS INDEX: 63.51 KG/M2 | HEART RATE: 67 BPM

## 2024-11-07 DIAGNOSIS — R91.8 GROUND GLASS OPACITY PRESENT ON IMAGING OF LUNG: Primary | ICD-10-CM

## 2024-11-07 DIAGNOSIS — R06.09 DYSPNEA ON EXERTION: ICD-10-CM

## 2024-11-07 RX ORDER — CELECOXIB 200 MG/1
200 CAPSULE ORAL ONCE
OUTPATIENT
Start: 2024-11-07 | End: 2024-11-07

## 2024-11-07 RX ORDER — SODIUM CHLORIDE 0.9 % (FLUSH) 0.9 %
3 SYRINGE (ML) INJECTION EVERY 12 HOURS SCHEDULED
OUTPATIENT
Start: 2024-11-07

## 2024-11-07 RX ORDER — SODIUM CHLORIDE 9 MG/ML
40 INJECTION, SOLUTION INTRAVENOUS AS NEEDED
OUTPATIENT
Start: 2024-11-07

## 2024-11-07 RX ORDER — GABAPENTIN 300 MG/1
300 CAPSULE ORAL ONCE
OUTPATIENT
Start: 2024-11-07 | End: 2024-11-07

## 2024-11-07 RX ORDER — SODIUM CHLORIDE 0.9 % (FLUSH) 0.9 %
3-10 SYRINGE (ML) INJECTION AS NEEDED
OUTPATIENT
Start: 2024-11-07

## 2024-11-07 RX ORDER — ACETAMINOPHEN 500 MG
1000 TABLET ORAL ONCE
OUTPATIENT
Start: 2024-11-07 | End: 2024-11-07

## 2024-11-07 RX ORDER — HEPARIN SODIUM 5000 [USP'U]/ML
5000 INJECTION, SOLUTION INTRAVENOUS; SUBCUTANEOUS ONCE
OUTPATIENT
Start: 2024-11-07 | End: 2024-11-07

## 2024-11-11 ENCOUNTER — TELEPHONE (OUTPATIENT)
Dept: SURGERY | Facility: CLINIC | Age: 69
End: 2024-11-11
Payer: MEDICARE

## 2024-11-19 NOTE — PROGRESS NOTES
"Chief Complaint  Lung nodule    Subjective        Kortney Charlton presents to [facility]  History of Present Illness  This patient is a pleasant 69-year-old lady who is status post Ion bronchoscopy.  She presents to discuss the results.  She is doing well after her pneumothorax.  Objective   Vital Signs:  /73   Pulse 67   Wt (!) 184 kg (405 lb 8 oz)   SpO2 90%   BMI 63.51 kg/m²   Estimated body mass index is 63.51 kg/m² as calculated from the following:    Height as of 11/1/24: 170.2 cm (67\").    Weight as of this encounter: 184 kg (405 lb 8 oz).            Physical Exam  Vitals and nursing note reviewed.   Constitutional:       Appearance: She is well-developed.   HENT:      Head: Normocephalic and atraumatic.      Nose: Nose normal.   Eyes:      Conjunctiva/sclera: Conjunctivae normal.   Cardiovascular:      Rate and Rhythm: Normal rate.   Pulmonary:      Effort: Pulmonary effort is normal.   Abdominal:      Palpations: Abdomen is soft.   Musculoskeletal:      Cervical back: Neck supple.   Skin:     General: Skin is warm and dry.   Neurological:      Mental Status: She is alert and oriented to person, place, and time.   Psychiatric:         Behavior: Behavior normal.         Thought Content: Thought content normal.         Judgment: Judgment normal.        Result Review :          Pathology from Ion robot: Some inflammatory cells but nondiagnostic    Please add to the results section of independently reviewed the CT of the chest performed on 9/25/2024 which demonstrates a groundglass opacity in the right upper lobe that has increased from 1.6 to 1.8 cm. There are no other lesions or nodules. There is no mediastinal or hilar lymphadenopathy.      Assessment and Plan   Diagnoses and all orders for this visit:    1. Ground glass opacity present on imaging of lung (Primary)  -     Complete PFT - Pre & Post Bronchodilator; Future  -     Hemoglobin; Future    Ms. " Latesha is a pleasant 69-year-old lady with a 1.8 cm groundglass opacity in the right upper lobe concerning for malignancy.  Her Ion bronchoscopy was not diagnostic.  I have discussed with her today possible options including watchful waiting versus resection.  She would like to proceed with surgical resection with wedge resection and possible lobectomy as management for this possible bronchogenic malignancy.  Given the growth of this lesion, I do think that this is the correct answer.  Risks and benefits of this procedure were discussed with the patient today including pneumonia, prolonged air leak and atrial fibrillation.    Will plan to repeat her pulmonary functions prior to her surgical procedure for risk stratification.  She will also need a stress test prior to the procedure.           Follow Up   No follow-ups on file.  Patient was given instructions and counseling regarding her condition or for health maintenance advice. Please see specific information pulled into the AVS if appropriate.

## 2024-12-27 DIAGNOSIS — R06.09 DYSPNEA ON EXERTION: ICD-10-CM

## 2024-12-27 DIAGNOSIS — R91.8 GROUND GLASS OPACITY PRESENT ON IMAGING OF LUNG: Primary | ICD-10-CM

## 2025-01-03 ENCOUNTER — TELEPHONE (OUTPATIENT)
Dept: CARDIOLOGY | Facility: CLINIC | Age: 70
End: 2025-01-03
Payer: MEDICARE

## 2025-01-06 ENCOUNTER — TELEPHONE (OUTPATIENT)
Dept: CARDIOLOGY | Facility: CLINIC | Age: 70
End: 2025-01-06
Payer: MEDICARE

## 2025-01-07 ENCOUNTER — HOSPITAL ENCOUNTER (OUTPATIENT)
Dept: CARDIOLOGY | Facility: HOSPITAL | Age: 70
Discharge: HOME | End: 2025-01-07
Payer: MEDICARE

## 2025-01-07 ENCOUNTER — PRE-ADMISSION TESTING (OUTPATIENT)
Dept: PREADMISSION TESTING | Facility: HOSPITAL | Age: 70
DRG: 163 | End: 2025-01-07
Payer: MEDICARE

## 2025-01-07 VITALS — WEIGHT: 293 LBS | HEIGHT: 67 IN | BODY MASS INDEX: 45.99 KG/M2

## 2025-01-07 VITALS
DIASTOLIC BLOOD PRESSURE: 82 MMHG | RESPIRATION RATE: 22 BRPM | HEART RATE: 77 BPM | TEMPERATURE: 97.7 F | SYSTOLIC BLOOD PRESSURE: 183 MMHG | HEIGHT: 67 IN | WEIGHT: 293 LBS | OXYGEN SATURATION: 94 % | BODY MASS INDEX: 45.99 KG/M2

## 2025-01-07 DIAGNOSIS — R91.8 GROUND GLASS OPACITY PRESENT ON IMAGING OF LUNG: ICD-10-CM

## 2025-01-07 DIAGNOSIS — R06.09 DYSPNEA ON EXERTION: ICD-10-CM

## 2025-01-07 LAB
ABO GROUP BLD: NORMAL
ANION GAP SERPL CALCULATED.3IONS-SCNC: 8.5 MMOL/L (ref 5–15)
BH CV NUCLEAR PRIOR STUDY: 2
BH CV STRESS BP STAGE 1: NORMAL
BH CV STRESS COMMENTS STAGE 1: NORMAL
BH CV STRESS DOSE REGADENOSON STAGE 1: 0.4
BH CV STRESS DURATION MIN STAGE 1: 0
BH CV STRESS DURATION SEC STAGE 1: 10
BH CV STRESS HR STAGE 1: 79
BH CV STRESS NUCLEAR ISOTOPE DOSE: 38.2 MCI
BH CV STRESS PROTOCOL 1: NORMAL
BH CV STRESS RECOVERY BP: NORMAL MMHG
BH CV STRESS RECOVERY HR: 67 BPM
BH CV STRESS STAGE 1: 1
BLD GP AB SCN SERPL QL: NEGATIVE
BUN SERPL-MCNC: 11 MG/DL (ref 8–23)
BUN/CREAT SERPL: 15.5 (ref 7–25)
CALCIUM SPEC-SCNC: 8.8 MG/DL (ref 8.6–10.5)
CHLORIDE SERPL-SCNC: 102 MMOL/L (ref 98–107)
CO2 SERPL-SCNC: 28.5 MMOL/L (ref 22–29)
CREAT SERPL-MCNC: 0.71 MG/DL (ref 0.57–1)
DEPRECATED RDW RBC AUTO: 39.7 FL (ref 37–54)
EGFRCR SERPLBLD CKD-EPI 2021: 91 ML/MIN/1.73
ERYTHROCYTE [DISTWIDTH] IN BLOOD BY AUTOMATED COUNT: 12.4 % (ref 12.3–15.4)
GLUCOSE SERPL-MCNC: 102 MG/DL (ref 65–99)
HCT VFR BLD AUTO: 41.5 % (ref 34–46.6)
HGB BLD-MCNC: 12.8 G/DL (ref 12–15.9)
MAXIMAL PREDICTED HEART RATE: 149 BPM
MCH RBC QN AUTO: 27.1 PG (ref 26.6–33)
MCHC RBC AUTO-ENTMCNC: 30.8 G/DL (ref 31.5–35.7)
MCV RBC AUTO: 87.9 FL (ref 79–97)
PERCENT MAX PREDICTED HR: 53.02 %
PLATELET # BLD AUTO: 220 10*3/MM3 (ref 140–450)
PMV BLD AUTO: 10 FL (ref 6–12)
POTASSIUM SERPL-SCNC: 4.2 MMOL/L (ref 3.5–5.2)
QT INTERVAL: 431 MS
QTC INTERVAL: 438 MS
RBC # BLD AUTO: 4.72 10*6/MM3 (ref 3.77–5.28)
RH BLD: POSITIVE
SODIUM SERPL-SCNC: 139 MMOL/L (ref 136–145)
SPECT HRT GATED+EF W RNC IV: 57 %
STRESS BASELINE BP: NORMAL MMHG
STRESS BASELINE HR: 61 BPM
STRESS PERCENT HR: 62 %
STRESS POST EXERCISE DUR SEC: 10 SEC
STRESS POST PEAK BP: NORMAL MMHG
STRESS POST PEAK HR: 79 BPM
STRESS TARGET HR: 127 BPM
T&S EXPIRATION DATE: NORMAL
WBC NRBC COR # BLD AUTO: 6.68 10*3/MM3 (ref 3.4–10.8)

## 2025-01-07 PROCEDURE — 80048 BASIC METABOLIC PNL TOTAL CA: CPT

## 2025-01-07 PROCEDURE — 85027 COMPLETE CBC AUTOMATED: CPT

## 2025-01-07 PROCEDURE — A9502 TC99M TETROFOSMIN: HCPCS | Performed by: THORACIC SURGERY (CARDIOTHORACIC VASCULAR SURGERY)

## 2025-01-07 PROCEDURE — 86901 BLOOD TYPING SEROLOGIC RH(D): CPT

## 2025-01-07 PROCEDURE — 86900 BLOOD TYPING SEROLOGIC ABO: CPT

## 2025-01-07 PROCEDURE — 93010 ELECTROCARDIOGRAM REPORT: CPT | Performed by: INTERNAL MEDICINE

## 2025-01-07 PROCEDURE — 86850 RBC ANTIBODY SCREEN: CPT

## 2025-01-07 PROCEDURE — 25010000002 REGADENOSON 0.4 MG/5ML SOLUTION: Performed by: THORACIC SURGERY (CARDIOTHORACIC VASCULAR SURGERY)

## 2025-01-07 PROCEDURE — 78451 HT MUSCLE IMAGE SPECT SING: CPT

## 2025-01-07 PROCEDURE — 93005 ELECTROCARDIOGRAM TRACING: CPT

## 2025-01-07 PROCEDURE — 34310000005 TECHNETIUM TETROFOSMIN KIT: Performed by: THORACIC SURGERY (CARDIOTHORACIC VASCULAR SURGERY)

## 2025-01-07 PROCEDURE — 93017 CV STRESS TEST TRACING ONLY: CPT

## 2025-01-07 PROCEDURE — 36415 COLL VENOUS BLD VENIPUNCTURE: CPT

## 2025-01-07 RX ORDER — REGADENOSON 0.08 MG/ML
0.4 INJECTION, SOLUTION INTRAVENOUS
Status: COMPLETED | OUTPATIENT
Start: 2025-01-07 | End: 2025-01-07

## 2025-01-07 RX ADMIN — TETROFOSMIN 1 DOSE: 1.38 INJECTION, POWDER, LYOPHILIZED, FOR SOLUTION INTRAVENOUS at 11:30

## 2025-01-07 RX ADMIN — REGADENOSON 0.4 MG: 0.08 INJECTION, SOLUTION INTRAVENOUS at 11:30

## 2025-01-07 NOTE — DISCHARGE INSTRUCTIONS
Take the following medications the morning of surgery:    NONE    If you are on prescription narcotic pain medication to control your pain you may also take that medication the morning of surgery.      General Instructions:     Do not eat solid food after midnight the night before surgery.  Clear liquids day of surgery are allowed but must be stopped at least two hours before your hospital arrival time.       Allowed clear liquids      Water, sodas, and tea or coffee with no cream or milk added.       12 to 20 ounces of a clear liquid that contains carbohydrates is recommended.  If non-diabetic, have Gatorade or Powerade.  If diabetic, have G2 or Powerade Zero.     Do not have liquids red in color.  Do not consume chicken, beef, pork or vegetable broth or bouillon cubes of any variety as they are not considered clear liquids and are not allowed.      Infants may have breast milk up to four hours before surgery.  Infants drinking formula may drink formula up to six hours before surgery.   Patients who avoid smoking, chewing tobacco and alcohol for 4 weeks prior to surgery have a reduced risk of post-operative complications.  Quit smoking as many days before surgery as you can.  Do not smoke, use chewing tobacco or drink alcohol the day of surgery.   If applicable bring your C-PAP/ BI-PAP machine in with you to preop day of surgery.  Bring any papers given to you in the doctor’s office.  Wear clean comfortable clothes.  Do not wear contact lenses, false eyelashes or make-up.  Bring a case for your glasses.   Bring crutches or walker if applicable.  Remove all piercings.  Leave jewelry and any other valuables at home.  Hair extensions with metal clips must be removed prior to surgery.  The Pre-Admission Testing nurse will instruct you to bring medications if unable to obtain an accurate list in Pre-Admission Testing.        If you were given a blood bank ID arm band remember to bring it with you the day of  surgery.    Preventing a Surgical Site Infection:  For 2 to 3 days before surgery, avoid shaving with a razor because the razor can irritate skin and make it easier to develop an infection.    Any areas of open skin can increase the risk of a post-operative wound infection by allowing bacteria to enter and travel throughout the body.  Notify your surgeon if you have any skin wounds / rashes even if it is not near the expected surgical site.  The area will need assessed to determine if surgery should be delayed until it is healed.  The night prior to surgery shower using a fresh bar of anti-bacterial soap (such as Dial) and clean washcloth.  Sleep in a clean bed with clean clothing.  Do not allow pets to sleep with you.  Shower on the morning of surgery using a fresh bar of anti-bacterial soap (such as Dial) and clean washcloth.  Dry with a clean towel and dress in clean clothing.  Ask your surgeon if you will be receiving antibiotics prior to surgery.  Make sure you, your family, and all healthcare providers clean their hands with soap and water or an alcohol based hand  before caring for you or your wound.    Day of surgery:  Your arrival time is approximately two hours before your scheduled surgery time.  Please note if you have an early arrival time the surgery doors do not open before 5:00 AM.  Upon arrival, a Pre-op nurse and Anesthesiologist will review your health history, obtain vital signs, and answer questions you may have.  The only belongings needed at this time will be a list of your home medications and if applicable your C-PAP/BI-PAP machine.  A Pre-op nurse will start an IV and you may receive medication in preparation for surgery, including something to help you relax.     Please be aware that surgery does come with discomfort.  We want to make every effort to control your discomfort so please discuss any uncontrolled symptoms with your nurse.   Your doctor will most likely have prescribed  pain medications.      If you are going home after surgery you will receive individualized written care instructions before being discharged.  A responsible adult must drive you to and from the hospital on the day of your surgery and ideally stay with you through the night.   .  Discharge prescriptions can be filled by the hospital pharmacy during regular pharmacy hours.  If you are having surgery late in the day/evening your prescription may be e-prescribed to your pharmacy.  Please verify your pharmacy hours or chose a 24 hour pharmacy to avoid not having access to your prescription because your pharmacy has closed for the day.    If you are staying overnight following surgery, you will be transported to your hospital room following the recovery period.  [facility] has all private rooms.    If you have any questions please call Pre-Admission Testing at [phone].  Deductibles and co-payments are collected on the day of service. Please be prepared to pay the required co-pay, deductible or deposit on the day of service as defined by your plan.    Call your surgeon immediately if you experience any of the following symptoms:  Sore Throat  Shortness of Breath or difficulty breathing  Cough  Chills  Body soreness or muscle pain  Headache  Fever  New loss of taste or smell  Do not arrive for your surgery ill.  Your procedure will need to be rescheduled to another time.  You will need to call your physician before the day of surgery to avoid any unnecessary exposure to hospital staff as well as other patients.    CHLORHEXIDINE CLOTH INSTRUCTIONS  The morning of surgery follow these instructions using the Chlorhexidine cloths you've been given.  These steps reduce bacteria on the body.  Do not use the cloths near your eyes, ears mouth, genitalia or on open wounds.  Throw the cloths away after use but do not try to flush them down a toilet.      Open and remove one cloth at a time from the package.     Leave the cloth unfolded and begin the bathing.  Massage the skin with the cloths using gentle pressure to remove bacteria.  Do not scrub harshly.   Follow the steps below with one 2% CHG cloth per area (6 total cloths).  One cloth for neck, shoulders and chest.  One cloth for both arms, hands, fingers and underarms (do underarms last).  One cloth for the abdomen followed by groin.  One cloth for right leg and foot including between the toes.  One cloth for left leg and foot including between the toes.  The last cloth is to be used for the back of the neck, back and buttocks.    Allow the CHG to air dry 3 minutes on the skin which will give it time to work and decrease the chance of irritation.  The skin may feel sticky until it is dry.  Do not rinse with water or any other liquid or you will lose the beneficial effects of the CHG.  If mild skin irritation occurs, do rinse the skin to remove the CHG.  Report this to the nurse at time of admission.  Do not apply lotions, creams, ointments, deodorants or perfumes after using the clothes. Dress in clean clothes before coming to the hospital.

## 2025-01-08 ENCOUNTER — ANESTHESIA EVENT (OUTPATIENT)
Dept: PERIOP | Facility: HOSPITAL | Age: 70
End: 2025-01-08
Payer: MEDICARE

## 2025-01-08 ENCOUNTER — HOSPITAL ENCOUNTER (INPATIENT)
Facility: HOSPITAL | Age: 70
LOS: 10 days | Discharge: HOME | DRG: 163 | End: 2025-01-18
Attending: THORACIC SURGERY (CARDIOTHORACIC VASCULAR SURGERY) | Admitting: THORACIC SURGERY (CARDIOTHORACIC VASCULAR SURGERY)
Payer: MEDICARE

## 2025-01-08 ENCOUNTER — ANESTHESIA (OUTPATIENT)
Dept: PERIOP | Facility: HOSPITAL | Age: 70
End: 2025-01-08
Payer: MEDICARE

## 2025-01-08 ENCOUNTER — APPOINTMENT (OUTPATIENT)
Dept: GENERAL RADIOLOGY | Facility: HOSPITAL | Age: 70
DRG: 163 | End: 2025-01-08
Payer: MEDICARE

## 2025-01-08 DIAGNOSIS — R91.8 GROUND GLASS OPACITY PRESENT ON IMAGING OF LUNG: ICD-10-CM

## 2025-01-08 LAB
ARTERIAL PATENCY WRIST A: ABNORMAL
ATMOSPHERIC PRESS: 755.6 MMHG
BASE EXCESS BLDA CALC-SCNC: 0.3 MMOL/L (ref 0–2)
BDY SITE: ABNORMAL
CO2 BLDA-SCNC: 32.1 MMOL/L (ref 23–27)
DEVICE COMMENT: ABNORMAL
GAS FLOW AIRWAY: 15 LPM
GLUCOSE BLDC GLUCOMTR-MCNC: 133 MG/DL (ref 70–130)
HCO3 BLDA-SCNC: 30 MMOL/L (ref 22–28)
HEMODILUTION: NO
Lab: ABNORMAL
MODALITY: ABNORMAL
NOTIFIED WHO: ABNORMAL
PCO2 BLDA: 70.6 MM HG (ref 35–45)
PH BLDA: 7.24 PH UNITS (ref 7.35–7.45)
PO2 BLDA: 159.9 MM HG (ref 80–100)
READ BACK: YES
SAO2 % BLDCOA: 99 % (ref 92–98.5)
TOTAL RATE: 16 BREATHS/MINUTE

## 2025-01-08 PROCEDURE — 32663 THORACOSCOPY W/LOBECTOMY: CPT | Performed by: THORACIC SURGERY (CARDIOTHORACIC VASCULAR SURGERY)

## 2025-01-08 PROCEDURE — 82803 BLOOD GASES ANY COMBINATION: CPT | Performed by: ANESTHESIOLOGY

## 2025-01-08 PROCEDURE — 88342 IMHCHEM/IMCYTCHM 1ST ANTB: CPT | Performed by: THORACIC SURGERY (CARDIOTHORACIC VASCULAR SURGERY)

## 2025-01-08 PROCEDURE — 88331 PATH CONSLTJ SURG 1 BLK 1SPC: CPT | Performed by: THORACIC SURGERY (CARDIOTHORACIC VASCULAR SURGERY)

## 2025-01-08 PROCEDURE — 32668 THORACOSCOPY W/W RESECT DIAG: CPT | Performed by: THORACIC SURGERY (CARDIOTHORACIC VASCULAR SURGERY)

## 2025-01-08 PROCEDURE — 25010000002 BUPIVACAINE LIPOSOME 1.3 % SUSPENSION: Performed by: ANESTHESIOLOGY

## 2025-01-08 PROCEDURE — 71045 X-RAY EXAM CHEST 1 VIEW: CPT

## 2025-01-08 PROCEDURE — 25810000003 SODIUM CHLORIDE PER 500 ML: Performed by: THORACIC SURGERY (CARDIOTHORACIC VASCULAR SURGERY)

## 2025-01-08 PROCEDURE — 88309 TISSUE EXAM BY PATHOLOGIST: CPT | Performed by: THORACIC SURGERY (CARDIOTHORACIC VASCULAR SURGERY)

## 2025-01-08 PROCEDURE — 94799 UNLISTED PULMONARY SVC/PX: CPT

## 2025-01-08 PROCEDURE — 32674 THORACOSCOPY LYMPH NODE EXC: CPT | Performed by: THORACIC SURGERY (CARDIOTHORACIC VASCULAR SURGERY)

## 2025-01-08 PROCEDURE — C1894 INTRO/SHEATH, NON-LASER: HCPCS | Performed by: THORACIC SURGERY (CARDIOTHORACIC VASCULAR SURGERY)

## 2025-01-08 PROCEDURE — 25010000002 BUPIVACAINE (PF) 0.25 % SOLUTION 10 ML VIAL: Performed by: THORACIC SURGERY (CARDIOTHORACIC VASCULAR SURGERY)

## 2025-01-08 PROCEDURE — 25010000002 MIDAZOLAM PER 1 MG: Performed by: ANESTHESIOLOGY

## 2025-01-08 PROCEDURE — 25010000002 DEXAMETHASONE SODIUM PHOSPHATE 20 MG/5ML SOLUTION: Performed by: NURSE ANESTHETIST, CERTIFIED REGISTERED

## 2025-01-08 PROCEDURE — 0BJ08ZZ INSPECTION OF TRACHEOBRONCHIAL TREE, VIA NATURAL OR ARTIFICIAL OPENING ENDOSCOPIC: ICD-10-PCS | Performed by: THORACIC SURGERY (CARDIOTHORACIC VASCULAR SURGERY)

## 2025-01-08 PROCEDURE — 82948 REAGENT STRIP/BLOOD GLUCOSE: CPT

## 2025-01-08 PROCEDURE — 88305 TISSUE EXAM BY PATHOLOGIST: CPT | Performed by: THORACIC SURGERY (CARDIOTHORACIC VASCULAR SURGERY)

## 2025-01-08 PROCEDURE — 25010000002 BUPIVACAINE LIPOSOME 1.3 % SUSPENSION 10 ML VIAL: Performed by: THORACIC SURGERY (CARDIOTHORACIC VASCULAR SURGERY)

## 2025-01-08 PROCEDURE — 94640 AIRWAY INHALATION TREATMENT: CPT

## 2025-01-08 PROCEDURE — 07B74ZX EXCISION OF THORAX LYMPHATIC, PERCUTANEOUS ENDOSCOPIC APPROACH, DIAGNOSTIC: ICD-10-PCS | Performed by: THORACIC SURGERY (CARDIOTHORACIC VASCULAR SURGERY)

## 2025-01-08 PROCEDURE — 25010000002 CEFAZOLIN 3 G RECONSTITUTED SOLUTION 1 EACH VIAL: Performed by: THORACIC SURGERY (CARDIOTHORACIC VASCULAR SURGERY)

## 2025-01-08 PROCEDURE — 25010000002 SUGAMMADEX 200 MG/2ML SOLUTION: Performed by: NURSE ANESTHETIST, CERTIFIED REGISTERED

## 2025-01-08 PROCEDURE — 25010000002 LIDOCAINE 2% SOLUTION: Performed by: NURSE ANESTHETIST, CERTIFIED REGISTERED

## 2025-01-08 PROCEDURE — 88341 IMHCHEM/IMCYTCHM EA ADD ANTB: CPT | Performed by: THORACIC SURGERY (CARDIOTHORACIC VASCULAR SURGERY)

## 2025-01-08 PROCEDURE — 88307 TISSUE EXAM BY PATHOLOGIST: CPT | Performed by: THORACIC SURGERY (CARDIOTHORACIC VASCULAR SURGERY)

## 2025-01-08 PROCEDURE — 88332 PATH CONSLTJ SURG EA ADD BLK: CPT | Performed by: THORACIC SURGERY (CARDIOTHORACIC VASCULAR SURGERY)

## 2025-01-08 PROCEDURE — 25010000002 HEPARIN (PORCINE) PER 1000 UNITS: Performed by: THORACIC SURGERY (CARDIOTHORACIC VASCULAR SURGERY)

## 2025-01-08 PROCEDURE — 8E0W4CZ ROBOTIC ASSISTED PROCEDURE OF TRUNK REGION, PERCUTANEOUS ENDOSCOPIC APPROACH: ICD-10-PCS | Performed by: THORACIC SURGERY (CARDIOTHORACIC VASCULAR SURGERY)

## 2025-01-08 PROCEDURE — 0BTC4ZZ RESECTION OF RIGHT UPPER LUNG LOBE, PERCUTANEOUS ENDOSCOPIC APPROACH: ICD-10-PCS | Performed by: THORACIC SURGERY (CARDIOTHORACIC VASCULAR SURGERY)

## 2025-01-08 PROCEDURE — 25010000002 ONDANSETRON PER 1 MG: Performed by: NURSE ANESTHETIST, CERTIFIED REGISTERED

## 2025-01-08 PROCEDURE — 25010000002 FENTANYL CITRATE (PF) 50 MCG/ML SOLUTION: Performed by: ANESTHESIOLOGY

## 2025-01-08 PROCEDURE — 25810000003 LACTATED RINGERS PER 1000 ML: Performed by: ANESTHESIOLOGY

## 2025-01-08 PROCEDURE — 25010000002 PROPOFOL 200 MG/20ML EMULSION: Performed by: NURSE ANESTHETIST, CERTIFIED REGISTERED

## 2025-01-08 PROCEDURE — S0260 H&P FOR SURGERY: HCPCS | Performed by: THORACIC SURGERY (CARDIOTHORACIC VASCULAR SURGERY)

## 2025-01-08 DEVICE — ABSORBABLE HEMOSTAT (OXIDIZED REGENERATED CELLULOSE)
Type: IMPLANTABLE DEVICE | Site: CHEST | Status: FUNCTIONAL
Brand: SURGICEL

## 2025-01-08 DEVICE — SUREFORM 45 RELOAD GREEN
Type: IMPLANTABLE DEVICE | Site: LUNG | Status: FUNCTIONAL
Brand: SUREFORM

## 2025-01-08 DEVICE — 8MM STAPLER
Type: IMPLANTABLE DEVICE | Site: LUNG | Status: FUNCTIONAL
Brand: SUREFORM

## 2025-01-08 DEVICE — HORIZON TI SMALL RED 6 CLIPS/CART
Type: IMPLANTABLE DEVICE | Site: CHEST | Status: FUNCTIONAL
Brand: WECK

## 2025-01-08 RX ORDER — FLUMAZENIL 0.1 MG/ML
0.2 INJECTION INTRAVENOUS AS NEEDED
Status: DISCONTINUED | OUTPATIENT
Start: 2025-01-08 | End: 2025-01-08 | Stop reason: HOSPADM

## 2025-01-08 RX ORDER — MIDAZOLAM HYDROCHLORIDE 1 MG/ML
0.5 INJECTION, SOLUTION INTRAMUSCULAR; INTRAVENOUS
Status: DISCONTINUED | OUTPATIENT
Start: 2025-01-08 | End: 2025-01-08 | Stop reason: HOSPADM

## 2025-01-08 RX ORDER — OXYCODONE HYDROCHLORIDE 5 MG/1
5 TABLET ORAL EVERY 4 HOURS PRN
Status: DISPENSED | OUTPATIENT
Start: 2025-01-08 | End: 2025-01-13

## 2025-01-08 RX ORDER — HEPARIN SODIUM 5000 [USP'U]/ML
5000 INJECTION, SOLUTION INTRAVENOUS; SUBCUTANEOUS EVERY 8 HOURS SCHEDULED
Status: DISCONTINUED | OUTPATIENT
Start: 2025-01-09 | End: 2025-01-18 | Stop reason: HOSPADM

## 2025-01-08 RX ORDER — NITROGLYCERIN 0.4 MG/1
0.4 TABLET SUBLINGUAL
Status: DISCONTINUED | OUTPATIENT
Start: 2025-01-08 | End: 2025-01-18 | Stop reason: HOSPADM

## 2025-01-08 RX ORDER — ACETAMINOPHEN 500 MG
1000 TABLET ORAL ONCE
Status: DISCONTINUED | OUTPATIENT
Start: 2025-01-08 | End: 2025-01-08 | Stop reason: HOSPADM

## 2025-01-08 RX ORDER — BUPIVACAINE HYDROCHLORIDE AND EPINEPHRINE 5; 5 MG/ML; UG/ML
INJECTION, SOLUTION EPIDURAL; INTRACAUDAL; PERINEURAL
Status: COMPLETED | OUTPATIENT
Start: 2025-01-08 | End: 2025-01-08

## 2025-01-08 RX ORDER — ROCURONIUM BROMIDE 10 MG/ML
INJECTION, SOLUTION INTRAVENOUS AS NEEDED
Status: DISCONTINUED | OUTPATIENT
Start: 2025-01-08 | End: 2025-01-08 | Stop reason: SURG

## 2025-01-08 RX ORDER — METOPROLOL TARTRATE 25 MG/1
25 TABLET, FILM COATED ORAL EVERY 12 HOURS SCHEDULED
Status: DISCONTINUED | OUTPATIENT
Start: 2025-01-08 | End: 2025-01-18 | Stop reason: HOSPADM

## 2025-01-08 RX ORDER — SODIUM CHLORIDE 0.9 % (FLUSH) 0.9 %
3-10 SYRINGE (ML) INJECTION AS NEEDED
Status: DISCONTINUED | OUTPATIENT
Start: 2025-01-08 | End: 2025-01-08 | Stop reason: HOSPADM

## 2025-01-08 RX ORDER — PHENYLEPHRINE HCL IN 0.9% NACL 1 MG/10 ML
SYRINGE (ML) INTRAVENOUS AS NEEDED
Status: DISCONTINUED | OUTPATIENT
Start: 2025-01-08 | End: 2025-01-08 | Stop reason: SURG

## 2025-01-08 RX ORDER — HYDROCODONE BITARTRATE AND ACETAMINOPHEN 5; 325 MG/1; MG/1
1 TABLET ORAL ONCE AS NEEDED
Status: DISCONTINUED | OUTPATIENT
Start: 2025-01-08 | End: 2025-01-08 | Stop reason: HOSPADM

## 2025-01-08 RX ORDER — FENTANYL CITRATE 50 UG/ML
50 INJECTION, SOLUTION INTRAMUSCULAR; INTRAVENOUS
Status: DISCONTINUED | OUTPATIENT
Start: 2025-01-08 | End: 2025-01-08 | Stop reason: HOSPADM

## 2025-01-08 RX ORDER — SODIUM CHLORIDE 0.9 % (FLUSH) 0.9 %
3 SYRINGE (ML) INJECTION EVERY 12 HOURS SCHEDULED
Status: DISCONTINUED | OUTPATIENT
Start: 2025-01-08 | End: 2025-01-08 | Stop reason: HOSPADM

## 2025-01-08 RX ORDER — DEXTROSE MONOHYDRATE, SODIUM CHLORIDE, AND POTASSIUM CHLORIDE 50; 1.49; 4.5 G/1000ML; G/1000ML; G/1000ML
75 INJECTION, SOLUTION INTRAVENOUS CONTINUOUS
Status: DISCONTINUED | OUTPATIENT
Start: 2025-01-08 | End: 2025-01-09

## 2025-01-08 RX ORDER — SODIUM CHLORIDE 9 MG/ML
40 INJECTION, SOLUTION INTRAVENOUS AS NEEDED
Status: DISCONTINUED | OUTPATIENT
Start: 2025-01-08 | End: 2025-01-08 | Stop reason: HOSPADM

## 2025-01-08 RX ORDER — NALOXONE HCL 0.4 MG/ML
0.2 VIAL (ML) INJECTION AS NEEDED
Status: DISCONTINUED | OUTPATIENT
Start: 2025-01-08 | End: 2025-01-08 | Stop reason: HOSPADM

## 2025-01-08 RX ORDER — FAMOTIDINE 10 MG/ML
20 INJECTION, SOLUTION INTRAVENOUS ONCE
Status: COMPLETED | OUTPATIENT
Start: 2025-01-08 | End: 2025-01-08

## 2025-01-08 RX ORDER — ONDANSETRON 4 MG/1
4 TABLET, ORALLY DISINTEGRATING ORAL EVERY 6 HOURS PRN
Status: DISCONTINUED | OUTPATIENT
Start: 2025-01-08 | End: 2025-01-18 | Stop reason: HOSPADM

## 2025-01-08 RX ORDER — SODIUM CHLORIDE 9 MG/ML
INJECTION, SOLUTION INTRAVENOUS AS NEEDED
Status: DISCONTINUED | OUTPATIENT
Start: 2025-01-08 | End: 2025-01-08 | Stop reason: HOSPADM

## 2025-01-08 RX ORDER — GABAPENTIN 300 MG/1
300 CAPSULE ORAL EVERY 8 HOURS SCHEDULED
Status: DISCONTINUED | OUTPATIENT
Start: 2025-01-08 | End: 2025-01-13

## 2025-01-08 RX ORDER — DEXAMETHASONE SODIUM PHOSPHATE 4 MG/ML
INJECTION, SOLUTION INTRA-ARTICULAR; INTRALESIONAL; INTRAMUSCULAR; INTRAVENOUS; SOFT TISSUE AS NEEDED
Status: DISCONTINUED | OUTPATIENT
Start: 2025-01-08 | End: 2025-01-08 | Stop reason: SURG

## 2025-01-08 RX ORDER — EPHEDRINE SULFATE 50 MG/ML
INJECTION INTRAVENOUS AS NEEDED
Status: DISCONTINUED | OUTPATIENT
Start: 2025-01-08 | End: 2025-01-08 | Stop reason: SURG

## 2025-01-08 RX ORDER — LIDOCAINE HYDROCHLORIDE 10 MG/ML
0.5 INJECTION, SOLUTION INFILTRATION; PERINEURAL ONCE AS NEEDED
Status: DISCONTINUED | OUTPATIENT
Start: 2025-01-08 | End: 2025-01-08 | Stop reason: HOSPADM

## 2025-01-08 RX ORDER — OXYCODONE AND ACETAMINOPHEN 7.5; 325 MG/1; MG/1
1 TABLET ORAL EVERY 4 HOURS PRN
Status: DISCONTINUED | OUTPATIENT
Start: 2025-01-08 | End: 2025-01-08 | Stop reason: HOSPADM

## 2025-01-08 RX ORDER — CHOLESTYRAMINE LIGHT 4 G/5.7G
1 POWDER, FOR SUSPENSION ORAL DAILY
Status: DISCONTINUED | OUTPATIENT
Start: 2025-01-09 | End: 2025-01-18 | Stop reason: HOSPADM

## 2025-01-08 RX ORDER — IPRATROPIUM BROMIDE AND ALBUTEROL SULFATE 2.5; .5 MG/3ML; MG/3ML
3 SOLUTION RESPIRATORY (INHALATION)
Status: COMPLETED | OUTPATIENT
Start: 2025-01-08 | End: 2025-01-10

## 2025-01-08 RX ORDER — SUCCINYLCHOLINE/SOD CL,ISO/PF 200MG/10ML
SYRINGE (ML) INTRAVENOUS AS NEEDED
Status: DISCONTINUED | OUTPATIENT
Start: 2025-01-08 | End: 2025-01-08 | Stop reason: SURG

## 2025-01-08 RX ORDER — SODIUM CHLORIDE, SODIUM LACTATE, POTASSIUM CHLORIDE, CALCIUM CHLORIDE 600; 310; 30; 20 MG/100ML; MG/100ML; MG/100ML; MG/100ML
9 INJECTION, SOLUTION INTRAVENOUS CONTINUOUS
Status: DISCONTINUED | OUTPATIENT
Start: 2025-01-08 | End: 2025-01-09

## 2025-01-08 RX ORDER — ATROPINE SULFATE 0.4 MG/ML
0.4 INJECTION, SOLUTION INTRAMUSCULAR; INTRAVENOUS; SUBCUTANEOUS ONCE AS NEEDED
Status: DISCONTINUED | OUTPATIENT
Start: 2025-01-08 | End: 2025-01-08 | Stop reason: HOSPADM

## 2025-01-08 RX ORDER — HYDROMORPHONE HYDROCHLORIDE 1 MG/ML
0.5 INJECTION, SOLUTION INTRAMUSCULAR; INTRAVENOUS; SUBCUTANEOUS
Status: ACTIVE | OUTPATIENT
Start: 2025-01-08 | End: 2025-01-13

## 2025-01-08 RX ORDER — HYDROMORPHONE HYDROCHLORIDE 1 MG/ML
0.5 INJECTION, SOLUTION INTRAMUSCULAR; INTRAVENOUS; SUBCUTANEOUS
Status: DISCONTINUED | OUTPATIENT
Start: 2025-01-08 | End: 2025-01-08 | Stop reason: HOSPADM

## 2025-01-08 RX ORDER — HYDRALAZINE HYDROCHLORIDE 20 MG/ML
5 INJECTION INTRAMUSCULAR; INTRAVENOUS
Status: DISCONTINUED | OUTPATIENT
Start: 2025-01-08 | End: 2025-01-08 | Stop reason: HOSPADM

## 2025-01-08 RX ORDER — AMOXICILLIN 250 MG
2 CAPSULE ORAL NIGHTLY
Status: DISCONTINUED | OUTPATIENT
Start: 2025-01-08 | End: 2025-01-18 | Stop reason: HOSPADM

## 2025-01-08 RX ORDER — LABETALOL HYDROCHLORIDE 5 MG/ML
5 INJECTION, SOLUTION INTRAVENOUS
Status: DISCONTINUED | OUTPATIENT
Start: 2025-01-08 | End: 2025-01-08 | Stop reason: HOSPADM

## 2025-01-08 RX ORDER — ONDANSETRON 2 MG/ML
INJECTION INTRAMUSCULAR; INTRAVENOUS AS NEEDED
Status: DISCONTINUED | OUTPATIENT
Start: 2025-01-08 | End: 2025-01-08 | Stop reason: SURG

## 2025-01-08 RX ORDER — BISACODYL 10 MG
10 SUPPOSITORY, RECTAL RECTAL DAILY PRN
Status: DISCONTINUED | OUTPATIENT
Start: 2025-01-08 | End: 2025-01-18 | Stop reason: HOSPADM

## 2025-01-08 RX ORDER — DIPHENHYDRAMINE HYDROCHLORIDE 50 MG/ML
12.5 INJECTION INTRAMUSCULAR; INTRAVENOUS
Status: DISCONTINUED | OUTPATIENT
Start: 2025-01-08 | End: 2025-01-08 | Stop reason: HOSPADM

## 2025-01-08 RX ORDER — IPRATROPIUM BROMIDE AND ALBUTEROL SULFATE 2.5; .5 MG/3ML; MG/3ML
3 SOLUTION RESPIRATORY (INHALATION) ONCE AS NEEDED
Status: DISCONTINUED | OUTPATIENT
Start: 2025-01-08 | End: 2025-01-08 | Stop reason: HOSPADM

## 2025-01-08 RX ORDER — GABAPENTIN 300 MG/1
300 CAPSULE ORAL ONCE
Status: COMPLETED | OUTPATIENT
Start: 2025-01-08 | End: 2025-01-08

## 2025-01-08 RX ORDER — HEPARIN SODIUM 5000 [USP'U]/ML
5000 INJECTION, SOLUTION INTRAVENOUS; SUBCUTANEOUS ONCE
Status: COMPLETED | OUTPATIENT
Start: 2025-01-08 | End: 2025-01-08

## 2025-01-08 RX ORDER — ONDANSETRON 2 MG/ML
4 INJECTION INTRAMUSCULAR; INTRAVENOUS ONCE AS NEEDED
Status: DISCONTINUED | OUTPATIENT
Start: 2025-01-08 | End: 2025-01-08 | Stop reason: HOSPADM

## 2025-01-08 RX ORDER — EPHEDRINE SULFATE 50 MG/ML
5 INJECTION, SOLUTION INTRAVENOUS ONCE AS NEEDED
Status: DISCONTINUED | OUTPATIENT
Start: 2025-01-08 | End: 2025-01-08 | Stop reason: HOSPADM

## 2025-01-08 RX ORDER — LOPERAMIDE HYDROCHLORIDE 2 MG/1
4 CAPSULE ORAL 4 TIMES DAILY PRN
Status: DISCONTINUED | OUTPATIENT
Start: 2025-01-08 | End: 2025-01-18 | Stop reason: HOSPADM

## 2025-01-08 RX ORDER — LIDOCAINE HYDROCHLORIDE 20 MG/ML
INJECTION, SOLUTION INFILTRATION; PERINEURAL AS NEEDED
Status: DISCONTINUED | OUTPATIENT
Start: 2025-01-08 | End: 2025-01-08 | Stop reason: SURG

## 2025-01-08 RX ORDER — PROPOFOL 10 MG/ML
INJECTION, EMULSION INTRAVENOUS AS NEEDED
Status: DISCONTINUED | OUTPATIENT
Start: 2025-01-08 | End: 2025-01-08 | Stop reason: SURG

## 2025-01-08 RX ORDER — DEXAMETHASONE SODIUM PHOSPHATE 4 MG/ML
INJECTION, SOLUTION INTRA-ARTICULAR; INTRALESIONAL; INTRAMUSCULAR; INTRAVENOUS; SOFT TISSUE AS NEEDED
Status: DISCONTINUED | OUTPATIENT
Start: 2025-01-08 | End: 2025-01-08

## 2025-01-08 RX ORDER — ACETAMINOPHEN 500 MG
1000 TABLET ORAL ONCE
Status: COMPLETED | OUTPATIENT
Start: 2025-01-08 | End: 2025-01-08

## 2025-01-08 RX ORDER — CELECOXIB 100 MG/1
100 CAPSULE ORAL EVERY 12 HOURS SCHEDULED
Status: DISCONTINUED | OUTPATIENT
Start: 2025-01-08 | End: 2025-01-18 | Stop reason: HOSPADM

## 2025-01-08 RX ORDER — DAPSONE 100 MG/1
50 TABLET ORAL 2 TIMES DAILY
Status: DISCONTINUED | OUTPATIENT
Start: 2025-01-08 | End: 2025-01-18 | Stop reason: HOSPADM

## 2025-01-08 RX ORDER — CETIRIZINE HYDROCHLORIDE 10 MG/1
10 TABLET ORAL DAILY
Status: DISCONTINUED | OUTPATIENT
Start: 2025-01-09 | End: 2025-01-18 | Stop reason: HOSPADM

## 2025-01-08 RX ORDER — CELECOXIB 200 MG/1
200 CAPSULE ORAL ONCE
Status: COMPLETED | OUTPATIENT
Start: 2025-01-08 | End: 2025-01-08

## 2025-01-08 RX ORDER — ONDANSETRON 2 MG/ML
4 INJECTION INTRAMUSCULAR; INTRAVENOUS EVERY 6 HOURS PRN
Status: DISCONTINUED | OUTPATIENT
Start: 2025-01-08 | End: 2025-01-18 | Stop reason: HOSPADM

## 2025-01-08 RX ORDER — FENTANYL CITRATE 50 UG/ML
50 INJECTION, SOLUTION INTRAMUSCULAR; INTRAVENOUS ONCE AS NEEDED
Status: COMPLETED | OUTPATIENT
Start: 2025-01-08 | End: 2025-01-08

## 2025-01-08 RX ADMIN — METOPROLOL TARTRATE 25 MG: 25 TABLET, FILM COATED ORAL at 22:03

## 2025-01-08 RX ADMIN — Medication 200 MG: at 13:58

## 2025-01-08 RX ADMIN — GABAPENTIN 300 MG: 300 CAPSULE ORAL at 11:27

## 2025-01-08 RX ADMIN — DEXAMETHASONE SODIUM PHOSPHATE 4 MG: 4 INJECTION, SOLUTION INTRAMUSCULAR; INTRAVENOUS at 15:25

## 2025-01-08 RX ADMIN — MIDAZOLAM 1 MG: 1 INJECTION INTRAMUSCULAR; INTRAVENOUS at 12:10

## 2025-01-08 RX ADMIN — HEPARIN SODIUM 5000 UNITS: 5000 INJECTION INTRAVENOUS; SUBCUTANEOUS at 12:27

## 2025-01-08 RX ADMIN — SODIUM CHLORIDE 3 G: 900 INJECTION INTRAVENOUS at 13:47

## 2025-01-08 RX ADMIN — SODIUM CHLORIDE, POTASSIUM CHLORIDE, SODIUM LACTATE AND CALCIUM CHLORIDE: 600; 310; 30; 20 INJECTION, SOLUTION INTRAVENOUS at 12:18

## 2025-01-08 RX ADMIN — IPRATROPIUM BROMIDE AND ALBUTEROL SULFATE 3 ML: 2.5; .5 SOLUTION RESPIRATORY (INHALATION) at 23:51

## 2025-01-08 RX ADMIN — ACETAMINOPHEN 1000 MG: 500 TABLET, FILM COATED ORAL at 11:15

## 2025-01-08 RX ADMIN — PROPOFOL 150 MG: 10 INJECTION, EMULSION INTRAVENOUS at 13:58

## 2025-01-08 RX ADMIN — FENTANYL CITRATE 50 MCG: 50 INJECTION, SOLUTION INTRAMUSCULAR; INTRAVENOUS at 15:00

## 2025-01-08 RX ADMIN — SUGAMMADEX 200 MG: 100 INJECTION, SOLUTION INTRAVENOUS at 16:41

## 2025-01-08 RX ADMIN — BUPIVACAINE 10 ML: 13.3 INJECTION, SUSPENSION, LIPOSOMAL INFILTRATION at 12:18

## 2025-01-08 RX ADMIN — Medication 100 MCG: at 18:31

## 2025-01-08 RX ADMIN — DEXTROSE MONOHYDRATE, SODIUM CHLORIDE, AND POTASSIUM CHLORIDE 75 ML/HR: 50; 1.49; 4.5 INJECTION, SOLUTION INTRAVENOUS at 21:59

## 2025-01-08 RX ADMIN — EPHEDRINE SULFATE 10 MG: 50 INJECTION INTRAVENOUS at 15:24

## 2025-01-08 RX ADMIN — CELECOXIB 200 MG: 200 CAPSULE ORAL at 11:15

## 2025-01-08 RX ADMIN — BUPIVACAINE HYDROCHLORIDE AND EPINEPHRINE BITARTRATE 15 ML: 5; .0091 INJECTION, SOLUTION EPIDURAL; INTRACAUDAL; PERINEURAL at 12:18

## 2025-01-08 RX ADMIN — GABAPENTIN 300 MG: 300 CAPSULE ORAL at 22:05

## 2025-01-08 RX ADMIN — LIDOCAINE HYDROCHLORIDE 100 MG: 20 INJECTION, SOLUTION INFILTRATION; PERINEURAL at 13:58

## 2025-01-08 RX ADMIN — EPHEDRINE SULFATE 10 MG: 50 INJECTION INTRAVENOUS at 16:23

## 2025-01-08 RX ADMIN — ROCURONIUM BROMIDE 50 MG: 10 INJECTION, SOLUTION INTRAVENOUS at 14:10

## 2025-01-08 RX ADMIN — ONDANSETRON 4 MG: 2 INJECTION INTRAMUSCULAR; INTRAVENOUS at 15:25

## 2025-01-08 RX ADMIN — CELECOXIB 100 MG: 100 CAPSULE ORAL at 23:12

## 2025-01-08 RX ADMIN — EPHEDRINE SULFATE 10 MG: 50 INJECTION INTRAVENOUS at 17:41

## 2025-01-08 RX ADMIN — FAMOTIDINE 20 MG: 10 INJECTION INTRAVENOUS at 12:17

## 2025-01-08 RX ADMIN — SODIUM CHLORIDE 3 G: 900 INJECTION INTRAVENOUS at 17:34

## 2025-01-08 RX ADMIN — FENTANYL CITRATE 50 MCG: 50 INJECTION, SOLUTION INTRAMUSCULAR; INTRAVENOUS at 18:02

## 2025-01-08 RX ADMIN — ROCURONIUM BROMIDE 30 MG: 10 INJECTION, SOLUTION INTRAVENOUS at 18:20

## 2025-01-08 RX ADMIN — EPHEDRINE SULFATE 10 MG: 50 INJECTION INTRAVENOUS at 15:22

## 2025-01-08 RX ADMIN — SODIUM CHLORIDE, POTASSIUM CHLORIDE, SODIUM LACTATE AND CALCIUM CHLORIDE: 600; 310; 30; 20 INJECTION, SOLUTION INTRAVENOUS at 17:02

## 2025-01-08 RX ADMIN — ROCURONIUM BROMIDE 50 MG: 10 INJECTION, SOLUTION INTRAVENOUS at 14:58

## 2025-01-08 NOTE — ANESTHESIA PROCEDURE NOTES
Peripheral Block      Patient reassessed immediately prior to procedure    Patient location during procedure: pre-op  Start time: 1/8/2025 12:11 PM  Stop time: 1/8/2025 12:18 PM  Reason for block: at surgeon's request and post-op pain management  Performed by  Anesthesiologist: Silvestre Chakraborty MD  Preanesthetic Checklist  Completed: patient identified, risks and benefits discussed, pre-op evaluation and timeout performed  Prep:  Pt Position: sitting  Sterile barriers:mask  Prep: ChloraPrep  Patient monitoring: blood pressure monitoring, continuous pulse oximetry and EKG  Procedure    Sedation: yes  Performed under: local infiltration  Guidance:ultrasound guided    ULTRASOUND INTERPRETATION.  Using ultrasound guidance a 21 G gauge needle was placed in close proximity to the nerve, at which point, under ultrasound guidance anesthetic was injected in the area of the nerve and spread of the anesthesia was seen on ultrasound in close proximity thereto.  There were no abnormalities seen on ultrasound; a digital image was taken; and the patient tolerated the procedure with no complications. Images:still images obtained, printed/placed on chart    Laterality:right  Block Type:erector spinae block  Injection Technique:single-shot  Needle Type:short-bevel  Needle Gauge:21 G      Medications Used: bupivacaine-EPINEPHrine PF (MARCAINE w/EPI) 0.5% -1:[id number] injection - Injection   15 mL - 1/8/2025 12:18:00 PM  bupivacaine liposome (EXPAREL) 1.3 % injection - Infiltration   10 mL - 1/8/2025 12:18:00 PM      Post Assessment  Patient Tolerance:comfortable throughout block  Complications:no  Additional Notes  Ultrasound Interpretation:  Using ultrasound guidance the needle was placed under the erector spinae muscle over the T6 transverse process.  Local was seen spreading cephalad and caudad.  No abnormalities noted.    Post-Op Diagnosis Codes:     * Ground glass opacity present on imaging of lung [R91.8]  Performed by: Silvestre Chakraborty  MD TONY

## 2025-01-08 NOTE — H&P
[facility]   PREOPERATIVE HISTORY AND PHYSICAL    Patient Name:Kortney Charlton  : 1955  MRN: [id number]  Primary Care Physician: Dorinda Hastings MD  Date of admission: 2025    Subjective   Subjective     Chief Complaint: preoperative evaluation    History of Present Illness  Kortney Charlton is a 69 y.o. female who presents for preoperative evaluation. She is scheduled for THORACOSCOPY WITH DAVINCI ROBOT with wedge resection, possible lobectomy (Right)    Review of Systems   Constitutional: Negative.    HENT: Negative.     Eyes: Negative.    Respiratory: Negative.     Cardiovascular: Negative.    Gastrointestinal: Negative.    Endocrine: Negative.    Genitourinary: Negative.    Musculoskeletal: Negative.    Skin: Negative.    Allergic/Immunologic: Negative.    Neurological: Negative.    Hematological: Negative.    Psychiatric/Behavioral: Negative.          Personal History     Past Medical History:   Diagnosis Date    Allergic topical steroids    Anemia Episodic    At risk for central sleep apnea     STOP BANG 5    Breast lump in female 2019    Chronic nasopharyngitis     Elevated BP without diagnosis of hypertension     History of pulmonary embolism     Hypertension     Lesion of lung     Morbid obesity     Neuromuscular disorder     Osteoarthritis     Rash     UNDER LEFT BREAST    Sneddon-Hanna disease     Stasis dermatitis     Urinary tract infection        Past Surgical History:   Procedure Laterality Date    BRONCHOSCOPY WITH ION ROBOTIC ASSIST N/A 10/30/2024    Procedure: BRONCHOSCOPY WITH ION ROBOT, LAVAGE, FINE NEEDLE ASPIRATIONS AND BIOPSIES;  Surgeon: Radha Marcus MD;  Location: [facility] ENDOSCOPY;  Service: Robotics - Pulmonary;  Laterality: N/A;    CATARACT EXTRACTION Bilateral     COLONOSCOPY  2015    CYST REMOVAL      MULTIPLE    HYSTEROSCOPY ENDOMETRIAL ABLATION      JOINT REPLACEMENT  R- L-    LAPAROSCOPIC CHOLECYSTECTOMY  10/2011    OVARIAN CYST  REMOVAL         Family History: Her family history includes Arthritis in her father, maternal aunt, and mother; Cancer in her father and mother; Dementia in her mother; Heart disease in her father and mother; Hyperlipidemia in her mother; Hypertension in her father, mother, and sister; Migraines in her sister; Prostate cancer in her father; Stroke in her mother; Thyroid disease in her mother.     Social History: She  reports that she has never smoked. She has never been exposed to tobacco smoke. She has never used smokeless tobacco. She reports current alcohol use. She reports that she does not use drugs.    Home Medications:  Dapsone, Fexofenadine HCl, colestipol, dapsone, furosemide, ibuprofen, loperamide, and multivitamin with minerals    Allergies:  She is allergic to zoster vac recomb adjuvanted, compazine [prochlorperazine edisylate], corticosteroids, and other.    Objective    Objective     Vitals:    Temp:  [98.2 °F (36.8 °C)] 98.2 °F (36.8 °C)  Heart Rate:  [57-68] 59  Resp:  [18] 18  BP: (166)/(86) 166/86  Arterial Line BP: (146-166)/(71-76) 166/74  Flow (L/min) (Oxygen Therapy):  [2] 2    Physical Exam  Vitals and nursing note reviewed.   Constitutional:       Appearance: She is well-developed.   HENT:      Head: Normocephalic and atraumatic.      Nose: Nose normal.   Eyes:      Conjunctiva/sclera: Conjunctivae normal.   Cardiovascular:      Rate and Rhythm: Normal rate.   Pulmonary:      Effort: Pulmonary effort is normal.   Abdominal:      Palpations: Abdomen is soft.   Musculoskeletal:      Cervical back: Neck supple.   Skin:     General: Skin is warm and dry.   Neurological:      Mental Status: She is alert and oriented to person, place, and time.   Psychiatric:         Behavior: Behavior normal.         Thought Content: Thought content normal.         Judgment: Judgment normal.         Assessment & Plan   Assessment / Plan     Brief Patient Summary:  Kortney Charlton is a 69 y.o. female who presents  for preoperative evaluation.    Pre-Op Diagnosis Codes:      * Ground glass opacity present on imaging of lung [R91.8]    Active Hospital Problems:  Active Hospital Problems    Diagnosis     **Ground glass opacity present on imaging of lung      Plan:   Procedure(s):  THORACOSCOPY WITH DAVINCI ROBOT with wedge resection, possible lobectomy    The risks, benefits, and alternatives of the procedure including but not limited to pneumonia, prolonged air leak and atrial fibrillation and risks of the anesthesia were discussed in detail with the patient and questions were answered. No guarantees were made or implied. Informed consent was obtained.    Radha Marcus MD

## 2025-01-08 NOTE — ANESTHESIA PROCEDURE NOTES
Airway  Urgency: elective    Date/Time: 1/8/2025 1:59 PM  Difficult airway    General Information and Staff    Patient location during procedure: OR  Anesthesiologist: Silvestre Chakraborty MD  CRNA/CAA: Jona Huggins CRNA    Indications and Patient Condition  Indications for airway management: airway protection    Preoxygenated: yes  MILS not maintained throughout  Mask difficulty assessment: 1 - vent by mask    Final Airway Details  Final airway type: endotracheal airway      Successful airway: EBT - double lumen left     Successful intubation technique: video laryngoscopy  Facilitating devices/methods: Bougie  Endotracheal tube insertion site: oral  Blade: CMAC  Blade size: D  EBT DL size (fr): 35  Cormack-Lehane Classification: grade I - full view of glottis  Placement verified by: chest auscultation, bronchoscopy and capnometry   Measured from: teeth  Number of attempts at approach: 2  Assessment: lips, teeth, and gum same as pre-op and atraumatic intubation

## 2025-01-08 NOTE — ANESTHESIA PROCEDURE NOTES
Arterial Line      Patient reassessed immediately prior to procedure    Patient location during procedure: pre-op  Start time: 1/8/2025 12:26 PM  Stop Time:1/8/2025 12:34 PM       Line placed for hemodynamic monitoring.  Performed By   Anesthesiologist: Silvestre Chakraborty MD   Preanesthetic Checklist  Completed: patient identified and risks and benefits discussed  Arterial Line Prep    Sterile Tech: gloves  Prep: ChloraPrep  Patient monitoring: blood pressure monitoring, continuous pulse oximetry and EKG  Arterial Line Procedure   Laterality:left  Location:  radial artery  Catheter size: 20 G   Guidance: ultrasound guided  PROCEDURE NOTE/ULTRASOUND INTERPRETATION.  Using ultrasound guidance the potential vascular sites for insertion of the catheter were visualized to determine the patency of the vessel to be used for vascular access.  After selecting the appropriate site for insertion, the needle was visualized under ultrasound being inserted into the radial artery, followed by ultrasound confirmation of wire and catheter placement. There were no abnormalities seen on ultrasound; an image was taken; and the patient tolerated the procedure with no complications.   Successful placement: yes Images: still images obtained, printed/placed on the chart  Post Assessment   Dressing Type: occlusive dressing applied and secured with tape.   Complications no   Patient Tolerance: patient tolerated the procedure well with no apparent complications  Additional Notes  Using ultrasound guidance the potential vascular sites for insertion of the catheter were visualized to determine the patency of the vessel to be used for vascular access.  After selecting the appropriate site for insertion, the needle was visualized under ultrasound being inserted into the artery, followed by ultrasound confirmation of wire and catheter placement.  There were no abnormalities seen on ultrasound; an image was taken/ and the patient tolerated the  procedure with no complications.

## 2025-01-08 NOTE — ANESTHESIA PREPROCEDURE EVALUATION
Anesthesia Evaluation     NPO Solid Status: > 8 hours             Airway   Mallampati: III  Dental      Pulmonary    (+) ,sleep apnea  (-) not a smoker    ROS comment: Positive OLIVERIO screen/Positive OLIVERIO diagnosis and 2 or more mitigating factors used (recovery in non-supine position and multimodal analgesia)    Cardiovascular     (+) hypertension      Neuro/Psych  GI/Hepatic/Renal/Endo    (+) morbid obesity    Musculoskeletal     Abdominal    Substance History      OB/GYN          Other                    Anesthesia Plan    ASA 4     general     (Block placed for postoperative pain control per surgeon request.  )    Anesthetic plan, risks, benefits, and alternatives have been provided, discussed and informed consent has been obtained with: patient.    CODE STATUS:

## 2025-01-09 ENCOUNTER — APPOINTMENT (OUTPATIENT)
Dept: GENERAL RADIOLOGY | Facility: HOSPITAL | Age: 70
DRG: 163 | End: 2025-01-09
Payer: MEDICARE

## 2025-01-09 LAB
ANION GAP SERPL CALCULATED.3IONS-SCNC: 6 MMOL/L (ref 5–15)
ARTERIAL PATENCY WRIST A: ABNORMAL
ATMOSPHERIC PRESS: 755.1 MMHG
ATMOSPHERIC PRESS: 755.2 MMHG
ATMOSPHERIC PRESS: 757.1 MMHG
BASE EXCESS BLDA CALC-SCNC: -1.4 MMOL/L (ref 0–2)
BASE EXCESS BLDA CALC-SCNC: 1.3 MMOL/L (ref 0–2)
BASE EXCESS BLDA CALC-SCNC: 3.9 MMOL/L (ref 0–2)
BASOPHILS # BLD AUTO: NORMAL 10*3/UL
BASOPHILS NFR BLD AUTO: NORMAL %
BDY SITE: ABNORMAL
BUN SERPL-MCNC: 8 MG/DL (ref 8–23)
BUN/CREAT SERPL: 16 (ref 7–25)
CALCIUM SPEC-SCNC: 7 MG/DL (ref 8.6–10.5)
CHLORIDE SERPL-SCNC: 106 MMOL/L (ref 98–107)
CO2 BLDA-SCNC: 30.1 MMOL/L (ref 23–27)
CO2 BLDA-SCNC: >32.45 MMOL/L (ref 23–27)
CO2 BLDA-SCNC: >32.45 MMOL/L (ref 23–27)
CO2 SERPL-SCNC: 25 MMOL/L (ref 22–29)
CREAT SERPL-MCNC: 0.5 MG/DL (ref 0.57–1)
DEPRECATED RDW RBC AUTO: 38.2 FL (ref 37–54)
DEPRECATED RDW RBC AUTO: NORMAL FL
DEVICE COMMENT: ABNORMAL
DEVICE COMMENT: ABNORMAL
EGFRCR SERPLBLD CKD-EPI 2021: 100.4 ML/MIN/1.73
EOSINOPHIL # BLD AUTO: NORMAL 10*3/UL
EOSINOPHIL NFR BLD AUTO: NORMAL %
ERYTHROCYTE [DISTWIDTH] IN BLOOD BY AUTOMATED COUNT: 12 % (ref 12.3–15.4)
ERYTHROCYTE [DISTWIDTH] IN BLOOD BY AUTOMATED COUNT: NORMAL %
GAS FLOW AIRWAY: 4 LPM
GAS FLOW AIRWAY: 5 LPM
GLUCOSE BLDC GLUCOMTR-MCNC: 128 MG/DL (ref 70–130)
GLUCOSE BLDC GLUCOMTR-MCNC: 176 MG/DL (ref 70–130)
GLUCOSE SERPL-MCNC: 133 MG/DL (ref 65–99)
HCO3 BLDA-SCNC: 28 MMOL/L (ref 22–28)
HCO3 BLDA-SCNC: 30.5 MMOL/L (ref 22–28)
HCO3 BLDA-SCNC: 32.9 MMOL/L (ref 22–28)
HCT VFR BLD AUTO: 31.7 % (ref 34–46.6)
HCT VFR BLD AUTO: NORMAL %
HEMODILUTION: NO
HGB BLD-MCNC: 10.3 G/DL (ref 12–15.9)
HGB BLD-MCNC: NORMAL G/DL
IMM GRANULOCYTES # BLD AUTO: NORMAL 10*3/UL
IMM GRANULOCYTES NFR BLD AUTO: NORMAL %
INHALED O2 CONCENTRATION: 40 %
LYMPHOCYTES # BLD AUTO: NORMAL 10*3/UL
LYMPHOCYTES NFR BLD AUTO: NORMAL %
Lab: ABNORMAL
MCH RBC QN AUTO: 28.2 PG (ref 26.6–33)
MCH RBC QN AUTO: NORMAL PG
MCHC RBC AUTO-ENTMCNC: 32.5 G/DL (ref 31.5–35.7)
MCHC RBC AUTO-ENTMCNC: NORMAL G/DL
MCV RBC AUTO: 86.8 FL (ref 79–97)
MCV RBC AUTO: NORMAL FL
MODALITY: ABNORMAL
MONOCYTES # BLD AUTO: NORMAL 10*3/UL
MONOCYTES NFR BLD AUTO: NORMAL %
NEUTROPHILS NFR BLD AUTO: NORMAL %
NEUTROPHILS NFR BLD AUTO: NORMAL %
NOTIFIED WHO: ABNORMAL
NRBC BLD AUTO-RTO: NORMAL %
O2 A-A PPRESDIFF RESPIRATORY: 0.5 MMHG
PCO2 BLDA: 70.1 MM HG (ref 35–45)
PCO2 BLDA: 70.2 MM HG (ref 35–45)
PCO2 BLDA: 70.7 MM HG (ref 35–45)
PH BLDA: 7.21 PH UNITS (ref 7.35–7.45)
PH BLDA: 7.25 PH UNITS (ref 7.35–7.45)
PH BLDA: 7.28 PH UNITS (ref 7.35–7.45)
PLATELET # BLD AUTO: 165 10*3/MM3 (ref 140–450)
PLATELET # BLD AUTO: NORMAL 10*3/UL
PMV BLD AUTO: 9.7 FL (ref 6–12)
PMV BLD AUTO: NORMAL FL
PO2 BLD: 272 MM[HG] (ref 0–500)
PO2 BLDA: 107.1 MM HG (ref 80–100)
PO2 BLDA: 108.8 MM HG (ref 80–100)
PO2 BLDA: 78.7 MM HG (ref 80–100)
POTASSIUM SERPL-SCNC: 4.4 MMOL/L (ref 3.5–5.2)
RBC # BLD AUTO: 3.65 10*6/MM3 (ref 3.77–5.28)
RBC # BLD AUTO: NORMAL 10*6/UL
READ BACK: YES
SAO2 % BLDCOA: 91.8 % (ref 92–98.5)
SAO2 % BLDCOA: 97 % (ref 92–98.5)
SAO2 % BLDCOA: 97.1 % (ref 92–98.5)
SET MECH RESP RATE: 18
SET MECH RESP RATE: 20
SODIUM SERPL-SCNC: 137 MMOL/L (ref 136–145)
TOTAL RATE: 18 BREATHS/MINUTE
TOTAL RATE: 20 BREATHS/MINUTE
VENTILATOR MODE: ABNORMAL
VT ON VENT VENT: 560 ML
WBC NRBC COR # BLD AUTO: 10.77 10*3/MM3 (ref 3.4–10.8)
WBC NRBC COR # BLD AUTO: NORMAL 10*3/UL

## 2025-01-09 PROCEDURE — 82803 BLOOD GASES ANY COMBINATION: CPT

## 2025-01-09 PROCEDURE — 85027 COMPLETE CBC AUTOMATED: CPT | Performed by: INTERNAL MEDICINE

## 2025-01-09 PROCEDURE — 80048 BASIC METABOLIC PNL TOTAL CA: CPT | Performed by: THORACIC SURGERY (CARDIOTHORACIC VASCULAR SURGERY)

## 2025-01-09 PROCEDURE — 82803 BLOOD GASES ANY COMBINATION: CPT | Performed by: INTERNAL MEDICINE

## 2025-01-09 PROCEDURE — 94664 DEMO&/EVAL PT USE INHALER: CPT

## 2025-01-09 PROCEDURE — 71045 X-RAY EXAM CHEST 1 VIEW: CPT

## 2025-01-09 PROCEDURE — 25010000002 HEPARIN (PORCINE) PER 1000 UNITS: Performed by: THORACIC SURGERY (CARDIOTHORACIC VASCULAR SURGERY)

## 2025-01-09 PROCEDURE — 94760 N-INVAS EAR/PLS OXIMETRY 1: CPT

## 2025-01-09 PROCEDURE — 94799 UNLISTED PULMONARY SVC/PX: CPT

## 2025-01-09 PROCEDURE — 94761 N-INVAS EAR/PLS OXIMETRY MLT: CPT

## 2025-01-09 PROCEDURE — 82948 REAGENT STRIP/BLOOD GLUCOSE: CPT

## 2025-01-09 PROCEDURE — 85025 COMPLETE CBC W/AUTO DIFF WBC: CPT | Performed by: THORACIC SURGERY (CARDIOTHORACIC VASCULAR SURGERY)

## 2025-01-09 PROCEDURE — 99024 POSTOP FOLLOW-UP VISIT: CPT | Performed by: NURSE PRACTITIONER

## 2025-01-09 PROCEDURE — 94660 CPAP INITIATION&MGMT: CPT

## 2025-01-09 RX ORDER — ACETAMINOPHEN 500 MG
1000 TABLET ORAL 3 TIMES DAILY
Status: DISCONTINUED | OUTPATIENT
Start: 2025-01-09 | End: 2025-01-18 | Stop reason: HOSPADM

## 2025-01-09 RX ORDER — NOREPINEPHRINE BITARTRATE 0.03 MG/ML
.02-.3 INJECTION, SOLUTION INTRAVENOUS
Status: DISCONTINUED | OUTPATIENT
Start: 2025-01-09 | End: 2025-01-18 | Stop reason: HOSPADM

## 2025-01-09 RX ADMIN — GABAPENTIN 300 MG: 300 CAPSULE ORAL at 06:04

## 2025-01-09 RX ADMIN — IPRATROPIUM BROMIDE AND ALBUTEROL SULFATE 3 ML: 2.5; .5 SOLUTION RESPIRATORY (INHALATION) at 14:50

## 2025-01-09 RX ADMIN — OXYCODONE HYDROCHLORIDE 5 MG: 5 TABLET ORAL at 07:29

## 2025-01-09 RX ADMIN — ACETAMINOPHEN 1000 MG: 500 TABLET, FILM COATED ORAL at 18:20

## 2025-01-09 RX ADMIN — ACETAMINOPHEN 1000 MG: 500 TABLET, FILM COATED ORAL at 23:03

## 2025-01-09 RX ADMIN — MUPIROCIN 1 APPLICATION: 20 OINTMENT TOPICAL at 23:05

## 2025-01-09 RX ADMIN — CELECOXIB 100 MG: 100 CAPSULE ORAL at 08:46

## 2025-01-09 RX ADMIN — DAPSONE 50 MG: 100 TABLET ORAL at 22:59

## 2025-01-09 RX ADMIN — CETIRIZINE HYDROCHLORIDE 10 MG: 10 TABLET, FILM COATED ORAL at 08:46

## 2025-01-09 RX ADMIN — DAPSONE 50 MG: 100 TABLET ORAL at 08:46

## 2025-01-09 RX ADMIN — HEPARIN SODIUM 5000 UNITS: 5000 INJECTION INTRAVENOUS; SUBCUTANEOUS at 06:02

## 2025-01-09 RX ADMIN — NOREPINEPHRINE BITARTRATE 0.02 MCG/KG/MIN: 0.03 INJECTION, SOLUTION INTRAVENOUS at 21:46

## 2025-01-09 RX ADMIN — HEPARIN SODIUM 5000 UNITS: 5000 INJECTION INTRAVENOUS; SUBCUTANEOUS at 23:05

## 2025-01-09 RX ADMIN — CHOLESTYRAMINE 4 G: 4 POWDER, FOR SUSPENSION ORAL at 08:47

## 2025-01-09 RX ADMIN — IPRATROPIUM BROMIDE AND ALBUTEROL SULFATE 3 ML: 2.5; .5 SOLUTION RESPIRATORY (INHALATION) at 06:51

## 2025-01-09 RX ADMIN — CELECOXIB 100 MG: 100 CAPSULE ORAL at 23:04

## 2025-01-09 RX ADMIN — HEPARIN SODIUM 5000 UNITS: 5000 INJECTION INTRAVENOUS; SUBCUTANEOUS at 14:29

## 2025-01-09 RX ADMIN — GABAPENTIN 300 MG: 300 CAPSULE ORAL at 23:00

## 2025-01-09 RX ADMIN — MUPIROCIN 1 APPLICATION: 20 OINTMENT TOPICAL at 04:19

## 2025-01-09 RX ADMIN — ACETAMINOPHEN 1000 MG: 500 TABLET, FILM COATED ORAL at 09:25

## 2025-01-09 RX ADMIN — GABAPENTIN 300 MG: 300 CAPSULE ORAL at 14:29

## 2025-01-09 RX ADMIN — METOPROLOL TARTRATE 25 MG: 25 TABLET, FILM COATED ORAL at 08:46

## 2025-01-09 NOTE — ANESTHESIA POSTPROCEDURE EVALUATION
Patient: Kortney Charlton    Procedure Summary       Date: 01/08/25 Room / Location: [facility] OR [address] [place] [address] MAIN OR    Anesthesia Start: 1337 Anesthesia Stop: 1917    Procedure: BRONCHOSCOPY, RIGHT VIDEO ASSISTED THORACOSCOPY WITH DAVINCI ROBOT with wedge resection X2, COMPLETION RIGHT UPPER LOBECTOMY, MEDIASTINAL AND HILAR LYMPH NODE DISSECTION, INTERCOSTAL NERVE BLOCK (Right: Chest) Diagnosis:       Ground glass opacity present on imaging of lung      (Ground glass opacity present on imaging of lung [R91.8])    Surgeons: Radha Marcus MD Provider: Roosevelt Diggs MD    Anesthesia Type: general ASA Status: 4            Anesthesia Type: general    Vitals  Vitals Value Taken Time   /68 01/08/25 2045   Temp 36.8 °C (98.2 °F) 01/08/25 1913   Pulse 85 01/08/25 2050   Resp 22 01/08/25 2030   SpO2 92 % 01/08/25 2050   Vitals shown include unfiled device data.        Post Anesthesia Care and Evaluation    Level of consciousness: awake and alert  Pain management: adequate    Airway patency: patent  Anesthetic complications: No anesthetic complications  PONV Status: controlled  Cardiovascular status: acceptable  Respiratory status: acceptable  Hydration status: acceptable    Comments: Deemed appropriate for discharge from post anesthetic care

## 2025-01-09 NOTE — PERIOPERATIVE NURSING NOTE
Spoke with Dr. Marcus about patient status and concern for Respiratory decrease, ICU bed ordered, and consult with pulmonary.

## 2025-01-09 NOTE — PROGRESS NOTES
[facility] INPATIENT PROGRESS NOTE         [facility] INTENSIVE CARE    2025      PATIENT IDENTIFICATION:  Name: Kortney Charlton ADMIT: 2025   : 1955  PCP: Dorinda Hastings MD    MRN: [id number] LOS: 1 days   AGE/SEX: 69 y.o. female  ROOM: [facility]                     LOS 1    Reason for visit: Critical care medical management      SUBJECTIVE:      Resting comfortably.  Says that she slept well on the BiPAP machine last night.  Pain is fairly well-controlled.  No productive cough, nausea or vomiting.  Plan for sleep study as outpatient.  Suspect OLIVERIO/OHS. I am seeing the patient for the first time today.  All patient problems are new to me.      Objective   OBJECTIVE:    Vital Sign Min/Max for last 24 hours  Temp  Min: 97.9 °F (36.6 °C)  Max: 98.3 °F (36.8 °C)   BP  Min: 99/59  Max: 166/86   Pulse  Min: 56  Max: 93   Resp  Min: 12  Max: 24   SpO2  Min: 89 %  Max: 100 %   No data recorded   Weight  Min: 192 kg (423 lb 4.5 oz)  Max: 192 kg (423 lb 4.5 oz)    Vitals:    25 0600 25 0651 25 0700 25 0701   BP: 114/60      Pulse: 59 62 62    Resp:  20     Temp:    97.9 °F (36.6 °C)   TempSrc:    Oral   SpO2: 98% 98% 100%    Weight: (!) 192 kg (423 lb 4.5 oz)      Height:                25  0600   Weight: (!) 192 kg (423 lb 4.5 oz)       Body mass index is 66.3 kg/m².        S RR:  [0-24] 12                 Body mass index is 66.3 kg/m².    Intake/Output Summary (Last 24 hours) at 2025 0756  Last data filed at 2025 0600  Gross per 24 hour   Intake 450 ml   Output 570 ml   Net -120 ml         Exam:  GEN:  No distress, appears stated age  EYES:   PERRL, anicteric sclerae  ENT:    External ears/nose normal, OP clear  NECK:  No adenopathy, midline trachea  LUNGS: Normal chest on inspection, palpation and auscultation  CV:  Normal S1S2, without murmur  ABD:  Nontender, nondistended, no hepatosplenomegaly, +BS  EXT:  No edema.  No cyanosis or clubbing.  No  mottling and normal cap refill.    Assessment     Scheduled meds:  celecoxib, 100 mg, Oral, Q12H  cetirizine, 10 mg, Oral, Daily  cholestyramine light, 1 packet, Oral, Daily  dapsone, 50 mg, Oral, BID  gabapentin, 300 mg, Oral, Q8H  heparin (porcine), 5,000 Units, Subcutaneous, Q8H  ipratropium-albuterol, 3 mL, Nebulization, Q8H - RT  metoprolol tartrate, 25 mg, Oral, Q12H  mupirocin, 1 Application, Each Nare, BID  senna-docusate sodium, 2 tablet, Oral, Nightly      IV meds:                      dextrose 5 % and sodium chloride 0.45 % with KCl 20 mEq/L, 75 mL/hr, Last Rate: 75 mL/hr (01/08/25 2200)  lactated ringers, 9 mL/hr, Last Rate: 125 mL/hr (01/08/25 1702)      Data Review:  Results from last 7 days   Lab Units 01/09/25  0422 01/07/25  1014   SODIUM mmol/L 137 139   POTASSIUM mmol/L 4.4 4.2   CHLORIDE mmol/L 106 102   CO2 mmol/L 25.0 28.5   BUN mg/dL 8 11   CREATININE mg/dL 0.50* 0.71   GLUCOSE mg/dL 133* 102*   CALCIUM mg/dL 7.0* 8.8         Estimated Creatinine Clearance: 185.7 mL/min (A) (by C-G formula based on SCr of 0.5 mg/dL (L)).  Results from last 7 days   Lab Units 01/09/25  0422 01/07/25  1014   WBC 10*3/mm3 10.77 6.68   HEMOGLOBIN g/dL 10.3* 12.8   PLATELETS 10*3/mm3 165 220             Results from last 7 days   Lab Units 01/08/25  1943   PH, ARTERIAL pH units 7.236*   PO2 ART mm Hg 159.9*   PCO2, ARTERIAL mm Hg 70.6*   HCO3 ART mmol/L 30.0*             Glucose   Date/Time Value Ref Range Status   01/08/2025 2108 133 (H) 70 - 130 mg/dL Final         Imaging reviewed  Chest x-ray 1/9 reviewed              Most recent CT chest 10/28/2024 reviewed: Shows stable 1.8 right upper lobe groundglass opacity.                    Active Hospital Problems    Diagnosis  POA    **Ground glass opacity present on imaging of lung [R91.8]  Unknown      Resolved Hospital Problems   No resolved problems to display.         ASSESSMENT:  1.8 cm groundglass nodule right upper lobe status post right upper lobectomy and  hilar node dissection 1/8/2025  OLIVERIO and suspected OHS  Super morbid obesity with BMI greater than 65      PLAN:  Continue BiPAP while here.  Plan formal sleep testing as outpatient.  Pain control.  Previous bronchoscopy with navigation did not show evidence of malignancy.  Awaiting final pathology from wedge resection.  Control glucose.  DVT prophylaxis.    Out of intensive care when okay with thoracic surgery.    Td Diaz MD  Pulmonary and Critical Care Medicine  [facility]  1/9/2025    07:56 EST

## 2025-01-09 NOTE — PROGRESS NOTES
Discussed with Dr. Marcus.  No BiPAP due to fresh lobectomy.  Note patient's ABGs look the same with BiPAP and without.  Patient is mentating fine and oxygenating okay.  Continue pulmonary toilet.    Will pass on to the night intensivist, if any questions regarding positive pressure ventilation then call Dr. Marcus directly.    Electronically signed by Vivek Denton MD, 01/09/25, 5:30 PM EST.

## 2025-01-09 NOTE — PROGRESS NOTES
"    Chief Complaint: Ground glass opacity   S/P: Right upper lobectomy  POD # 1    Subjective:  Symptoms:  Improved.  She reports shortness of breath.    Diet:  Adequate intake.    Activity level: Impaired due to weakness.    Pain:  She complains of pain that is mild.  She reports pain is improving.  Pain is well controlled.        Vital Signs:  Temp:  [97.9 °F (36.6 °C)-98.3 °F (36.8 °C)] 97.9 °F (36.6 °C)  Heart Rate:  [54-93] 54  Resp:  [12-24] 20  BP: ()/(52-73) 114/60  Arterial Line BP: ()/(51-70) 112/51    Intake & Output (last day)         01/08 0701  01/09 0700 01/09 0701  01/10 0700    P.O. 50     I.V. (mL/kg) 300 (1.6)     IV Piggyback 100     Total Intake(mL/kg) 450 (2.3)     Urine (mL/kg/hr) 300     Chest Tube 270     Total Output 570     Net -120                   Objective:  General Appearance:  In no acute distress and comfortable.    Vital signs: (most recent): Blood pressure 114/60, pulse 54, temperature 97.9 °F (36.6 °C), temperature source Oral, resp. rate 20, height 170.2 cm (67\"), weight (!) 192 kg (423 lb 4.5 oz), SpO2 99%, not currently breastfeeding.  No fever.    HEENT: Normal HEENT exam.    Lungs:  Normal effort.    Heart: Bradycardia.    Chest: Chest wall tenderness present.    Neurological: Patient is alert and oriented to person, place and time.                Chest tube:   Site: Right, Clean, Dry, and Intact  Suction: -20 cm  Air Leak: negative  24 Hour Total: 270    Results Review:     I reviewed the patient's new clinical results.  I reviewed the patient's new imaging results and agree with the interpretation.    Imaging Results (Last 24 Hours)       Procedure Component Value Units Date/Time    XR Chest 1 View [[id number]] Collected: 01/09/25 0441     Updated: 01/09/25 0445    Narrative:      SINGLE VIEW OF THE CHEST     HISTORY: Postop thoracotomy     COMPARISON: January 8, 2025     FINDINGS:  Right-sided chest tube is noted. Cardiomegaly and vascular congestion  are " noted. Lung volumes are diminished, with increasing bibasilar  atelectasis noted. No obvious pneumothorax is seen. Bilateral effusions  are suspected.       Impression:      Diminished lung volumes with bibasilar atelectasis.     This report was finalized on 1/9/2025 4:42 AM by Dr. Joann Garcia M.D on Workstation: [id number]       XR Chest 1 View [[id number]] Collected: 01/08/25 2014     Updated: 01/08/25 2018    Narrative:      AP CHEST     HISTORY: Postop lung surgery     COMPARISON: 11/1/2024     FINDINGS: There is a right chest tube in place. There appears to be a  small right apical pneumothorax. There is a small amount of pleural  fluid laterally on the right. There is increased pulmonary vascular  congestion. There is increased opacity in the right lung base. Heart  size appears enlarged.       Impression:      Right-sided chest tube in place. There appears to be a small right  pleural effusion. Additional findings as described           This report was finalized on 1/8/2025 8:15 PM by Dr. Marcin Mitchell M.D  on Workstation: [id number]               Lab Results:     Lab Results (last 24 hours)       Procedure Component Value Units Date/Time    Blood Gas, Arterial - [[id number]]  (Abnormal) Collected: 01/09/25 1229    Specimen: Arterial Blood Updated: 01/09/25 1233     Site Arterial Line     Avi's Test N/A     pH, Arterial 7.276 pH units      pCO2, Arterial 70.7 mm Hg      pO2, Arterial 107.1 mm Hg      HCO3, Arterial 32.9 mmol/L      Base Excess, Arterial 3.9 mmol/L      Comment: Serial Number: [id number]:  [id number]        O2 Saturation, Arterial 97.1 %      CO2 Content >32.45 mmol/L      Barometric Pressure for Blood Gas 757.1000 mmHg      Modality Cannula     Flow Rate 4.0000 lpm      Set [facility] Resp Rate 20     Notified Who Bsn Lisandra, RN     Read Back Yes     Notified Time --     Hemodilution No     Device Comment 97%, 4L    POC Glucose Once [[id number]]  (Normal) Collected: 01/09/25 1107     Specimen: Blood Updated: 01/09/25 1108     Glucose 128 mg/dL     CBC (No Diff) [[id number]]  (Abnormal) Collected: 01/09/25 0422    Specimen: Blood Updated: 01/09/25 0458     WBC 10.77 10*3/mm3      RBC 3.65 10*6/mm3      Hemoglobin 10.3 g/dL      Hematocrit 31.7 %      MCV 86.8 fL      MCH 28.2 pg      MCHC 32.5 g/dL      RDW 12.0 %      RDW-SD 38.2 fl      MPV 9.7 fL      Platelets 165 10*3/mm3     Basic Metabolic Panel [[id number]]  (Abnormal) Collected: 01/09/25 0422    Specimen: Blood Updated: 01/09/25 0456     Glucose 133 mg/dL      BUN 8 mg/dL      Creatinine 0.50 mg/dL      Sodium 137 mmol/L      Potassium 4.4 mmol/L      Chloride 106 mmol/L      CO2 25.0 mmol/L      Calcium 7.0 mg/dL      BUN/Creatinine Ratio 16.0     Anion Gap 6.0 mmol/L      eGFR 100.4 mL/min/1.73     Narrative:      GFR Categories in Chronic Kidney Disease (CKD)      GFR Category          GFR (mL/min/1.73)    Interpretation  G1                     90 or greater         Normal or high (1)  G2                      60-89                Mild decrease (1)  G3a                   45-59                Mild to moderate decrease  G3b                   30-44                Moderate to severe decrease  G4                    15-29                Severe decrease  G5                    14 or less           Kidney failure          (1)In the absence of evidence of kidney disease, neither GFR category G1 or G2 fulfill the criteria for CKD.    eGFR calculation 2021 CKD-EPI creatinine equation, which does not include race as a factor    CBC & Differential [[id number]] Collected: 01/09/25 0328    Specimen: Blood Updated: 01/09/25 0418    Narrative:      The following orders were created for panel order CBC & Differential.  Procedure                               Abnormality         Status                     ---------                               -----------         ------                     CBC Auto Differential[[id number]]                            Edited  Result - FINAL        Please view results for these tests on the individual orders.    CBC Auto Differential [[id number]] Collected: 01/09/25 0328    Specimen: Blood Updated: 01/09/25 0418     WBC --     Comment:    Chemistries questionable; redraw  Corrected result. Previous result was 12.86 10*3/mm3 on 1/9/2025 at [facility] EST.        RBC --     Comment:    Chemistries questionable; redraw  Corrected result. Previous result was 4.27 10*6/mm3 on 1/9/2025 at [facility] EST.        Hemoglobin --     Comment:    Chemistries questionable; redraw  Corrected result. Previous result was 11.4 g/dL on 1/9/2025 at [facility] EST.        Hematocrit --     Comment:    Chemistries questionable; redraw  Corrected result. Previous result was 38.6 % on 1/9/2025 at [facility] EST.        MCV --     Comment:    Chemistries questionable; redraw  Corrected result. Previous result was 90.4 fL on 1/9/2025 at [facility] EST.        MCH --     Comment:    Chemistries questionable; redraw  Corrected result. Previous result was 26.7 pg on 1/9/2025 at [facility] EST.        MCHC --     Comment:    Chemistries questionable; redraw  Corrected result. Previous result was 29.5 g/dL on 1/9/2025 at [facility] EST.        RDW --     Comment:    Chemistries questionable; redraw  Corrected result. Previous result was 12.5 % on 1/9/2025 at [facility] EST.        RDW-SD --     Comment:    Chemistries questionable; redraw  Corrected result. Previous result was 41.2 fl on 1/9/2025 at [facility] EST.        MPV --     Comment:    Chemistries questionable; redraw  Corrected result. Previous result was 10.0 fL on 1/9/2025 at [facility] EST.        Platelets --     Comment:    Chemistries questionable; redraw  Corrected result. Previous result was 188 10*3/mm3 on 1/9/2025 at [facility] EST.        Neutrophil % --     Comment:    Chemistries questionable; redraw  Corrected result. Previous result was 90.9 % on 1/9/2025 at [facility] EST.        Lymphocyte % --     Comment:    Chemistries questionable; redraw  Corrected result.  Previous result was 3.4 % on 1/9/2025 at 0352 EST.        Monocyte % --     Comment:    Chemistries questionable; redraw  Corrected result. Previous result was 5.1 % on 1/9/2025 at 0352 EST.        Eosinophil % --     Comment:    Chemistries questionable; redraw  Corrected result. Previous result was 0.0 % on 1/9/2025 at 0352 EST.        Basophil % --     Comment:    Chemistries questionable; redraw  Corrected result. Previous result was 0.1 % on 1/9/2025 at 0352 EST.        Immature Grans % --     Comment:    Chemistries questionable; redraw  Corrected result. Previous result was 0.5 % on 1/9/2025 at 0352 EST.        Neutrophils, Absolute --     Comment:    Chemistries questionable; redraw  Corrected result. Previous result was 11.69 10*3/mm3 on 1/9/2025 at 0352 EST.        Lymphocytes, Absolute --     Comment:    Chemistries questionable; redraw  Corrected result. Previous result was 0.44 10*3/mm3 on 1/9/2025 at 0352 EST.        Monocytes, Absolute --     Comment:    Chemistries questionable; redraw  Corrected result. Previous result was 0.66 10*3/mm3 on 1/9/2025 at 0352 EST.        Eosinophils, Absolute --     Comment:    Chemistries questionable; redraw  Corrected result. Previous result was 0.00 10*3/mm3 on 1/9/2025 at 0352 EST.        Basophils, Absolute --     Comment:    Chemistries questionable; redraw  Corrected result. Previous result was 0.01 10*3/mm3 on 1/9/2025 at 0352 EST.        Immature Grans, Absolute --     Comment:    Chemistries questionable; redraw  Corrected result. Previous result was 0.06 10*3/mm3 on 1/9/2025 at 0352 EST.        nRBC --     Comment:    Chemistries questionable; redraw  Corrected result. Previous result was 0.0 /100 WBC on 1/9/2025 at 0352 EST.       Narrative:      Modified report. Previous result was Hemogram + Auto diff on 1/9/2025 at 0352 EST.    Tissue Pathology Exam [[id number]] Collected: 01/08/25 150    Specimen: Tissue from Lung, Right Upper Lobe; Tissue from Lymph  Node; Tissue from Lymph Node; Tissue from Lymph Node; Tissue from Lung, Right Upper Lobe; Tissue from Lymph Node; Tissue from Lymph Node; Tissue from Lymph Node; Tissue from Lymph Node; Tissue from Lung, Right Upper Lobe Updated: 01/08/25 2217    POC Glucose Once [[id number]]  (Abnormal) Collected: 01/08/25 2108    Specimen: Blood Updated: 01/08/25 2109     Glucose 133 mg/dL     Blood Gas, Arterial - [[id number]]  (Abnormal) Collected: 01/08/25 1943    Specimen: Arterial Blood Updated: 01/08/25 1947     Site Arterial Line     Avi's Test N/A     pH, Arterial 7.236 pH units      pCO2, Arterial 70.6 mm Hg      pO2, Arterial 159.9 mm Hg      HCO3, Arterial 30.0 mmol/L      Base Excess, Arterial 0.3 mmol/L      Comment: Serial Number: [id number]:  [id number]        O2 Saturation, Arterial 99.0 %      CO2 Content 32.1 mmol/L      Barometric Pressure for Blood Gas 755.6000 mmHg      Modality NRB     Flow Rate 15.0000 lpm      Rate 16 Breaths/minute      Notified Who marlene tang     Read Back Yes     Notified Time --     Hemodilution No     Device Comment sat 96             Assessment & Plan       Ground glass opacity present on imaging of lung       Assessment & Plan  Post op day 1 s/p right upper lobectomy. Intermittently on Bipap overnight. Appears improved today on 4 to 5L nasal cannula. Continue chest tube to waterseal. Repeat chest x-ray in the morning. Will ask PT to evaluate patient.  Encourage pulmonary toilet and increase mobilization.  Continue current care in ICU.    Emma Salter DNP, APRN  [facility]  01/09/25  14:24 EST

## 2025-01-09 NOTE — PERIOPERATIVE NURSING NOTE
Abgs obtained per Dr. Magana's request, results given to Dr. Kt Merino, at this time ok with gases.  Patient sleepy but alert at this time.  Follows commands, switched from face mask to 6 LPM NC.

## 2025-01-09 NOTE — CONSULTS
Patient Care Team:  Dorinda Hastings MD as PCP - General (Family Medicine)      Subjective     Patient is a 69 y.o. female.  I was notified at 9:40 PM patient in ICU with consult apparently she is status post bronchoscopy right video-assisted thoracoscopy with da Sera robot with wedge resection x 2 and completion right upper lobe lobectomy mediastinal and hilar lymph node dissection and intercostal nerve block.  Patient is a never smoker and no significant tobacco exposure patient denies any history of sleep apnea says she does not snore much but she always sleeps on her belly.  Does not wake herself up with shortness of breath.  No family history of sleep apnea.  Never been studied for sleep apnea.  She did have a blood clot the etiology was not clear she was on 6 months of therapy that was in March of last year and finished about 10/24.  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  No heart disease she is having some soreness on the right chest now otherwise not much pain.  She has no liver or kidney disease that she is aware of no diabetes no hypertension no liver disease or hepatitis she has occasional social alcohol.      Review of Systems:  No polyuria polydipsia heat or cold intolerance no headaches or visual changes      History  Past Medical History:   Diagnosis Date    Allergic topical steroids    Anemia Episodic    At risk for central sleep apnea     STOP BANG 5    Breast lump in female 01/14/2019    Chronic nasopharyngitis     Elevated BP without diagnosis of hypertension     History of pulmonary embolism     Hypertension     Lesion of lung     Morbid obesity     Neuromuscular disorder 2010    Osteoarthritis     Rash     UNDER LEFT BREAST    Sneddon-Hanna disease     Stasis dermatitis     Urinary tract infection 2022     Past Surgical History:   Procedure Laterality Date    BRONCHOSCOPY WITH ION ROBOTIC ASSIST N/A  10/30/2024    Procedure: BRONCHOSCOPY WITH ION ROBOT, LAVAGE, FINE NEEDLE ASPIRATIONS AND BIOPSIES;  Surgeon: Radha Marcus MD;  [facility];  Service: Robotics - Pulmonary;  Laterality: N/A;    CATARACT EXTRACTION Bilateral     COLONOSCOPY  2015    CYST REMOVAL      MULTIPLE    HYSTEROSCOPY ENDOMETRIAL ABLATION      JOINT REPLACEMENT  R-2013 L-2015    LAPAROSCOPIC CHOLECYSTECTOMY  10/2011    OVARIAN CYST REMOVAL       Social History     Socioeconomic History    Marital status:    Tobacco Use    Smoking status: Never     Passive exposure: Never    Smokeless tobacco: Never   Vaping Use    Vaping status: Never Used   Substance and Sexual Activity    Alcohol use: Yes     Comment: maybe 2x year    Drug use: Never    Sexual activity: Not Currently     Partners: Male     Birth control/protection: Post-menopausal, Surgical     Comment: Ablation     Family History   Problem Relation Age of Onset    Hypertension Mother     Arthritis Mother     Cancer Mother         bladder    Heart disease Mother     Hyperlipidemia Mother     Stroke Mother     Thyroid disease Mother     Dementia Mother     Prostate cancer Father     Hypertension Father     Arthritis Father     Cancer Father         prostate, bone    Heart disease Father     Migraines Sister     Hypertension Sister     Arthritis Maternal Aunt     Malig Hyperthermia Neg Hx          Allergies:  Zoster vac recomb adjuvanted, Compazine [prochlorperazine edisylate], Corticosteroids, and Other    Medications:  Prior to Admission medications    Medication Sig Start Date End Date Taking? Authorizing Provider   colestipol (COLESTID) 1 g tablet TAKE 2 TABLETS BY MOUTH TWICE A DAY 10/14/24  Yes Dorinda Hastings MD   dapsone 25 MG tablet Take 2 tablets by mouth 2 (Two) Times a Day. 1/27/22  Yes ProviderNadja MD   Fexofenadine HCl (ALLEGRA ALLERGY PO) Take 1 capsule by mouth As Needed. 1/1/00  Yes ProviderNadja MD   loperamide (IMODIUM A-D) 2 MG  "tablet Take 1 tablet by mouth As Needed.   Yes Nadja Birch MD   Multiple Vitamins-Minerals (MULTIVITAMIN WITH MINERALS) tablet tablet Take 1 tablet by mouth Daily. HOLD PRIOR TO SURG   Yes Nadja Birch MD   Dapsone 5 % topical gel APPLY TO FACE ONCE DAILY 2/11/22   Nadja Birch MD   furosemide (LASIX) 20 MG tablet TAKE 1 TABLET BY MOUTH EVERY DAY  Patient taking differently: Take 1 tablet by mouth. 8/29/24   Gary Farias MD   ibuprofen (Advil) 200 MG tablet Take  by mouth Every 6 (Six) Hours. HOLD PRIOR TO SURG    Nadja Birch MD     celecoxib, 100 mg, Oral, Q12H  [START ON 1/9/2025] cetirizine, 10 mg, Oral, Daily  [START ON 1/9/2025] cholestyramine light, 1 packet, Oral, Daily  dapsone, 50 mg, Oral, BID  gabapentin, 300 mg, Oral, Q8H  [START ON 1/9/2025] heparin (porcine), 5,000 Units, Subcutaneous, Q8H  ipratropium-albuterol, 3 mL, Nebulization, Q8H - RT  metoprolol tartrate, 25 mg, Oral, Q12H  senna-docusate sodium, 2 tablet, Oral, Nightly      dextrose 5 % and sodium chloride 0.45 % with KCl 20 mEq/L, 75 mL/hr, Last Rate: 75 mL/hr (01/08/25 2200)  lactated ringers, 9 mL/hr, Last Rate: 125 mL/hr (01/08/25 1702)        Objective     Vital Signs  Vital Sign Min/Max for last 24 hours  Temp  Min: 98.2 °F (36.8 °C)  Max: 98.3 °F (36.8 °C)   BP  Min: 99/53  Max: 166/86   Pulse  Min: 56  Max: 93   Resp  Min: 12  Max: 24   SpO2  Min: 89 %  Max: 100 %   Flow (L/min) (Oxygen Therapy)  Min: 2  Max: 15   No data recorded       Intake/Output Summary (Last 24 hours) at 1/8/2025 2206  Last data filed at 1/8/2025 2057  Gross per 24 hour   Intake 400 ml   Output 160 ml   Net 240 ml     I/O this shift:  In: 300 [I.V.:300]  Out: 160 [Chest Tube:160]  Last Weight and Admission Weight    There were no vitals filed for this visit.  Flowsheet Rows      Flowsheet Row First Filed Value   Admission Height 170.2 cm (67\") Documented at 01/08/2025 1053   Admission Weight --            Body mass " index is 65.6 kg/m².           Physical Exam:  General Appearance: Super morbidly obese white female BMI of over 65 who is resting in bed awake in no acute distress on about 3-1/2 L nasal cannula O2 with oxygen saturations of 5 to 96% while I was in the unit working on another patient nurse came to got me patient went to sleep and her sats dropped into the 60s they turned her oxygen all the way up to 6 L with no improvement.  Woke her up and her saturations immediately came right up into the upper 90s.  Eyes: Conjunctiva are clear and anicteric  ENT: Mucous membranes are moist she has a Mallampati 3 airway large tongue, nasal septum midline  Neck: No adenopathy or thyromegaly large neck trachea midline I do not appreciate jugular venous distention,   Lungs: She has a chest tube on the right there is some serosanguineous fluid less than 100 cc in the Pleur-evac no airleak her lungs are pretty clear the right is more decreased than the left no rales no rhonchi and she is not laboring.  Cardiac: Regular rate rhythm no murmur no gallop  Abdomen: Obese soft nontender no palpable  : Not examined  Musculoskeletal: Grossly normal  Skin: Warm and dry no jaundice no petechiae  Neuro: She is alert and oriented cooperative following commands and grossly intact  Extremities/P Vascular: No clubbing no cyanosis no edema she has a left radial A-line with good waveform good right radial pulse and palpable dorsalis pedis pulses bilaterally  MSE: She is a pleasant lady she is appropriate      Labs:  Results from last 7 days   Lab Units 01/07/25  1014   GLUCOSE mg/dL 102*   SODIUM mmol/L 139   POTASSIUM mmol/L 4.2   CO2 mmol/L 28.5   CHLORIDE mmol/L 102   ANION GAP mmol/L 8.5   CREATININE mg/dL 0.71   BUN mg/dL 11   BUN / CREAT RATIO  15.5   CALCIUM mg/dL 8.8     Estimated Creatinine Clearance: 129.6 mL/min (by C-G formula based on SCr of 0.71 mg/dL).      Results from last 7 days   Lab Units 01/07/25  1014   WBC 10*3/mm3 6.68   RBC  10*6/mm3 4.72   HEMOGLOBIN g/dL 12.8   HEMATOCRIT % 41.5   MCV fL 87.9   MCH pg 27.1   MCHC g/dL 30.8*   RDW % 12.4   RDW-SD fl 39.7   MPV fL 10.0   PLATELETS 10*3/mm3 220     Results from last 7 days   Lab Units 01/08/25 1943   PH, ARTERIAL pH units 7.236*   PO2 ART mm Hg 159.9*   PCO2, ARTERIAL mm Hg 70.6*   HCO3 ART mmol/L 30.0*                         Microbiology Results (last 10 days)       ** No results found for the last 240 hours. **              Diagnostics:  XR Chest 1 View    Result Date: 1/8/2025  AP CHEST  HISTORY: Postop lung surgery  COMPARISON: 11/1/2024  FINDINGS: There is a right chest tube in place. There appears to be a small right apical pneumothorax. There is a small amount of pleural fluid laterally on the right. There is increased pulmonary vascular congestion. There is increased opacity in the right lung base. Heart size appears enlarged.      Right-sided chest tube in place. There appears to be a small right pleural effusion. Additional findings as described    This report was finalized on 1/8/2025 8:15 PM by Dr. Marcin Mitchell M.D on Workstation: [id number]      Arterial Line    Result Date: 1/8/2025  Silvestre Chakraborty MD     1/8/2025 12:39 PM Arterial Line Patient reassessed immediately prior to procedure Patient location during procedure: pre-op Start time: 1/8/2025 12:26 PM Stop Time:1/8/2025 12:34 PM  Line placed for hemodynamic monitoring. Performed By Anesthesiologist: Silvestre Chakraborty MD Preanesthetic Checklist Completed: patient identified and risks and benefits discussed Arterial Line Prep  Sterile Tech: gloves Prep: ChloraPrep Patient monitoring: blood pressure monitoring, continuous pulse oximetry and EKG Arterial Line Procedure Laterality:left Location:  radial artery Catheter size: 20 G Guidance: ultrasound guided PROCEDURE NOTE/ULTRASOUND INTERPRETATION.  Using ultrasound guidance the potential vascular sites for insertion of the catheter were visualized to determine the  patency of the vessel to be used for vascular access.  After selecting the appropriate site for insertion, the needle was visualized under ultrasound being inserted into the radial artery, followed by ultrasound confirmation of wire and catheter placement. There were no abnormalities seen on ultrasound; an image was taken; and the patient tolerated the procedure with no complications. Successful placement: yes Images: still images obtained, printed/placed on the chart Post Assessment Dressing Type: occlusive dressing applied and secured with tape. Complications no Patient Tolerance: patient tolerated the procedure well with no apparent complications Additional Notes Using ultrasound guidance the potential vascular sites for insertion of the catheter were visualized to determine the patency of the vessel to be used for vascular access.  After selecting the appropriate site for insertion, the needle was visualized under ultrasound being inserted into the artery, followed by ultrasound confirmation of wire and catheter placement.  There were no abnormalities seen on ultrasound; an image was taken/ and the patient tolerated the procedure with no complications.     Peripheral Block    Result Date: 1/8/2025  Silvestre Chakraborty MD     1/8/2025 12:42 PM Peripheral Block Patient reassessed immediately prior to procedure Patient location during procedure: pre-op Start time: 1/8/2025 12:11 PM Stop time: 1/8/2025 12:18 PM Reason for block: at surgeon's request and post-op pain management Performed by Anesthesiologist: Silvestre Chakraborty MD Preanesthetic Checklist Completed: patient identified, risks and benefits discussed, pre-op evaluation and timeout performed Prep: Pt Position: sitting Sterile barriers:mask Prep: ChloraPrep Patient monitoring: blood pressure monitoring, continuous pulse oximetry and EKG Procedure Sedation: yes Performed under: local infiltration Guidance:ultrasound guided ULTRASOUND INTERPRETATION.  Using  ultrasound guidance a 21 G gauge needle was placed in close proximity to the nerve, at which point, under ultrasound guidance anesthetic was injected in the area of the nerve and spread of the anesthesia was seen on ultrasound in close proximity thereto.  There were no abnormalities seen on ultrasound; a digital image was taken; and the patient tolerated the procedure with no complications. Images:still images obtained, printed/placed on chart Laterality:right Block Type:erector spinae block Injection Technique:single-shot Needle Type:short-bevel Needle Gauge:21 G Medications Used: bupivacaine-EPINEPHrine PF (MARCAINE w/EPI) 0.5% -[id number] injection - Injection  15 mL - 1/8/2025 12:18:00 PM bupivacaine liposome (EXPAREL) 1.3 % injection - Infiltration  10 mL - 1/8/2025 12:18:00 PM Post Assessment Patient Tolerance:comfortable throughout block Complications:no Additional Notes Ultrasound Interpretation:  Using ultrasound guidance the needle was placed under the erector spinae muscle over the T6 transverse process.  Local was seen spreading cephalad and caudad.  No abnormalities noted. Post-Op Diagnosis Codes:    * Ground glass opacity present on imaging of lung [R91.8] Performed by: Silvestre Chakraborty MD     Stress Test With Myocardial Perfusion - One Day    Result Date: 1/7/2025    One-day stress only Lexiscan protocol completed without low-level exercise.  No angina or ischemic ECG changes noted.   Left ventricular ejection fraction is normal (Calculated EF = 57% without obvious wall motion abnormality).   Stress-only myocardial perfusion imaging indicates a normal myocardial perfusion study with no evidence of ischemia, no resting perfusion needed. Impressions are consistent with a low risk study.     Results for orders placed during the hospital encounter of 03/18/22    Adult Transthoracic Echo Complete w/ Color, Spectral and Contrast if Necessary Per Protocol    Interpretation Summary  · Calculated left ventricular  EF = 59% Estimated left ventricular EF was in agreement with the calculated left ventricular EF. Left ventricular systolic function is normal. Normal left ventricular cavity size noted. Left ventricular wall thickness is consistent with mild concentric hypertrophy. All left ventricular wall segments contract normally. Left ventricular diastolic function is consistent with (grade I) impaired relaxation.  · The right ventricular cavity is mildly dilated. Normal right ventricular systolic function noted.      Chest x-ray reviewed and agree with radiology right chest tube small right apical pneumothorax despite the portable view the cardiac silhouette still is probably enlarged, may be some pulmonary vascular congestion maybe a little right pleural effusion    Assessment & Plan     1.8 cm groundglass nodule right upper lobe negative etiology on bronchoscopic biopsy she went for right upper lobe lobectomy hilar node dissection today  I am almost certain she has significant sleep apnea she may well have obesity hypoventilation as well although her serum bicarbonate was still within the upper limits of normal preoperatively.  She desaturates severely and earlier today she was hypercapnic when they put her on a nonrebreather trying to keep her oxygen up.  Difficult situation and we going to check with Dr. Marcus and see if we can use BiPAP/noninvasive ventilation on this patient.  Super morbid obesity with a BMI greater than 65 obviously long-term weight loss would be beneficial    Addendum: Nursing was able to get a hold of Dr. Goodson covering for Dr. Marcus and he okayed noninvasive ventilation  Eulalio Gabriel Jr, MD  01/08/25  22:06 EST    Time: Critical care time 51 minutes

## 2025-01-09 NOTE — BRIEF OP NOTE
THORACOSCOPY WITH LOBECTOMY WITH DAVINCI ROBOT  Progress Note    Kortney Thomasmelida  1/8/2025    Pre-op Diagnosis:   Ground glass opacity present on imaging of lung [R91.8]       Post-Op Diagnosis Codes:     * Ground glass opacity present on imaging of lung [R91.8]    Procedure/CPT® Codes:        Procedure(s):  BRONCHOSCOPY, RIGHT VIDEO ASSISTED THORACOSCOPY WITH DAVINCI ROBOT with wedge resection X2, COMPLETION RIGHT UPPER LOBECTOMY, MEDIASTINAL AND HILAR LYMPH NODE DISSECTION, INTERCOSTAL NERVE BLOCK      Synoptic portion:    Intent:  curative    Surgical procedure performed:  Resection of at least one lobe or bilobectomy - Lobectomy WITH mediastinal lymph node dissection    Mediastinal lymph node stations resected:  4R Lower Paratracheal (right), 7 Subcarinal, and 8 Paraesophageal (below azra)    Hilar lymph node station(s) resected:  10 Hilar, 11 Interlobar, and 12 Lobar          Surgeon(s):  Radha Marcus MD    Anesthesia: General with Block    Staff:   Circulator: Ivelisse Marroquin RN; Jennifer Brown RN  Scrub Person: Adan Nieves; Tonio Whitaker; She Faria  Assistant: Blaine Gtz CSA; Annia Flynn CSA  Assistant: Blaine Gtz CSA; Annia Flynn CSA      Estimated Blood Loss: 200ml    Urine Voided: * No values recorded between 1/8/2025  1:37 PM and 1/8/2025  6:55 PM *    Specimens:                Specimens       ID Source Type Tests Collected By Collected At Frozen?    A Lung, Right Upper Lobe Tissue TISSUE PATHOLOGY EXAM   Radha Marcus MD 1/8/25 1505 Yes    Description: RIGHT UPPER LOBE WEDGE RESECTION FOR FROZEN PLEASE CALL OR24     B Lymph Node Tissue TISSUE PATHOLOGY EXAM   Radha Marcus MD 1/8/25 1521 No    Description: RIGHT LEVEL 8; FRESH FOR PERMAMENT    C Lymph Node Tissue TISSUE PATHOLOGY EXAM   Radha Marcus MD 1/8/25 1525 No    Description: LEVEL 7; FRESH FOR PERMAMENT    D Lymph Node Tissue TISSUE PATHOLOGY EXAM   Radha Marcus MD 1/8/25 1534 No     Description: RIGHT LEVEL 4; FRESH FOR PERMAMENT    E Lung, Right Upper Lobe Tissue TISSUE PATHOLOGY EXAM   Radha Marcus MD 1/8/25 1550 Yes    Description: RIGHT UPPER LOBE WEDGE RESECTION #2 FOR FROZEN PLEASE CALL OR24     F Lymph Node Tissue TISSUE PATHOLOGY EXAM   Radha Marcus MD 1/8/25 1621 No    Description: RIGHT LEVEL 10; FRESH FOR PERMAMENT    G Lymph Node Tissue TISSUE PATHOLOGY EXAM   Radha Marcus MD 1/8/25 1716 No    Description: RIGHT LEVEL 11; FRESH FOR PERMAMENT    H Lymph Node Tissue TISSUE PATHOLOGY EXAM   Radha Marcus MD 1/8/25 1719 No    Description: RIGHT LEVEL 12; FRESH FOR PERMAMENT    I Lymph Node Tissue TISSUE PATHOLOGY EXAM   Radha Marcus MD 1/8/25 1751 No    Description: RIGHT LEVEL 10 #2; FRESH FOR PERMAMENT    J Lung, Right Upper Lobe Tissue TISSUE PATHOLOGY EXAM   Radha Marcus MD 1/8/25 1815 No    Description: RIGHT UPPER LOBECTOMY FRESH FOR PERMANENT; STITCH MARKS PRIOR CANCER STAPLE LINE                  Drains:   Chest Tube 1 Right (Active)       [REMOVED] Urethral Catheter Silicone;Straight-tip;Double-lumen 16 Fr. (Removed)       Findings: RUL GGO  with adenoCA        Complications: None apparent    Assistant: Blaine Gtz CSA; Annia Flynn CSA  was responsible for performing the following activities: Retraction, Suction, Closing, Placing Dressing, and Held/Positioned Camera and their skilled assistance was necessary for the success of this case.    Radha Marcus MD     Date: 1/8/2025  Time: 19:03 EST

## 2025-01-10 ENCOUNTER — APPOINTMENT (OUTPATIENT)
Dept: GENERAL RADIOLOGY | Facility: HOSPITAL | Age: 70
DRG: 163 | End: 2025-01-10
Payer: MEDICARE

## 2025-01-10 LAB
ANION GAP SERPL CALCULATED.3IONS-SCNC: 7 MMOL/L (ref 5–15)
ARTERIAL PATENCY WRIST A: ABNORMAL
ARTERIAL PATENCY WRIST A: ABNORMAL
ATMOSPHERIC PRESS: 746 MMHG
ATMOSPHERIC PRESS: 750.1 MMHG
BASE EXCESS BLDA CALC-SCNC: 2.1 MMOL/L (ref 0–2)
BASE EXCESS BLDA CALC-SCNC: 4.8 MMOL/L (ref 0–2)
BDY SITE: ABNORMAL
BDY SITE: ABNORMAL
BUN SERPL-MCNC: 13 MG/DL (ref 8–23)
BUN/CREAT SERPL: 22 (ref 7–25)
CALCIUM SPEC-SCNC: 6.7 MG/DL (ref 8.6–10.5)
CHLORIDE SERPL-SCNC: 107 MMOL/L (ref 98–107)
CO2 BLDA-SCNC: >32.45 MMOL/L (ref 23–27)
CO2 BLDA-SCNC: >32.45 MMOL/L (ref 23–27)
CO2 SERPL-SCNC: 24 MMOL/L (ref 22–29)
CREAT SERPL-MCNC: 0.59 MG/DL (ref 0.57–1)
DEPRECATED RDW RBC AUTO: 38.9 FL (ref 37–54)
DEVICE COMMENT: ABNORMAL
EGFRCR SERPLBLD CKD-EPI 2021: 96.5 ML/MIN/1.73
ERYTHROCYTE [DISTWIDTH] IN BLOOD BY AUTOMATED COUNT: 12.1 % (ref 12.3–15.4)
GAS FLOW AIRWAY: 5 LPM
GLUCOSE BLDC GLUCOMTR-MCNC: 117 MG/DL (ref 70–130)
GLUCOSE BLDC GLUCOMTR-MCNC: 89 MG/DL (ref 70–130)
GLUCOSE SERPL-MCNC: 114 MG/DL (ref 65–99)
HCO3 BLDA-SCNC: 33.4 MMOL/L (ref 22–28)
HCO3 BLDA-SCNC: 33.5 MMOL/L (ref 22–28)
HCT VFR BLD AUTO: 31.9 % (ref 34–46.6)
HEMODILUTION: NO
HEMODILUTION: NO
HGB BLD-MCNC: 10.1 G/DL (ref 12–15.9)
INHALED O2 CONCENTRATION: 30 %
Lab: ABNORMAL
Lab: ABNORMAL
MCH RBC QN AUTO: 28.1 PG (ref 26.6–33)
MCHC RBC AUTO-ENTMCNC: 31.7 G/DL (ref 31.5–35.7)
MCV RBC AUTO: 88.6 FL (ref 79–97)
MODALITY: ABNORMAL
MODALITY: ABNORMAL
NOTIFIED WHO: ABNORMAL
NOTIFIED WHO: ABNORMAL
O2 A-A PPRESDIFF RESPIRATORY: 0.6 MMHG
PCO2 BLDA: 67.4 MM HG (ref 35–45)
PCO2 BLDA: 88.4 MM HG (ref 35–45)
PH BLDA: 7.19 PH UNITS (ref 7.35–7.45)
PH BLDA: 7.3 PH UNITS (ref 7.35–7.45)
PLATELET # BLD AUTO: 152 10*3/MM3 (ref 140–450)
PMV BLD AUTO: 9.6 FL (ref 6–12)
PO2 BLD: 259 MM[HG] (ref 0–500)
PO2 BLDA: 75.4 MM HG (ref 80–100)
PO2 BLDA: 77.8 MM HG (ref 80–100)
POTASSIUM SERPL-SCNC: 4.5 MMOL/L (ref 3.5–5.2)
RBC # BLD AUTO: 3.6 10*6/MM3 (ref 3.77–5.28)
READ BACK: YES
READ BACK: YES
SAO2 % BLDCOA: 89.7 % (ref 92–98.5)
SAO2 % BLDCOA: 93.3 % (ref 92–98.5)
SET MECH RESP RATE: 28
SODIUM SERPL-SCNC: 138 MMOL/L (ref 136–145)
TOTAL RATE: 16 BREATHS/MINUTE
TOTAL RATE: 28 BREATHS/MINUTE
VENTILATOR MODE: ABNORMAL
VT ON VENT VENT: 560 ML
WBC NRBC COR # BLD AUTO: 11.45 10*3/MM3 (ref 3.4–10.8)

## 2025-01-10 PROCEDURE — 94799 UNLISTED PULMONARY SVC/PX: CPT

## 2025-01-10 PROCEDURE — 25010000002 ACETAZOLAMIDE PER 500 MG: Performed by: INTERNAL MEDICINE

## 2025-01-10 PROCEDURE — 82948 REAGENT STRIP/BLOOD GLUCOSE: CPT

## 2025-01-10 PROCEDURE — 25010000002 FUROSEMIDE PER 20 MG: Performed by: INTERNAL MEDICINE

## 2025-01-10 PROCEDURE — 25010000002 HEPARIN (PORCINE) PER 1000 UNITS: Performed by: THORACIC SURGERY (CARDIOTHORACIC VASCULAR SURGERY)

## 2025-01-10 PROCEDURE — 94761 N-INVAS EAR/PLS OXIMETRY MLT: CPT

## 2025-01-10 PROCEDURE — 80048 BASIC METABOLIC PNL TOTAL CA: CPT | Performed by: STUDENT IN AN ORGANIZED HEALTH CARE EDUCATION/TRAINING PROGRAM

## 2025-01-10 PROCEDURE — 85027 COMPLETE CBC AUTOMATED: CPT | Performed by: STUDENT IN AN ORGANIZED HEALTH CARE EDUCATION/TRAINING PROGRAM

## 2025-01-10 PROCEDURE — 94664 DEMO&/EVAL PT USE INHALER: CPT

## 2025-01-10 PROCEDURE — 71045 X-RAY EXAM CHEST 1 VIEW: CPT

## 2025-01-10 PROCEDURE — 94760 N-INVAS EAR/PLS OXIMETRY 1: CPT

## 2025-01-10 PROCEDURE — 82803 BLOOD GASES ANY COMBINATION: CPT

## 2025-01-10 PROCEDURE — 99024 POSTOP FOLLOW-UP VISIT: CPT | Performed by: NURSE PRACTITIONER

## 2025-01-10 PROCEDURE — 82803 BLOOD GASES ANY COMBINATION: CPT | Performed by: INTERNAL MEDICINE

## 2025-01-10 RX ORDER — ACETAZOLAMIDE 500 MG/5ML
500 INJECTION, POWDER, LYOPHILIZED, FOR SOLUTION INTRAVENOUS ONCE
Status: COMPLETED | OUTPATIENT
Start: 2025-01-10 | End: 2025-01-10

## 2025-01-10 RX ORDER — FUROSEMIDE 10 MG/ML
20 INJECTION INTRAMUSCULAR; INTRAVENOUS ONCE
Status: COMPLETED | OUTPATIENT
Start: 2025-01-10 | End: 2025-01-10

## 2025-01-10 RX ADMIN — ACETAMINOPHEN 1000 MG: 500 TABLET, FILM COATED ORAL at 08:54

## 2025-01-10 RX ADMIN — IPRATROPIUM BROMIDE AND ALBUTEROL SULFATE 3 ML: 2.5; .5 SOLUTION RESPIRATORY (INHALATION) at 00:22

## 2025-01-10 RX ADMIN — DAPSONE 50 MG: 100 TABLET ORAL at 21:25

## 2025-01-10 RX ADMIN — HEPARIN SODIUM 5000 UNITS: 5000 INJECTION INTRAVENOUS; SUBCUTANEOUS at 16:50

## 2025-01-10 RX ADMIN — IPRATROPIUM BROMIDE AND ALBUTEROL SULFATE 3 ML: 2.5; .5 SOLUTION RESPIRATORY (INHALATION) at 14:47

## 2025-01-10 RX ADMIN — MUPIROCIN 1 APPLICATION: 20 OINTMENT TOPICAL at 21:27

## 2025-01-10 RX ADMIN — GABAPENTIN 300 MG: 300 CAPSULE ORAL at 21:27

## 2025-01-10 RX ADMIN — METOPROLOL TARTRATE 25 MG: 25 TABLET, FILM COATED ORAL at 21:22

## 2025-01-10 RX ADMIN — GABAPENTIN 300 MG: 300 CAPSULE ORAL at 16:51

## 2025-01-10 RX ADMIN — SENNOSIDES AND DOCUSATE SODIUM 2 TABLET: 50; 8.6 TABLET ORAL at 21:28

## 2025-01-10 RX ADMIN — FUROSEMIDE 20 MG: 10 INJECTION, SOLUTION INTRAMUSCULAR; INTRAVENOUS at 11:28

## 2025-01-10 RX ADMIN — CELECOXIB 100 MG: 100 CAPSULE ORAL at 08:54

## 2025-01-10 RX ADMIN — ACETAMINOPHEN 1000 MG: 500 TABLET, FILM COATED ORAL at 16:51

## 2025-01-10 RX ADMIN — ACETAMINOPHEN 1000 MG: 500 TABLET, FILM COATED ORAL at 21:24

## 2025-01-10 RX ADMIN — IPRATROPIUM BROMIDE AND ALBUTEROL SULFATE 3 ML: 2.5; .5 SOLUTION RESPIRATORY (INHALATION) at 07:22

## 2025-01-10 RX ADMIN — DAPSONE 50 MG: 100 TABLET ORAL at 08:54

## 2025-01-10 RX ADMIN — CETIRIZINE HYDROCHLORIDE 10 MG: 10 TABLET, FILM COATED ORAL at 08:54

## 2025-01-10 RX ADMIN — HEPARIN SODIUM 5000 UNITS: 5000 INJECTION INTRAVENOUS; SUBCUTANEOUS at 21:30

## 2025-01-10 RX ADMIN — GABAPENTIN 300 MG: 300 CAPSULE ORAL at 05:51

## 2025-01-10 RX ADMIN — ACETAZOLAMIDE 500 MG: 500 INJECTION, POWDER, LYOPHILIZED, FOR SOLUTION INTRAVENOUS at 11:28

## 2025-01-10 RX ADMIN — CELECOXIB 100 MG: 100 CAPSULE ORAL at 21:28

## 2025-01-10 RX ADMIN — HEPARIN SODIUM 5000 UNITS: 5000 INJECTION INTRAVENOUS; SUBCUTANEOUS at 05:52

## 2025-01-10 RX ADMIN — MUPIROCIN 1 APPLICATION: 20 OINTMENT TOPICAL at 08:55

## 2025-01-10 RX ADMIN — CHOLESTYRAMINE 4 G: 4 POWDER, FOR SUSPENSION ORAL at 08:54

## 2025-01-10 NOTE — SIGNIFICANT NOTE
01/10/25 1001   OTHER   Discipline physical therapist   Rehab Time/Intention   Session Not Performed unable to treat, medical status change  (Hold today per RN. Will follow up tomorrow)   Recommendation   PT - Next Appointment 01/11/25

## 2025-01-10 NOTE — PROGRESS NOTES
"    Chief Complaint: Ground glass opacity, NSCLC  S/P: Right upper lobectomy  POD # 2    Subjective:  Symptoms:  Stable.  She reports shortness of breath and weakness.    Diet:  NPO.    Activity level: Impaired due to weakness.    Pain:  She complains of pain that is mild.  She reports pain is improving.  Pain is well controlled.    Lethargic on exam today.    Vital Signs:  Temp:  [97.3 °F (36.3 °C)-98.2 °F (36.8 °C)] 97.3 °F (36.3 °C)  Heart Rate:  [51-70] 57  Resp:  [16-20] 16  BP: (56-95)/(20-57) 93/46  Arterial Line BP: ()/(43-70) 137/59    Intake & Output (last day)         01/09 0701  01/10 0700 01/10 0701  01/11 0700    P.O. 20     I.V. (mL/kg)      IV Piggyback      Total Intake(mL/kg) 20 (0.1)     Urine (mL/kg/hr) 350 (0.1)     Chest Tube 60     Total Output 410     Net -390                   Objective:  General Appearance:  In no acute distress, comfortable and ill-appearing.    Vital signs: (most recent): Blood pressure 93/46, pulse 57, temperature 97.3 °F (36.3 °C), resp. rate 16, height 170.2 cm (67\"), weight (!) 192 kg (423 lb 4.5 oz), SpO2 97%, not currently breastfeeding.  No fever.    HEENT: (Bipap)    Lungs:  Normal effort.    Heart: Bradycardia.    Chest: Chest wall tenderness present.    Extremities: Decreased range of motion.    Neurological: (Arousable, but lethargic).    Skin:  Warm and dry.                Chest tube:   Site: Right, Clean, Dry, and Intact  Suction: waterseal  Air Leak: negative  24 Hour Total: 60ml    Results Review:     I reviewed the patient's new clinical results.  I reviewed the patient's new imaging results and agree with the interpretation.    Imaging Results (Last 24 Hours)       Procedure Component Value Units Date/Time    XR Chest 1 View [[id number]] Collected: 01/10/25 0511     Updated: 01/10/25 0515    Narrative:      SINGLE VIEW OF THE CHEST     HISTORY: Chest tube management     COMPARISON: January 9, 2025     FINDINGS:  There is cardiomegaly. There is " vascular congestion. There is bibasilar  atelectasis. No pneumothorax is seen. No large effusion is identified.  Lung volumes remain diminished.       Impression:      No significant interval change.     This report was finalized on 1/10/2025 5:12 AM by Dr. Joann Garcia M.D on Workstation: [id number]               Lab Results:     Lab Results (last 24 hours)       Procedure Component Value Units Date/Time    POC Glucose Once [[id number]]  (Normal) Collected: 01/10/25 1220    Specimen: Blood Updated: 01/10/25 1222     Glucose 117 mg/dL     Blood Gas, Arterial - [[id number]]  (Abnormal) Collected: 01/10/25 0853    Specimen: Arterial Blood Updated: 01/10/25 0857     Site Arterial Line     Avi's Test N/A     pH, Arterial 7.186 pH units      pCO2, Arterial 88.4 mm Hg      pO2, Arterial 75.4 mm Hg      HCO3, Arterial 33.4 mmol/L      Base Excess, Arterial 2.1 mmol/L      Comment: Serial Number: [id number]:  [id number]        O2 Saturation, Arterial 89.7 %      CO2 Content >32.45 mmol/L      Barometric Pressure for Blood Gas 750.1000 mmHg      Modality Cannula     Flow Rate 5.0000 lpm      Rate 16 Breaths/minute      Notified Who james wolf     Read Back Yes     Notified Time --     Hemodilution No    Tissue Pathology Exam [[id number]] Collected: 01/08/25 1505    Specimen: Tissue from Lung, Right Upper Lobe; Tissue from Lymph Node; Tissue from Lymph Node; Tissue from Lymph Node; Tissue from Lung, Right Upper Lobe; Tissue from Lymph Node; Tissue from Lymph Node; Tissue from Lymph Node; Tissue from Lymph Node; Tissue from Lung, Right Upper Lobe Updated: 01/10/25 0817     Case Report --     Surgical Pathology Report                         Case: [id number]                                  Authorizing Provider:  Radha Marcus MD        Collected:           01/08/2025 03:05 PM          Ordering Location:     [facility]  Received:            01/08/2025 03:18 PM                                 MAIN OR                                                                       Pathologist:           Evelia Acosta MD                                                          Specimens:   1) - Lung, Right Upper Lobe, RIGHT UPPER LOBE WEDGE RESECTION FOR FROZEN PLEASE CALL                OR24                                                                                           2) - Lung, Right Upper Lobe, RIGHT UPPER LOBE WEDGE RESECTION #2 FOR FROZEN PLEASE                  CALL OR24                                                                                      3) - Lymph Node, RIGHT LEVEL 8; FRESH FOR PERMAMENT                                                 4) - Lymph Node, LEVEL 7; FRESH FOR PERMAMENT                                                       5) - Lymph Node, RIGHT LEVEL 4; FRESH FOR PERMAMENT                                                 6) - Lymph Node, RIGHT LEVEL 10; FRESH FOR PERMAMENT                                                7) - Lymph Node, RIGHT LEVEL 11; FRESH FOR PERMAMENT                                                8) - Lymph Node, RIGHT LEVEL 12; FRESH FOR PERMAMENT                                                9) - Lymph Node, RIGHT LEVEL 10 #2; FRESH FOR PERMAMENT                                             10) - Lung, Right Upper Lobe, RIGHT UPPER LOBECTOMY FRESH FOR PERMANENT; STITCH LYONS               PRIOR CANCER STAPLE LINE                                                                    Final Diagnosis --     1.  Lung, right upper lobe, wedge resection: (15 g).   A.  Benign pulmonary parenchyma.    2.  Lung, right upper lobe, wedge resection #2: (4 g)   A.  Adenocarcinoma, acinar type with peripheral lepidic pattern; see synoptic template for complete tumor details.    3.  Lymph node, right level 8, excision:    A.  1 benign lymph node (0/1).    4.  Lymph node, right level 7, excision:    A.  1 benign lymph node (0/1).    5.  Lymph node, right level 4,  excision:    A.  1 benign lymph node (0/1).    6.  Lymph node, right level 10, excision:    A.  1 benign lymph node (0/1).    7.  Lymph node, right level 11, excision:    A.  1 benign lymph node (0/1).    8.  Lymph node, right level 12, excision:    A.  1 benign lymph node (0/1).    9.  Lymph node, right level 10 #2, excision:    A.  1 benign lymph node (0/1).    10.  Lung, right upper lobe, lobectomy: (179 g)   A.  Benign lung with area of parenchymal hemorrhage.       Synoptic Checklist --     LUNG  LUNG - All Specimens  8th Edition - Protocol posted: 9/21/2022    SPECIMEN     Procedure:    Lobectomy      Specimen Laterality:    Right     TUMOR     Tumor Focality:    Single focus      Tumor Site:    Upper lobe of lung      Tumor Size:           Total Tumor Size (size of entire tumor):    Greatest Dimension (Centimeters): 1.8 cm       Size of Invasive Component:    Greatest Dimension (Centimeters): 0.7 cm     Histologic Type:    Invasive acinar adenocarcinoma        Histologic Patterns Present:    Acinar        Histologic Patterns Present:    Lepidic      Visceral Pleura Invasion:    Not identified      Direct Invasion of Adjacent Structures:    Not applicable (no adjacent structures present)      Treatment Effect:    No known presurgical therapy      Lymphovascular Invasion:    Not identified     MARGINS     Margin Status for Invasive Carcinoma:    All margins negative for invasive carcinoma        Closest Margin(s) to Invasive Carcinoma:    Bronchial        Closest Margin(s) to Invasive Carcinoma:    Vascular        Distance from Invasive Carcinoma to Closest Margin:    4 cm     Margin Status for Non-Invasive Tumor:    All margins negative for non-invasive tumor     REGIONAL LYMPH NODES     Lymph Node(s) from Prior Procedures:    Not included      Regional Lymph Node Status:           :    All regional lymph nodes negative for tumor        Number of Lymph Nodes Examined:    7          Aide Site(s) Examined:     "4R: Lower paratracheal          Aide Site(s) Examined:    8R: Para-esophageal (below azra)          Aide Site(s) Examined:    10R: Hilar          Aide Site(s) Examined:    11R: Interlobar          Aide Site(s) Examined:    12R: Lobar          Aide Site(s) Examined:    7: Subcarinal     PATHOLOGIC STAGE CLASSIFICATION (pTNM, AJCC 8th Edition)     Reporting of pT, pN, and (when applicable) pM categories is based on information available to the pathologist at the time the report is issued. As per the AJCC (Chapter 1, 8th Ed.) it is the managing physician’s responsibility to establish the final pathologic stage based upon all pertinent information, including but potentially not limited to this pathology report.     pT Category:    pT1a      pN Category:    pN0        Comment --     PD-L1 has been ordered; the result will follow in an addendum.        Intraoperative Consultation --     Part 1 (right upper lobe wedge resection)-5 frozen blocks submitted.  Frozen section diagnosis-no tumor seen on representative sections.  Reported to Dr. Marcus at 3:45 PM for Dr. Flanagan.    Part 2 (right upper lobe wedge resection #2)-1 frozen blocks submitted.  Frozen section diagnosis-non-small cell carcinoma.  Present at the inked margin.  Reported to Dr. Marcus at 4:15 PM per Dr. Flanagan.       Gross Description --     1. Lung, Right Upper Lobe.  Received fresh for frozen section diagnosis, labeled \"right upper lobe wedge resection\" is a 15 g, 8.0 x 3.5 x 2.5 cm lung wedge resection with an 8.0 cm long staple line margin.  The pleural surface is smooth tan-red and focally hemorrhagic.  The pleura is inked blue and the staple line margin is inked orange.  The specimen is serially sectioned to reveal no obvious mass lesions.  5 representative sections are submitted for frozen section diagnosis and the remainder of the specimen is submitted for permanent sections as follows:    1 AFS-1 DFS- frozen section residue-random sections of " "lung tissue  1E-1L- remainder of the specimen    [facility]/uso/sw    2. Lung, Right Upper Lobe.  Received fresh for frozen section diagnosis, labeled \"right upper lobe wedge resection #2\" is a 4 g, 4.3 x 2.0 x 1.5 cm lung wedge resection with a 4.3 cm long staple line margin, which is inked orange.  The pleural surface is smooth tan-pink and is previously incised by the surgeon, conveying an obvious 1.8 x 1.5 x 1.3 cm solid tan-pink mass which is at the pleural surface and comes to within 0.1 cm of the parenchymal staple line margin.  The pleural surface is inked blue.  The remaining parenchyma is spongy tan-red.  Representative section of the mass is submitted to include the pleural surface and the parenchymal margin and the remainder of the specimen is entirely submitted as follows:    2 AFS- frozen section residue-mass to the pleural surface and parenchymal margin  2B-2C- remainder the mass to include the parenchymal margin and the pleural surface  2D- remainder of the specimen    [facility]/uso/sw  3. Lymph Node.  Received fresh and subsequently placed in formalin labeled with the patient's name and designated \"right level 8 lymph node is a gray-tan lymph node measuring 0.5 x 0.3 x 0.3 cm.  The specimen is submitted as received in cassette 3A.    t/uso/[facility]    4. Lymph Node.  Received fresh and subsequently placed in formalin labeled with the patient's name and designated \"level 7 lymph node is a gray-black lymph node measuring 0.8 x 0.7 x 0.4 cm.  The specimen is submitted as received in cassette 4A.    t/uso/[facility]    5. Lymph Node.  Received fresh and subsequently placed in formalin labeled with the patient's name and designated \"right level 4 lymph node is a 0.3 to 0.2 cm.  The specimen is filtered and submitted as received in cassette 5A.    t/uso/ [facility]  6. Lymph Node.    Received fresh and subsequently placed in formalin labeled with the patient's name and designated \"right level 10 lymph node\" is a fatty yellow to " "tan tissue measuring 1.0 x 0.5 x 0.3 cm.  The specimen is submitted as received in cassette 6A.    [facility]/o/[facility]    7. Lymph Node.  Received fresh and subsequently placed in formalin labeled with the patient's name and designated \" right level 11 lymph node\" is a tan-black tissue measuring 1.0 x 0.5 x 0.4 cm.  The specimen is submitted as received in cassette 7A.    [facility]    8. Lymph Node.  Received fresh and subsequently placed in formalin labeled with the patient's name and designated \"right level 12 lymph node\" is a gray-tan tissue measuring 1.0 x 0.5 x 0.4 cm.  The specimen is submitted as received in cassette 8A.    [facility]    9. Lymph Node.  Received fresh and subsequently placed in formalin labeled with the patient's name and designated \"right level 10 #2\" is a gray-black tissue measuring 0.9 x 0.7 x 0.3 cm.  The specimen is submitted as received in cassette 9A.    [facility]    10. Lung, Right Upper Lobe.  Received fresh and subsequently placed in formalin at 1847 labeled with the patient's name and designated \"right upper lobectomy stitch marks prior cancer staple line\" is a 179 g (post fixation) right upper lobectomy measuring 12.0 x 8.5 x 4.9 cm.  The pleural surface is purple-gray and predominantly smooth.  The specimen is received with a suture designating the prior wedge resection/cancer staple line.  The staple line is bifurcated and measures 7.5 and 4.5 cm.  The opposing hilar surface displays an additional staple line measuring 9.0 cm.  The bronchial and vascular margins are closed by series of metal staples.  The pleural surface area designated as previous cancer site is inked in blue.  The staple line is removed and the parenchymal margin is inked in orange.  Sectioning through the tissue reveals homogenous beefy red lung parenchyma without additional mass lesion nor tumor nodularity identified.  A 3.5 x 1.5 x 1.5 cm clot filled cavity is identified approximately 3.5 cm from the hilar " "margin and 4 cm from the sutured area.  The designated area measures approximately 4 cm from the hilar margin.  Representative sections are submitted as follows:    10A: Bronchial and vascular shave margins  10B-10D: Random representative sections from designated area demonstrating parenchymal and pleural margins  10 the E: Adjacent vessels  10F-10G clot filled cavity  10H: Random lung    [facility]         Special Stains --     Utilizing appropriate positive and negative controls immunohistochemical stains CK7, CK20, TTF-1 and Napsin are performed on block 2A.  The tumor is positive for CK7, TTF-1 and Napsin.  The tumor is negative for CK20.       Microscopic Description --     Unless \"gross only\" is specified, the final diagnosis for each specimen is based on microscopic examination of tissue.      Basic Metabolic Panel [[id number]]  (Abnormal) Collected: 01/10/25 0628    Specimen: Blood Updated: 01/10/25 0703     Glucose 114 mg/dL      BUN 13 mg/dL      Creatinine 0.59 mg/dL      Sodium 138 mmol/L      Potassium 4.5 mmol/L      Comment: Slight hemolysis detected by analyzer. Result may be falsely elevated.        Chloride 107 mmol/L      CO2 24.0 mmol/L      Calcium 6.7 mg/dL      BUN/Creatinine Ratio 22.0     Anion Gap 7.0 mmol/L      eGFR 96.5 mL/min/1.73     Narrative:      GFR Categories in Chronic Kidney Disease (CKD)      GFR Category          GFR (mL/min/1.73)    Interpretation  G1                     90 or greater         Normal or high (1)  G2                      60-89                Mild decrease (1)  G3a                   45-59                Mild to moderate decrease  G3b                   30-44                Moderate to severe decrease  G4                    15-29                Severe decrease  G5                    14 or less           Kidney failure          (1)In the absence of evidence of kidney disease, neither GFR category G1 or G2 fulfill the criteria for CKD.    eGFR calculation 2021 " CKD-EPI creatinine equation, which does not include race as a factor    CBC (No Diff) [[id number]]  (Abnormal) Collected: 01/10/25 0514    Specimen: Blood Updated: 01/10/25 0535     WBC 11.45 10*3/mm3      RBC 3.60 10*6/mm3      Hemoglobin 10.1 g/dL      Hematocrit 31.9 %      MCV 88.6 fL      MCH 28.1 pg      MCHC 31.7 g/dL      RDW 12.1 %      RDW-SD 38.9 fl      MPV 9.6 fL      Platelets 152 10*3/mm3     Blood Gas, Arterial - [[id number]]  (Abnormal) Collected: 01/09/25 2219    Specimen: Arterial Blood Updated: 01/09/25 2224     Site Arterial Line     Avi's Test N/A     pH, Arterial 7.208 pH units      pCO2, Arterial 70.2 mm Hg      pO2, Arterial 78.7 mm Hg      HCO3, Arterial 28.0 mmol/L      Base Excess, Arterial -1.4 mmol/L      Comment: Serial Number: [id number]:  [id number]        O2 Saturation, Arterial 91.8 %      CO2 Content 30.1 mmol/L      Barometric Pressure for Blood Gas 755.1000 mmHg      Modality Cannula     Flow Rate 5.0000 lpm      Rate 18 Breaths/minute      Notified Who Katie Mccormick     Read Back Yes     Notified Time --     Hemodilution No    POC Glucose Once [[id number]]  (Abnormal) Collected: 01/09/25 1730    Specimen: Blood Updated: 01/09/25 1731     Glucose 176 mg/dL     Blood Gas, Arterial - [[id number]]  (Abnormal) Collected: 01/09/25 1700    Specimen: Arterial Blood Updated: 01/09/25 1707     Site Arterial Line     Avi's Test N/A     pH, Arterial 7.247 pH units      pCO2, Arterial 70.1 mm Hg      pO2, Arterial 108.8 mm Hg      HCO3, Arterial 30.5 mmol/L      Base Excess, Arterial 1.3 mmol/L      Comment: Serial Number: [id number]:  [id number]        O2 Saturation, Arterial 97.0 %      A-a DO2 0.5 mmHg      CO2 Content >32.45 mmol/L      Barometric Pressure for Blood Gas 755.2000 mmHg      Modality BiPap     FIO2 40 %      Ventilator Mode NIV     Set Tidal Volume 560     Set Mech Resp Rate 18     Rate 20 Breaths/minute      Notified Who carlos laval rn     Read Back Yes     Notified Time --      Hemodilution No     Device Comment avaps 14-28 epap6     PO2/FIO2 272             Assessment & Plan       Ground glass opacity present on imaging of lung       Assessment & Plan    POD2 s/p right upper lobectomy. Remains intermittently on BiPAP due to hypercarbia.  Will likely need intubation later.  Await ABG results.  Continue chest tube to waterseal while on the ventilator.    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    Discussed with with the patient, , Dr. Marcus and Dr. Diaz.    Emma Salter, MANISHA, APRN  [facility]  01/10/25  13:31 EST

## 2025-01-10 NOTE — PLAN OF CARE
Goal Outcome Evaluation:      VSS, patient able to ambulate to chair x1 and ambulate to nurse's station and back, ABG remains unfavorable with pH 7.2 and CO2 70.  Patient cannot be on Bipap per Dr. Marcus.  Neurologically patient is still awake and alert.  No acute events this shift, will continue to monitor.

## 2025-01-10 NOTE — PLAN OF CARE
Goal Outcome Evaluation:         VSS, ABG this morning showed pH 7.1, CO2 88.  Patient was placed on Bipap per Dr. Marcus and Dr. Diaz.  Repeat ABG showed improvement and Bipap was removed.  Patient has been notably more lethargic today and has slept most of the day.  IS use encouraged and performed.  Patient sitting in chair position in bed.  No acute events this shift, will continue to monitor.

## 2025-01-10 NOTE — NURSING NOTE
This RN spoke with Dr. Goodson regarding patient becoming increasingly more lethargic and worsening ABG. No new orders. Patient not to be placed on BIPAP per Dr. Marcus.

## 2025-01-11 ENCOUNTER — APPOINTMENT (OUTPATIENT)
Dept: GENERAL RADIOLOGY | Facility: HOSPITAL | Age: 70
DRG: 163 | End: 2025-01-11
Payer: MEDICARE

## 2025-01-11 LAB
ALBUMIN SERPL-MCNC: 2.8 G/DL (ref 3.5–5.2)
ALBUMIN/GLOB SERPL: 0.9 G/DL
ALP SERPL-CCNC: 67 U/L (ref 39–117)
ALT SERPL W P-5'-P-CCNC: 22 U/L (ref 1–33)
ANION GAP SERPL CALCULATED.3IONS-SCNC: 5.7 MMOL/L (ref 5–15)
ARTERIAL PATENCY WRIST A: ABNORMAL
AST SERPL-CCNC: 26 U/L (ref 1–32)
ATMOSPHERIC PRESS: 744 MMHG
BASE EXCESS BLDA CALC-SCNC: 4 MMOL/L (ref 0–2)
BASOPHILS # BLD AUTO: 0.01 10*3/MM3 (ref 0–0.2)
BASOPHILS NFR BLD AUTO: 0.1 % (ref 0–1.5)
BDY SITE: ABNORMAL
BILIRUB SERPL-MCNC: 0.2 MG/DL (ref 0–1.2)
BUN SERPL-MCNC: 15 MG/DL (ref 8–23)
BUN/CREAT SERPL: 25.4 (ref 7–25)
CALCIUM SPEC-SCNC: 7.9 MG/DL (ref 8.6–10.5)
CHLORIDE SERPL-SCNC: 102 MMOL/L (ref 98–107)
CO2 BLDA-SCNC: >32.45 MMOL/L (ref 23–27)
CO2 SERPL-SCNC: 29.3 MMOL/L (ref 22–29)
CREAT SERPL-MCNC: 0.59 MG/DL (ref 0.57–1)
DEPRECATED RDW RBC AUTO: 38.4 FL (ref 37–54)
EGFRCR SERPLBLD CKD-EPI 2021: 96.5 ML/MIN/1.73
EOSINOPHIL # BLD AUTO: 0.12 10*3/MM3 (ref 0–0.4)
EOSINOPHIL NFR BLD AUTO: 1.1 % (ref 0.3–6.2)
ERYTHROCYTE [DISTWIDTH] IN BLOOD BY AUTOMATED COUNT: 12.1 % (ref 12.3–15.4)
GAS FLOW AIRWAY: 3 LPM
GLOBULIN UR ELPH-MCNC: 3 GM/DL
GLUCOSE BLDC GLUCOMTR-MCNC: 102 MG/DL (ref 70–130)
GLUCOSE BLDC GLUCOMTR-MCNC: 107 MG/DL (ref 70–130)
GLUCOSE BLDC GLUCOMTR-MCNC: 121 MG/DL (ref 70–130)
GLUCOSE BLDC GLUCOMTR-MCNC: 123 MG/DL (ref 70–130)
GLUCOSE BLDC GLUCOMTR-MCNC: 125 MG/DL (ref 70–130)
GLUCOSE SERPL-MCNC: 124 MG/DL (ref 65–99)
HCO3 BLDA-SCNC: 32.5 MMOL/L (ref 22–28)
HCT VFR BLD AUTO: 37.5 % (ref 34–46.6)
HEMODILUTION: NO
HGB BLD-MCNC: 11.7 G/DL (ref 12–15.9)
IMM GRANULOCYTES # BLD AUTO: 0.06 10*3/MM3 (ref 0–0.05)
IMM GRANULOCYTES NFR BLD AUTO: 0.6 % (ref 0–0.5)
LYMPHOCYTES # BLD AUTO: 0.47 10*3/MM3 (ref 0.7–3.1)
LYMPHOCYTES NFR BLD AUTO: 4.3 % (ref 19.6–45.3)
Lab: ABNORMAL
MCH RBC QN AUTO: 27.7 PG (ref 26.6–33)
MCHC RBC AUTO-ENTMCNC: 31.2 G/DL (ref 31.5–35.7)
MCV RBC AUTO: 88.7 FL (ref 79–97)
MODALITY: ABNORMAL
MONOCYTES # BLD AUTO: 0.69 10*3/MM3 (ref 0.1–0.9)
MONOCYTES NFR BLD AUTO: 6.3 % (ref 5–12)
NEUTROPHILS NFR BLD AUTO: 87.6 % (ref 42.7–76)
NEUTROPHILS NFR BLD AUTO: 9.54 10*3/MM3 (ref 1.7–7)
NOTIFIED WHO: ABNORMAL
NRBC BLD AUTO-RTO: 0 /100 WBC (ref 0–0.2)
NT-PROBNP SERPL-MCNC: 291 PG/ML (ref 0–900)
PCO2 BLDA: 65.6 MM HG (ref 35–45)
PH BLDA: 7.3 PH UNITS (ref 7.35–7.45)
PLATELET # BLD AUTO: 179 10*3/MM3 (ref 140–450)
PMV BLD AUTO: 9.6 FL (ref 6–12)
PO2 BLDA: 82 MM HG (ref 80–100)
POTASSIUM SERPL-SCNC: 4.2 MMOL/L (ref 3.5–5.2)
PROT SERPL-MCNC: 5.8 G/DL (ref 6–8.5)
RBC # BLD AUTO: 4.23 10*6/MM3 (ref 3.77–5.28)
READ BACK: YES
SAO2 % BLDCOA: 94.3 % (ref 92–98.5)
SODIUM SERPL-SCNC: 137 MMOL/L (ref 136–145)
TOTAL RATE: 20 BREATHS/MINUTE
WBC NRBC COR # BLD AUTO: 10.89 10*3/MM3 (ref 3.4–10.8)

## 2025-01-11 PROCEDURE — 94799 UNLISTED PULMONARY SVC/PX: CPT

## 2025-01-11 PROCEDURE — 82948 REAGENT STRIP/BLOOD GLUCOSE: CPT

## 2025-01-11 PROCEDURE — 97530 THERAPEUTIC ACTIVITIES: CPT

## 2025-01-11 PROCEDURE — 99024 POSTOP FOLLOW-UP VISIT: CPT | Performed by: SURGERY

## 2025-01-11 PROCEDURE — 97162 PT EVAL MOD COMPLEX 30 MIN: CPT

## 2025-01-11 PROCEDURE — 94761 N-INVAS EAR/PLS OXIMETRY MLT: CPT

## 2025-01-11 PROCEDURE — 94660 CPAP INITIATION&MGMT: CPT

## 2025-01-11 PROCEDURE — 25010000002 ONDANSETRON PER 1 MG: Performed by: THORACIC SURGERY (CARDIOTHORACIC VASCULAR SURGERY)

## 2025-01-11 PROCEDURE — 83880 ASSAY OF NATRIURETIC PEPTIDE: CPT | Performed by: HOSPITALIST

## 2025-01-11 PROCEDURE — 71045 X-RAY EXAM CHEST 1 VIEW: CPT

## 2025-01-11 PROCEDURE — 85025 COMPLETE CBC W/AUTO DIFF WBC: CPT | Performed by: STUDENT IN AN ORGANIZED HEALTH CARE EDUCATION/TRAINING PROGRAM

## 2025-01-11 PROCEDURE — 25010000002 FUROSEMIDE PER 20 MG: Performed by: HOSPITALIST

## 2025-01-11 PROCEDURE — 25010000002 HEPARIN (PORCINE) PER 1000 UNITS: Performed by: THORACIC SURGERY (CARDIOTHORACIC VASCULAR SURGERY)

## 2025-01-11 PROCEDURE — 80053 COMPREHEN METABOLIC PANEL: CPT | Performed by: STUDENT IN AN ORGANIZED HEALTH CARE EDUCATION/TRAINING PROGRAM

## 2025-01-11 PROCEDURE — 82803 BLOOD GASES ANY COMBINATION: CPT | Performed by: HOSPITALIST

## 2025-01-11 RX ORDER — FUROSEMIDE 10 MG/ML
20 INJECTION INTRAMUSCULAR; INTRAVENOUS ONCE
Status: COMPLETED | OUTPATIENT
Start: 2025-01-11 | End: 2025-01-11

## 2025-01-11 RX ADMIN — METOPROLOL TARTRATE 25 MG: 25 TABLET, FILM COATED ORAL at 08:28

## 2025-01-11 RX ADMIN — HEPARIN SODIUM 5000 UNITS: 5000 INJECTION INTRAVENOUS; SUBCUTANEOUS at 14:14

## 2025-01-11 RX ADMIN — ACETAMINOPHEN 1000 MG: 500 TABLET, FILM COATED ORAL at 08:26

## 2025-01-11 RX ADMIN — GABAPENTIN 300 MG: 300 CAPSULE ORAL at 14:15

## 2025-01-11 RX ADMIN — CHOLESTYRAMINE 4 G: 4 POWDER, FOR SUSPENSION ORAL at 08:29

## 2025-01-11 RX ADMIN — ACETAMINOPHEN 1000 MG: 500 TABLET, FILM COATED ORAL at 21:02

## 2025-01-11 RX ADMIN — FUROSEMIDE 20 MG: 10 INJECTION, SOLUTION INTRAMUSCULAR; INTRAVENOUS at 08:29

## 2025-01-11 RX ADMIN — CELECOXIB 100 MG: 100 CAPSULE ORAL at 21:02

## 2025-01-11 RX ADMIN — HEPARIN SODIUM 5000 UNITS: 5000 INJECTION INTRAVENOUS; SUBCUTANEOUS at 06:47

## 2025-01-11 RX ADMIN — CELECOXIB 100 MG: 100 CAPSULE ORAL at 08:29

## 2025-01-11 RX ADMIN — HEPARIN SODIUM 5000 UNITS: 5000 INJECTION INTRAVENOUS; SUBCUTANEOUS at 21:03

## 2025-01-11 RX ADMIN — MUPIROCIN 1 APPLICATION: 20 OINTMENT TOPICAL at 08:58

## 2025-01-11 RX ADMIN — ONDANSETRON 4 MG: 2 INJECTION INTRAMUSCULAR; INTRAVENOUS at 17:05

## 2025-01-11 RX ADMIN — MUPIROCIN 1 APPLICATION: 20 OINTMENT TOPICAL at 21:03

## 2025-01-11 RX ADMIN — METOPROLOL TARTRATE 25 MG: 25 TABLET, FILM COATED ORAL at 21:02

## 2025-01-11 RX ADMIN — DAPSONE 50 MG: 100 TABLET ORAL at 21:03

## 2025-01-11 RX ADMIN — CETIRIZINE HYDROCHLORIDE 10 MG: 10 TABLET, FILM COATED ORAL at 08:27

## 2025-01-11 RX ADMIN — DAPSONE 50 MG: 100 TABLET ORAL at 08:26

## 2025-01-11 RX ADMIN — GABAPENTIN 300 MG: 300 CAPSULE ORAL at 21:02

## 2025-01-11 RX ADMIN — ACETAMINOPHEN 1000 MG: 500 TABLET, FILM COATED ORAL at 16:32

## 2025-01-11 NOTE — PLAN OF CARE
Goal Outcome Evaluation:  Plan of Care Reviewed With: patient           Outcome Evaluation: 68yo morbidly obese white female admitted 1/8/25 with ground glass opacity lung,  now s/pR bronchoscopy, thoracoscopy, R upper lobectomy, and lymph node disection 1/8/25. PMH includes B TKA, PE, LE venous stasis, hbp, lung collapse, sleep apnea. PLOF: Pt lives at home with spouse and was independent with ADLs including driving. She amb without AD. She has ramp entrance from garage and optional inside stairs. She is limited now by generalized weakness and decreased activity tolerance. Today, she was found resting in bed in NAD, pleasant and cooperative, on nc O2. She req mod-max A of 2 to sit EOB. She had difficulty performing seated LE ther ex due to body habitus. She stood from EOB with mod A of 2 x 2 attempts. She failed to clear her hipson first attempt but was able to stand for 90 sec with mod A fo2 on 2nd attempt. She took 1-2 small shuffle sidesteps toward HOB. She fatigues quicky and her O2 sats dropped to low 80s on 2-3 occasions with mobility. She req max A of 2 to return supine and reposition in bed. She would certainly benefit from skilled PT servcies toa ddress functional deficits and prepare for d/c.    Anticipated Discharge Disposition (PT): home with home health, skilled nursing facility (pending progress)

## 2025-01-11 NOTE — PROGRESS NOTES
"  Intensive Care Unit Daily Progress Note.   [facility] INTENSIVE CARE  1/11/2025    Patient Name:  Kortney Charlton  MRN:  [id number]  YOB: 1955  Age: 69 y.o.  Sex: female         Reason for Admission / Chief Complaint:  Critical care management, right upper lobectomy    Hospital Course:   69-year-old with a history of lung adenocarcinoma status post right upper lobectomy (1/8/2025) admitted to the ICU for hypercapnic respiratory failure.    Interval History:  On BiPAP yesterday and overnight  Net -1.9 L over the past 24 hours  Complains of some nausea this morning without any emesis  No chest pain or palpitations  Breathing is mildly improved    Physical Exam:  BP (!) 89/49   Pulse 65   Temp 98.1 °F (36.7 °C) (Oral)   Resp 28   Ht 170.2 cm (67\")   Wt (!) 192 kg (423 lb 4.5 oz)   LMP  (LMP Unknown)   SpO2 91%   BMI 66.30 kg/m²   Body mass index is 66.3 kg/m².    Intake/Output    Intake/Output Summary (Last 24 hours) at 1/11/2025 0720  Last data filed at 1/11/2025 0600  Gross per 24 hour   Intake 150 ml   Output 2020 ml   Net -1870 ml     General: Alert, nontoxic, NAD, morbidly obese  HEENT: NC/AT, EOMI, MMM  Neck: Supple, trachea midline  Cardiac: RRR, no murmur, gallops, rubs  Pulmonary: Diminished bilateral  GI: Soft, non-tender, non-distended, normal bowel sounds  Extremities: Warm, well perfused, no LE edema  Skin: no visible rash  Neuro: CN II - XII grossly intact  Psychiatry: Normal mood and affect    Data Review:  Notable Labs:  Results from last 7 days   Lab Units 01/11/25 0328 01/10/25  0514 01/09/25  0422 01/07/25  1014   WBC 10*3/mm3 10.89* 11.45* 10.77 6.68   HEMOGLOBIN g/dL 11.7* 10.1* 10.3* 12.8   PLATELETS 10*3/mm3 179 152 165 220     Results from last 7 days   Lab Units 01/11/25 0328 01/10/25  0628 01/09/25  0422 01/07/25  1014   SODIUM mmol/L 137 138 137 139   POTASSIUM mmol/L 4.2 4.5 4.4 4.2   CHLORIDE mmol/L 102 107 106 102   CO2 mmol/L 29.3* 24.0 25.0 " 28.5   BUN mg/dL 15 13 8 11   CREATININE mg/dL 0.59 0.59 0.50* 0.71   GLUCOSE mg/dL 124* 114* 133* 102*   CALCIUM mg/dL 7.9* 6.7* 7.0* 8.8   Estimated Creatinine Clearance: 157.4 mL/min (by C-G formula based on SCr of 0.59 mg/dL).    Results from last 7 days   Lab Units 01/11/25  0328 01/10/25  0514 01/09/25  0422   AST (SGOT) U/L 26  --   --    ALT (SGPT) U/L 22  --   --    PLATELETS 10*3/mm3 179 152 165       Results from last 7 days   Lab Units 01/10/25  1446 01/10/25  0853 01/09/25  2219 01/09/25  1700 01/09/25  1229 01/08/25  1943   PH, ARTERIAL pH units 7.304* 7.186* 7.208* 7.247* 7.276* 7.236*   PCO2, ARTERIAL mm Hg 67.4* 88.4* 70.2* 70.1* 70.7* 70.6*   PO2 ART mm Hg 77.8* 75.4* 78.7* 108.8* 107.1* 159.9*   HCO3 ART mmol/L 33.5* 33.4* 28.0 30.5* 32.9* 30.0*       Imaging:  Reviewed chest images personally from past 3 days    ASSESSMENT  /  PLAN:    Acute hypercapnic respiratory failure necessitating noninvasive ventilation  1.8 cm groundglass nodule (Adenocarcinoma) right upper lobe status post right upper lobectomy and hilar node dissection 1/8/2025  T1aN0 adenocarcinoma.   Pulmonary edema  Metabolic alkalosis  OLIVERIO and suspected OHS  Super morbid obesity with BMI greater than 65  Leukocytosis    -Patient presented for right upper lobectomy for adenocarcinoma and with hypercapnic respiratory failure on noninvasive ventilation.  -Hypercapnia while improved continues to be significant with respiratory acidosis.  Continues to require intermittent BiPAP therapy.  Hold off on intubation at this point  -Chest x-ray with vascular congestion however due to body habitus for penetration  -I suspect she has a component of fluid overload contributing to her hypercapnic respiratory failure, will give her IV Lasix now  -Appreciate thoracic surgery recommendations  -Pain control, glucose control  -Patient will need OLIVERIO evaluation as an outpatient.    All issues new to me today.  Prior hospital course, labs and imaging  reviewed.    GI prophylaxis: Not indicated  DVT prophylaxis: SCDs, heparin  Reyes catheter: No  Bowel regimen: Ordered  Diet: Regular    Discharge: Continue to monitor in the ICU due to critical status    Critical Care Time: 35 minutes    Maciel Tijerina MD  [facility]  Pulmonary and Critical Care Medicine, Interventional Pulmonology    Parts of this note may be an electronic transcription/translation of spoken language to printed text using the Dragon dictation system.

## 2025-01-11 NOTE — THERAPY EVALUATION
Patient Name: Kortney Charlton  : 1955    MRN: [id number]                              Today's Date: 2025       Admit Date: 2025    Visit Dx:     ICD-10-CM ICD-9-CM   1. Ground glass opacity present on imaging of lung  R91.8 793.19     Patient Active Problem List   Diagnosis    Morbid obesity    Elevated BP without diagnosis of hypertension    Venous stasis dermatitis of both lower extremities    History of total bilateral knee replacement (TKR)    Breast lump in female    Sneddon-Hanna disease    Abnormal TSH    Atypical chest pain    Medicare annual wellness visit, subsequent    Diarrhea in adult patient    Chronic diarrhea    Hypermetropia, bilateral    Vitreous degeneration of left eye    Prediabetes    Numbness and tingling in left hand    Bilateral carpal tunnel syndrome    Diarrhea following gastrointestinal surgery    Leg swelling    Encounter for lipid screening for cardiovascular disease    Body mass index (BMI) of 60.0 to 69.9 in adult    Immunization due    Age-related nuclear cataract of both eyes    Bilateral myopia    Presbyopia    Regular astigmatism of both eyes    History of pulmonary embolism    Ground glass opacity present on imaging of lung    Pneumothorax     Past Medical History:   Diagnosis Date    Allergic topical steroids    Anemia Episodic    At risk for central sleep apnea     STOP BANG 5    Breast lump in female 2019    Chronic nasopharyngitis     Elevated BP without diagnosis of hypertension     History of pulmonary embolism     Hypertension     Lesion of lung     Morbid obesity     Neuromuscular disorder     Osteoarthritis     Rash     UNDER LEFT BREAST    Sneddon-Hanna disease     Stasis dermatitis     Urinary tract infection      Past Surgical History:   Procedure Laterality Date    BRONCHOSCOPY WITH ION ROBOTIC ASSIST N/A 10/30/2024    Procedure: BRONCHOSCOPY WITH ION ROBOT, LAVAGE, FINE NEEDLE ASPIRATIONS AND BIOPSIES;  Surgeon: Radha Marcus  MD VALERIE;  Location: [facility] ENDOSCOPY;  Service: Robotics - Pulmonary;  Laterality: N/A;    CATARACT EXTRACTION Bilateral     COLONOSCOPY  2015    CYST REMOVAL      MULTIPLE    HYSTEROSCOPY ENDOMETRIAL ABLATION      JOINT REPLACEMENT  R-2013 L-2015    LAPAROSCOPIC CHOLECYSTECTOMY  10/2011    LOBECTOMY Right 1/8/2025    Procedure: BRONCHOSCOPY, RIGHT VIDEO ASSISTED THORACOSCOPY WITH DAVINCI ROBOT with wedge resection X2, COMPLETION RIGHT UPPER LOBECTOMY, MEDIASTINAL AND HILAR LYMPH NODE DISSECTION, INTERCOSTAL NERVE BLOCK;  Surgeon: Radha Marcus MD;  Location: [facility] OR;  Service: Robotics - DaVinci;  Laterality: Right;    OVARIAN CYST REMOVAL        General Information       Row Name 01/11/25 1251          Physical Therapy Time and Intention    Document Type evaluation  -DJ     Mode of Treatment individual therapy;physical therapy  -DJ       Row Name 01/11/25 1251          General Information    Patient Profile Reviewed yes  -DJ     Prior Level of Function independent:;all household mobility;ADL's;driving;bathing;dressing  -DJ     Existing Precautions/Restrictions fall;oxygen therapy device and L/min  -DJ     Barriers to Rehab medically complex  -DJ       Row Name 01/11/25 1251          Living Environment    People in Home spouse  -DJ       Row Name 01/11/25 1251          Home Main Entrance    Number of Stairs, Main Entrance none  ramp from garage  -DJ       Row Name 01/11/25 1251          Stairs Within Home, Primary    Stairs, Within Home, Primary optional  -DJ       Row Name 01/11/25 1251          Cognition    Orientation Status (Cognition) oriented x 3  -DJ       Row Name 01/11/25 1251          Safety Issues/Impairments Affecting Functional Mobility    Safety Issues Affecting Function (Mobility) judgment;safety precaution awareness  -DJ     Impairments Affecting Function (Mobility) balance;endurance/activity tolerance;strength;shortness of breath  -DJ     Comment, Safety Issues/Impairments (Mobility) gt  belt, nonskid socks  -DJ               User Key  (r) = Recorded By, (t) = Taken By, (c) = Cosigned By      Initials Name Provider Type    Sahara Ashley PT Physical Therapist                   Mobility       Row Name 01/11/25 1254          Bed Mobility    Bed Mobility supine-sit;sit-supine  -DJ     Supine-Sit [place] (Bed Mobility) moderate assist (50% patient effort);maximum assist (25% patient effort);2 person assist;verbal cues  -DJ     Sit-Supine [place] (Bed Mobility) maximum assist (25% patient effort);2 person assist;verbal cues  -DJ     Assistive Device (Bed Mobility) bed rails;head of bed elevated;leg   -DJ     Comment, (Bed Mobility) vc for sequencing; mattress deflated  -DJ       Row Name 01/11/25 1254          Transfers    Comment, (Transfers) sit/stand from EOB x 2 attempts  -DJ       Row Name 01/11/25 1254          Bed-Chair Transfer    Bed-Chair [place] (Transfers) unable to assess  -DJ       Row Name 01/11/25 1254          Sit-Stand Transfer    Sit-Stand [place] (Transfers) moderate assist (50% patient effort);verbal cues;2 person assist  -DJ     Assistive Device (Sit-Stand Transfers) other (see comments)  no AD  -DJ     Comment, (Sit-Stand Transfer) pt failed to clear hips on first attempt; pt stood ~90 seconds on 2nd attempt  -DJ       Row Name 01/11/25 1254          Gait/Stairs (Locomotion)    [place] Level (Gait) unable to assess  -DJ     Distance in Feet (Gait) 2  sidesteps  -DJ     Deviations/Abnormal Patterns (Gait) festinating/shuffling;stride length decreased  -DJ     Bilateral Gait Deviations forward flexed posture  -DJ     [place] Level (Stairs) unable to assess  -DJ     Comment, (Gait/Stairs) Pt stood EOB and was able to take 2 very small sidesteps with mod A of 2 without AD  -DJ               User Key  (r) = Recorded By, (t) = Taken By, (c) = Cosigned By      Initials Name Provider Type    Sahara Ashley PT Physical Therapist                    Obj/Interventions       Row Name 01/11/25 1257          Range of Motion Comprehensive    Comment, General Range of Motion grossly WFL - limited by soft tissue approximation  -DJ       Row Name 01/11/25 1257          Strength Comprehensive (MMT)    Comment, General Manual Muscle Testing (MMT) Assessment generally weak - moves all 4s, fair LE strength  -DJ       Row Name 01/11/25 1257          Motor Skills    Motor Skills functional endurance  -DJ     Functional Endurance poor  -DJ     Therapeutic Exercise other (see comments)  AP, unable to perform LAQ and hip flex well due to body habitus  -DJ       Row Name 01/11/25 1257          Advanced Stepping/Walking Interventions    Stepping/Walking Interventions side stepping  -DJ       Row Name 01/11/25 1257          Balance    Balance Assessment standing static balance;standing dynamic balance;sitting dynamic balance  -DJ     Dynamic Sitting Balance contact guard;verbal cues  -DJ     Static Standing Balance moderate assist;2-person assist;verbal cues  -DJ     Dynamic Standing Balance moderate assist;2-person assist;verbal cues  -DJ     Position/Device Used, Standing Balance other (see comments);supported  no AD  -DJ     Balance Interventions sitting;standing;sit to stand;supported;weight shifting activity  -DJ     Comment, Balance no LOB  -DJ       Row Name 01/11/25 1257          Sensory Assessment (Somatosensory)    Sensory Assessment (Somatosensory) not tested  -DJ               User Key  (r) = Recorded By, (t) = Taken By, (c) = Cosigned By      Initials Name Provider Type    Sahara Ashley, PT Physical Therapist                   Goals/Plan       Row Name 01/11/25 1312          Bed Mobility Goal 1 (PT)    Activity/Assistive Device (Bed Mobility Goal 1, PT) sit to supine;supine to sit  -DJ     [place] Level/Cues Needed (Bed Mobility Goal 1, PT) standby assist;verbal cues required  -DJ     Time Frame (Bed Mobility Goal 1, PT) 10 days  -DJ       Row Name 01/11/25  1312          Transfer Goal 1 (PT)    Activity/Assistive Device (Transfer Goal 1, PT) sit-to-stand/stand-to-sit  -DJ     [place] Level/Cues Needed (Transfer Goal 1, PT) standby assist;verbal cues required  -DJ     Time Frame (Transfer Goal 1, PT) 10 days  -DJ       Row Name 01/11/25 1312          Gait Training Goal 1 (PT)    Activity/Assistive Device (Gait Training Goal 1, PT) gait (walking locomotion);assistive device use;improve balance and speed;increase endurance/gait distance;increase energy conservation;walker, rolling  -DJ     [place] Level (Gait Training Goal 1, PT) contact guard required  -DJ     Distance (Gait Training Goal 1, PT) 100'  -DJ     Time Frame (Gait Training Goal 1, PT) 10 days  -DJ       Row Name 01/11/25 1312          Patient Education Goal (PT)    Activity (Patient Education Goal, PT) HEP  -DJ     [place]/Cues/Accuracy (Memory Goal 2, PT) demonstrates adequately;verbalizes understanding  -DJ     Time Frame (Patient Education Goal, PT) 5 days  -DJ       Row Name 01/11/25 1312          Therapy Assessment/Plan (PT)    Planned Therapy Interventions (PT) balance training;bed mobility training;gait training;home exercise program;strengthening;postural re-education;patient/family education;transfer training  -DJ               User Key  (r) = Recorded By, (t) = Taken By, (c) = Cosigned By      Initials Name Provider Type    Sahara Ashley, PT Physical Therapist                   Clinical Impression       Row Name 01/11/25 1259          Pain    Pretreatment Pain Rating 0/10 - no pain  -DJ       Row Name 01/11/25 1259          Plan of Care Review    Plan of Care Reviewed With patient  -DJ     Outcome Evaluation 68yo morbidly obese white female admitted 1/8/25 with ground glass opacity lung,  now s/pR bronchoscopy, thoracoscopy, R upper lobectomy, and lymph node disection 1/8/25. PMH includes B TKA, PE, LE venous stasis, hbp, lung collapse, sleep apnea. PLOF: Pt lives at home with  spouse and was independent with ADLs including driving. She amb without AD. She has ramp entrance from garage and optional inside stairs. She is limited now by generalized weakness and decreased activity tolerance. Today, she was found resting in bed in NAD, pleasant and cooperative, on nc O2. She req mod-max A of 2 to sit EOB. She had difficulty performing seated LE ther ex due to body habitus. She stood from EOB with mod A of 2 x 2 attempts. She failed to clear her hipson first attempt but was able to stand for 90 sec with mod A fo2 on 2nd attempt. She took 1-2 small shuffle sidesteps toward HOB. She fatigues quicky and her O2 sats dropped to low 80s on 2-3 occasions with mobility. She req max A of 2 to return supine and reposition in bed. She would certainly benefit from skilled PT servcies toa ddress functional deficits and prepare for d/c.  -DJ       Row Name 01/11/25 1259          Therapy Assessment/Plan (PT)    Rehab Potential (PT) fair  -DJ     Criteria for Skilled Interventions Met (PT) yes;meets criteria;skilled treatment is necessary  -DJ     Therapy Frequency (PT) 5 times/wk  -DJ       Row Name 01/11/25 1311          Vital Signs    Pre SpO2 (%) --  90s  -DJ     O2 Delivery Pre Treatment supplemental O2  -DJ     Intra SpO2 (%) --  low 80s  -DJ     O2 Delivery Intra Treatment supplemental O2  -DJ     Post SpO2 (%) --  90s  -DJ     O2 Delivery Post Treatment supplemental O2  -DJ     Pre Patient Position Supine  -DJ     Intra Patient Position Standing  -DJ     Post Patient Position Supine  -DJ       Row Name 01/11/25 1311          Positioning and Restraints    Pre-Treatment Position in bed  -DJ     Post Treatment Position bed  -DJ     In Bed notified nsg;supine;call light within reach;encouraged to call for assist  no exit alarm on when PT entered room  -DJ               User Key  (r) = Recorded By, (t) = Taken By, (c) = Cosigned By      Initials Name Provider Type    Sahara Ashley, PT Physical Therapist                    Outcome Measures       Row Name 01/11/25 1314          How much help from another person do you currently need...    Turning from your back to your side while in flat bed without using bedrails? 2  -DJ     Moving from lying on back to sitting on the side of a flat bed without bedrails? 2  -DJ     Moving to and from a bed to a chair (including a wheelchair)? 2  -DJ     Standing up from a chair using your arms (e.g., wheelchair, bedside chair)? 2  -DJ     Climbing 3-5 steps with a railing? 1  -DJ     To walk in hospital room? 2  -DJ     AM-PAC 6 Clicks Score (PT) 11  -DJ     Highest Level of Mobility Goal 4 --> Transfer to chair/commode  -DJ       Row Name 01/11/25 1314          Functional Assessment    Outcome Measure Options AM-PAC 6 Clicks Basic Mobility (PT)  -               User Key  (r) = Recorded By, (t) = Taken By, (c) = Cosigned By      Initials Name Provider Type    Sahara Ashley, PT Physical Therapist                                 Physical Therapy Education       Title: PT OT SLP Therapies (Done)       Topic: Physical Therapy (Done)       Point: Mobility training (Done)       Learning Progress Summary            Patient Acceptance, E, VU,NR by  at 1/11/2025 1314                      Point: Home exercise program (Done)       Learning Progress Summary            Patient Acceptance, E, VU,NR by  at 1/11/2025 1314                      Point: Body mechanics (Done)       Learning Progress Summary            Patient Acceptance, E, VU,NR by  at 1/11/2025 1314                      Point: Precautions (Done)       Learning Progress Summary            Patient Acceptance, E, VU,NR by  at 1/11/2025 1314                                      User Key       Initials Effective Dates Name Provider Type Discipline     10/25/19 -  Sahara Brown, LUCILLE Physical Therapist PT                  PT Recommendation and Plan  Planned Therapy Interventions (PT): balance training, bed mobility training, gait  training, home exercise program, strengthening, postural re-education, patient/family education, transfer training  Outcome Evaluation: 70yo morbidly obese white female admitted 1/8/25 with ground glass opacity lung,  now s/pR bronchoscopy, thoracoscopy, R upper lobectomy, and lymph node disection 1/8/25. PMH includes B TKA, PE, LE venous stasis, hbp, lung collapse, sleep apnea. PLOF: Pt lives at home with spouse and was independent with ADLs including driving. She amb without AD. She has ramp entrance from garage and optional inside stairs. She is limited now by generalized weakness and decreased activity tolerance. Today, she was found resting in bed in NAD, pleasant and cooperative, on nc O2. She req mod-max A of 2 to sit EOB. She had difficulty performing seated LE ther ex due to body habitus. She stood from EOB with mod A of 2 x 2 attempts. She failed to clear her hipson first attempt but was able to stand for 90 sec with mod A fo2 on 2nd attempt. She took 1-2 small shuffle sidesteps toward HOB. She fatigues quicky and her O2 sats dropped to low 80s on 2-3 occasions with mobility. She req max A of 2 to return supine and reposition in bed. She would certainly benefit from skilled PT servcies toa ddress functional deficits and prepare for d/c.     Time Calculation:   PT Evaluation Complexity  History, PT Evaluation Complexity: 3 or more personal factors and/or comorbidities  Examination of Body Systems (PT Eval Complexity): total of 4 or more elements  Clinical Presentation (PT Evaluation Complexity): evolving  Clinical Decision Making (PT Evaluation Complexity): high complexity  Overall Complexity (PT Evaluation Complexity): moderate complexity     PT Charges       Row Name 01/11/25 1323             Time Calculation    Start Time 1021  -DJ      Stop Time 1047  -DJ      Time Calculation (min) 26 min  -DJ      PT Non-Billable Time (min) 15 min  -DJ      PT Received On 01/11/25  -DJ      PT - Next Appointment  01/13/25  -AUGIE      PT Goal Re-Cert Due Date 01/21/25  -AUGIE                User Key  (r) = Recorded By, (t) = Taken By, (c) = Cosigned By      Initials Name Provider Type    Sahara Ashley, LUCILLE Physical Therapist                  Therapy Charges for Today       Code Description Service Date Service Provider Modifiers Qty    62229528047 HC PT EVAL MOD COMPLEXITY 3 1/11/2025 Sahara Brown, PT GP 1    15574079693 HC PT THERAPEUTIC ACT EA 15 MIN 1/11/2025 Sahara Brown, PT GP 2    33918280984 HC PT THER SUPP EA 15 MIN 1/11/2025 Sahara Brown, PT GP 2            PT G-Codes  Outcome Measure Options: AM-PAC 6 Clicks Basic Mobility (PT)  AM-PAC 6 Clicks Score (PT): 11  PT Discharge Summary  Anticipated Discharge Disposition (PT): home with home health, skilled nursing facility (pending progress)    Sahara Brown PT  1/11/2025

## 2025-01-12 ENCOUNTER — APPOINTMENT (OUTPATIENT)
Dept: GENERAL RADIOLOGY | Facility: HOSPITAL | Age: 70
DRG: 163 | End: 2025-01-12
Payer: MEDICARE

## 2025-01-12 LAB
ANION GAP SERPL CALCULATED.3IONS-SCNC: 8 MMOL/L (ref 5–15)
ARTERIAL PATENCY WRIST A: POSITIVE
ARTERIAL PATENCY WRIST A: POSITIVE
ATMOSPHERIC PRESS: 746.1 MMHG
ATMOSPHERIC PRESS: 748.1 MMHG
BASE EXCESS BLDA CALC-SCNC: 5.7 MMOL/L (ref 0–2)
BASE EXCESS BLDA CALC-SCNC: 6.3 MMOL/L (ref 0–2)
BDY SITE: ABNORMAL
BDY SITE: ABNORMAL
BUN SERPL-MCNC: 12 MG/DL (ref 8–23)
BUN/CREAT SERPL: 22.6 (ref 7–25)
CALCIUM SPEC-SCNC: 8.1 MG/DL (ref 8.6–10.5)
CHLORIDE SERPL-SCNC: 102 MMOL/L (ref 98–107)
CO2 BLDA-SCNC: >32.45 MMOL/L (ref 23–27)
CO2 BLDA-SCNC: >32.45 MMOL/L (ref 23–27)
CO2 SERPL-SCNC: 29 MMOL/L (ref 22–29)
CREAT SERPL-MCNC: 0.53 MG/DL (ref 0.57–1)
DEPRECATED RDW RBC AUTO: 38.3 FL (ref 37–54)
DEVICE COMMENT: ABNORMAL
EGFRCR SERPLBLD CKD-EPI 2021: 99 ML/MIN/1.73
ERYTHROCYTE [DISTWIDTH] IN BLOOD BY AUTOMATED COUNT: 12 % (ref 12.3–15.4)
GAS FLOW AIRWAY: 3 LPM
GLUCOSE BLDC GLUCOMTR-MCNC: 125 MG/DL (ref 70–130)
GLUCOSE SERPL-MCNC: 111 MG/DL (ref 65–99)
HCO3 BLDA-SCNC: 34 MMOL/L (ref 22–28)
HCO3 BLDA-SCNC: 35.3 MMOL/L (ref 22–28)
HCT VFR BLD AUTO: 39.6 % (ref 34–46.6)
HEMODILUTION: NO
HEMODILUTION: NO
HGB BLD-MCNC: 12.1 G/DL (ref 12–15.9)
INHALED O2 CONCENTRATION: 40 %
INSPIRATORY TIME: 1
Lab: ABNORMAL
Lab: ABNORMAL
MCH RBC QN AUTO: 27.2 PG (ref 26.6–33)
MCHC RBC AUTO-ENTMCNC: 30.6 G/DL (ref 31.5–35.7)
MCV RBC AUTO: 89 FL (ref 79–97)
MODALITY: ABNORMAL
MODALITY: ABNORMAL
NOTIFIED WHO: ABNORMAL
NOTIFIED WHO: ABNORMAL
O2 A-A PPRESDIFF RESPIRATORY: 0.6 MMHG
PAW @ PEAK INSP FLOW SETTING VENT: 17 CMH2O
PCO2 BLDA: 61.4 MM HG (ref 35–45)
PCO2 BLDA: 73.8 MM HG (ref 35–45)
PH BLDA: 7.29 PH UNITS (ref 7.35–7.45)
PH BLDA: 7.35 PH UNITS (ref 7.35–7.45)
PLATELET # BLD AUTO: 214 10*3/MM3 (ref 140–450)
PMV BLD AUTO: 10.2 FL (ref 6–12)
PO2 BLD: 305 MM[HG] (ref 0–500)
PO2 BLDA: 122 MM HG (ref 80–100)
PO2 BLDA: 73.9 MM HG (ref 80–100)
POTASSIUM SERPL-SCNC: 4.1 MMOL/L (ref 3.5–5.2)
RBC # BLD AUTO: 4.45 10*6/MM3 (ref 3.77–5.28)
READ BACK: YES
READ BACK: YES
SAO2 % BLDCOA: 91.8 % (ref 92–98.5)
SAO2 % BLDCOA: 98.4 % (ref 92–98.5)
SET MECH RESP RATE: 16
SODIUM SERPL-SCNC: 139 MMOL/L (ref 136–145)
TOTAL RATE: 20 BREATHS/MINUTE
TOTAL RATE: 24 BREATHS/MINUTE
VT ON VENT VENT: 560 ML
WBC NRBC COR # BLD AUTO: 10.7 10*3/MM3 (ref 3.4–10.8)

## 2025-01-12 PROCEDURE — 36600 WITHDRAWAL OF ARTERIAL BLOOD: CPT

## 2025-01-12 PROCEDURE — 25010000002 HEPARIN (PORCINE) PER 1000 UNITS: Performed by: THORACIC SURGERY (CARDIOTHORACIC VASCULAR SURGERY)

## 2025-01-12 PROCEDURE — 94799 UNLISTED PULMONARY SVC/PX: CPT

## 2025-01-12 PROCEDURE — 85027 COMPLETE CBC AUTOMATED: CPT | Performed by: STUDENT IN AN ORGANIZED HEALTH CARE EDUCATION/TRAINING PROGRAM

## 2025-01-12 PROCEDURE — 25010000002 FUROSEMIDE PER 20 MG: Performed by: SURGERY

## 2025-01-12 PROCEDURE — 71045 X-RAY EXAM CHEST 1 VIEW: CPT

## 2025-01-12 PROCEDURE — 25010000002 ONDANSETRON PER 1 MG: Performed by: THORACIC SURGERY (CARDIOTHORACIC VASCULAR SURGERY)

## 2025-01-12 PROCEDURE — 82803 BLOOD GASES ANY COMBINATION: CPT

## 2025-01-12 PROCEDURE — 99024 POSTOP FOLLOW-UP VISIT: CPT | Performed by: SURGERY

## 2025-01-12 PROCEDURE — 82948 REAGENT STRIP/BLOOD GLUCOSE: CPT

## 2025-01-12 PROCEDURE — 94660 CPAP INITIATION&MGMT: CPT

## 2025-01-12 PROCEDURE — 80048 BASIC METABOLIC PNL TOTAL CA: CPT | Performed by: STUDENT IN AN ORGANIZED HEALTH CARE EDUCATION/TRAINING PROGRAM

## 2025-01-12 RX ORDER — FUROSEMIDE 10 MG/ML
20 INJECTION INTRAMUSCULAR; INTRAVENOUS ONCE
Status: COMPLETED | OUTPATIENT
Start: 2025-01-12 | End: 2025-01-12

## 2025-01-12 RX ADMIN — ONDANSETRON 4 MG: 2 INJECTION INTRAMUSCULAR; INTRAVENOUS at 05:48

## 2025-01-12 RX ADMIN — MUPIROCIN 1 APPLICATION: 20 OINTMENT TOPICAL at 10:22

## 2025-01-12 RX ADMIN — CELECOXIB 100 MG: 100 CAPSULE ORAL at 22:02

## 2025-01-12 RX ADMIN — ACETAMINOPHEN 1000 MG: 500 TABLET, FILM COATED ORAL at 09:30

## 2025-01-12 RX ADMIN — DAPSONE 50 MG: 100 TABLET ORAL at 09:30

## 2025-01-12 RX ADMIN — METOPROLOL TARTRATE 25 MG: 25 TABLET, FILM COATED ORAL at 09:30

## 2025-01-12 RX ADMIN — FUROSEMIDE 20 MG: 10 INJECTION, SOLUTION INTRAMUSCULAR; INTRAVENOUS at 09:31

## 2025-01-12 RX ADMIN — GABAPENTIN 300 MG: 300 CAPSULE ORAL at 22:02

## 2025-01-12 RX ADMIN — ACETAMINOPHEN 1000 MG: 500 TABLET, FILM COATED ORAL at 22:01

## 2025-01-12 RX ADMIN — HEPARIN SODIUM 5000 UNITS: 5000 INJECTION INTRAVENOUS; SUBCUTANEOUS at 22:02

## 2025-01-12 RX ADMIN — HEPARIN SODIUM 5000 UNITS: 5000 INJECTION INTRAVENOUS; SUBCUTANEOUS at 06:11

## 2025-01-12 RX ADMIN — ONDANSETRON 4 MG: 2 INJECTION INTRAMUSCULAR; INTRAVENOUS at 18:26

## 2025-01-12 RX ADMIN — ONDANSETRON 4 MG: 2 INJECTION INTRAMUSCULAR; INTRAVENOUS at 11:43

## 2025-01-12 RX ADMIN — CHOLESTYRAMINE 4 G: 4 POWDER, FOR SUSPENSION ORAL at 09:31

## 2025-01-12 RX ADMIN — DAPSONE 50 MG: 100 TABLET ORAL at 22:01

## 2025-01-12 RX ADMIN — CELECOXIB 100 MG: 100 CAPSULE ORAL at 09:30

## 2025-01-12 RX ADMIN — CETIRIZINE HYDROCHLORIDE 10 MG: 10 TABLET, FILM COATED ORAL at 09:30

## 2025-01-12 RX ADMIN — MUPIROCIN 1 APPLICATION: 20 OINTMENT TOPICAL at 22:02

## 2025-01-12 RX ADMIN — GABAPENTIN 300 MG: 300 CAPSULE ORAL at 06:11

## 2025-01-12 RX ADMIN — SENNOSIDES AND DOCUSATE SODIUM 2 TABLET: 50; 8.6 TABLET ORAL at 22:01

## 2025-01-12 RX ADMIN — HEPARIN SODIUM 5000 UNITS: 5000 INJECTION INTRAVENOUS; SUBCUTANEOUS at 14:27

## 2025-01-12 NOTE — PLAN OF CARE
Problem: Adult Inpatient Plan of Care  Goal: Plan of Care Review  Outcome: Progressing   Goal Outcome Evaluation:   Transfer from ICU.   VSS, NSR-SB. Currently on 2L NC. BiPAP as tolerated. Wore BiPAP for approx 2 hrs since getting pt @ 0250 from Icu. AxOx4, easily arousable, but still presents as sleepy/lethargic. Can be impulsive. Ax2 to turn. Purewick on. Bariatric bed. Oral care completed. Generalized edema & BUE wrists & hands +2 edema. Psoriasis rash on abd, back, buttocks, & legs. Pulses palpable. Bed alarm set. Prn zofran x1 for nausea.

## 2025-01-12 NOTE — NURSING NOTE
Called Dr. Goodson, received orders for transfer to 5E for need of neuro ICU bed. Per Dr. Goodson, while on tele floor to keep close eye on pt. Report to be called to 5E bed 565

## 2025-01-12 NOTE — NURSING NOTE
Patients oxygen continues to drop to 60's with good pleth on 3liters, Patient has been waking up and sleep most of the shift, patient has orders for BIPAP while asleep but complains of nausea

## 2025-01-12 NOTE — PROGRESS NOTES
"  Intensive Care Unit Daily Progress Note.   [address]  1/12/2025    Patient Name:  Kortney Charlton  MRN:  [id number]  YOB: 1955  Age: 69 y.o.  Sex: female         Reason for Admission / Chief Complaint:  Critical care management, right upper lobectomy    Hospital Course:   69-year-old with a history of lung adenocarcinoma status post right upper lobectomy (1/8/2025) admitted to the ICU for hypercapnic respiratory failure.    Interval History:  No acute events overnight  Net -1.7 L over the past 24 hours  Hypercapnic on ABG  patient not tolerating BiPAP very well, complaining of nausea  No episodes of emesis  Mildly altered on evaluation   is at bedside  Patient has been sleepy he states    Physical Exam:  /57 (BP Location: Right arm, Patient Position: Lying)   Pulse 67   Temp 97.8 °F (36.6 °C) (Oral)   Resp 18   Ht 170.2 cm (67\")   Wt (!) 182 kg (400 lb 8 oz)   LMP  (LMP Unknown)   SpO2 92%   BMI 62.73 kg/m²   Body mass index is 62.73 kg/m².    Intake/Output    Intake/Output Summary (Last 24 hours) at 1/12/2025 1330  Last data filed at 1/12/2025 0618  Gross per 24 hour   Intake 240 ml   Output 950 ml   Net -710 ml     General: Drowsy, morbidly obese  HEENT: NC/AT, EOMI, MMM  Neck: Supple, trachea midline  Cardiac: RRR, no murmur, gallops, rubs  Pulmonary: Diminished bilateral  GI: Soft, non-tender, non-distended, normal bowel sounds  Extremities: Warm, well perfused, no LE edema  Skin: no visible rash  Neuro: CN II - XII grossly intact  Psychiatry: Normal mood and affect    Data Review:  Notable Labs:  Results from last 7 days   Lab Units 01/12/25  0825 01/11/25  0328 01/10/25  0514 01/09/25  0422 01/07/25  1014   WBC 10*3/mm3 10.70 10.89* 11.45* 10.77 6.68   HEMOGLOBIN g/dL 12.1 11.7* 10.1* 10.3* 12.8   PLATELETS 10*3/mm3 214 179 152 165 220     Results from last 7 days   Lab Units 01/12/25  0825 01/11/25  0328 01/10/25  0628 01/09/25  0422 " 01/07/25  1014   SODIUM mmol/L 139 137 138 137 139   POTASSIUM mmol/L 4.1 4.2 4.5 4.4 4.2   CHLORIDE mmol/L 102 102 107 106 102   CO2 mmol/L 29.0 29.3* 24.0 25.0 28.5   BUN mg/dL 12 15 13 8 11   CREATININE mg/dL 0.53* 0.59 0.59 0.50* 0.71   GLUCOSE mg/dL 111* 124* 114* 133* 102*   CALCIUM mg/dL 8.1* 7.9* 6.7* 7.0* 8.8   Estimated Creatinine Clearance: 169.1 mL/min (A) (by C-G formula based on SCr of 0.53 mg/dL (L)).    Results from last 7 days   Lab Units 01/12/25  0825 01/11/25  0328 01/10/25  0514   AST (SGOT) U/L  --  26  --    ALT (SGPT) U/L  --  22  --    PLATELETS 10*3/mm3 214 179 152       Results from last 7 days   Lab Units 01/12/25  1250 01/11/25  0746 01/10/25  1446 01/10/25  0853 01/09/25  2219 01/09/25  1700 01/09/25  1229 01/08/25  1943   PH, ARTERIAL pH units 7.288* 7.303* 7.304* 7.186* 7.208* 7.247* 7.276* 7.236*   PCO2, ARTERIAL mm Hg 73.8* 65.6* 67.4* 88.4* 70.2* 70.1* 70.7* 70.6*   PO2 ART mm Hg 73.9* 82.0 77.8* 75.4* 78.7* 108.8* 107.1* 159.9*   HCO3 ART mmol/L 35.3* 32.5* 33.5* 33.4* 28.0 30.5* 32.9* 30.0*       Imaging:  Reviewed chest images personally from past 3 days    ASSESSMENT  /  PLAN:    Acute on chronic hypercapnic respiratory failure necessitating noninvasive ventilation  1.8 cm groundglass nodule (Adenocarcinoma) right upper lobe status post right upper lobectomy and hilar node dissection 1/8/2025  T1aN0 adenocarcinoma.   Pulmonary edema  Metabolic alkalosis  OLIVERIO and suspected OHS  Super morbid obesity with BMI greater than 65  Leukocytosis  Nocturnal hypoxia    -Patient presented for right upper lobectomy for adenocarcinoma and with hypercapnic respiratory failure on noninvasive ventilation.  -Patient transferred out of the ICU yesterday.  Continues to be hypercapnic on ABGs and is only tolerating BiPAP for short periods of time.  -Informed patient and  at bedside that she needs to consistently wear it at night and with naps as her acute hypercapnia is causing her to become  sedated and could ultimately result in intubation if she does not adequately use noninvasive ventilation.  -Her respiratory status is multifactorial and is driven by recent surgery, morbid obesity, narcotic pain medications.  Hypercapnia is improved with consistent BiPAP use.  -Chest x-ray with vascular congestion however due to body habitus for penetration  -Continue ongoing diuresis  -Appreciate thoracic surgery recommendations  -Pain control, glucose control  -Patient will need OLIVERIO evaluation as an outpatient.    GI prophylaxis: Not indicated  DVT prophylaxis: SCDs, heparin  Reyes catheter: No  Bowel regimen: Ordered  Diet: Regular    Maciel Tijerina MD  [facility]  Pulmonary and Critical Care Medicine, Interventional Pulmonology    Parts of this note may be an electronic transcription/translation of spoken language to printed text using the Dragon dictation system.

## 2025-01-12 NOTE — PROGRESS NOTES
"    Chief Complaint: Ground glass opacity, NSCLC  S/P: Right upper lobectomy  POD # 3    Subjective:  Symptoms:  Stable.  She reports shortness of breath and weakness.    Diet:  NPO.    Activity level: Impaired due to weakness.    Pain:  She complains of pain that is mild.  She reports pain is improving.  Pain is well controlled.    Appears much improved today.  No acute issues overnight.    Vital Signs:  Temp:  [97.8 °F (36.6 °C)-98.5 °F (36.9 °C)] 98.3 °F (36.8 °C)  Heart Rate:  [50-76] 65  BP: ()/(35-66) 106/66  Arterial Line BP: ()/() 115/74    Intake & Output (last day)         01/11 0701  01/12 0700    P.O. 50    Total Intake(mL/kg) 50 (0.3)    Urine (mL/kg/hr) 1550 (0.7)    Chest Tube 90    Total Output 1640    Net -1590                 Objective:  General Appearance:  In no acute distress, comfortable and ill-appearing.    Vital signs: (most recent): Blood pressure 106/66, pulse 65, temperature 98.3 °F (36.8 °C), temperature source Oral, resp. rate 28, height 170.2 cm (67\"), weight (!) 192 kg (423 lb 4.5 oz), SpO2 93%, not currently breastfeeding.  No fever.    HEENT: (Bipap)    Lungs:  Normal effort.    Heart: Bradycardia.    Chest: Chest wall tenderness present.    Extremities: Decreased range of motion.    Neurological: (Arousable, but lethargic).    Skin:  Warm and dry.                Chest tube:   Site: Right, Clean, Dry, and Intact  Suction: waterseal  Air Leak: negative    Results Review:     I reviewed the patient's new clinical results.  I reviewed the patient's new imaging results and agree with the interpretation.    Imaging Results (Last 24 Hours)       Procedure Component Value Units Date/Time    XR Chest 1 View [[id number]] Collected: 01/11/25 0555     Updated: 01/11/25 0604    Narrative:      XR CHEST 1 VW-     HISTORY: Female who is 69 years-old, chest tube management     TECHNIQUE: Frontal view of the chest     COMPARISON: 1/10/2025     FINDINGS: Right chest tube appears " slightly higher in position versus  difference in patient positioning. The heart is enlarged. Pulmonary  vasculature appears more congested. Bilateral pulmonary opacities appear  similar to prior exam. There may be small left pleural effusion. No  pneumothorax. Otherwise stable.       Impression:      As described.     This report was finalized on 1/11/2025 6:01 AM by Dr. Piter Fuchs M.D on Workstation: [id number]               Lab Results:     Lab Results (last 24 hours)       Procedure Component Value Units Date/Time    POC Glucose Once [[id number]]  (Normal) Collected: 01/11/25 1643    Specimen: Blood Updated: 01/11/25 1654     Glucose 125 mg/dL     POC Glucose Once [[id number]]  (Normal) Collected: 01/11/25 1106    Specimen: Blood Updated: 01/11/25 1141     Glucose 121 mg/dL     BNP [[id number]]  (Normal) Collected: 01/11/25 0328    Specimen: Blood Updated: 01/11/25 0752     proBNP 291.0 pg/mL     Narrative:      This assay is used as an aid in the diagnosis of individuals suspected of having heart failure. It can be used as an aid in the diagnosis of acute decompensated heart failure (ADHF) in patients presenting with signs and symptoms of ADHF to the emergency department (ED). In addition, NT-proBNP of <300 pg/mL indicates ADHF is not likely.    Age Range Result Interpretation  NT-proBNP Concentration (pg/mL:      <50             Positive            >450                   Gray                 300-450                    Negative             <300    50-75           Positive            >900                  Gray                300-900                  Negative            <300      >75             Positive            >1800                  Gray                300-1800                  Negative            <300    Blood Gas, Arterial - [[id number]]  (Abnormal) Collected: 01/11/25 0746    Specimen: Arterial Blood Updated: 01/11/25 0751     Site Arterial Line     Avi's Test N/A     pH, Arterial 7.303 pH units       pCO2, Arterial 65.6 mm Hg      pO2, Arterial 82.0 mm Hg      HCO3, Arterial 32.5 mmol/L      Base Excess, Arterial 4.0 mmol/L      Comment: Serial Number: [id number]:  [id number]        O2 Saturation, Arterial 94.3 %      CO2 Content >32.45 mmol/L      Barometric Pressure for Blood Gas 744.0000 mmHg      Modality Cannula     Flow Rate 3.0000 lpm      Rate 20 Breaths/minute      Notified Who jewani     Read Back Yes     Notified Time --     Hemodilution No    POC Glucose Once [[id number]]  (Normal) Collected: 01/11/25 0726    Specimen: Blood Updated: 01/11/25 0737     Glucose 107 mg/dL     POC Glucose Once [[id number]]  (Normal) Collected: 01/11/25 0601    Specimen: Blood Updated: 01/11/25 0602     Glucose 102 mg/dL     Comprehensive Metabolic Panel [[id number]]  (Abnormal) Collected: 01/11/25 0328    Specimen: Blood Updated: 01/11/25 0404     Glucose 124 mg/dL      BUN 15 mg/dL      Creatinine 0.59 mg/dL      Sodium 137 mmol/L      Potassium 4.2 mmol/L      Chloride 102 mmol/L      CO2 29.3 mmol/L      Calcium 7.9 mg/dL      Total Protein 5.8 g/dL      Albumin 2.8 g/dL      ALT (SGPT) 22 U/L      AST (SGOT) 26 U/L      Alkaline Phosphatase 67 U/L      Total Bilirubin 0.2 mg/dL      Globulin 3.0 gm/dL      A/G Ratio 0.9 g/dL      BUN/Creatinine Ratio 25.4     Anion Gap 5.7 mmol/L      eGFR 96.5 mL/min/1.73     Narrative:      GFR Categories in Chronic Kidney Disease (CKD)      GFR Category          GFR (mL/min/1.73)    Interpretation  G1                     90 or greater         Normal or high (1)  G2                      60-89                Mild decrease (1)  G3a                   45-59                Mild to moderate decrease  G3b                   30-44                Moderate to severe decrease  G4                    15-29                Severe decrease  G5                    14 or less           Kidney failure          (1)In the absence of evidence of kidney disease, neither GFR category G1 or G2 fulfill the  criteria for CKD.    eGFR calculation 2021 CKD-EPI creatinine equation, which does not include race as a factor    CBC & Differential [[id number]]  (Abnormal) Collected: 01/11/25 0328    Specimen: Blood Updated: 01/11/25 0346    Narrative:      The following orders were created for panel order CBC & Differential.  Procedure                               Abnormality         Status                     ---------                               -----------         ------                     CBC Auto Differential[[id number]]        Abnormal            Final result                 Please view results for these tests on the individual orders.    CBC Auto Differential [[id number]]  (Abnormal) Collected: 01/11/25 0328    Specimen: Blood Updated: 01/11/25 0346     WBC 10.89 10*3/mm3      RBC 4.23 10*6/mm3      Hemoglobin 11.7 g/dL      Hematocrit 37.5 %      MCV 88.7 fL      MCH 27.7 pg      MCHC 31.2 g/dL      RDW 12.1 %      RDW-SD 38.4 fl      MPV 9.6 fL      Platelets 179 10*3/mm3      Neutrophil % 87.6 %      Lymphocyte % 4.3 %      Monocyte % 6.3 %      Eosinophil % 1.1 %      Basophil % 0.1 %      Immature Grans % 0.6 %      Neutrophils, Absolute 9.54 10*3/mm3      Lymphocytes, Absolute 0.47 10*3/mm3      Monocytes, Absolute 0.69 10*3/mm3      Eosinophils, Absolute 0.12 10*3/mm3      Basophils, Absolute 0.01 10*3/mm3      Immature Grans, Absolute 0.06 10*3/mm3      nRBC 0.0 /100 WBC     POC Glucose Once [[id number]]  (Normal) Collected: 01/11/25 0004    Specimen: Blood Updated: 01/11/25 0005     Glucose 123 mg/dL              Assessment & Plan       Ground glass opacity present on imaging of lung       Assessment & Plan    POD # 3 s/p right upper lobectomy. Remains intermittently on BiPAP due to hypercarbia.  Clinically improving.  Continue chest tube to waterseal due to high drainage.  Encourage deep breathing, incentive spirometer.  Diuresis as tolerated.  PT/OT.    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    Continues  to stay in the ICU for respiratory monitoring.    Edgar Goodson MD  [facility]  01/11/25  19:08 EST

## 2025-01-13 ENCOUNTER — APPOINTMENT (OUTPATIENT)
Dept: GENERAL RADIOLOGY | Facility: HOSPITAL | Age: 70
DRG: 163 | End: 2025-01-13
Payer: MEDICARE

## 2025-01-13 LAB
ANION GAP SERPL CALCULATED.3IONS-SCNC: 6.7 MMOL/L (ref 5–15)
BUN SERPL-MCNC: 13 MG/DL (ref 8–23)
BUN/CREAT SERPL: 21.7 (ref 7–25)
CALCIUM SPEC-SCNC: 8.3 MG/DL (ref 8.6–10.5)
CHLORIDE SERPL-SCNC: 103 MMOL/L (ref 98–107)
CO2 SERPL-SCNC: 32.3 MMOL/L (ref 22–29)
CREAT SERPL-MCNC: 0.6 MG/DL (ref 0.57–1)
DEPRECATED RDW RBC AUTO: 39.2 FL (ref 37–54)
EGFRCR SERPLBLD CKD-EPI 2021: 96.1 ML/MIN/1.73
ERYTHROCYTE [DISTWIDTH] IN BLOOD BY AUTOMATED COUNT: 12.2 % (ref 12.3–15.4)
GLUCOSE SERPL-MCNC: 115 MG/DL (ref 65–99)
HCT VFR BLD AUTO: 40.4 % (ref 34–46.6)
HGB BLD-MCNC: 12.8 G/DL (ref 12–15.9)
MAGNESIUM SERPL-MCNC: 2.3 MG/DL (ref 1.6–2.4)
MCH RBC QN AUTO: 28.1 PG (ref 26.6–33)
MCHC RBC AUTO-ENTMCNC: 31.7 G/DL (ref 31.5–35.7)
MCV RBC AUTO: 88.8 FL (ref 79–97)
PLATELET # BLD AUTO: 242 10*3/MM3 (ref 140–450)
PMV BLD AUTO: 10 FL (ref 6–12)
POTASSIUM SERPL-SCNC: 4.3 MMOL/L (ref 3.5–5.2)
RBC # BLD AUTO: 4.55 10*6/MM3 (ref 3.77–5.28)
SODIUM SERPL-SCNC: 142 MMOL/L (ref 136–145)
WBC NRBC COR # BLD AUTO: 13.33 10*3/MM3 (ref 3.4–10.8)

## 2025-01-13 PROCEDURE — 63710000001 ONDANSETRON ODT 4 MG TABLET DISPERSIBLE: Performed by: THORACIC SURGERY (CARDIOTHORACIC VASCULAR SURGERY)

## 2025-01-13 PROCEDURE — 97110 THERAPEUTIC EXERCISES: CPT

## 2025-01-13 PROCEDURE — 94799 UNLISTED PULMONARY SVC/PX: CPT

## 2025-01-13 PROCEDURE — 94760 N-INVAS EAR/PLS OXIMETRY 1: CPT

## 2025-01-13 PROCEDURE — 97116 GAIT TRAINING THERAPY: CPT

## 2025-01-13 PROCEDURE — 25010000002 HEPARIN (PORCINE) PER 1000 UNITS: Performed by: THORACIC SURGERY (CARDIOTHORACIC VASCULAR SURGERY)

## 2025-01-13 PROCEDURE — 80048 BASIC METABOLIC PNL TOTAL CA: CPT | Performed by: SURGERY

## 2025-01-13 PROCEDURE — 25010000002 FUROSEMIDE PER 20 MG: Performed by: SURGERY

## 2025-01-13 PROCEDURE — 94660 CPAP INITIATION&MGMT: CPT

## 2025-01-13 PROCEDURE — 83735 ASSAY OF MAGNESIUM: CPT | Performed by: SURGERY

## 2025-01-13 PROCEDURE — 71045 X-RAY EXAM CHEST 1 VIEW: CPT

## 2025-01-13 PROCEDURE — 85027 COMPLETE CBC AUTOMATED: CPT | Performed by: SURGERY

## 2025-01-13 PROCEDURE — 94761 N-INVAS EAR/PLS OXIMETRY MLT: CPT

## 2025-01-13 PROCEDURE — 99024 POSTOP FOLLOW-UP VISIT: CPT

## 2025-01-13 RX ORDER — FUROSEMIDE 10 MG/ML
40 INJECTION INTRAMUSCULAR; INTRAVENOUS ONCE
Status: COMPLETED | OUTPATIENT
Start: 2025-01-13 | End: 2025-01-13

## 2025-01-13 RX ADMIN — CELECOXIB 100 MG: 100 CAPSULE ORAL at 20:34

## 2025-01-13 RX ADMIN — ACETAMINOPHEN 1000 MG: 500 TABLET, FILM COATED ORAL at 08:48

## 2025-01-13 RX ADMIN — DAPSONE 50 MG: 100 TABLET ORAL at 08:48

## 2025-01-13 RX ADMIN — HEPARIN SODIUM 5000 UNITS: 5000 INJECTION INTRAVENOUS; SUBCUTANEOUS at 06:38

## 2025-01-13 RX ADMIN — CHOLESTYRAMINE 4 G: 4 POWDER, FOR SUSPENSION ORAL at 08:49

## 2025-01-13 RX ADMIN — CELECOXIB 100 MG: 100 CAPSULE ORAL at 08:49

## 2025-01-13 RX ADMIN — DAPSONE 50 MG: 100 TABLET ORAL at 20:34

## 2025-01-13 RX ADMIN — HEPARIN SODIUM 5000 UNITS: 5000 INJECTION INTRAVENOUS; SUBCUTANEOUS at 21:41

## 2025-01-13 RX ADMIN — FUROSEMIDE 40 MG: 10 INJECTION, SOLUTION INTRAMUSCULAR; INTRAVENOUS at 08:49

## 2025-01-13 RX ADMIN — CETIRIZINE HYDROCHLORIDE 10 MG: 10 TABLET, FILM COATED ORAL at 08:49

## 2025-01-13 RX ADMIN — MUPIROCIN 1 APPLICATION: 20 OINTMENT TOPICAL at 08:49

## 2025-01-13 RX ADMIN — ONDANSETRON 4 MG: 4 TABLET, ORALLY DISINTEGRATING ORAL at 09:07

## 2025-01-13 RX ADMIN — HEPARIN SODIUM 5000 UNITS: 5000 INJECTION INTRAVENOUS; SUBCUTANEOUS at 15:58

## 2025-01-13 RX ADMIN — METOPROLOL TARTRATE 25 MG: 25 TABLET, FILM COATED ORAL at 20:34

## 2025-01-13 RX ADMIN — MUPIROCIN 1 APPLICATION: 20 OINTMENT TOPICAL at 20:34

## 2025-01-13 RX ADMIN — ACETAMINOPHEN 1000 MG: 500 TABLET, FILM COATED ORAL at 15:58

## 2025-01-13 RX ADMIN — ACETAMINOPHEN 1000 MG: 500 TABLET, FILM COATED ORAL at 20:34

## 2025-01-13 NOTE — PROGRESS NOTES
"    Chief Complaint: Ground glass opacity, NSCLC  S/P: Right upper lobectomy  POD # 5    Subjective:  Symptoms:  Stable.  She reports shortness of breath and weakness.    Diet:  NPO.    Activity level: Impaired due to weakness.    Pain:  She complains of pain that is mild.  She reports pain is improving.  Pain is well controlled.    Somnolent this morning.   at bedside.  Denies any significant pain.    Vital Signs:  Temp:  [97.4 °F (36.3 °C)-99.2 °F (37.3 °C)] 97.4 °F (36.3 °C)  Heart Rate:  [58-64] 61  Resp:  [18-20] 20  BP: (126-165)/(66-84) 165/80    Intake & Output (last day)         01/12 0701  01/13 0700 01/13 0701  01/14 0700    P.O. 360     Total Intake(mL/kg) 360 (2)     Urine (mL/kg/hr) 1300 (0.3) 1300 (1.4)    Stool 0     Chest Tube      Total Output 1300 1300    Net -940 -1300          Urine Unmeasured Occurrence 3 x     Stool Unmeasured Occurrence 3 x             Objective:  General Appearance:  In no acute distress, comfortable and not in pain.    Vital signs: (most recent): Blood pressure 165/80, pulse 61, temperature 97.4 °F (36.3 °C), temperature source Oral, resp. rate 20, height 170.2 cm (67\"), weight (!) 182 kg (400 lb 8 oz), SpO2 96%, not currently breastfeeding.  No fever.    HEENT: (3-4L NC )    Lungs:  Normal effort.    Heart: Normal rate and bradycardia.    Chest: Symmetric chest wall expansion. Chest wall tenderness present.    Extremities: Decreased range of motion.    Neurological: Patient is alert.  (Arousable, but lethargic).    Skin:  Warm and dry.                Chest tube:   Site: Right, Clean, Dry, and Intact  Suction: waterseal  Air Leak: negative    Results Review:     I reviewed the patient's new clinical results.  I reviewed the patient's new imaging results and agree with the interpretation.    Imaging Results (Last 24 Hours)       Procedure Component Value Units Date/Time    XR Chest 1 View [[id number]] Collected: 01/13/25 0631     Updated: 01/13/25 0636    Narrative:   "    ONE-VIEW PORTABLE CHEST AT 5:53 A.M.     HISTORY: Chest tube management. Recent right lung surgery.     FINDINGS: The examination is limited by the patient's marked obesity. A  previous chest tube in the lower right chest noted on yesterday's exam  is no longer visualized. There is no evidence of pneumothorax. The lungs  are moderately expanded with cardiomegaly and mild to moderate vascular  congestion that is unchanged from yesterday. There is a suspicion of a  small left pleural effusion.     This report was finalized on 1/13/2025 6:33 AM by Dr. Chilango Galicia M.D  on Workstation: [id number]               Lab Results:     Lab Results (last 24 hours)       Procedure Component Value Units Date/Time    Basic Metabolic Panel [[id number]]  (Abnormal) Collected: 01/13/25 0850    Specimen: Blood Updated: 01/13/25 0936     Glucose 115 mg/dL      BUN 13 mg/dL      Creatinine 0.60 mg/dL      Sodium 142 mmol/L      Potassium 4.3 mmol/L      Comment: Slight hemolysis detected by analyzer. Result may be falsely elevated.        Chloride 103 mmol/L      CO2 32.3 mmol/L      Calcium 8.3 mg/dL      BUN/Creatinine Ratio 21.7     Anion Gap 6.7 mmol/L      eGFR 96.1 mL/min/1.73     Narrative:      GFR Categories in Chronic Kidney Disease (CKD)      GFR Category          GFR (mL/min/1.73)    Interpretation  G1                     90 or greater         Normal or high (1)  G2                      60-89                Mild decrease (1)  G3a                   45-59                Mild to moderate decrease  G3b                   30-44                Moderate to severe decrease  G4                    15-29                Severe decrease  G5                    14 or less           Kidney failure          (1)In the absence of evidence of kidney disease, neither GFR category G1 or G2 fulfill the criteria for CKD.    eGFR calculation 2021 CKD-EPI creatinine equation, which does not include race as a factor    Magnesium [[id number]]   (Normal) Collected: 01/13/25 0850    Specimen: Blood Updated: 01/13/25 0936     Magnesium 2.3 mg/dL     CBC (No Diff) [[id number]]  (Abnormal) Collected: 01/13/25 0850    Specimen: Blood Updated: 01/13/25 0914     WBC 13.33 10*3/mm3      RBC 4.55 10*6/mm3      Hemoglobin 12.8 g/dL      Hematocrit 40.4 %      MCV 88.8 fL      MCH 28.1 pg      MCHC 31.7 g/dL      RDW 12.2 %      RDW-SD 39.2 fl      MPV 10.0 fL      Platelets 242 10*3/mm3     Blood Gas, Arterial - [[id number]]  (Abnormal) Collected: 01/12/25 1412    Specimen: Arterial Blood Updated: 01/12/25 1417     Site Left Radial     Avi's Test Positive     pH, Arterial 7.352 pH units      pCO2, Arterial 61.4 mm Hg      pO2, Arterial 122.0 mm Hg      HCO3, Arterial 34.0 mmol/L      Base Excess, Arterial 6.3 mmol/L      Comment: Serial Number: [id number]:  [id number]        O2 Saturation, Arterial 98.4 %      A-a DO2 0.6 mmHg      CO2 Content >32.45 mmol/L      Barometric Pressure for Blood Gas 746.1000 mmHg      Modality BiPap     FIO2 40 %      Set Tidal Volume 560     Set Mech Resp Rate 16     Rate 24 Breaths/minute      PIP 17 cmH2O      Notified Who MD julian     Read Back Yes     Notified Time --     Hemodilution No     Inspiratory Time 1     Device Comment spo2 97% avaps min p 14 max p 28 epap 6     PO2/FIO2 305    Blood Gas, Arterial - [[id number]]  (Abnormal) Collected: 01/12/25 1250    Specimen: Arterial Blood Updated: 01/12/25 1255     Site Left Radial     Avi's Test Positive     pH, Arterial 7.288 pH units      pCO2, Arterial 73.8 mm Hg      pO2, Arterial 73.9 mm Hg      HCO3, Arterial 35.3 mmol/L      Base Excess, Arterial 5.7 mmol/L      Comment: Serial Number: [id number]:  [id number]        O2 Saturation, Arterial 91.8 %      CO2 Content >32.45 mmol/L      Barometric Pressure for Blood Gas 748.1000 mmHg      Modality Cannula     Flow Rate 3.0000 lpm      Rate 20 Breaths/minute      Notified Who oliver bolden rn     Read Back Yes     Notified Time --      Hemodilution No             Assessment & Plan       Ground glass opacity present on imaging of lung       Assessment & Plan  POD # 5 s/p right upper lobectomy. Remains intermittently on BiPAP due to hypercarbia.  Pulmonology following.  A.m. chest x-ray is stable.  No significant volume effusions.    It is imperative we increase her mobility, PT OT.  Encourage pulmonary hygiene including use of I-S.  Patient up to the recliner as able.  Plan for IV Lasix x 1.    Patient is high risk for respiratory decompensation, pneumonia due to body habitus and shallow breathing.  Repeat a.m. chest x-ray, a.m. labs.    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    JOANN Mcwilliams  [facility]  01/13/25  12:07 EST

## 2025-01-13 NOTE — PLAN OF CARE
Goal Outcome Evaluation:  Plan of Care Reviewed With: patient        Progress: improving  Outcome Evaluation: Pt tolerated treatment well this date. Required min A x2 to stand, then ambulated 15ft x2 w/ HHA and CGA-min A x2. Pt required seated rest break between trials d/t SOA and fatigue. Completed a few seated LE exercises as well, and encouraged pt to attempt a few on own during the day. O2 sats dropped to 89% at lowest, w/ quick recovery into 90s.    Anticipated Discharge Disposition (PT): skilled nursing facility

## 2025-01-13 NOTE — PLAN OF CARE
Problem: Adult Inpatient Plan of Care  Goal: Plan of Care Review  Outcome: Progressing  Flowsheets (Taken 1/12/2025 1953)  Progress: improving  Plan of Care Reviewed With: patient  Goal: Patient-Specific Goal (Individualized)  Outcome: Progressing  Goal: Absence of Hospital-Acquired Illness or Injury  Outcome: Progressing  Intervention: Identify and Manage Fall Risk  Recent Flowsheet Documentation  Taken 1/12/2025 1800 by Jordyn Donaldson RN  Safety Promotion/Fall Prevention:   activity supervised   assistive device/personal items within reach   clutter free environment maintained   fall prevention program maintained   nonskid shoes/slippers when out of bed   safety round/check completed  Taken 1/12/2025 1600 by Jordyn Donaldson RN  Safety Promotion/Fall Prevention:   activity supervised   clutter free environment maintained   assistive device/personal items within reach   fall prevention program maintained   nonskid shoes/slippers when out of bed   safety round/check completed  Taken 1/12/2025 1400 by Jordyn Donaldson RN  Safety Promotion/Fall Prevention:   assistive device/personal items within reach   activity supervised   clutter free environment maintained   fall prevention program maintained   nonskid shoes/slippers when out of bed  Taken 1/12/2025 1200 by Jordyn Donaldson RN  Safety Promotion/Fall Prevention:   activity supervised   assistive device/personal items within reach   clutter free environment maintained   fall prevention program maintained   nonskid shoes/slippers when out of bed  Taken 1/12/2025 1000 by Jordyn Donaldson RN  Safety Promotion/Fall Prevention:   assistive device/personal items within reach   activity supervised   clutter free environment maintained   fall prevention program maintained   nonskid shoes/slippers when out of bed   safety round/check completed  Intervention: Prevent Skin Injury  Recent Flowsheet Documentation  Taken 1/12/2025 0800 by Jordyn Donaldson RN  Skin  Protection: incontinence pads utilized  Intervention: Prevent Infection  Recent Flowsheet Documentation  Taken 1/12/2025 1800 by Jordyn Donaldson RN  Infection Prevention: single patient room provided  Taken 1/12/2025 1600 by Jordyn Donaldson RN  Infection Prevention: single patient room provided  Taken 1/12/2025 1400 by Jordyn Donaldson RN  Infection Prevention: single patient room provided  Taken 1/12/2025 1200 by Jordyn Donaldson RN  Infection Prevention: single patient room provided  Taken 1/12/2025 1000 by Jordyn Donaldson RN  Infection Prevention: single patient room provided  Taken 1/12/2025 0800 by Jodryn Donaldson RN  Infection Prevention: single patient room provided  Goal: Optimal Comfort and Wellbeing  Outcome: Progressing  Intervention: Provide Person-Centered Care  Recent Flowsheet Documentation  Taken 1/12/2025 0800 by Jordyn Donaldson RN  Trust Relationship/Rapport:   care explained   choices provided  Goal: Readiness for Transition of Care  Outcome: Progressing   Goal Outcome Evaluation:  Plan of Care Reviewed With: patient        Progress: improving

## 2025-01-13 NOTE — CASE MANAGEMENT/SOCIAL WORK
Discharge Planning Assessment  [facility]     Patient Name: Kortney Charlton  MRN: [id number]  Today's Date: 1/13/2025    Admit Date: 1/8/2025    Plan: SNF - need referral choices   Discharge Needs Assessment       Row Name 01/13/25 1358       Living Environment    People in Home spouse    Current Living Arrangements home    Primary Care Provided by self    Provides Primary Care For no one    Family Caregiver if Needed spouse    Able to Return to Prior Arrangements yes       Resource/Environmental Concerns    Resource/Environmental Concerns home accessibility    Home Accessibility Concerns stairs to enter home       Transition Planning    Patient/Family Anticipates Transition to inpatient rehabilitation facility    Patient/Family Anticipated Services at Transition skilled nursing;rehabilitation services    Transportation Anticipated health plan transportation       Discharge Needs Assessment    Equipment Currently Used at Home none    Concerns to be Addressed discharge planning    Discharge Facility/Level of Care Needs nursing facility, skilled                   Discharge Plan       Row Name 01/13/25 1358       Plan    Plan SNF - need referral choices    Patient/Family in Agreement with Plan yes    Plan Comments CCP spoke with patient and spouse Varinder at bedside; explained role, verified facesheet, and discussed dc plan. Patient was independent for mobility prior to admission. She lives with her spouse in a 3 level home but resides on 1 level. There are 3 steps to enter the home. She has no history of HH or SNF. PT is recommending SNF; patient was max assist of 2. Patient is currently on bipap; CCP explained to spouse that if she goes to rehab, the facility will help set that up for home prior to discharging from rehab. Patient and spouse are agreeable to rehab. CCP provided SNF list to spouse and encouraged them to pick 5-6 referral choices. SUSHIL, EDIL                  Continued Care and Services - Admitted Since  1/8/2025    No active coordination exists for this encounter.       Expected Discharge Date and Time       Expected Discharge Date Expected Discharge Time    Jayson 15, 2025            Demographic Summary       Row Name 01/13/25 1352       General Information    Admission Type inpatient    Arrived From home    Referral Source admission list    Reason for Consult discharge planning    Preferred Language English       Contact Information    Permission Granted to Share Info With family/designee                   Functional Status       Row Name 01/13/25 1358       Functional Status    Usual Activity Tolerance good    Current Activity Tolerance poor       Functional Status, IADL    Medications independent    Meal Preparation independent    Housekeeping independent    Laundry independent    Shopping independent       Mental Status    General Appearance WDL WDL                   Psychosocial    No documentation.                  Abuse/Neglect    No documentation.                  Legal    No documentation.                  Substance Abuse    No documentation.                  Patient Forms    No documentation.                     EDIL Field

## 2025-01-13 NOTE — PLAN OF CARE
Problem: Adult Inpatient Plan of Care  Goal: Plan of Care Review  Outcome: Progressing  Flowsheets (Taken 1/13/2025 1627)  Outcome Evaluation: Alert and oriented. O2 3l nasal cannula. up in the chair.  Goal: Patient-Specific Goal (Individualized)  Outcome: Progressing  Goal: Absence of Hospital-Acquired Illness or Injury  Outcome: Progressing  Intervention: Identify and Manage Fall Risk  Recent Flowsheet Documentation  Taken 1/13/2025 1602 by Dayami Khan RN  Safety Promotion/Fall Prevention:   clutter free environment maintained   activity supervised  Taken 1/13/2025 1401 by Dayami Khan RN  Safety Promotion/Fall Prevention:   clutter free environment maintained   activity supervised  Taken 1/13/2025 1210 by Dayami Khan RN  Safety Promotion/Fall Prevention:   clutter free environment maintained   activity supervised  Taken 1/13/2025 1007 by Dayami Khan RN  Safety Promotion/Fall Prevention:   clutter free environment maintained   activity supervised  Taken 1/13/2025 0858 by Dayami Khan RN  Safety Promotion/Fall Prevention:   clutter free environment maintained   activity supervised  Taken 1/13/2025 0850 by Dayami Khan RN  Safety Promotion/Fall Prevention:   clutter free environment maintained   activity supervised  Intervention: Prevent and Manage VTE (Venous Thromboembolism) Risk  Recent Flowsheet Documentation  Taken 1/13/2025 0850 by Dayami Khan RN  VTE Prevention/Management: (Sub q heparin)   bilateral   SCDs (sequential compression devices) off  Intervention: Prevent Infection  Recent Flowsheet Documentation  Taken 1/13/2025 1602 by Dayami Khan RN  Infection Prevention: single patient room provided  Taken 1/13/2025 1401 by Dayami Khan RN  Infection Prevention: single patient room provided  Taken 1/13/2025 1210 by Dayami Khan RN  Infection Prevention: single patient room provided  Taken 1/13/2025 1007 by Dayami Khan RN  Infection Prevention: single patient room provided  Taken  1/13/2025 0858 by Dayami Khan RN  Infection Prevention: single patient room provided  Taken 1/13/2025 0850 by Dayami Khan RN  Infection Prevention: single patient room provided  Goal: Optimal Comfort and Wellbeing  Outcome: Progressing  Intervention: Provide Person-Centered Care  Recent Flowsheet Documentation  Taken 1/13/2025 1401 by Dayami Khan RN  Trust Relationship/Rapport:   care explained   choices provided  Taken 1/13/2025 0850 by Dayami Khan RN  Trust Relationship/Rapport:   care explained   choices provided  Goal: Readiness for Transition of Care  Outcome: Progressing     Problem: Skin Injury Risk Increased  Goal: Skin Health and Integrity  Outcome: Progressing  Intervention: Optimize Skin Protection  Recent Flowsheet Documentation  Taken 1/13/2025 1401 by Dayami Khan RN  Activity Management: up in chair  Taken 1/13/2025 1210 by Dayami Khan RN  Activity Management: up in chair     Problem: Noninvasive Ventilation Acute  Goal: Effective Unassisted Ventilation and Oxygenation  Outcome: Progressing     Problem: Fall Injury Risk  Goal: Absence of Fall and Fall-Related Injury  Outcome: Progressing  Intervention: Identify and Manage Contributors  Recent Flowsheet Documentation  Taken 1/13/2025 1602 by Dayami Khan RN  Medication Review/Management: medications reviewed  Taken 1/13/2025 1401 by Dayami Khan RN  Medication Review/Management: medications reviewed  Taken 1/13/2025 1210 by Dayami Khan RN  Medication Review/Management: medications reviewed  Taken 1/13/2025 1007 by Dayami Khan RN  Medication Review/Management: medications reviewed  Taken 1/13/2025 0858 by Dayami Khan RN  Medication Review/Management: medications reviewed  Taken 1/13/2025 0850 by Dayami Khan RN  Medication Review/Management: medications reviewed  Intervention: Promote Injury-Free Environment  Recent Flowsheet Documentation  Taken 1/13/2025 1602 by Dayami Khan RN  Safety Promotion/Fall Prevention:    clutter free environment maintained   activity supervised  Taken 1/13/2025 1401 by Dayami Khan RN  Safety Promotion/Fall Prevention:   clutter free environment maintained   activity supervised  Taken 1/13/2025 1210 by Dayami Khan RN  Safety Promotion/Fall Prevention:   clutter free environment maintained   activity supervised  Taken 1/13/2025 1007 by Dayami Khan RN  Safety Promotion/Fall Prevention:   clutter free environment maintained   activity supervised  Taken 1/13/2025 0858 by Dayami Khan RN  Safety Promotion/Fall Prevention:   clutter free environment maintained   activity supervised  Taken 1/13/2025 0850 by Dayami Khan RN  Safety Promotion/Fall Prevention:   clutter free environment maintained   activity supervised     Problem: Gas Exchange Impaired  Goal: Optimal Gas Exchange  Outcome: Progressing     Problem: Lung Surgery  Goal: Absence of Bleeding  Outcome: Progressing  Goal: Effective Bowel Elimination  Outcome: Progressing  Goal: Fluid and Electrolyte Balance  Outcome: Progressing  Goal: Effective Tissue Perfusion  Outcome: Progressing  Goal: Absence of Infection Signs and Symptoms  Outcome: Progressing  Intervention: Prevent or Manage Infection  Recent Flowsheet Documentation  Taken 1/13/2025 1602 by Dayami Khan RN  Isolation Precautions: precautions maintained  Taken 1/13/2025 1401 by Dayami Khan RN  Isolation Precautions: precautions maintained  Taken 1/13/2025 1210 by Dayami Khan RN  Isolation Precautions: precautions maintained  Taken 1/13/2025 1007 by Dayami Khan RN  Isolation Precautions: precautions maintained  Taken 1/13/2025 0858 by Dayami Khan RN  Isolation Precautions: precautions maintained  Taken 1/13/2025 0850 by Dayami Khan RN  Isolation Precautions: precautions maintained  Goal: Anesthesia/Sedation Recovery  Outcome: Progressing  Intervention: Optimize Anesthesia Recovery  Recent Flowsheet Documentation  Taken 1/13/2025 1602 by Dayami Khan RN  Safety  Promotion/Fall Prevention:   clutter free environment maintained   activity supervised  Taken 1/13/2025 1558 by Dayami Khan RN  Administration (IS): self-administered  Taken 1/13/2025 1401 by Dayami Khan RN  Safety Promotion/Fall Prevention:   clutter free environment maintained   activity supervised  Taken 1/13/2025 1400 by Dayami Khan RN  Administration (IS): self-administered  Taken 1/13/2025 1210 by Dayami Khan RN  Safety Promotion/Fall Prevention:   clutter free environment maintained   activity supervised  Taken 1/13/2025 1200 by Dayami Khan RN  Administration (IS): self-administered  Taken 1/13/2025 1007 by Dayami Khan RN  Safety Promotion/Fall Prevention:   clutter free environment maintained   activity supervised  Taken 1/13/2025 1000 by Dayami Khan RN  Administration (IS): self-administered  Taken 1/13/2025 0858 by Dayami Khan RN  Safety Promotion/Fall Prevention:   clutter free environment maintained   activity supervised  Taken 1/13/2025 0850 by Dayami Khan RN  Safety Promotion/Fall Prevention:   clutter free environment maintained   activity supervised  Taken 1/13/2025 0800 by Dayami Khan RN  Administration (IS): self-administered  Goal: Pain Control and Function  Outcome: Progressing  Goal: Nausea and Vomiting Relief  Outcome: Progressing  Goal: Effective Urinary Elimination  Outcome: Progressing  Goal: Effective Oxygenation and Ventilation  Outcome: Progressing  Intervention: Optimize Oxygenation and Ventilation  Recent Flowsheet Documentation  Taken 1/13/2025 1558 by Dayami Khan RN  Administration (IS): self-administered  Taken 1/13/2025 1400 by Dayami Khan RN  Administration (IS): self-administered  Taken 1/13/2025 1200 by Dayami Khan RN  Administration (IS): self-administered  Taken 1/13/2025 1000 by Dayami Khan RN  Administration (IS): self-administered  Taken 1/13/2025 0800 by Dayami Khan RN  Administration (IS): self-administered   Goal Outcome  Evaluation:              Outcome Evaluation: Alert and oriented. O2 3l nasal cannula. up in the chair.

## 2025-01-13 NOTE — THERAPY TREATMENT NOTE
Patient Name: Kortney Charlton  : 1955    MRN: [id number]                              Today's Date: 2025       Admit Date: 2025    Visit Dx:     ICD-10-CM ICD-9-CM   1. Ground glass opacity present on imaging of lung  R91.8 793.19     Patient Active Problem List   Diagnosis    Morbid obesity    Elevated BP without diagnosis of hypertension    Venous stasis dermatitis of both lower extremities    History of total bilateral knee replacement (TKR)    Breast lump in female    Sneddon-Hanna disease    Abnormal TSH    Atypical chest pain    Medicare annual wellness visit, subsequent    Diarrhea in adult patient    Chronic diarrhea    Hypermetropia, bilateral    Vitreous degeneration of left eye    Prediabetes    Numbness and tingling in left hand    Bilateral carpal tunnel syndrome    Diarrhea following gastrointestinal surgery    Leg swelling    Encounter for lipid screening for cardiovascular disease    Body mass index (BMI) of 60.0 to 69.9 in adult    Immunization due    Age-related nuclear cataract of both eyes    Bilateral myopia    Presbyopia    Regular astigmatism of both eyes    History of pulmonary embolism    Ground glass opacity present on imaging of lung    Pneumothorax     Past Medical History:   Diagnosis Date    Allergic topical steroids    Anemia Episodic    At risk for central sleep apnea     STOP BANG 5    Breast lump in female 2019    Chronic nasopharyngitis     Elevated BP without diagnosis of hypertension     History of pulmonary embolism     Hypertension     Lesion of lung     Morbid obesity     Neuromuscular disorder     Osteoarthritis     Rash     UNDER LEFT BREAST    Sneddon-Hanna disease     Stasis dermatitis     Urinary tract infection      Past Surgical History:   Procedure Laterality Date    BRONCHOSCOPY WITH ION ROBOTIC ASSIST N/A 10/30/2024    Procedure: BRONCHOSCOPY WITH ION ROBOT, LAVAGE, FINE NEEDLE ASPIRATIONS AND BIOPSIES;  Surgeon: Radha Marcus  MD VALERIE;  Location: [facility] ENDOSCOPY;  Service: Robotics - Pulmonary;  Laterality: N/A;    CATARACT EXTRACTION Bilateral     COLONOSCOPY  2015    CYST REMOVAL      MULTIPLE    HYSTEROSCOPY ENDOMETRIAL ABLATION      JOINT REPLACEMENT  R-2013 L-2015    LAPAROSCOPIC CHOLECYSTECTOMY  10/2011    LOBECTOMY Right 1/8/2025    Procedure: BRONCHOSCOPY, RIGHT VIDEO ASSISTED THORACOSCOPY WITH DAVINCI ROBOT with wedge resection X2, COMPLETION RIGHT UPPER LOBECTOMY, MEDIASTINAL AND HILAR LYMPH NODE DISSECTION, INTERCOSTAL NERVE BLOCK;  Surgeon: Radha Marcus MD;  Location: [facility] OR;  Service: Robotics - DaVinci;  Laterality: Right;    OVARIAN CYST REMOVAL        General Information       Row Name 01/13/25 1632          Physical Therapy Time and Intention    Document Type therapy note (daily note)  -     Mode of Treatment physical therapy  -       Row Name 01/13/25 1632          General Information    Existing Precautions/Restrictions fall;oxygen therapy device and L/min  -       Row Name 01/13/25 1632          Cognition    Orientation Status (Cognition) oriented x 3  -               User Key  (r) = Recorded By, (t) = Taken By, (c) = Cosigned By      Initials Name Provider Type     Gema Trevino PTA Physical Therapist Assistant                   Mobility       Row Name 01/13/25 1632          Bed Mobility    Comment, (Bed Mobility) up in chair  -       Row Name 01/13/25 1632          Gait/Stairs (Locomotion)    Griffith Level (Gait) minimum assist (75% patient effort);2 person assist  -     Patient was able to Ambulate yes  -     Distance in Feet (Gait) 15  x2; including forward and backwards steps  -     Deviations/Abnormal Patterns (Gait) tami decreased;stride length decreased  -     Bilateral Gait Deviations forward flexed posture  -               User Key  (r) = Recorded By, (t) = Taken By, (c) = Cosigned By      Initials Name Provider Type     Gema Trevino PTA Physical  Therapist Assistant                   Obj/Interventions       Row Name 01/13/25 1633          Motor Skills    Therapeutic Exercise --  seated AP, LAQ, marches x10 reps  -SM               User Key  (r) = Recorded By, (t) = Taken By, (c) = Cosigned By      Initials Name Provider Type    Gema Reyes PTA Physical Therapist Assistant                   Goals/Plan    No documentation.                  Clinical Impression       Row Name 01/13/25 1633          Pain    Pretreatment Pain Rating 0/10 - no pain  -SM     Posttreatment Pain Rating 0/10 - no pain  -SM       Row Name 01/13/25 1633          Positioning and Restraints    Pre-Treatment Position sitting in chair/recliner  -SM     Post Treatment Position chair  -SM     In Chair reclined;call light within reach;encouraged to call for assist;exit alarm on;with family/caregiver;with nsg  -SM               User Key  (r) = Recorded By, (t) = Taken By, (c) = Cosigned By      Initials Name Provider Type    Gema Reyes PTA Physical Therapist Assistant                   Outcome Measures       Row Name 01/13/25 1635 01/13/25 0850       How much help from another person do you currently need...    Turning from your back to your side while in flat bed without using bedrails? 3  -SM 3  -KP    Moving from lying on back to sitting on the side of a flat bed without bedrails? 3  -SM 2  -KP    Moving to and from a bed to a chair (including a wheelchair)? 3  -SM 2  -KP    Standing up from a chair using your arms (e.g., wheelchair, bedside chair)? 3  -SM 2  -KP    Climbing 3-5 steps with a railing? 1  -SM 1  -KP    To walk in hospital room? 2  -SM 2  -KP    AM-PAC 6 Clicks Score (PT) 15  -SM 12  -KP    Highest Level of Mobility Goal 4 --> Transfer to chair/commode  -SM 4 --> Transfer to chair/commode  -KP      Row Name 01/13/25 1635          Functional Assessment    Outcome Measure Options AM-PAC 6 Clicks Basic Mobility (PT)  -               User Key  (r) =  Recorded By, (t) = Taken By, (c) = Cosigned By      Initials Name Provider Type     Gema Trevino PTA Physical Therapist Assistant    Dayami Alfaro RN Registered Nurse                                 Physical Therapy Education       Title: PT OT SLP Therapies (Done)       Topic: Physical Therapy (Done)       Point: Mobility training (Done)       Learning Progress Summary            Patient Acceptance, E,TB,D, VU,NR by  at 1/13/2025 1636    Acceptance, E, VU,NR by  at 1/11/2025 1314                      Point: Home exercise program (Done)       Learning Progress Summary            Patient Acceptance, E,TB,D, VU,NR by  at 1/13/2025 1636    Acceptance, E, VU,NR by  at 1/11/2025 1314                      Point: Body mechanics (Done)       Learning Progress Summary            Patient Acceptance, E,TB,D, VU,NR by  at 1/13/2025 1636    Acceptance, E, VU,NR by  at 1/11/2025 1314                      Point: Precautions (Done)       Learning Progress Summary            Patient Acceptance, E,TB,D, VU,NR by  at 1/13/2025 1636    Acceptance, E, VU,NR by  at 1/11/2025 1314                                      User Key       Initials Effective Dates Name Provider Type Discipline     03/07/18 -  Gema Trevino PTA Physical Therapist Assistant PT     10/25/19 -  Sahara Brown PT Physical Therapist PT                  PT Recommendation and Plan     Progress: improving  Outcome Evaluation: Pt tolerated treatment well this date. Required min A x2 to stand, then ambulated 15ft x2 w/ HHA and CGA-min A x2. Pt required seated rest break between trials d/t SOA and fatigue. Completed a few seated LE exercises as well, and encouraged pt to attempt a few on own during the day. O2 sats dropped to 89% at lowest, w/ quick recovery into 90s.     Time Calculation:         PT Charges       Row Name 01/13/25 9988             Time Calculation    Start Time 1442  -      Stop Time 1509  -      Time Calculation  (min) 27 min  -      PT Received On 01/13/25  -ERICK      PT - Next Appointment 01/14/25  -ERICK                User Key  (r) = Recorded By, (t) = Taken By, (c) = Cosigned By      Initials Name Provider Type    Gema Reyes PTA Physical Therapist Assistant                  Therapy Charges for Today       Code Description Service Date Service Provider Modifiers Qty    00131845941 HC GAIT TRAINING EA 15 MIN 1/13/2025 Gema Trevino, TEE GP 1    44641745655 HC PT THER PROC EA 15 MIN 1/13/2025 Gema Trevino, TEE GP 1    80256368925 HC PT THER SUPP EA 15 MIN 1/13/2025 Gema Trevino, TEE GP 2            PT G-Codes  Outcome Measure Options: AM-PAC 6 Clicks Basic Mobility (PT)  AM-PAC 6 Clicks Score (PT): 15  PT Discharge Summary  Anticipated Discharge Disposition (PT): skilled nursing facility    Gema Trevino PTA  1/13/2025

## 2025-01-13 NOTE — PLAN OF CARE
Problem: Adult Inpatient Plan of Care  Goal: Plan of Care Review  Outcome: Progressing  Goal: Patient-Specific Goal (Individualized)  Outcome: Progressing  Goal: Absence of Hospital-Acquired Illness or Injury  Outcome: Progressing  Intervention: Identify and Manage Fall Risk  Recent Flowsheet Documentation  Taken 1/13/2025 0400 by Cassi Martini RN  Safety Promotion/Fall Prevention:   activity supervised   assistive device/personal items within reach   clutter free environment maintained   fall prevention program maintained   nonskid shoes/slippers when out of bed   room organization consistent   safety round/check completed  Taken 1/13/2025 0215 by Cassi Martini RN  Safety Promotion/Fall Prevention:   activity supervised   assistive device/personal items within reach   clutter free environment maintained   fall prevention program maintained   nonskid shoes/slippers when out of bed   room organization consistent   safety round/check completed  Taken 1/13/2025 0000 by Cassi Martini RN  Safety Promotion/Fall Prevention:   activity supervised   assistive device/personal items within reach   clutter free environment maintained   fall prevention program maintained   nonskid shoes/slippers when out of bed   room organization consistent   safety round/check completed  Taken 1/12/2025 2200 by Cassi Martini, RN  Safety Promotion/Fall Prevention:   activity supervised   assistive device/personal items within reach   clutter free environment maintained   fall prevention program maintained   nonskid shoes/slippers when out of bed   room organization consistent   safety round/check completed  Taken 1/12/2025 2015 by Cassi Martini, RN  Safety Promotion/Fall Prevention:   activity supervised   assistive device/personal items within reach   clutter free environment maintained   fall prevention program maintained   nonskid shoes/slippers when out of bed   room organization consistent   safety round/check  completed  Intervention: Prevent Skin Injury  Recent Flowsheet Documentation  Taken 1/13/2025 0400 by Cassi Martini RN  Body Position:   turned   right  Taken 1/13/2025 0215 by Cassi Martini RN  Body Position:   left   side-lying   position changed independently  Skin Protection:   incontinence pads utilized   transparent dressing maintained   skin sealant/moisture barrier applied  Taken 1/13/2025 0000 by Cassi Martini RN  Body Position:   supine   position changed independently  Taken 1/12/2025 2200 by Cassi Martini RN  Body Position:   left   side-lying   position changed independently  Taken 1/12/2025 2015 by Cassi Martini RN  Body Position:   supine   legs elevated  Intervention: Prevent Infection  Recent Flowsheet Documentation  Taken 1/13/2025 0400 by Cassi Martini RN  Infection Prevention:   cohorting utilized   environmental surveillance performed   hand hygiene promoted   rest/sleep promoted   single patient room provided  Taken 1/13/2025 0215 by Cassi Martini RN  Infection Prevention:   environmental surveillance performed   hand hygiene promoted   single patient room provided   rest/sleep promoted   cohorting utilized  Taken 1/13/2025 0000 by Cassi Martini RN  Infection Prevention:   cohorting utilized   environmental surveillance performed   hand hygiene promoted   rest/sleep promoted   single patient room provided  Taken 1/12/2025 2200 by Cassi Martini RN  Infection Prevention:   cohorting utilized   environmental surveillance performed   hand hygiene promoted   rest/sleep promoted   single patient room provided  Taken 1/12/2025 2015 by Cassi Martini RN  Infection Prevention:   cohorting utilized   environmental surveillance performed   hand hygiene promoted   rest/sleep promoted   single patient room provided  Goal: Optimal Comfort and Wellbeing  Outcome: Progressing  Intervention: Provide Person-Centered Care  Recent Flowsheet Documentation  Taken 1/13/2025 0215 by  Cassi Martini RN  Trust Relationship/Rapport:   care explained   choices provided  Taken 1/12/2025 2015 by Cassi Martini RN  Trust Relationship/Rapport:   care explained   choices provided  Goal: Readiness for Transition of Care  Outcome: Progressing     Problem: Skin Injury Risk Increased  Goal: Skin Health and Integrity  Outcome: Progressing  Intervention: Optimize Skin Protection  Recent Flowsheet Documentation  Taken 1/13/2025 0400 by Cassi Martini RN  Activity Management: activity minimized  Head of Bed (HOB) Positioning: HOB at 20-30 degrees  Taken 1/13/2025 0215 by Cassi Martini RN  Activity Management: activity minimized  Pressure Reduction Techniques:   frequent weight shift encouraged   weight shift assistance provided   heels elevated off bed  Head of Bed (HOB) Positioning: HOB at 20-30 degrees  Pressure Reduction Devices: alternating pressure pump (SUMI)  Skin Protection:   incontinence pads utilized   transparent dressing maintained   skin sealant/moisture barrier applied  Taken 1/13/2025 0000 by Cassi Martini RN  Activity Management: activity encouraged  Head of Bed (HOB) Positioning: HOB at 20-30 degrees  Taken 1/12/2025 2200 by Cassi Martini RN  Activity Management: activity encouraged  Head of Bed (HOB) Positioning: HOB at 20 degrees  Taken 1/12/2025 2015 by Cassi Martini RN  Activity Management: activity encouraged  Head of Bed (HOB) Positioning: HOB at 20-30 degrees     Problem: Noninvasive Ventilation Acute  Goal: Effective Unassisted Ventilation and Oxygenation  Outcome: Progressing     Problem: Fall Injury Risk  Goal: Absence of Fall and Fall-Related Injury  Outcome: Progressing  Intervention: Identify and Manage Contributors  Recent Flowsheet Documentation  Taken 1/13/2025 0400 by Cassi Martini RN  Medication Review/Management: medications reviewed  Taken 1/13/2025 0215 by Cassi Martini RN  Medication Review/Management: medications reviewed  Taken 1/13/2025 0000 by  Cassi Martini, RN  Medication Review/Management: medications reviewed  Taken 1/12/2025 2200 by Cassi Martini, RN  Medication Review/Management: medications reviewed  Taken 1/12/2025 2015 by Cassi Martini RN  Medication Review/Management: medications reviewed  Intervention: Promote Injury-Free Environment  Recent Flowsheet Documentation  Taken 1/13/2025 0400 by Cassi Martini, RN  Safety Promotion/Fall Prevention:   activity supervised   assistive device/personal items within reach   clutter free environment maintained   fall prevention program maintained   nonskid shoes/slippers when out of bed   room organization consistent   safety round/check completed  Taken 1/13/2025 0215 by Cassi Martini, RN  Safety Promotion/Fall Prevention:   activity supervised   assistive device/personal items within reach   clutter free environment maintained   fall prevention program maintained   nonskid shoes/slippers when out of bed   room organization consistent   safety round/check completed  Taken 1/13/2025 0000 by Cassi Martini RN  Safety Promotion/Fall Prevention:   activity supervised   assistive device/personal items within reach   clutter free environment maintained   fall prevention program maintained   nonskid shoes/slippers when out of bed   room organization consistent   safety round/check completed  Taken 1/12/2025 2200 by Cassi Martini, RN  Safety Promotion/Fall Prevention:   activity supervised   assistive device/personal items within reach   clutter free environment maintained   fall prevention program maintained   nonskid shoes/slippers when out of bed   room organization consistent   safety round/check completed  Taken 1/12/2025 2015 by Cassi Martini, RN  Safety Promotion/Fall Prevention:   activity supervised   assistive device/personal items within reach   clutter free environment maintained   fall prevention program maintained   nonskid shoes/slippers when out of bed   room organization  consistent   safety round/check completed     Problem: Gas Exchange Impaired  Goal: Optimal Gas Exchange  Outcome: Progressing  Intervention: Optimize Oxygenation and Ventilation  Recent Flowsheet Documentation  Taken 1/13/2025 0400 by Cassi Martini RN  Head of Bed (HOB) Positioning: HOB at 20-30 degrees  Taken 1/13/2025 0215 by Cassi Martini RN  Head of Bed (HOB) Positioning: HOB at 20-30 degrees  Taken 1/13/2025 0000 by Cassi Martini RN  Head of Bed (HOB) Positioning: HOB at 20-30 degrees  Taken 1/12/2025 2200 by Cassi Martini RN  Head of Bed (HOB) Positioning: HOB at 20 degrees  Taken 1/12/2025 2015 by Casis Martini RN  Head of Bed (HOB) Positioning: HOB at 20-30 degrees     Problem: Lung Surgery  Goal: Absence of Bleeding  Outcome: Progressing  Goal: Effective Bowel Elimination  Outcome: Progressing  Goal: Fluid and Electrolyte Balance  Outcome: Progressing  Goal: Effective Tissue Perfusion  Outcome: Progressing  Goal: Absence of Infection Signs and Symptoms  Outcome: Progressing  Goal: Anesthesia/Sedation Recovery  Outcome: Progressing  Intervention: Optimize Anesthesia Recovery  Recent Flowsheet Documentation  Taken 1/13/2025 0400 by Cassi Martini RN  Safety Promotion/Fall Prevention:   activity supervised   assistive device/personal items within reach   clutter free environment maintained   fall prevention program maintained   nonskid shoes/slippers when out of bed   room organization consistent   safety round/check completed  Taken 1/13/2025 0215 by Cassi Martini RN  Safety Promotion/Fall Prevention:   activity supervised   assistive device/personal items within reach   clutter free environment maintained   fall prevention program maintained   nonskid shoes/slippers when out of bed   room organization consistent   safety round/check completed  Taken 1/13/2025 0000 by Cassi Martini RN  Safety Promotion/Fall Prevention:   activity supervised   assistive device/personal items within  reach   clutter free environment maintained   fall prevention program maintained   nonskid shoes/slippers when out of bed   room organization consistent   safety round/check completed  Taken 1/12/2025 2200 by Cassi Martini RN  Safety Promotion/Fall Prevention:   activity supervised   assistive device/personal items within reach   clutter free environment maintained   fall prevention program maintained   nonskid shoes/slippers when out of bed   room organization consistent   safety round/check completed  Taken 1/12/2025 2015 by Cassi Martini RN  Safety Promotion/Fall Prevention:   activity supervised   assistive device/personal items within reach   clutter free environment maintained   fall prevention program maintained   nonskid shoes/slippers when out of bed   room organization consistent   safety round/check completed  Goal: Pain Control and Function  Outcome: Progressing  Goal: Nausea and Vomiting Relief  Outcome: Progressing  Goal: Effective Urinary Elimination  Outcome: Progressing  Goal: Effective Oxygenation and Ventilation  Outcome: Progressing  Intervention: Optimize Oxygenation and Ventilation  Recent Flowsheet Documentation  Taken 1/13/2025 0400 by Cassi Martini RN  Head of Bed (HOB) Positioning: HOB at 20-30 degrees  Taken 1/13/2025 0215 by Cassi Martini RN  Head of Bed (HOB) Positioning: HOB at 20-30 degrees  Taken 1/13/2025 0000 by Cassi Martini RN  Head of Bed (HOB) Positioning: HOB at 20-30 degrees  Taken 1/12/2025 2200 by Cassi Martini RN  Head of Bed (HOB) Positioning: HOB at 20 degrees  Taken 1/12/2025 2015 by Cassi Martini RN  Head of Bed (HOB) Positioning: HOB at 20-30 degrees   Goal Outcome Evaluation:

## 2025-01-13 NOTE — PROGRESS NOTES
"    Chief Complaint: Ground glass opacity, NSCLC  S/P: Right upper lobectomy  POD # 4    Subjective:  Symptoms:  Stable.  She reports shortness of breath and weakness.    Diet:  NPO.    Activity level: Impaired due to weakness.    Pain:  She complains of pain that is mild.  She reports pain is improving.  Pain is well controlled.    Transferred out of the ICU last night due to ICU bed shortage for critical patients.  Feeling better.  Denies new complaint.    Vital Signs:  Temp:  [97.4 °F (36.3 °C)-98.4 °F (36.9 °C)] 98.4 °F (36.9 °C)  Heart Rate:  [53-75] 61  Resp:  [18-20] 20  BP: (107-166)/(55-84) 126/84  Arterial Line BP: ()/(59-84) 79/63    Intake & Output (last day)         01/12 0701  01/13 0700    P.O. 120    Total Intake(mL/kg) 120 (0.7)    Urine (mL/kg/hr) 500 (0.2)    Stool 0    Chest Tube     Total Output 500    Net -380         Urine Unmeasured Occurrence 1 x    Stool Unmeasured Occurrence 1 x            Objective:  General Appearance:  In no acute distress, comfortable and ill-appearing.    Vital signs: (most recent): Blood pressure 126/84, pulse 61, temperature 98.4 °F (36.9 °C), temperature source Oral, resp. rate 20, height 170.2 cm (67\"), weight (!) 182 kg (400 lb 8 oz), SpO2 94%, not currently breastfeeding.  No fever.    HEENT: (Bipap)    Lungs:  Normal effort.    Heart: Bradycardia.    Chest: Chest wall tenderness present.    Extremities: Decreased range of motion.    Neurological: (Arousable, but lethargic).    Skin:  Warm and dry.                Chest tube:   Site: Right, Clean, Dry, and Intact  Suction: waterseal  Air Leak: negative    Results Review:     I reviewed the patient's new clinical results.  I reviewed the patient's new imaging results and agree with the interpretation.    Imaging Results (Last 24 Hours)       Procedure Component Value Units Date/Time    XR Chest 1 View [[id number]] Collected: 01/12/25 1132     Updated: 01/12/25 1137    Narrative:      XR CHEST 1 VW-   "   HISTORY: Chest tube management.     COMPARISON: 1/11/2025     FINDINGS:    A single view of the chest was obtained. There is a right chest tube,  slightly retracted from prior. The cardiac silhouette and mediastinal  and hilar contours are enlarged, similar to prior. There are low lung  volumes. Pulmonary venous congestion. There is improved aeration of the  left lung. There is new right apical opacity possibly reflecting apical  capping of pleural fluid. There is a suspected corresponding right  pneumothorax. Recommend follow-up PA and lateral chest radiographs.  There is degenerative disc disease.     This report was finalized on 1/12/2025 11:34 AM by Dr. Nancy Greer M.D  on Workstation: [id number]               Lab Results:     Lab Results (last 24 hours)       Procedure Component Value Units Date/Time    Blood Gas, Arterial - [[id number]]  (Abnormal) Collected: 01/12/25 1412    Specimen: Arterial Blood Updated: 01/12/25 1417     Site Left Radial     Avi's Test Positive     pH, Arterial 7.352 pH units      pCO2, Arterial 61.4 mm Hg      pO2, Arterial 122.0 mm Hg      HCO3, Arterial 34.0 mmol/L      Base Excess, Arterial 6.3 mmol/L      Comment: Serial Number: [id number]:  [id number]        O2 Saturation, Arterial 98.4 %      A-a DO2 0.6 mmHg      CO2 Content >32.45 mmol/L      Barometric Pressure for Blood Gas 746.1000 mmHg      Modality BiPap     FIO2 40 %      Set Tidal Volume 560     Set Mech Resp Rate 16     Rate 24 Breaths/minute      PIP 17 cmH2O      Notified Who MD julian     Read Back Yes     Notified Time --     Hemodilution No     Inspiratory Time 1     Device Comment spo2 97% avaps min p 14 max p 28 epap 6     PO2/FIO2 305    Blood Gas, Arterial - [[id number]]  (Abnormal) Collected: 01/12/25 1250    Specimen: Arterial Blood Updated: 01/12/25 1255     Site Left Radial     Avi's Test Positive     pH, Arterial 7.288 pH units      pCO2, Arterial 73.8 mm Hg      pO2, Arterial 73.9 mm Hg      HCO3,  Arterial 35.3 mmol/L      Base Excess, Arterial 5.7 mmol/L      Comment: Serial Number: [id number]:  [id number]        O2 Saturation, Arterial 91.8 %      CO2 Content >32.45 mmol/L      Barometric Pressure for Blood Gas 748.1000 mmHg      Modality Cannula     Flow Rate 3.0000 lpm      Rate 20 Breaths/minute      Notified Who oliver bolden rn     Read Back Yes     Notified Time --     Hemodilution No    Basic Metabolic Panel [[id number]]  (Abnormal) Collected: 01/12/25 0825    Specimen: Blood Updated: 01/12/25 0947     Glucose 111 mg/dL      BUN 12 mg/dL      Creatinine 0.53 mg/dL      Sodium 139 mmol/L      Potassium 4.1 mmol/L      Chloride 102 mmol/L      CO2 29.0 mmol/L      Calcium 8.1 mg/dL      BUN/Creatinine Ratio 22.6     Anion Gap 8.0 mmol/L      eGFR 99.0 mL/min/1.73     Narrative:      GFR Categories in Chronic Kidney Disease (CKD)      GFR Category          GFR (mL/min/1.73)    Interpretation  G1                     90 or greater         Normal or high (1)  G2                      60-89                Mild decrease (1)  G3a                   45-59                Mild to moderate decrease  G3b                   30-44                Moderate to severe decrease  G4                    15-29                Severe decrease  G5                    14 or less           Kidney failure          (1)In the absence of evidence of kidney disease, neither GFR category G1 or G2 fulfill the criteria for CKD.    eGFR calculation 2021 CKD-EPI creatinine equation, which does not include race as a factor    CBC (No Diff) [[id number]]  (Abnormal) Collected: 01/12/25 0825    Specimen: Blood Updated: 01/12/25 0905     WBC 10.70 10*3/mm3      RBC 4.45 10*6/mm3      Hemoglobin 12.1 g/dL      Hematocrit 39.6 %      MCV 89.0 fL      MCH 27.2 pg      MCHC 30.6 g/dL      RDW 12.0 %      RDW-SD 38.3 fl      MPV 10.2 fL      Platelets 214 10*3/mm3     POC Glucose Once [[id number]]  (Normal) Collected: 01/12/25 0037    Specimen: Blood  Updated: 01/12/25 0039     Glucose 125 mg/dL              Assessment & Plan       Ground glass opacity present on imaging of lung       Assessment & Plan    POD # 4 s/p right upper lobectomy. Remains intermittently on BiPAP due to hypercarbia.  Clinically improving.  Chest tube removed today.  Encourage deep breathing, incentive spirometer.  Diuresis as tolerated.  PT/OT.    Patient is high risk for respiratory decompensation, pneumonia due to body habitus and shallow breathing.    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    Edgar Goodson MD  [facility]  01/12/25  19:09 EST

## 2025-01-13 NOTE — PROGRESS NOTES
"                                              LOS: 5 days   Patient Care Team:  Dorinda Hastings MD as PCP - General (Family Medicine)    Chief Complaint:  F/up respiratory failure and medical problems listed below.    Subjective   Interval History  I reviewed the admission note, progress notes, PMH, PSH, Family hx, social history, imagings and prior records on this admission, summarized the finding in my note and formulated a transition of care plan.      She reported aerophagia, burping in the morning and nausea.  She feels like she did not sleep well with the NIPPV at night.  Continues to have some chest pain as well which she said its exacerbated by the use of NIPPV.  Her  is concerned about her dozing off intermittently.    REVIEW OF SYSTEMS:   CARDIOVASCULAR: No chest pain, chest pressure or chest discomfort. No palpitations or edema.   GASTROINTESTINAL: No anorexia, nausea, vomiting or diarrhea. No abdominal pain.  CONSTITUTIONAL: No fever or chills.     Ventilator/Non-Invasive Ventilation Settings (From admission, onward)       Start     Ordered    01/08/25 2348  NIPPV (CPAP or BIPAP)  At Bedtime & As Needed-RT        Comments: With all sleep   Question Answer Comment   Indication Sleep Apnea    Type AVAPS-AE    Rate 12    VT (mL) 560    EPAP Min (cm H2O) 6    EPAP Max (cm H2O) 14    Max Pressure (cm H2O) 28    Pressure Support Min (cm H2O) 4    Pressure Support Max (cm H2O) 16    Titrate Oxygen for SpO2 90 - 95%        01/08/25 2349                          Physical Exam:     Vital Signs  Temp:  [97.4 °F (36.3 °C)-99.2 °F (37.3 °C)] 97.4 °F (36.3 °C)  Heart Rate:  [58-64] 61  Resp:  [18-20] 20  BP: (126-165)/(66-84) 165/80    Intake/Output Summary (Last 24 hours) at 1/13/2025 1159  Last data filed at 1/13/2025 1100  Gross per 24 hour   Intake 360 ml   Output 2600 ml   Net -2240 ml     Flowsheet Rows      Flowsheet Row First Filed Value   Admission Height 170.2 cm (67\") Documented at 01/08/2025 1053 "   Admission Weight 192 kg (423 lb 4.5 oz) Documented at 01/09/2025 0600            PPE used per hospital policy    General Appearance:   Alert, cooperative, in no acute distress   ENMT:  Mallampati score 4, moist mucous membrane   Eyes:  Pupils equal and reactive to light. EOMI   Neck:   Large. Trachea midline. No thyromegaly.   Lungs:   Equal but diminished air entry throughout.  No audible crackles or wheezing anterior auscultation but exam is limited due to body habitus.    Heart:   Regular rhythm and normal rate, normal S1 and S2, no         murmur   Skin:   No rash or ecchymosis   Abdomen:    Obese. Soft. No tenderness. No HSM.   Neuro/psych:  Conscious, alert, oriented x3. Strength 5/5 in upper and lower  ext.  Appropriate mood and affect   Extremities:  No cyanosis, clubbing but edema in legs.  Warm extremities and well-perfused          Results Review:        Results from last 7 days   Lab Units 01/13/25  0850 01/12/25  0825 01/11/25  0328   SODIUM mmol/L 142 139 137   POTASSIUM mmol/L 4.3 4.1 4.2   CHLORIDE mmol/L 103 102 102   CO2 mmol/L 32.3* 29.0 29.3*   BUN mg/dL 13 12 15   CREATININE mg/dL 0.60 0.53* 0.59   GLUCOSE mg/dL 115* 111* 124*   CALCIUM mg/dL 8.3* 8.1* 7.9*         Results from last 7 days   Lab Units 01/13/25  0850 01/12/25  0825 01/11/25  0328   WBC 10*3/mm3 13.33* 10.70 10.89*   HEMOGLOBIN g/dL 12.8 12.1 11.7*   HEMATOCRIT % 40.4 39.6 37.5   PLATELETS 10*3/mm3 242 214 179         Results from last 7 days   Lab Units 01/11/25  0328   PROBNP pg/mL 291.0                   Results from last 7 days   Lab Units 01/12/25  1412 01/12/25  1250 01/11/25  0746 01/10/25  1446 01/10/25  0853 01/09/25  2219 01/09/25  1700 01/09/25  1229 01/08/25  1943   PH, ARTERIAL pH units 7.352 7.288* 7.303* 7.304* 7.186* 7.208* 7.247* 7.276* 7.236*   PCO2, ARTERIAL mm Hg 61.4* 73.8* 65.6* 67.4* 88.4* 70.2* 70.1* 70.7* 70.6*   PO2 ART mm Hg 122.0* 73.9* 82.0 77.8* 75.4* 78.7* 108.8* 107.1* 159.9*   O2 SATURATION ART %  98.4 91.8* 94.3 93.3 89.7* 91.8* 97.0 97.1 99.0*   FLOW RATE lpm  --  3.0000 3.0000  --  5.0000 5.0000  --  4.0000 15.0000   MODALITY  BiPap Cannula Cannula BiPap Cannula Cannula BiPap Cannula NRB         I reviewed the patient's new clinical results.        Medication Review:   acetaminophen, 1,000 mg, Oral, TID  celecoxib, 100 mg, Oral, Q12H  cetirizine, 10 mg, Oral, Daily  cholestyramine light, 1 packet, Oral, Daily  dapsone, 50 mg, Oral, BID  gabapentin, 300 mg, Oral, Q8H  heparin (porcine), 5,000 Units, Subcutaneous, Q8H  metoprolol tartrate, 25 mg, Oral, Q12H  mupirocin, 1 Application, Each Nare, BID  senna-docusate sodium, 2 tablet, Oral, Nightly        norepinephrine, 0.02-0.3 mcg/kg/min, Last Rate: Stopped (01/09/25 8794)        Diagnostic imaging:  I personally and independently reviewed the following images:  CXR 1/13/2025 and CT chest 10/28/2024:  Increased pulmonary vascular markings on the left on CXR.      Assessment     Acute on chronic hypercapnic respiratory failure, requiring NIV.  Estimated baseline pCO2 in the 60s-multifactorial.  Secondary to obesity mostly and resulting hypoventilation.  Contributing factors are hypoventilation from pain and the use of narcotics.  Acute hypoxic respiratory failure  1.8 cm GG nodule (Adenocarcinoma) RUL s/p Rt upper lobectomy and hilar node dissection 1/8/2025 -> T1aN0 adenocarcinoma.   Pulmonary edema  Metabolic alkalosis  OLIVERIO and suspected OHS  Super morbid obesity with BMI greater than 65  Leukocytosis, mild.  Nocturnal hypoxia suspicious for sleep apnea    All problems new to me    Plan     NIPPV: Settings reviewed and adjusted.  Lowered RR from 16 to 8; lowered EPAP from 10 to 8; lowered PS min from 14 to 12.  Changes were made due to the patient experiencing aerophagia and nausea.  Minimize narcotics if possible.  DVT prophylaxis with heparin  Encouraged to use incentive spirometry  As needed bronchodilators  PRN diuresis    Outpatient evaluation for  sleep apnea but she may as well require NIPPV for hypercapnia on discharge.  Will address that when she is close to be discharged.    Brandin Murillo MD  01/13/25  11:59 EST        This note was dictated utilizing Dragon dictation

## 2025-01-14 ENCOUNTER — APPOINTMENT (OUTPATIENT)
Dept: GENERAL RADIOLOGY | Facility: HOSPITAL | Age: 70
DRG: 163 | End: 2025-01-14
Payer: MEDICARE

## 2025-01-14 LAB
BASOPHILS # BLD AUTO: 0.03 10*3/MM3 (ref 0–0.2)
BASOPHILS NFR BLD AUTO: 0.2 % (ref 0–1.5)
CYTO UR: NORMAL
DEPRECATED RDW RBC AUTO: 38.1 FL (ref 37–54)
EOSINOPHIL # BLD AUTO: 0.11 10*3/MM3 (ref 0–0.4)
EOSINOPHIL NFR BLD AUTO: 0.9 % (ref 0.3–6.2)
ERYTHROCYTE [DISTWIDTH] IN BLOOD BY AUTOMATED COUNT: 12.1 % (ref 12.3–15.4)
HCT VFR BLD AUTO: 39.5 % (ref 34–46.6)
HGB BLD-MCNC: 12.6 G/DL (ref 12–15.9)
IMM GRANULOCYTES # BLD AUTO: 0.1 10*3/MM3 (ref 0–0.05)
IMM GRANULOCYTES NFR BLD AUTO: 0.8 % (ref 0–0.5)
LAB AP CASE REPORT: NORMAL
LAB AP DIAGNOSIS COMMENT: NORMAL
LAB AP SPECIAL STAINS: NORMAL
LAB AP SYNOPTIC CHECKLIST: NORMAL
LYMPHOCYTES # BLD AUTO: 0.56 10*3/MM3 (ref 0.7–3.1)
LYMPHOCYTES NFR BLD AUTO: 4.3 % (ref 19.6–45.3)
Lab: NORMAL
MCH RBC QN AUTO: 27.6 PG (ref 26.6–33)
MCHC RBC AUTO-ENTMCNC: 31.9 G/DL (ref 31.5–35.7)
MCV RBC AUTO: 86.4 FL (ref 79–97)
MONOCYTES # BLD AUTO: 0.82 10*3/MM3 (ref 0.1–0.9)
MONOCYTES NFR BLD AUTO: 6.4 % (ref 5–12)
NEUTROPHILS NFR BLD AUTO: 11.29 10*3/MM3 (ref 1.7–7)
NEUTROPHILS NFR BLD AUTO: 87.4 % (ref 42.7–76)
NRBC BLD AUTO-RTO: 0 /100 WBC (ref 0–0.2)
PATH REPORT.ADDENDUM SPEC: NORMAL
PATH REPORT.FINAL DX SPEC: NORMAL
PATH REPORT.GROSS SPEC: NORMAL
PLATELET # BLD AUTO: 235 10*3/MM3 (ref 140–450)
PMV BLD AUTO: 9.6 FL (ref 6–12)
RBC # BLD AUTO: 4.57 10*6/MM3 (ref 3.77–5.28)
WBC NRBC COR # BLD AUTO: 12.91 10*3/MM3 (ref 3.4–10.8)

## 2025-01-14 PROCEDURE — 99024 POSTOP FOLLOW-UP VISIT: CPT

## 2025-01-14 PROCEDURE — 25010000002 HEPARIN (PORCINE) PER 1000 UNITS: Performed by: THORACIC SURGERY (CARDIOTHORACIC VASCULAR SURGERY)

## 2025-01-14 PROCEDURE — 94660 CPAP INITIATION&MGMT: CPT

## 2025-01-14 PROCEDURE — 94799 UNLISTED PULMONARY SVC/PX: CPT

## 2025-01-14 PROCEDURE — 71045 X-RAY EXAM CHEST 1 VIEW: CPT

## 2025-01-14 PROCEDURE — 97166 OT EVAL MOD COMPLEX 45 MIN: CPT

## 2025-01-14 PROCEDURE — 97535 SELF CARE MNGMENT TRAINING: CPT

## 2025-01-14 PROCEDURE — 63710000001 ONDANSETRON ODT 4 MG TABLET DISPERSIBLE: Performed by: THORACIC SURGERY (CARDIOTHORACIC VASCULAR SURGERY)

## 2025-01-14 PROCEDURE — 85025 COMPLETE CBC W/AUTO DIFF WBC: CPT

## 2025-01-14 RX ADMIN — HEPARIN SODIUM 5000 UNITS: 5000 INJECTION INTRAVENOUS; SUBCUTANEOUS at 05:12

## 2025-01-14 RX ADMIN — HEPARIN SODIUM 5000 UNITS: 5000 INJECTION INTRAVENOUS; SUBCUTANEOUS at 22:53

## 2025-01-14 RX ADMIN — ONDANSETRON 4 MG: 4 TABLET, ORALLY DISINTEGRATING ORAL at 17:32

## 2025-01-14 RX ADMIN — DAPSONE 50 MG: 100 TABLET ORAL at 20:19

## 2025-01-14 RX ADMIN — HEPARIN SODIUM 5000 UNITS: 5000 INJECTION INTRAVENOUS; SUBCUTANEOUS at 14:37

## 2025-01-14 RX ADMIN — CELECOXIB 100 MG: 100 CAPSULE ORAL at 09:47

## 2025-01-14 RX ADMIN — SENNOSIDES AND DOCUSATE SODIUM 2 TABLET: 50; 8.6 TABLET ORAL at 20:19

## 2025-01-14 RX ADMIN — DAPSONE 50 MG: 100 TABLET ORAL at 09:46

## 2025-01-14 RX ADMIN — CETIRIZINE HYDROCHLORIDE 10 MG: 10 TABLET, FILM COATED ORAL at 09:49

## 2025-01-14 RX ADMIN — ACETAMINOPHEN 1000 MG: 500 TABLET, FILM COATED ORAL at 17:28

## 2025-01-14 RX ADMIN — CELECOXIB 100 MG: 100 CAPSULE ORAL at 20:19

## 2025-01-14 RX ADMIN — METOPROLOL TARTRATE 25 MG: 25 TABLET, FILM COATED ORAL at 20:19

## 2025-01-14 RX ADMIN — ACETAMINOPHEN 1000 MG: 500 TABLET, FILM COATED ORAL at 09:49

## 2025-01-14 NOTE — PLAN OF CARE
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  Upon arrival Pt up in the chair w/ PCA just having assisted her up and agreeable to OT evaluation but increased dyspnea and c/o fatigue with having just had a sponge bath in the chair prior to OT entering. Pt agreeable to evaluation but limited tolerance this AM and mod cues and education on proper recovery and PLB. pt inhale/exhaling through mouth throughout eval w/ multiple reminders. Pt presents w/ decreased ADL IND, impaired balance, bed mobility IND, and would benefit from skilled therapy services to increased ADL IND with DC REC: IRF vs SNU when medically stable to return to highest level of IND/function before home.    Anticipated Discharge Disposition (OT): skilled nursing facility

## 2025-01-14 NOTE — PLAN OF CARE
Problem: Adult Inpatient Plan of Care  Goal: Plan of Care Review  Outcome: Progressing  Goal: Patient-Specific Goal (Individualized)  Outcome: Progressing  Goal: Absence of Hospital-Acquired Illness or Injury  Outcome: Progressing  Intervention: Identify and Manage Fall Risk  Recent Flowsheet Documentation  Taken 1/14/2025 0400 by Cassi Martini RN  Safety Promotion/Fall Prevention:   activity supervised   assistive device/personal items within reach   clutter free environment maintained   fall prevention program maintained   nonskid shoes/slippers when out of bed   room organization consistent   safety round/check completed  Taken 1/14/2025 0215 by Cassi Martini RN  Safety Promotion/Fall Prevention:   activity supervised   assistive device/personal items within reach   clutter free environment maintained   fall prevention program maintained   nonskid shoes/slippers when out of bed   room organization consistent   safety round/check completed  Taken 1/14/2025 0000 by Cassi Martini RN  Safety Promotion/Fall Prevention:   activity supervised   assistive device/personal items within reach   clutter free environment maintained   fall prevention program maintained   nonskid shoes/slippers when out of bed   room organization consistent   safety round/check completed  Taken 1/13/2025 2200 by Cassi Martini RN  Safety Promotion/Fall Prevention:   activity supervised   assistive device/personal items within reach   clutter free environment maintained   fall prevention program maintained   nonskid shoes/slippers when out of bed   room organization consistent   safety round/check completed  Taken 1/13/2025 2035 by Cassi Martini, RN  Safety Promotion/Fall Prevention:   activity supervised   assistive device/personal items within reach   clutter free environment maintained   fall prevention program maintained   nonskid shoes/slippers when out of bed   room organization consistent   safety round/check  completed  Intervention: Prevent Skin Injury  Recent Flowsheet Documentation  Taken 1/14/2025 0400 by Cassi Martini RN  Body Position:   supine   weight shifting   legs elevated   position changed independently  Taken 1/14/2025 0215 by Cassi Martini RN  Body Position:   supine   legs elevated   position changed independently   weight shifting  Taken 1/14/2025 0000 by Cassi Martini RN  Body Position:   weight shifting   legs elevated  Taken 1/13/2025 2200 by Cassi Martini RN  Body Position:   supine   legs elevated   upper extremity elevated  Taken 1/13/2025 2035 by Cassi Martini RN  Body Position:   legs elevated   weight shifting  Skin Protection:   drying agents applied   incontinence pads utilized   transparent dressing maintained   skin sealant/moisture barrier applied   pulse oximeter probe site changed  Intervention: Prevent and Manage VTE (Venous Thromboembolism) Risk  Recent Flowsheet Documentation  Taken 1/14/2025 0215 by Cassi Martini RN  VTE Prevention/Management: (SQ Heparin) other (see comments)  Taken 1/13/2025 2035 by Cassi Martini RN  VTE Prevention/Management: (SQ Heparin) other (see comments)  Intervention: Prevent Infection  Recent Flowsheet Documentation  Taken 1/14/2025 0400 by Cassi Martini RN  Infection Prevention:   cohorting utilized   environmental surveillance performed   hand hygiene promoted   rest/sleep promoted   single patient room provided  Taken 1/14/2025 0215 by Cassi Martini RN  Infection Prevention:   cohorting utilized   environmental surveillance performed   hand hygiene promoted   rest/sleep promoted   single patient room provided  Taken 1/14/2025 0000 by Cassi Martini RN  Infection Prevention:   cohorting utilized   environmental surveillance performed   hand hygiene promoted   rest/sleep promoted   single patient room provided  Taken 1/13/2025 2200 by Cassi Martini RN  Infection Prevention:   cohorting utilized   environmental surveillance  performed   hand hygiene promoted   rest/sleep promoted   single patient room provided  Taken 1/13/2025 2035 by Cassi Martini RN  Infection Prevention:   cohorting utilized   environmental surveillance performed   hand hygiene promoted   single patient room provided   rest/sleep promoted  Goal: Optimal Comfort and Wellbeing  Outcome: Progressing  Intervention: Provide Person-Centered Care  Recent Flowsheet Documentation  Taken 1/14/2025 0215 by Cassi Martini RN  Trust Relationship/Rapport:   care explained   choices provided  Taken 1/13/2025 2035 by Cassi Martini RN  Trust Relationship/Rapport:   care explained   choices provided  Goal: Readiness for Transition of Care  Outcome: Progressing     Problem: Skin Injury Risk Increased  Goal: Skin Health and Integrity  Outcome: Progressing  Intervention: Optimize Skin Protection  Recent Flowsheet Documentation  Taken 1/14/2025 0400 by Cassi Martini RN  Activity Management: activity minimized  Head of Bed (HOB) Positioning: HOB at 30-45 degrees  Taken 1/14/2025 0215 by Cassi Martini RN  Activity Management: activity minimized  Head of Bed (HOB) Positioning: HOB at 30-45 degrees  Pressure Reduction Devices: alternating pressure pump (SUMI)  Taken 1/14/2025 0000 by Cassi Martini RN  Activity Management: activity minimized  Head of Bed (HOB) Positioning: HOB at 30-45 degrees  Taken 1/13/2025 2200 by Cassi Martini RN  Activity Management: back to bed  Head of Bed (HOB) Positioning: HOB at 60 degrees  Taken 1/13/2025 2035 by Cassi Martini RN  Activity Management: up in chair  Pressure Reduction Techniques:   frequent weight shift encouraged   weight shift assistance provided  Pressure Reduction Devices: alternating pressure pump (SUMI)  Skin Protection:   drying agents applied   incontinence pads utilized   transparent dressing maintained   skin sealant/moisture barrier applied   pulse oximeter probe site changed     Problem: Noninvasive Ventilation  Acute  Goal: Effective Unassisted Ventilation and Oxygenation  Outcome: Progressing     Problem: Fall Injury Risk  Goal: Absence of Fall and Fall-Related Injury  Outcome: Progressing  Intervention: Identify and Manage Contributors  Recent Flowsheet Documentation  Taken 1/14/2025 0400 by Cassi Martini RN  Medication Review/Management: medications reviewed  Taken 1/14/2025 0215 by Cassi Martini RN  Medication Review/Management: medications reviewed  Taken 1/14/2025 0000 by Cassi Martini RN  Medication Review/Management: medications reviewed  Taken 1/13/2025 2200 by Cassi Martini RN  Medication Review/Management: medications reviewed  Taken 1/13/2025 2035 by Cassi Martini RN  Medication Review/Management: medications reviewed  Intervention: Promote Injury-Free Environment  Recent Flowsheet Documentation  Taken 1/14/2025 0400 by Cassi Martini RN  Safety Promotion/Fall Prevention:   activity supervised   assistive device/personal items within reach   clutter free environment maintained   fall prevention program maintained   nonskid shoes/slippers when out of bed   room organization consistent   safety round/check completed  Taken 1/14/2025 0215 by Cassi Martini RN  Safety Promotion/Fall Prevention:   activity supervised   assistive device/personal items within reach   clutter free environment maintained   fall prevention program maintained   nonskid shoes/slippers when out of bed   room organization consistent   safety round/check completed  Taken 1/14/2025 0000 by Cassi Martini RN  Safety Promotion/Fall Prevention:   activity supervised   assistive device/personal items within reach   clutter free environment maintained   fall prevention program maintained   nonskid shoes/slippers when out of bed   room organization consistent   safety round/check completed  Taken 1/13/2025 2200 by Cassi Martini RN  Safety Promotion/Fall Prevention:   activity supervised   assistive device/personal items  within reach   clutter free environment maintained   fall prevention program maintained   nonskid shoes/slippers when out of bed   room organization consistent   safety round/check completed  Taken 1/13/2025 2035 by Cassi Martini RN  Safety Promotion/Fall Prevention:   activity supervised   assistive device/personal items within reach   clutter free environment maintained   fall prevention program maintained   nonskid shoes/slippers when out of bed   room organization consistent   safety round/check completed     Problem: Gas Exchange Impaired  Goal: Optimal Gas Exchange  Outcome: Progressing  Intervention: Optimize Oxygenation and Ventilation  Recent Flowsheet Documentation  Taken 1/14/2025 0400 by Cassi Martini RN  Head of Bed (HOB) Positioning: HOB at 30-45 degrees  Taken 1/14/2025 0215 by Cassi Martini RN  Head of Bed (HOB) Positioning: HOB at 30-45 degrees  Taken 1/14/2025 0000 by Cassi Martini RN  Head of Bed (HOB) Positioning: HOB at 30-45 degrees  Taken 1/13/2025 2200 by Cassi Martini RN  Head of Bed (HOB) Positioning: HOB at 60 degrees     Problem: Lung Surgery  Goal: Absence of Bleeding  Outcome: Progressing  Goal: Effective Bowel Elimination  Outcome: Progressing  Goal: Fluid and Electrolyte Balance  Outcome: Progressing  Goal: Effective Tissue Perfusion  Outcome: Progressing  Goal: Absence of Infection Signs and Symptoms  Outcome: Progressing  Goal: Anesthesia/Sedation Recovery  Outcome: Progressing  Intervention: Optimize Anesthesia Recovery  Recent Flowsheet Documentation  Taken 1/14/2025 0400 by Cassi Martini RN  Safety Promotion/Fall Prevention:   activity supervised   assistive device/personal items within reach   clutter free environment maintained   fall prevention program maintained   nonskid shoes/slippers when out of bed   room organization consistent   safety round/check completed  Taken 1/14/2025 0215 by Cassi Martini RN  Safety Promotion/Fall Prevention:   activity  supervised   assistive device/personal items within reach   clutter free environment maintained   fall prevention program maintained   nonskid shoes/slippers when out of bed   room organization consistent   safety round/check completed  Taken 1/14/2025 0000 by Cassi Martini RN  Safety Promotion/Fall Prevention:   activity supervised   assistive device/personal items within reach   clutter free environment maintained   fall prevention program maintained   nonskid shoes/slippers when out of bed   room organization consistent   safety round/check completed  Taken 1/13/2025 2200 by Cassi Martini RN  Safety Promotion/Fall Prevention:   activity supervised   assistive device/personal items within reach   clutter free environment maintained   fall prevention program maintained   nonskid shoes/slippers when out of bed   room organization consistent   safety round/check completed  Taken 1/13/2025 2035 by Cassi Martini RN  Safety Promotion/Fall Prevention:   activity supervised   assistive device/personal items within reach   clutter free environment maintained   fall prevention program maintained   nonskid shoes/slippers when out of bed   room organization consistent   safety round/check completed  Taken 1/13/2025 2030 by Cassi Martini RN  Patient Tolerance (IS): fair  Administration (IS): self-administered  Level Incentive Spirometer (mL): 1000  Number of Repetitions (IS): 10  Goal: Pain Control and Function  Outcome: Progressing  Goal: Nausea and Vomiting Relief  Outcome: Progressing  Goal: Effective Urinary Elimination  Outcome: Progressing  Goal: Effective Oxygenation and Ventilation  Outcome: Progressing  Intervention: Optimize Oxygenation and Ventilation  Recent Flowsheet Documentation  Taken 1/14/2025 0400 by Cassi aMrtini RN  Head of Bed (HOB) Positioning: HOB at 30-45 degrees  Taken 1/14/2025 0215 by Cassi Martini RN  Head of Bed (HOB) Positioning: HOB at 30-45 degrees  Taken 1/14/2025 0000 by  Cassi Martini, RN  Head of Bed (HOB) Positioning: HOB at 30-45 degrees  Taken 1/13/2025 2200 by Cassi Martini RN  Head of Bed (HOB) Positioning: HOB at 60 degrees  Taken 1/13/2025 2030 by Cassi Martini RN  Patient Tolerance (IS): fair  Administration (IS): self-administered  Level Incentive Spirometer (mL): 1000  Number of Repetitions (IS): 10   Goal Outcome Evaluation:

## 2025-01-14 NOTE — PROGRESS NOTES
"                                              LOS: 6 days   Patient Care Team:  Dorinda Hastings MD as PCP - General (Family Medicine)    Chief Complaint:  F/up respiratory failure and medical problems listed below.    Subjective   Interval History      Looks much better today.  She is more alert.  Used NIPPV overnight and tolerated well.  Otherwise she is on 4 L during the day.  She denies cough or dyspnea.  No chest pain.    REVIEW OF SYSTEMS:   CARDIOVASCULAR: No chest pain, chest pressure or chest discomfort. No palpitations but edema.   GASTROINTESTINAL: No anorexia, nausea, vomiting or diarrhea. No abdominal pain.  CONSTITUTIONAL: No fever or chills.     Ventilator/Non-Invasive Ventilation Settings (From admission, onward)       Start     Ordered    01/08/25 2348  NIPPV (CPAP or BIPAP)  At Bedtime & As Needed-RT        Comments: With all sleep   Question Answer Comment   Indication Sleep Apnea    Type AVAPS-AE    Rate 12    VT (mL) 560    EPAP Min (cm H2O) 6    EPAP Max (cm H2O) 14    Max Pressure (cm H2O) 28    Pressure Support Min (cm H2O) 4    Pressure Support Max (cm H2O) 16    Titrate Oxygen for SpO2 90 - 95%        01/08/25 2349                          Physical Exam:     Vital Signs  Temp:  [97.7 °F (36.5 °C)-98.1 °F (36.7 °C)] 98.1 °F (36.7 °C)  Heart Rate:  [53-64] 58  Resp:  [20-28] 24  BP: (122-151)/(56-78) 141/71    Intake/Output Summary (Last 24 hours) at 1/14/2025 1250  Last data filed at 1/14/2025 0427  Gross per 24 hour   Intake 120 ml   Output 500 ml   Net -380 ml     Flowsheet Rows      Flowsheet Row First Filed Value   Admission Height 170.2 cm (67\") Documented at 01/08/2025 1053   Admission Weight 192 kg (423 lb 4.5 oz) Documented at 01/09/2025 0600            PPE used per hospital policy    General Appearance:   Alert, cooperative, in no acute distress   ENMT:  Mallampati score 4, moist mucous membrane   Eyes:  Pupils equal and reactive to light. EOMI   Neck:   Large. Trachea midline. No " thyromegaly.   Lungs:   Equal but diminished air entry throughout.  No audible crackles or wheezing anterior auscultation but exam is limited due to body habitus.    Heart:   Regular rhythm and normal rate, normal S1 and S2, no         murmur   Skin:   No rash or ecchymosis   Abdomen:    Obese. Soft. No tenderness. No HSM.   Neuro/psych:  Conscious, alert, oriented x3. Strength 5/5 in upper and lower  ext.  Appropriate mood and affect   Extremities:  No cyanosis, clubbing but edema in legs.  Warm extremities and well-perfused          Results Review:        Results from last 7 days   Lab Units 01/13/25  0850 01/12/25  0825 01/11/25  0328   SODIUM mmol/L 142 139 137   POTASSIUM mmol/L 4.3 4.1 4.2   CHLORIDE mmol/L 103 102 102   CO2 mmol/L 32.3* 29.0 29.3*   BUN mg/dL 13 12 15   CREATININE mg/dL 0.60 0.53* 0.59   GLUCOSE mg/dL 115* 111* 124*   CALCIUM mg/dL 8.3* 8.1* 7.9*         Results from last 7 days   Lab Units 01/14/25  0625 01/13/25  0850 01/12/25  0825   WBC 10*3/mm3 12.91* 13.33* 10.70   HEMOGLOBIN g/dL 12.6 12.8 12.1   HEMATOCRIT % 39.5 40.4 39.6   PLATELETS 10*3/mm3 235 242 214         Results from last 7 days   Lab Units 01/11/25  0328   PROBNP pg/mL 291.0                   Results from last 7 days   Lab Units 01/12/25  1412 01/12/25  1250 01/11/25  0746 01/10/25  1446 01/10/25  0853 01/09/25  2219 01/09/25  1700 01/09/25  1229 01/08/25  1943   PH, ARTERIAL pH units 7.352 7.288* 7.303* 7.304* 7.186* 7.208* 7.247* 7.276* 7.236*   PCO2, ARTERIAL mm Hg 61.4* 73.8* 65.6* 67.4* 88.4* 70.2* 70.1* 70.7* 70.6*   PO2 ART mm Hg 122.0* 73.9* 82.0 77.8* 75.4* 78.7* 108.8* 107.1* 159.9*   O2 SATURATION ART % 98.4 91.8* 94.3 93.3 89.7* 91.8* 97.0 97.1 99.0*   FLOW RATE lpm  --  3.0000 3.0000  --  5.0000 5.0000  --  4.0000 15.0000   MODALITY  BiPap Cannula Cannula BiPap Cannula Cannula BiPap Cannula NRB         I reviewed the patient's new clinical results.        Medication Review:   acetaminophen, 1,000 mg, Oral,  TID  celecoxib, 100 mg, Oral, Q12H  cetirizine, 10 mg, Oral, Daily  cholestyramine light, 1 packet, Oral, Daily  dapsone, 50 mg, Oral, BID  heparin (porcine), 5,000 Units, Subcutaneous, Q8H  metoprolol tartrate, 25 mg, Oral, Q12H  senna-docusate sodium, 2 tablet, Oral, Nightly        norepinephrine, 0.02-0.3 mcg/kg/min, Last Rate: Stopped (01/09/25 2337)        Diagnostic imaging:  I personally and independently reviewed the following images:  CXR 1/13/2025 and CT chest 10/28/2024:  Increased pulmonary vascular markings on the left on CXR.      CXR 1/14/2025: Pulmonary infiltrates bilaterally.  Difficult to interpret due to body habitus.    Assessment     Acute on chronic hypercapnic respiratory failure, requiring NIV.  Estimated baseline pCO2 in the 60s-multifactorial.  Secondary to obesity mostly and resulting hypoventilation.  Contributing factors are hypoventilation from pain and the use of narcotics.  Acute hypoxic respiratory failure  1.8 cm GG nodule (Adenocarcinoma) RUL s/p Rt upper lobectomy and hilar node dissection 1/8/2025 -> T1aN0 adenocarcinoma.   Pulmonary edema  Metabolic alkalosis  OLIVERIO and suspected OHS  Super morbid obesity with BMI greater than 65  Leukocytosis, mild.  Nocturnal hypoxia suspicious for sleep apnea        Plan     NIPPV at night and when asleep during the day.  Current settings reviewed and seems to be appropriate and we will continue the same.  Minimize narcotics if possible.  DVT prophylaxis with heparin  Encouraged to use incentive spirometry  As needed bronchodilators  PRN diuresis  Consult case management for initiation of outpatient NIPPV.    NIPPV statement: Patient has chronic hypercapnic respiratory failure secondary to restrictive lung disease.  She benefits from long-term NIPPV therapy as reducing CO2 level has been shown to improve outcome, reduce hospitalizations and mortality.  Bilevel therapy was attempted but failed.  In addition, NIPPV as needed to ensure optimal  ventilation with the use of backup rate and consistent usage in the event of power outage.    Discussed with her .      Brandin Murillo MD  01/14/25  12:50 EST        This note was dictated utilizing Dragon dictation

## 2025-01-14 NOTE — DISCHARGE PLACEMENT REQUEST
"Kortney Charlton (69 y.o. Female)       Date of Birth   1955    Social Security Number       Address   [address] [place] [address]    Home Phone   [phone]    MRN   [id number]       Taoist   Voodoo    Marital Status                               Admission Date   1/8/25    Admission Type   Elective    Admitting Provider   Radha Marcus MD    Attending Provider   Radha Marcus MD    Department, Room/Bed   [address], [address]/1       Discharge Date       Discharge Disposition       Discharge Destination                                 Attending Provider: Radha Marcus MD    Allergies: Zoster Vac Recomb Adjuvanted, Compazine [Prochlorperazine Edisylate], Corticosteroids, Other    Isolation: None   Infection: None   Code Status: CPR    Ht: 170.2 cm (67\")   Wt: 182 kg (400 lb 8 oz)    Admission Cmt: None   Principal Problem: Ground glass opacity present on imaging of lung [R91.8]                   Active Insurance as of 1/8/2025       Primary Coverage       Payor Plan Insurance Group Employer/Plan Group    MEDICARE MEDICARE A & B        Payor Plan Address Payor Plan Phone Number Payor Plan Fax Number Effective Dates    [address] [phone]  9/1/2018 - None Entered    [facility] [id number]         Subscriber Name Subscriber Birth Date Member ID       KORTNEY CHARLTON 1955 [id number]               Secondary Coverage       Payor Plan Insurance Group Employer/Plan Group    [employer] [facility] access [facility] SUPP [id number]       Payor Plan Address Payor Plan Phone Number Payor Plan Fax Number Effective Dates    [address]   9/1/2018 - None Entered    [facility] [id number]         Subscriber Name Subscriber Birth Date Member ID       KOTRNEY CHARLTON 1955 [id number]                     Emergency Contacts        (Rel.) Home Phone Work Phone Mobile Phone    Varinder Charlton (Spouse) [phone] -- [phone]    daughter,daughter " (Daughter) -- -- [phone]

## 2025-01-14 NOTE — CASE MANAGEMENT/SOCIAL WORK
Continued Stay Note  [facility]     Patient Name: Kortney Charlton  MRN: [id number]  Today's Date: 1/14/2025    Admit Date: 1/8/2025    Plan: SNF   Discharge Plan       Row Name 01/14/25 1643       Plan    Plan SNF    Patient/Family in Agreement with Plan yes    Plan Comments Met with pt and  Varinder in room. They confirmed the plan is for the pt to go to rehab at discharge. She will need transportation. Varinder filled out patient choice list and gave three choices to CCP. Referrals entered. They denied any further needs at this time.  request entered by Pulmonology; pt needs NIPPV for discharge. Pt requested [facility]. Referral entered. Spoke with [facility] who stated that she would start working on it. No further needs assessed at this time. CCP following. Mikal GUERRA RN                   Discharge Codes    No documentation.                 Expected Discharge Date and Time       Expected Discharge Date Expected Discharge Time    Jan 17, 2025               Mikal Stoner RN

## 2025-01-14 NOTE — THERAPY EVALUATION
Patient Name: Kortney Charlton  : 1955    MRN: [id number]                              Today's Date: 2025       Admit Date: 2025    Visit Dx:     ICD-10-CM ICD-9-CM   1. Ground glass opacity present on imaging of lung  R91.8 793.19     Patient Active Problem List   Diagnosis    Morbid obesity    Elevated BP without diagnosis of hypertension    Venous stasis dermatitis of both lower extremities    History of total bilateral knee replacement (TKR)    Breast lump in female    Sneddon-Hanna disease    Abnormal TSH    Atypical chest pain    Medicare annual wellness visit, subsequent    Diarrhea in adult patient    Chronic diarrhea    Hypermetropia, bilateral    Vitreous degeneration of left eye    Prediabetes    Numbness and tingling in left hand    Bilateral carpal tunnel syndrome    Diarrhea following gastrointestinal surgery    Leg swelling    Encounter for lipid screening for cardiovascular disease    Body mass index (BMI) of 60.0 to 69.9 in adult    Immunization due    Age-related nuclear cataract of both eyes    Bilateral myopia    Presbyopia    Regular astigmatism of both eyes    History of pulmonary embolism    Ground glass opacity present on imaging of lung    Pneumothorax     Past Medical History:   Diagnosis Date    Allergic topical steroids    Anemia Episodic    At risk for central sleep apnea     STOP BANG 5    Breast lump in female 2019    Chronic nasopharyngitis     Elevated BP without diagnosis of hypertension     History of pulmonary embolism     Hypertension     Lesion of lung     Morbid obesity     Neuromuscular disorder     Osteoarthritis     Rash     UNDER LEFT BREAST    Sneddon-Hanna disease     Stasis dermatitis     Urinary tract infection      Past Surgical History:   Procedure Laterality Date    BRONCHOSCOPY WITH ION ROBOTIC ASSIST N/A 10/30/2024    Procedure: BRONCHOSCOPY WITH ION ROBOT, LAVAGE, FINE NEEDLE ASPIRATIONS AND BIOPSIES;  Surgeon: Radha Marcus  MD VALERIE;  Location: [facility] ENDOSCOPY;  Service: Robotics - Pulmonary;  Laterality: N/A;    CATARACT EXTRACTION Bilateral     COLONOSCOPY  2015    CYST REMOVAL      MULTIPLE    HYSTEROSCOPY ENDOMETRIAL ABLATION      JOINT REPLACEMENT  R-2013 L-2015    LAPAROSCOPIC CHOLECYSTECTOMY  10/2011    LOBECTOMY Right 1/8/2025    Procedure: BRONCHOSCOPY, RIGHT VIDEO ASSISTED THORACOSCOPY WITH DAVINCI ROBOT with wedge resection X2, COMPLETION RIGHT UPPER LOBECTOMY, MEDIASTINAL AND HILAR LYMPH NODE DISSECTION, INTERCOSTAL NERVE BLOCK;  Surgeon: Radha Marcus MD;  Location: [facility] OR;  Service: Robotics - DaVinci;  Laterality: Right;    OVARIAN CYST REMOVAL        General Information       Row Name 01/14/25 1143          OT Time and Intention    Subjective Information complains of;fatigue  -BC     Document Type evaluation  -BC     Mode of Treatment individual therapy;occupational therapy  -BC     Patient Effort fair  -BC       Row Name 01/14/25 1143          General Information    Patient Profile Reviewed yes  -BC     Prior Level of Function independent:;all household mobility;ADL's  (I) w/o AD/DME at home  -BC     Existing Precautions/Restrictions fall;oxygen therapy device and L/min  -BC     Barriers to Rehab medically complex  -BC       Row Name 01/14/25 1143          Living Environment    People in Home spouse  -BC       Row Name 01/14/25 1143          Home Main Entrance    Number of Stairs, Main Entrance --  ramp entrance  -BC       Row Name 01/14/25 1143          Stairs Within Home, Primary    Number of Stairs, Within Home, Primary none  -BC       Row Name 01/14/25 1143          Cognition    Orientation Status (Cognition) oriented x 3  -BC       Row Name 01/14/25 1143          Safety Issues/Impairments Affecting Functional Mobility    Impairments Affecting Function (Mobility) balance;endurance/activity tolerance;strength;shortness of breath  -BC               User Key  (r) = Recorded By, (t) = Taken By, (c) = Cosigned  By      Initials Name Provider Type    BC Fiona Diehl OT Occupational Therapist                     Mobility/ADL's       Row Name 01/14/25 1144          Bed Mobility    Comment, (Bed Mobility) [facility] pre/post tx  -BC       Row Name 01/14/25 1144          Transfers    Transfers sit-stand transfer;stand-sit transfer  -BC       Row Name 01/14/25 1144          Sit-Stand Transfer    Sit-Stand [place] (Transfers) contact guard;verbal cues  -BC     Comment, (Sit-Stand Transfer) CGA w stand with increased time and mult breaks for PLB before coming to full stand from chair but unable to progress w/ ADls in standing or steps due to fatigue, sitting back down  -BC       Row Name 01/14/25 1144          Stand-Sit Transfer    Stand-Sit [place] (Transfers) contact guard;verbal cues  -BC       Row Name 01/14/25 1144          Functional Mobility    Patient was able to Ambulate no, other medical factors prevent ambulation  -BC       Row Name 01/14/25 1144          Activities of Daily Living    BADL Assessment/Intervention lower body dressing;grooming;toileting;feeding  -BC       Row Name 01/14/25 1144          Lower Body Dressing Assessment/Training    [place] Level (Lower Body Dressing) doff;don;socks;dependent (less than 25% patient effort)  -BC     Comment, (Lower Body Dressing) SOA  -BC       Row Name 01/14/25 1144          Grooming Assessment/Training    [place] Level (Grooming) wash face, hands;set up  -BC     Position (Grooming) supported sitting  -BC       Row Name 01/14/25 1144          Toileting Assessment/Training    [place] Level (Toileting) dependent (less than 25% patient effort)  -BC     Comment, (Toileting) purewick and brief  -BC       Row Name 01/14/25 114          Self-Feeding Assessment/Training    [place] Level (Feeding) liquids to mouth;set up  -BC     Position (Feeding) supported sitting  -BC               User Key  (r) = Recorded By, (t) = Taken By, (c) = Cosigned By       Initials Name Provider Type    BC Fiona Diehl OT Occupational Therapist                   Obj/Interventions       Row Name 01/14/25 1146          Sensory Assessment (Somatosensory)    Sensory Subjective Reports other (see comments)  -BC     Sensory Assessment N/T mil[place] hands/feet  -BC       Row Name 01/14/25 1146          Vision Assessment/Intervention    Visual Impairment/Limitations WFL  -BC       Row Name 01/14/25 1146          Range of Motion Comprehensive    General Range of Motion no range of motion deficits identified  -BC       Row Name 01/14/25 1146          Strength Comprehensive (MMT)    Comment, General Manual Muscle Testing (MMT) Assessment gross weakness BUES/BLES, milton UEs 4-/5, BLEs observed fair w/ standing WBing  -BC       Row Name 01/14/25 1146          Balance    Dynamic Sitting Balance supervision  -BC     Dynamic Standing Balance contact guard  -BC     Comment, Balance sat up unsupported away from chair support w sup A for intervals/periods, standing w/ CGA but unable to progress w/ dynamic activities w/ C/o SOA.  -BC               User Key  (r) = Recorded By, (t) = Taken By, (c) = Cosigned By      Initials Name Provider Type    BC Fiona Diehl OT Occupational Therapist                   Goals/Plan       Row Name 01/14/25 1151          Bed Mobility Goal 1 (OT)    Activity/Assistive Device (Bed Mobility Goal 1, OT) bed mobility activities, all  -BC     [place] Level/Cues Needed (Bed Mobility Goal 1, OT) supervision required  -BC     Time Frame (Bed Mobility Goal 1, OT) short term goal (STG);2 weeks  -BC     Progress/Outcomes (Bed Mobility Goal 1, OT) new goal  -BC       Row Name 01/14/25 1151          Transfer Goal 1 (OT)    Activity/Assistive Device (Transfer Goal 1, OT) sit-to-stand/stand-to-sit;bed-to-chair/chair-to-bed;toilet  -BC     [place] Level/Cues Needed (Transfer Goal 1, OT) supervision required  -BC     Time Frame (Transfer Goal 1, OT) short term goal (STG);2 weeks   -BC     Progress/Outcome (Transfer Goal 1, OT) new goal  -BC       Row Name 01/14/25 1151          Dressing Goal 1 (OT)    Activity/Device (Dressing Goal 1, OT) upper body dressing;lower body dressing  -BC     [place]/Cues Needed (Dressing Goal 1, OT) supervision required  -BC     Time Frame (Dressing Goal 1, OT) short term goal (STG);2 weeks  -BC     Progress/Outcome (Dressing Goal 1, OT) new goal  -BC       Row Name 01/14/25 1151          Toileting Goal 1 (OT)    Activity/Device (Toileting Goal 1, OT) toileting skills, all;adjust/manage clothing;perform perineal hygiene  -BC     Time Frame (Toileting Goal 1, OT) short term goal (STG);2 weeks  -BC     Progress/Outcome (Toileting Goal 1, OT) new goal  -BC       Row Name 01/14/25 1151          Grooming Goal 1 (OT)    Activity/Device (Grooming Goal 1, OT) grooming skills, all  -BC     [place] (Grooming Goal 1, OT) supervision required  -BC     Time Frame (Grooming Goal 1, OT) 2 weeks;short term goal (STG)  -BC     Strategies/Barriers (Grooming Goal 1, OT) standing sinkside for ADL IND  -BC     Progress/Outcome (Grooming Goal 1, OT) new goal  -BC       Row Name 01/14/25 1151          Problem Specific Goal 1 (OT)    Problem Specific Goal 1 (OT) Pt will increase ADL IND for 3 step ADL in room w/ walker and close sup A.  -BC     Time Frame (Problem Specific Goal 1, OT) 2 weeks;short term goal (STG)  -BC     Progress/Outcome (Problem Specific Goal 1, OT) new goal  -BC       Row Name 01/14/25 1151          Therapy Assessment/Plan (OT)    Planned Therapy Interventions (OT) activity tolerance training;BADL retraining;functional balance retraining;occupation/activity based interventions;patient/caregiver education/training;strengthening exercise;transfer/mobility retraining  -BC               User Key  (r) = Recorded By, (t) = Taken By, (c) = Cosigned By      Initials Name Provider Type    Fiona Duron, OT Occupational Therapist                   Clinical  Impression       Row Name 01/14/25 1148          Pain Assessment    Pretreatment Pain Rating 0/10 - no pain  -BC     Posttreatment Pain Rating 0/10 - no pain  -BC       Row Name 01/14/25 1148          Plan of Care Review    Plan of Care Reviewed With patient  -BC     Progress no change  -BC     Outcome Evaluation Pt is a 70 y/o F admitted to [facility] 1/8 with  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  Upon arrival Pt up in the chair w/ PCA just having assisted her up and agreeable to OT evaluation but increased dyspnea and c/o fatigue with having just had a sponge bath in the chair prior to OT entering. Pt agreeable to evaluation but limited tolerance this AM and mod cues and education on proper recovery and PLB. pt inhale/exhaling through mouth throughout eval w/ multiple reminders. Pt presents w/ decreased ADL IND, impaired balance, bed mobility IND, and would benefit from skilled therapy services to increased ADL IND with DC REC: IRF vs SNU when medically stable to return to highest level of IND/function before home.  -BC       Row Name 01/14/25 1148          Therapy Assessment/Plan (OT)    Rehab Potential (OT) good  -BC     Criteria for Skilled Therapeutic Interventions Met (OT) yes;meets criteria  -BC     Therapy Frequency (OT) 5 times/wk  -BC     Predicted Duration of Therapy Intervention (OT) 2 weeks  -BC       Row Name 01/14/25 1148          Therapy Plan Review/Discharge Plan (OT)    Anticipated Discharge Disposition (OT) skilled nursing facility  -BC       Row Name 01/14/25 1148          Vital Signs    Pretreatment Heart Rate (beats/min) 61  -BC     Pre SpO2 (%) 93  -BC     O2 Delivery Pre Treatment supplemental O2  -BC     Intra SpO2 (%) 92  -BC     O2 Delivery Intra Treatment supplemental O2  -BC     Post SpO2 (%) 93  -BC     O2 Delivery Post Treatment supplemental O2  -BC     Recovery Time multiple cues/increased time w educ/practice recovery breathing and PLB  -BC       Row Name 01/14/25 1148           Positioning and Restraints    Pre-Treatment Position sitting in chair/recliner  -BC     Post Treatment Position chair  -BC     In Chair reclined;call light within reach;encouraged to call for assist;exit alarm on  left as found  -BC               User Key  (r) = Recorded By, (t) = Taken By, (c) = Cosigned By      Initials Name Provider Type    Fiona Duron OT Occupational Therapist                   Outcome Measures       Row Name 01/14/25 1152          How much help from another is currently needed...    Putting on and taking off regular lower body clothing? 1  -BC     Bathing (including washing, rinsing, and drying) 2  -BC     Toileting (which includes using toilet bed pan or urinal) 1  -BC     Putting on and taking off regular upper body clothing 3  -BC     Taking care of personal grooming (such as brushing teeth) 3  -BC     Eating meals 3  -BC     AM-PAC 6 Clicks Score (OT) 13  -BC       Row Name 01/14/25 1152          Functional Assessment    Outcome Measure Options AM-PAC 6 Clicks Daily Activity (OT)  -BC               User Key  (r) = Recorded By, (t) = Taken By, (c) = Cosigned By      Initials Name Provider Type    Fiona Duron OT Occupational Therapist                    Occupational Therapy Education       Title: PT OT SLP Therapies (In Progress)       Topic: Occupational Therapy (In Progress)       Point: ADL training (Done)       Description:   Instruct learner(s) on proper safety adaptation and remediation techniques during self care or transfers.   Instruct in proper use of assistive devices.                  Learning Progress Summary            Patient Acceptance, E,TB, VU,NR by BC at 1/14/2025 1152    Comment: role of OT at acute level, ADl  participation in future POC, Rec cont'd rehab, cont teaching                      Point: Home exercise program (Not Started)       Description:   Instruct learner(s) on appropriate technique for monitoring, assisting and/or progressing  therapeutic exercises/activities.                  Learner Progress:  Not documented in this visit.              Point: Precautions (Not Started)       Description:   Instruct learner(s) on prescribed precautions during self-care and functional transfers.                  Learner Progress:  Not documented in this visit.              Point: Body mechanics (Not Started)       Description:   Instruct learner(s) on proper positioning and spine alignment during self-care, functional mobility activities and/or exercises.                  Learner Progress:  Not documented in this visit.                              User Key       Initials Effective Dates Name Provider Type Discipline    BC 07/29/24 -  Fiona Diehl OT Occupational Therapist OT                  OT Recommendation and Plan  Planned Therapy Interventions (OT): activity tolerance training, BADL retraining, functional balance retraining, occupation/activity based interventions, patient/caregiver education/training, strengthening exercise, transfer/mobility retraining  Therapy Frequency (OT): 5 times/wk  Plan of Care Review  Plan of Care Reviewed With: patient  Progress: no change  Outcome Evaluation: Pt is a 70 y/o F [facility] with  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  Upon arrival Pt up in the chair w/ PCA just having assisted her up and agreeable to OT evaluation but increased dyspnea and c/o fatigue with having just had a sponge bath in the chair prior to OT entering. Pt agreeable to evaluation but limited tolerance this AM and mod cues and education on proper recovery and PLB. pt inhale/exhaling through mouth throughout eval w/ multiple reminders. Pt presents w/ decreased ADL IND, impaired balance, bed mobility IND, and would benefit from skilled therapy services to increased ADL IND with DC REC: IRF vs SNU when medically stable to return to highest level of IND/function before home.     Time Calculation:   Evaluation  Complexity (OT)  Review Occupational Profile/Medical/Therapy History Complexity: expanded/moderate complexity  Assessment, Occupational Performance/Identification of Deficit Complexity: 3-5 performance deficits  Clinical Decision Making Complexity (OT): detailed assessment/moderate complexity  Overall Complexity of Evaluation (OT): moderate complexity     Time Calculation- OT       Row Name 01/14/25 1153             Time Calculation- OT    OT Start Time 0810  -BC      OT Stop Time 0831  -BC      OT Time Calculation (min) 21 min  -BC      Total Timed Code Minutes- OT 10 minute(s)  -BC      OT Received On 01/14/25  -BC      OT - Next Appointment 01/15/25  -BC      OT Goal Re-Cert Due Date 01/28/25  -BC         Timed Charges    40154 - OT Self Care/Mgmt Minutes 10  -BC         Total Minutes    Timed Charges Total Minutes 10  -BC       Total Minutes 10  -BC                User Key  (r) = Recorded By, (t) = Taken By, (c) = Cosigned By      Initials Name Provider Type    BC Fiona Diehl, OT Occupational Therapist                  Therapy Charges for Today       Code Description Service Date Service Provider Modifiers Qty    [id number]  OT SELF CARE/MGMT/TRAIN EA 15 MIN 1/14/2025 Fiona Diehl OT  1    [id number]  OT EVAL MOD COMPLEXITY 2 1/14/2025 Fiona Diehl OT  1                 Fiona Diehl OT  1/14/2025

## 2025-01-14 NOTE — PROGRESS NOTES
"    Chief Complaint: Ground glass opacity, NSCLC  S/P: Right upper lobectomy  POD # 6    Subjective:  Symptoms:  Improved.  She reports weakness.  No shortness of breath or cough.    Diet:  Adequate intake.  No nausea or vomiting.    Activity level: Impaired due to weakness.    Pain:  She complains of pain that is mild.  She reports pain is improving.  Pain is well controlled.    Alert, up in recliner.  Denies any pain.  Denies any shortness of breath.    Vital Signs:  Temp:  [97.7 °F (36.5 °C)-98.1 °F (36.7 °C)] 98.1 °F (36.7 °C)  Heart Rate:  [53-64] 58  Resp:  [20-28] 24  BP: (122-151)/(56-78) 141/71    Intake & Output (last day)         01/13 0701  01/14 0700 01/14 0701  01/15 0700    P.O. 120     Total Intake(mL/kg) 120 (0.7)     Urine (mL/kg/hr) 1800 (0.4)     Stool      Total Output 1800     Net -1680                   Objective:  General Appearance:  In no acute distress, comfortable and not in pain.    Vital signs: (most recent): Blood pressure 141/71, pulse 58, temperature 98.1 °F (36.7 °C), temperature source Oral, resp. rate 24, height 170.2 cm (67\"), weight (!) 182 kg (400 lb 8 oz), SpO2 96%, not currently breastfeeding.  No fever.    HEENT: (3-4L NC )    Lungs:  Normal effort.    Heart: Normal rate and bradycardia.    Chest: Symmetric chest wall expansion. Chest wall tenderness present.    Extremities: Decreased range of motion.    Neurological: Patient is alert and oriented to person, place and time.    Skin:  Warm and dry.  (Surgical incisions clean, dry, intact well-approximated.  No significant redness or bruising)                  Results Review:     I reviewed the patient's new clinical results.  I reviewed the patient's new imaging results and agree with the interpretation.    Imaging Results (Last 24 Hours)       Procedure Component Value Units Date/Time    XR Chest 1 View [[id number]] Collected: 01/14/25 0859     Updated: 01/14/25 0908    Narrative:      XR CHEST 1 VW-     HISTORY: Female who " is 69 years-old, chest tube management     TECHNIQUE: Frontal view of the chest     COMPARISON: 1/3/2025     FINDINGS: The heart size is borderline. Pulmonary vasculature is mildly  congested. Aorta is calcified. Right apical opacity may be loculated  fluid, appears similar to prior exam, possibility of a loculated right  apical hydropneumothorax not excluded, CT correlation could be obtained  as indicated. Small bibasilar atelectasis/infiltrate and minimal pleural  effusions. No left pneumothorax. No acute osseous process.       Impression:      As described.     This report was finalized on 1/14/2025 9:05 AM by Dr. Piter Fuchs M.D on Workstation: [employer]               Lab Results:     Lab Results (last 24 hours)       Procedure Component Value Units Date/Time    Tissue Pathology Exam [[id number]] Collected: 01/08/25 1505    Specimen: Tissue from Lung, Right Upper Lobe; Tissue from Lymph Node; Tissue from Lymph Node; Tissue from Lymph Node; Tissue from Lung, Right Upper Lobe; Tissue from Lymph Node; Tissue from Lymph Node; Tissue from Lymph Node; Tissue from Lymph Node; Tissue from Lung, Right Upper Lobe Updated: 01/14/25 1211     Addendum --     Please see the completely scanned PD-L1 IHC report from [facility] below.        Case Report --     Surgical Pathology Report                         Case: [id number]                                  Authorizing Provider:  Radha Marcus MD        Collected:           01/08/2025 03:05 PM          Ordering Location:     [facility]  Received:            01/08/2025 03:18 PM                                 MAIN OR                                                                      Pathologist:           Evelia Acosta MD                                                          Specimens:   1) - Lung, Right Upper Lobe, RIGHT UPPER LOBE WEDGE RESECTION FOR FROZEN PLEASE CALL                [id number]                                                                                            2) - Lung, Right Upper Lobe, RIGHT UPPER LOBE WEDGE RESECTION #2 FOR FROZEN PLEASE                  CALL OR24                                                                                      3) - Lymph Node, RIGHT LEVEL 8; FRESH FOR PERMAMENT                                                 4) - Lymph Node, LEVEL 7; FRESH FOR PERMAMENT                                                       5) - Lymph Node, RIGHT LEVEL 4; FRESH FOR PERMAMENT                                                 6) - Lymph Node, RIGHT LEVEL 10; FRESH FOR PERMAMENT                                                7) - Lymph Node, RIGHT LEVEL 11; FRESH FOR PERMAMENT                                                8) - Lymph Node, RIGHT LEVEL 12; FRESH FOR PERMAMENT                                                9) - Lymph Node, RIGHT LEVEL 10 #2; FRESH FOR PERMAMENT                                             10) - Lung, Right Upper Lobe, RIGHT UPPER LOBECTOMY FRESH FOR PERMANENT; STITCH LYONS               PRIOR CANCER STAPLE LINE                                                                    Final Diagnosis --     1.  Lung, right upper lobe, wedge resection: (15 g).   A.  Benign pulmonary parenchyma.    2.  Lung, right upper lobe, wedge resection #2: (4 g)   A.  Adenocarcinoma, acinar type with peripheral lepidic pattern; see synoptic template for complete tumor details.    3.  Lymph node, right level 8, excision:    A.  1 benign lymph node (0/1).    4.  Lymph node, right level 7, excision:    A.  1 benign lymph node (0/1).    5.  Lymph node, right level 4, excision:    A.  1 benign lymph node (0/1).    6.  Lymph node, right level 10, excision:    A.  1 benign lymph node (0/1).    7.  Lymph node, right level 11, excision:    A.  1 benign lymph node (0/1).    8.  Lymph node, right level 12, excision:    A.  1 benign lymph node (0/1).    9.  Lymph node, right level 10 #2, excision:    A.  1 benign  lymph node (0/1).    10.  Lung, right upper lobe, lobectomy: (179 g)   A.  Benign lung with area of parenchymal hemorrhage.       Synoptic Checklist --     LUNG  LUNG - All Specimens  8th Edition - Protocol posted: 9/21/2022    SPECIMEN     Procedure:    Lobectomy      Specimen Laterality:    Right     TUMOR     Tumor Focality:    Single focus      Tumor Site:    Upper lobe of lung      Tumor Size:           Total Tumor Size (size of entire tumor):    Greatest Dimension (Centimeters): 1.8 cm       Size of Invasive Component:    Greatest Dimension (Centimeters): 0.7 cm     Histologic Type:    Invasive acinar adenocarcinoma        Histologic Patterns Present:    Acinar        Histologic Patterns Present:    Lepidic      Visceral Pleura Invasion:    Not identified      Direct Invasion of Adjacent Structures:    Not applicable (no adjacent structures present)      Treatment Effect:    No known presurgical therapy      Lymphovascular Invasion:    Not identified     MARGINS     Margin Status for Invasive Carcinoma:    All margins negative for invasive carcinoma        Closest Margin(s) to Invasive Carcinoma:    Bronchial        Closest Margin(s) to Invasive Carcinoma:    Vascular        Distance from Invasive Carcinoma to Closest Margin:    4 cm     Margin Status for Non-Invasive Tumor:    All margins negative for non-invasive tumor     REGIONAL LYMPH NODES     Lymph Node(s) from Prior Procedures:    Not included      Regional Lymph Node Status:           :    All regional lymph nodes negative for tumor        Number of Lymph Nodes Examined:    7          Aide Site(s) Examined:    4R: Lower paratracheal          Aide Site(s) Examined:    8R: Para-esophageal (below azra)          Aide Site(s) Examined:    10R: Hilar          Aide Site(s) Examined:    11R: Interlobar          Aide Site(s) Examined:    12R: Lobar          Aide Site(s) Examined:    7: Subcarinal     PATHOLOGIC STAGE CLASSIFICATION (pTNM, AJCC 8th  "Edition)     Reporting of pT, pN, and (when applicable) pM categories is based on information available to the pathologist at the time the report is issued. As per the AJCC (Chapter 1, 8th Ed.) it is the managing physician’s responsibility to establish the final pathologic stage based upon all pertinent information, including but potentially not limited to this pathology report.     pT Category:    pT1a      pN Category:    pN0        Comment --     PD-L1 has been ordered; the result will follow in an addendum.        Intraoperative Consultation --     Part 1 (right upper lobe wedge resection)-5 frozen blocks submitted.  Frozen section diagnosis-no tumor seen on representative sections.  Reported to Dr. Marcus at 3:45 PM for Dr. Flanagan.    Part 2 (right upper lobe wedge resection #2)-1 frozen blocks submitted.  Frozen section diagnosis-non-small cell carcinoma.  Present at the inked margin.  Reported to Dr. Marcus at 4:15 PM per Dr. Flanagan.       Gross Description --     1. Lung, Right Upper Lobe.  Received fresh for frozen section diagnosis, labeled \"right upper lobe wedge resection\" is a 15 g, 8.0 x 3.5 x 2.5 cm lung wedge resection with an 8.0 cm long staple line margin.  The pleural surface is smooth tan-red and focally hemorrhagic.  The pleura is inked blue and the staple line margin is inked orange.  The specimen is serially sectioned to reveal no obvious mass lesions.  5 representative sections are submitted for frozen section diagnosis and the remainder of the specimen is submitted for permanent sections as follows:    1 AFS-1 DFS- frozen section residue-random sections of lung tissue  1E-1L- remainder of the specimen    jap/uso/swm    2. Lung, Right Upper Lobe.  Received fresh for frozen section diagnosis, labeled \"right upper lobe wedge resection #2\" is a 4 g, 4.3 x 2.0 x 1.5 cm lung wedge resection with a 4.3 cm long staple line margin, which is inked orange.  The pleural surface is smooth tan-pink and is " "previously incised by the surgeon, conveying an obvious 1.8 x 1.5 x 1.3 cm solid tan-pink mass which is at the pleural surface and comes to within 0.1 cm of the parenchymal staple line margin.  The pleural surface is inked blue.  The remaining parenchyma is spongy tan-red.  Representative section of the mass is submitted to include the pleural surface and the parenchymal margin and the remainder of the specimen is entirely submitted as follows:    2 AFS- frozen section residue-mass to the pleural surface and parenchymal margin  2B-2C- remainder the mass to include the parenchymal margin and the pleural surface  2D- remainder of the specimen    jap/uso/swm  3. Lymph Node.  Received fresh and subsequently placed in formalin labeled with the patient's name and designated \"right level 8 lymph node is a gray-tan lymph node measuring 0.5 x 0.3 x 0.3 cm.  The specimen is submitted as received in cassette 3A.    t/uso/MEC    4. Lymph Node.  Received fresh and subsequently placed in formalin labeled with the patient's name and designated \"level 7 lymph node is a gray-black lymph node measuring 0.8 x 0.7 x 0.4 cm.  The specimen is submitted as received in cassette 4A.    t/uso/MEC    5. Lymph Node.  Received fresh and subsequently placed in formalin labeled with the patient's name and designated \"right level 4 lymph node is a 0.3 to 0.2 cm.  The specimen is filtered and submitted as received in cassette 5A.    t/uso/ MEC  6. Lymph Node.    Received fresh and subsequently placed in formalin labeled with the patient's name and designated \"right level 10 lymph node\" is a fatty yellow to tan tissue measuring 1.0 x 0.5 x 0.3 cm.  The specimen is submitted as received in cassette 6A.    t/uso/MEC    7. Lymph Node.  Received fresh and subsequently placed in formalin labeled with the patient's name and designated \" right level 11 lymph node\" is a tan-black tissue measuring 1.0 x 0.5 x 0.4 cm.  The specimen is submitted as " "received in cassette 7A.    [facility]    8. Lymph Node.  Received fresh and subsequently placed in formalin labeled with the patient's name and designated \"right level 12 lymph node\" is a gray-tan tissue measuring 1.0 x 0.5 x 0.4 cm.  The specimen is submitted as received in cassette 8A.    [facility]    9. Lymph Node.  Received fresh and subsequently placed in formalin labeled with the patient's name and designated \"right level 10 #2\" is a gray-black tissue measuring 0.9 x 0.7 x 0.3 cm.  The specimen is submitted as received in cassette 9A.    [facility]    10. Lung, Right Upper Lobe.  Received fresh and subsequently placed in formalin at 1847 labeled with the patient's name and designated \"right upper lobectomy stitch marks prior cancer staple line\" is a 179 g (post fixation) right upper lobectomy measuring 12.0 x 8.5 x 4.9 cm.  The pleural surface is purple-gray and predominantly smooth.  The specimen is received with a suture designating the prior wedge resection/cancer staple line.  The staple line is bifurcated and measures 7.5 and 4.5 cm.  The opposing hilar surface displays an additional staple line measuring 9.0 cm.  The bronchial and vascular margins are closed by series of metal staples.  The pleural surface area designated as previous cancer site is inked in blue.  The staple line is removed and the parenchymal margin is inked in orange.  Sectioning through the tissue reveals homogenous beefy red lung parenchyma without additional mass lesion nor tumor nodularity identified.  A 3.5 x 1.5 x 1.5 cm clot filled cavity is identified approximately 3.5 cm from the hilar margin and 4 cm from the sutured area.  The designated area measures approximately 4 cm from the hilar margin.  Representative sections are submitted as follows:    10A: Bronchial and vascular shave margins  10B-10D: Random representative sections from designated area demonstrating parenchymal and pleural margins  10 the E: Adjacent " "vessels  10F-10G clot filled cavity  10H: Random lung    hbt/uso/MEC         Special Stains --     Utilizing appropriate positive and negative controls immunohistochemical stains CK7, CK20, TTF-1 and Napsin are performed on block 2A.  The tumor is positive for CK7, TTF-1 and Napsin.  The tumor is negative for CK20.       Microscopic Description --     Unless \"gross only\" is specified, the final diagnosis for each specimen is based on microscopic examination of tissue.      CBC & Differential [[id number]]  (Abnormal) Collected: 01/14/25 0625    Specimen: Blood Updated: 01/14/25 0651    Narrative:      The following orders were created for panel order CBC & Differential.  Procedure                               Abnormality         Status                     ---------                               -----------         ------                     CBC Auto Differential[[id number]]        Abnormal            Final result                 Please view results for these tests on the individual orders.    CBC Auto Differential [[id number]]  (Abnormal) Collected: 01/14/25 0625    Specimen: Blood Updated: 01/14/25 0651     WBC 12.91 10*3/mm3      RBC 4.57 10*6/mm3      Hemoglobin 12.6 g/dL      Hematocrit 39.5 %      MCV 86.4 fL      MCH 27.6 pg      MCHC 31.9 g/dL      RDW 12.1 %      RDW-SD 38.1 fl      MPV 9.6 fL      Platelets 235 10*3/mm3      Neutrophil % 87.4 %      Lymphocyte % 4.3 %      Monocyte % 6.4 %      Eosinophil % 0.9 %      Basophil % 0.2 %      Immature Grans % 0.8 %      Neutrophils, Absolute 11.29 10*3/mm3      Lymphocytes, Absolute 0.56 10*3/mm3      Monocytes, Absolute 0.82 10*3/mm3      Eosinophils, Absolute 0.11 10*3/mm3      Basophils, Absolute 0.03 10*3/mm3      Immature Grans, Absolute 0.10 10*3/mm3      nRBC 0.0 /100 WBC              Assessment & Plan       Ground glass opacity present on imaging of lung       Assessment & Plan  POD # 6 s/p right upper lobectomy.    Patient alert and in recliner.  She " reports she feels better today.  She denies any shortness of breath or uncontrolled pain.  Follow-up chest x-ray performed today reviewed without significant change compared to the previous.  She is requiring 3 to 4 L of oxygen via nasal cannula and saturating well.     Important we increase her mobility, PT OT.  Encourage pulmonary hygiene including use of I-S. Patient is high risk for respiratory decompensation, pneumonia due to body habitus and shallow breathing.  Repeat a.m. chest x-ray.     Appreciate recommendations from pulmonology.  Noted plans for outpatient evaluation for sleep apnea.  Presently requiring BiPAP with sleep.  Plan for discharge to skilled nursing facility, pending placement per case management.    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    JOANN Mcwilliams  [facility]  01/14/25  13:14 EST

## 2025-01-15 ENCOUNTER — APPOINTMENT (OUTPATIENT)
Dept: GENERAL RADIOLOGY | Facility: HOSPITAL | Age: 70
DRG: 163 | End: 2025-01-15
Payer: MEDICARE

## 2025-01-15 PROCEDURE — 71045 X-RAY EXAM CHEST 1 VIEW: CPT

## 2025-01-15 PROCEDURE — 94799 UNLISTED PULMONARY SVC/PX: CPT

## 2025-01-15 PROCEDURE — 97116 GAIT TRAINING THERAPY: CPT

## 2025-01-15 PROCEDURE — 97530 THERAPEUTIC ACTIVITIES: CPT

## 2025-01-15 PROCEDURE — 94761 N-INVAS EAR/PLS OXIMETRY MLT: CPT

## 2025-01-15 PROCEDURE — 63710000001 ONDANSETRON ODT 4 MG TABLET DISPERSIBLE: Performed by: THORACIC SURGERY (CARDIOTHORACIC VASCULAR SURGERY)

## 2025-01-15 PROCEDURE — 25010000002 HEPARIN (PORCINE) PER 1000 UNITS: Performed by: THORACIC SURGERY (CARDIOTHORACIC VASCULAR SURGERY)

## 2025-01-15 PROCEDURE — 97110 THERAPEUTIC EXERCISES: CPT

## 2025-01-15 PROCEDURE — 99024 POSTOP FOLLOW-UP VISIT: CPT

## 2025-01-15 RX ORDER — SCOLOPAMINE TRANSDERMAL SYSTEM 1 MG/1
1 PATCH, EXTENDED RELEASE TRANSDERMAL
Status: DISCONTINUED | OUTPATIENT
Start: 2025-01-15 | End: 2025-01-18 | Stop reason: HOSPADM

## 2025-01-15 RX ORDER — PANTOPRAZOLE SODIUM 40 MG/1
40 TABLET, DELAYED RELEASE ORAL
Status: DISCONTINUED | OUTPATIENT
Start: 2025-01-15 | End: 2025-01-18 | Stop reason: HOSPADM

## 2025-01-15 RX ADMIN — ONDANSETRON 4 MG: 4 TABLET, ORALLY DISINTEGRATING ORAL at 09:21

## 2025-01-15 RX ADMIN — HEPARIN SODIUM 5000 UNITS: 5000 INJECTION INTRAVENOUS; SUBCUTANEOUS at 15:32

## 2025-01-15 RX ADMIN — DAPSONE 50 MG: 100 TABLET ORAL at 08:57

## 2025-01-15 RX ADMIN — DAPSONE 50 MG: 100 TABLET ORAL at 21:12

## 2025-01-15 RX ADMIN — PANTOPRAZOLE SODIUM 40 MG: 40 TABLET, DELAYED RELEASE ORAL at 15:32

## 2025-01-15 RX ADMIN — HEPARIN SODIUM 5000 UNITS: 5000 INJECTION INTRAVENOUS; SUBCUTANEOUS at 21:12

## 2025-01-15 RX ADMIN — ACETAMINOPHEN 1000 MG: 500 TABLET, FILM COATED ORAL at 08:57

## 2025-01-15 RX ADMIN — METOPROLOL TARTRATE 25 MG: 25 TABLET, FILM COATED ORAL at 21:12

## 2025-01-15 RX ADMIN — HEPARIN SODIUM 5000 UNITS: 5000 INJECTION INTRAVENOUS; SUBCUTANEOUS at 05:13

## 2025-01-15 RX ADMIN — CETIRIZINE HYDROCHLORIDE 10 MG: 10 TABLET, FILM COATED ORAL at 08:58

## 2025-01-15 RX ADMIN — CELECOXIB 100 MG: 100 CAPSULE ORAL at 21:12

## 2025-01-15 RX ADMIN — SCOPOLAMINE 1 PATCH: 1.5 PATCH, EXTENDED RELEASE TRANSDERMAL at 15:33

## 2025-01-15 RX ADMIN — CELECOXIB 100 MG: 100 CAPSULE ORAL at 08:58

## 2025-01-15 RX ADMIN — CHOLESTYRAMINE 4 G: 4 POWDER, FOR SUSPENSION ORAL at 08:57

## 2025-01-15 RX ADMIN — ACETAMINOPHEN 1000 MG: 500 TABLET, FILM COATED ORAL at 21:12

## 2025-01-15 RX ADMIN — ACETAMINOPHEN 1000 MG: 500 TABLET, FILM COATED ORAL at 15:33

## 2025-01-15 NOTE — PLAN OF CARE
Goal Outcome Evaluation:  Plan of Care Reviewed With: patient        Progress: improving  Outcome Evaluation: Pt tolerates short therapy session this afternoon. She is limited by endurance. ADLs not addressed this session. Pt does report she is getting up to use BSC (for BM only) and this was faticindyg eariler today. Session focused on engagement in brief UE exercises and she was able to mobilize 10 ft CGA with walker. Pt would benefit from continued OT to progress ADL engagement and tolerance post op.    Anticipated Discharge Disposition (OT): skilled nursing facility

## 2025-01-15 NOTE — PROGRESS NOTES
"    Chief Complaint: Ground glass opacity, NSCLC  S/P: Right upper lobectomy  POD # 7    Subjective:  Symptoms:  Improved.  She reports weakness.  No shortness of breath or cough.    Diet:  Adequate intake.  No vomiting.  Nausea: Mild nausea..  Activity level: Impaired due to weakness.    Pain:  She complains of pain that is mild.  She reports pain is improving.  Pain is well controlled.    Remains alert and in recliner.  Denies any pain or shortness of breath.      Vital Signs:  Temp:  [97.8 °F (36.6 °C)-98.3 °F (36.8 °C)] 97.8 °F (36.6 °C)  Heart Rate:  [53-71] 58  Resp:  [16-24] 20  BP: (121-148)/(61-71) 138/71    Intake & Output (last day)         01/14 0701  01/15 0700 01/15 0701 01/16 0700    P.O. 240     Total Intake(mL/kg) 240 (1.3)     Urine (mL/kg/hr) 1000 (0.2)     Total Output 1000     Net -760           Urine Unmeasured Occurrence 1 x             Objective:  General Appearance:  In no acute distress, comfortable and not in pain.    Vital signs: (most recent): Blood pressure 109/54, pulse 63, temperature 97.7 °F (36.5 °C), temperature source Oral, resp. rate 16, height 170.2 cm (67\"), weight (!) 182 kg (400 lb 8 oz), SpO2 92%, not currently breastfeeding.  No fever.    HEENT: (3-4L NC )    Lungs:  Normal effort.    Heart: Bradycardia.    Chest: Symmetric chest wall expansion. Chest wall tenderness (Incisional) present.    Abdomen: Abdomen is soft and non-distended.    Extremities: Decreased range of motion.    Neurological: Patient is alert and oriented to person, place and time.    Skin:  Warm and dry.                    Results Review:     I reviewed the patient's new clinical results.  I reviewed the patient's new imaging results and agree with the interpretation.    Imaging Results (Last 24 Hours)       Procedure Component Value Units Date/Time    XR Chest 1 View [[id number]] Collected: 01/15/25 0703     Updated: 01/15/25 0709    Narrative:      ONE-VIEW PORTABLE CHEST AT 5:23 A.M.     HISTORY: " Recent right chest surgery. Atelectasis.     FINDINGS: The examination is limited by the patient's marked obesity.  The patient has had previous right-sided chest tube and no definite  chest tube is identified on the current portable study. There remains  localized right apical pleural fluid or pleural thickening. There is  also slight worsening of atelectasis and possible tiny pleural effusion  at the left base when compared to yesterday's exam.           This report was finalized on 1/15/2025 7:05 AM by Dr. Chilango Galicia M.D  on Workstation: [id number]               Lab Results:     Lab Results (last 24 hours)       Procedure Component Value Units Date/Time    Tissue Pathology Exam [[id number]] Collected: 01/08/25 1505    Specimen: Tissue from Lung, Right Upper Lobe; Tissue from Lymph Node; Tissue from Lymph Node; Tissue from Lymph Node; Tissue from Lung, Right Upper Lobe; Tissue from Lymph Node; Tissue from Lymph Node; Tissue from Lymph Node; Tissue from Lymph Node; Tissue from Lung, Right Upper Lobe Updated: 01/14/25 1211     Addendum --     Please see the completely scanned PD-L1 IHC report from [facility] below.        Case Report --     Surgical Pathology Report                         Case: [id number]                                  Authorizing Provider:  Radha Marcus MD        Collected:           01/08/2025 03:05 PM          Ordering Location:     [facility]  Received:            01/08/2025 03:18 PM                                 MAIN OR                                                                      Pathologist:           Evelia Acosta MD                                                          Specimens:   1) - Lung, Right Upper Lobe, RIGHT UPPER LOBE WEDGE RESECTION FOR FROZEN PLEASE CALL                OR24                                                                                           2) - Lung, Right Upper Lobe, RIGHT UPPER LOBE WEDGE RESECTION #2 FOR FROZEN  PLEASE                  CALL OR24                                                                                      3) - Lymph Node, RIGHT LEVEL 8; FRESH FOR PERMAMENT                                                 4) - Lymph Node, LEVEL 7; FRESH FOR PERMAMENT                                                       5) - Lymph Node, RIGHT LEVEL 4; FRESH FOR PERMAMENT                                                 6) - Lymph Node, RIGHT LEVEL 10; FRESH FOR PERMAMENT                                                7) - Lymph Node, RIGHT LEVEL 11; FRESH FOR PERMAMENT                                                8) - Lymph Node, RIGHT LEVEL 12; FRESH FOR PERMAMENT                                                9) - Lymph Node, RIGHT LEVEL 10 #2; FRESH FOR PERMAMENT                                             10) - Lung, Right Upper Lobe, RIGHT UPPER LOBECTOMY FRESH FOR PERMANENT; STITCH LYONS               PRIOR CANCER STAPLE LINE                                                                    Final Diagnosis --     1.  Lung, right upper lobe, wedge resection: (15 g).   A.  Benign pulmonary parenchyma.    2.  Lung, right upper lobe, wedge resection #2: (4 g)   A.  Adenocarcinoma, acinar type with peripheral lepidic pattern; see synoptic template for complete tumor details.    3.  Lymph node, right level 8, excision:    A.  1 benign lymph node (0/1).    4.  Lymph node, right level 7, excision:    A.  1 benign lymph node (0/1).    5.  Lymph node, right level 4, excision:    A.  1 benign lymph node (0/1).    6.  Lymph node, right level 10, excision:    A.  1 benign lymph node (0/1).    7.  Lymph node, right level 11, excision:    A.  1 benign lymph node (0/1).    8.  Lymph node, right level 12, excision:    A.  1 benign lymph node (0/1).    9.  Lymph node, right level 10 #2, excision:    A.  1 benign lymph node (0/1).    10.  Lung, right upper lobe, lobectomy: (179 g)   A.  Benign lung with area of parenchymal  hemorrhage.       Synoptic Checklist --     LUNG  LUNG - All Specimens  8th Edition - Protocol posted: 9/21/2022    SPECIMEN     Procedure:    Lobectomy      Specimen Laterality:    Right     TUMOR     Tumor Focality:    Single focus      Tumor Site:    Upper lobe of lung      Tumor Size:           Total Tumor Size (size of entire tumor):    Greatest Dimension (Centimeters): 1.8 cm       Size of Invasive Component:    Greatest Dimension (Centimeters): 0.7 cm     Histologic Type:    Invasive acinar adenocarcinoma        Histologic Patterns Present:    Acinar        Histologic Patterns Present:    Lepidic      Visceral Pleura Invasion:    Not identified      Direct Invasion of Adjacent Structures:    Not applicable (no adjacent structures present)      Treatment Effect:    No known presurgical therapy      Lymphovascular Invasion:    Not identified     MARGINS     Margin Status for Invasive Carcinoma:    All margins negative for invasive carcinoma        Closest Margin(s) to Invasive Carcinoma:    Bronchial        Closest Margin(s) to Invasive Carcinoma:    Vascular        Distance from Invasive Carcinoma to Closest Margin:    4 cm     Margin Status for Non-Invasive Tumor:    All margins negative for non-invasive tumor     REGIONAL LYMPH NODES     Lymph Node(s) from Prior Procedures:    Not included      Regional Lymph Node Status:           :    All regional lymph nodes negative for tumor        Number of Lymph Nodes Examined:    7          Aide Site(s) Examined:    4R: Lower paratracheal          Aide Site(s) Examined:    8R: Para-esophageal (below azra)          Aide Site(s) Examined:    10R: Hilar          Aide Site(s) Examined:    11R: Interlobar          Aide Site(s) Examined:    12R: Lobar          Aide Site(s) Examined:    7: Subcarinal     PATHOLOGIC STAGE CLASSIFICATION (pTNM, AJCC 8th Edition)     Reporting of pT, pN, and (when applicable) pM categories is based on information available to the  "pathologist at the time the report is issued. As per the AJCC (Chapter 1, 8th Ed.) it is the managing physician’s responsibility to establish the final pathologic stage based upon all pertinent information, including but potentially not limited to this pathology report.     pT Category:    pT1a      pN Category:    pN0        Comment --     PD-L1 has been ordered; the result will follow in an addendum.        Intraoperative Consultation --     Part 1 (right upper lobe wedge resection)-5 frozen blocks submitted.  Frozen section diagnosis-no tumor seen on representative sections.  Reported to Dr. Marcus at 3:45 PM for Dr. Flanagan.    Part 2 (right upper lobe wedge resection #2)-1 frozen blocks submitted.  Frozen section diagnosis-non-small cell carcinoma.  Present at the inked margin.  Reported to Dr. Marcus at 4:15 PM per Dr. Flanagan.       Gross Description --     1. Lung, Right Upper Lobe.  Received fresh for frozen section diagnosis, labeled \"right upper lobe wedge resection\" is a 15 g, 8.0 x 3.5 x 2.5 cm lung wedge resection with an 8.0 cm long staple line margin.  The pleural surface is smooth tan-red and focally hemorrhagic.  The pleura is inked blue and the staple line margin is inked orange.  The specimen is serially sectioned to reveal no obvious mass lesions.  5 representative sections are submitted for frozen section diagnosis and the remainder of the specimen is submitted for permanent sections as follows:    1 AFS-1 DFS- frozen section residue-random sections of lung tissue  1E-1L- remainder of the specimen    jap/uso/[facility]    2. Lung, Right Upper Lobe.  Received fresh for frozen section diagnosis, labeled \"right upper lobe wedge resection #2\" is a 4 g, 4.3 x 2.0 x 1.5 cm lung wedge resection with a 4.3 cm long staple line margin, which is inked orange.  The pleural surface is smooth tan-pink and is previously incised by the surgeon, conveying an obvious 1.8 x 1.5 x 1.3 cm solid tan-pink mass which is at the " "pleural surface and comes to within 0.1 cm of the parenchymal staple line margin.  The pleural surface is inked blue.  The remaining parenchyma is spongy tan-red.  Representative section of the mass is submitted to include the pleural surface and the parenchymal margin and the remainder of the specimen is entirely submitted as follows:    2 AFS- frozen section residue-mass to the pleural surface and parenchymal margin  2B-2C- remainder the mass to include the parenchymal margin and the pleural surface  2D- remainder of the specimen    jap/uso/swm  3. Lymph Node.  Received fresh and subsequently placed in formalin labeled with the patient's name and designated \"right level 8 lymph node is a gray-tan lymph node measuring 0.5 x 0.3 x 0.3 cm.  The specimen is submitted as received in cassette 3A.    t/uso/[facility]    4. Lymph Node.  Received fresh and subsequently placed in formalin labeled with the patient's name and designated \"level 7 lymph node is a gray-black lymph node measuring 0.8 x 0.7 x 0.4 cm.  The specimen is submitted as received in cassette 4A.    t/uso/[facility]    5. Lymph Node.  Received fresh and subsequently placed in formalin labeled with the patient's name and designated \"right level 4 lymph node is a 0.3 to 0.2 cm.  The specimen is filtered and submitted as received in cassette 5A.    t/uso/ [facility]  6. Lymph Node.    Received fresh and subsequently placed in formalin labeled with the patient's name and designated \"right level 10 lymph node\" is a fatty yellow to tan tissue measuring 1.0 x 0.5 x 0.3 cm.  The specimen is submitted as received in cassette 6A.    t/uso/[facility]    7. Lymph Node.  Received fresh and subsequently placed in formalin labeled with the patient's name and designated \" right level 11 lymph node\" is a tan-black tissue measuring 1.0 x 0.5 x 0.4 cm.  The specimen is submitted as received in cassette 7A.    t/uso/[facility]    8. Lymph Node.  Received fresh and subsequently placed in formalin " "labeled with the patient's name and designated \"right level 12 lymph node\" is a gray-tan tissue measuring 1.0 x 0.5 x 0.4 cm.  The specimen is submitted as received in cassette 8A.    \[facility] [place] [facility]\""/o/[facility]    9. Lymph Node.  Received fresh and subsequently placed in formalin labeled with the patient's name and designated \"right level 10 #2\" is a gray-black tissue measuring 0.9 x 0.7 x 0.3 cm.  The specimen is submitted as received in cassette 9A.    \[facility] [place] [facility]\""/o/[facility]    10. Lung, Right Upper Lobe.  Received fresh and subsequently placed in formalin at 1847 labeled with the patient's name and designated \"right upper lobectomy stitch marks prior cancer staple line\" is a 179 g (post fixation) right upper lobectomy measuring 12.0 x 8.5 x 4.9 cm.  The pleural surface is purple-gray and predominantly smooth.  The specimen is received with a suture designating the prior wedge resection/cancer staple line.  The staple line is bifurcated and measures 7.5 and 4.5 cm.  The opposing hilar surface displays an additional staple line measuring 9.0 cm.  The bronchial and vascular margins are closed by series of metal staples.  The pleural surface area designated as previous cancer site is inked in blue.  The staple line is removed and the parenchymal margin is inked in orange.  Sectioning through the tissue reveals homogenous beefy red lung parenchyma without additional mass lesion nor tumor nodularity identified.  A 3.5 x 1.5 x 1.5 cm clot filled cavity is identified approximately 3.5 cm from the hilar margin and 4 cm from the sutured area.  The designated area measures approximately 4 cm from the hilar margin.  Representative sections are submitted as follows:    10A: Bronchial and vascular shave margins  10B-10D: Random representative sections from designated area demonstrating parenchymal and pleural margins  10 the E: Adjacent vessels  10F-10G clot filled cavity  10H: Random lung    t/o/[facility]         Special Stains --     Utilizing " "appropriate positive and negative controls immunohistochemical stains CK7, CK20, TTF-1 and Napsin are performed on block 2A.  The tumor is positive for CK7, TTF-1 and Napsin.  The tumor is negative for CK20.       Microscopic Description --     Unless \"gross only\" is specified, the final diagnosis for each specimen is based on microscopic examination of tissue.               Assessment & Plan       Ground glass opacity present on imaging of lung       Assessment & Plan  POD # 7 s/p right upper lobectomy.    Patient remains alert and in recliner.  Denies any shortness of breath and is saturating well on 2 L via nasal cannula. AM CXR without significant change compared to the previous.    Continue to increase her activity, PT OT.  Encourage pulmonary hygiene including use of I-S.  Patient is at high risk for respiratory decompensation and pneumonia due to body habitus and shallow breathing.  Scopolamine patch for nausea.     Appreciate recommendations from pulmonology.  Noted plans for outpatient evaluation for sleep apnea.  Presently requiring BiPAP with sleep.  Plan for discharge to skilled nursing facility, pending placement per case management.    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    JOANN Mcwilliams  [facility]  01/15/25  10:49 EST          "

## 2025-01-15 NOTE — DISCHARGE INSTRUCTIONS
Post-op VAT / Thoracotomy Discharge Instructions    1. Activity:  Climb stairs as tolerated  May drive car after 2 weeks or as directed by Physician  Walk at least 3-4 times a day  Limit lifting for first 6 weeks. No lifting, pushing, or pulling greater than 10 pounds till seen by MD.  Continue to use your incentive spirometer at least 4 times per day.    2. Nutrition:  Resume previous diet  Eat well balanced meals  Avoid constipation by eating fresh fruits, vegetables, whole grain foods and increasing fluid intake.    3. Incision (wound) Care:  Remove dressing after 48 hours  If continued drainage, change dressing every 24 hours and as needed.  May shower after discharge.  Wash around incision area with soap and water daily.  No lotions or creams on incision area.  Take temperature daily for the first week after discharge.     4. When to call for Medical Advise:  Fever greater than 101 degrees  Unusual or severe pain  Difficulty breathing  Incision changes (redness, swelling, or increased drainage)  Any questions or problems    5. Medication Instructions:  Take Pain medications as directed to stay comfortable  No driving or drinking alcohol when taking prescribed pain meds  Laxative of choice if needed for constipation.    6. Medication and dosages:  See discharge medication instruction form

## 2025-01-15 NOTE — THERAPY TREATMENT NOTE
Patient Name: Kortney Charlton  : 1955    MRN: [id number]                              Today's Date: 1/15/2025       Admit Date: 2025    Visit Dx:     ICD-10-CM ICD-9-CM   1. Ground glass opacity present on imaging of lung  R91.8 793.19     Patient Active Problem List   Diagnosis    Morbid obesity    Elevated BP without diagnosis of hypertension    Venous stasis dermatitis of both lower extremities    History of total bilateral knee replacement (TKR)    Breast lump in female    Sneddon-Hanna disease    Abnormal TSH    Atypical chest pain    Medicare annual wellness visit, subsequent    Diarrhea in adult patient    Chronic diarrhea    Hypermetropia, bilateral    Vitreous degeneration of left eye    Prediabetes    Numbness and tingling in left hand    Bilateral carpal tunnel syndrome    Diarrhea following gastrointestinal surgery    Leg swelling    Encounter for lipid screening for cardiovascular disease    Body mass index (BMI) of 60.0 to 69.9 in adult    Immunization due    Age-related nuclear cataract of both eyes    Bilateral myopia    Presbyopia    Regular astigmatism of both eyes    History of pulmonary embolism    Ground glass opacity present on imaging of lung    Pneumothorax     Past Medical History:   Diagnosis Date    Allergic topical steroids    Anemia Episodic    At risk for central sleep apnea     STOP BANG 5    Breast lump in female 2019    Chronic nasopharyngitis     Elevated BP without diagnosis of hypertension     History of pulmonary embolism     Hypertension     Lesion of lung     Morbid obesity     Neuromuscular disorder     Osteoarthritis     Rash     UNDER LEFT BREAST    Sneddon-Hanna disease     Stasis dermatitis     Urinary tract infection      Past Surgical History:   Procedure Laterality Date    BRONCHOSCOPY WITH ION ROBOTIC ASSIST N/A 10/30/2024    Procedure: BRONCHOSCOPY WITH ION ROBOT, LAVAGE, FINE NEEDLE ASPIRATIONS AND BIOPSIES;  Surgeon: Radha Marcus  MD VALERIE;  Location: [facility] ENDOSCOPY;  Service: Robotics - Pulmonary;  Laterality: N/A;    CATARACT EXTRACTION Bilateral     COLONOSCOPY  2015    CYST REMOVAL      MULTIPLE    HYSTEROSCOPY ENDOMETRIAL ABLATION      JOINT REPLACEMENT  R-2013 L-2015    LAPAROSCOPIC CHOLECYSTECTOMY  10/2011    LOBECTOMY Right 1/8/2025    Procedure: BRONCHOSCOPY, RIGHT VIDEO ASSISTED THORACOSCOPY WITH DAVINCI ROBOT with wedge resection X2, COMPLETION RIGHT UPPER LOBECTOMY, MEDIASTINAL AND HILAR LYMPH NODE DISSECTION, INTERCOSTAL NERVE BLOCK;  Surgeon: Radha Marcus MD;  Location: [facility] OR;  Service: Robotics - DaVinci;  Laterality: Right;    OVARIAN CYST REMOVAL        General Information       Row Name 01/15/25 1320          Physical Therapy Time and Intention    Document Type therapy note (daily note)  -     Mode of Treatment physical therapy  -       Row Name 01/15/25 1320          General Information    Existing Precautions/Restrictions fall;oxygen therapy device and L/min  -       Row Name 01/15/25 1320          Cognition    Orientation Status (Cognition) oriented x 4  -               User Key  (r) = Recorded By, (t) = Taken By, (c) = Cosigned By      Initials Name Provider Type     Gema Trevino, TEE Physical Therapist Assistant                   Mobility       Row Name 01/15/25 1320          Bed Mobility    Comment, (Bed Mobility) up in chair  -       Row Name 01/15/25 1320          Sit-Stand Transfer    Sit-Stand [place] (Transfers) minimum assist (75% patient effort)  -     Assistive Device (Sit-Stand Transfers) walker, front-wheeled  -       Row Name 01/15/25 1320          Gait/Stairs (Locomotion)    [place] Level (Gait) contact guard  -     Assistive Device (Gait) walker, front-wheeled  -     Patient was able to Ambulate yes  -     Distance in Feet (Gait) 10  5ft x2  -SM     Deviations/Abnormal Patterns (Gait) tami decreased;stride length decreased  -     Bilateral Gait  Deviations forward flexed posture  -SM               User Key  (r) = Recorded By, (t) = Taken By, (c) = Cosigned By      Initials Name Provider Type    Gema Reyes PTA Physical Therapist Assistant                   Obj/Interventions       Row Name 01/15/25 1323          Motor Skills    Therapeutic Exercise --  seated AP, LAQ, pillow squeeze x10 reps  -SM               User Key  (r) = Recorded By, (t) = Taken By, (c) = Cosigned By      Initials Name Provider Type    Gema Reyes PTA Physical Therapist Assistant                   Goals/Plan    No documentation.                  Clinical Impression       Row Name 01/15/25 1323          Pain    Pretreatment Pain Rating 0/10 - no pain  -SM     Posttreatment Pain Rating 0/10 - no pain  -SM       Row Name 01/15/25 1323          Positioning and Restraints    Pre-Treatment Position sitting in chair/recliner  -SM     Post Treatment Position chair  -SM     In Chair reclined;call light within reach;encouraged to call for assist;with nsg  -SM               User Key  (r) = Recorded By, (t) = Taken By, (c) = Cosigned By      Initials Name Provider Type    Gema Reyes PTA Physical Therapist Assistant                   Outcome Measures       Row Name 01/15/25 1331 01/15/25 0855       How much help from another person do you currently need...    Turning from your back to your side while in flat bed without using bedrails? 3  -SM 3  -KP    Moving from lying on back to sitting on the side of a flat bed without bedrails? 2  -SM 3  -KP    Moving to and from a bed to a chair (including a wheelchair)? 3  -SM 3  -KP    Standing up from a chair using your arms (e.g., wheelchair, bedside chair)? 3  -SM 2  -KP    Climbing 3-5 steps with a railing? 1  -SM 3  -KP    To walk in hospital room? 3  -SM 2  -KP    AM-PAC 6 Clicks Score (PT) 15  -SM 16  -KP    Highest Level of Mobility Goal 4 --> Transfer to chair/commode  -SM 5 --> Static standing  -KP      Row Name  01/15/25 1331          Functional Assessment    Outcome Measure Options AM-PAC 6 Clicks Basic Mobility (PT)  -               User Key  (r) = Recorded By, (t) = Taken By, (c) = Cosigned By      Initials Name Provider Type     Gema Trevino PTA Physical Therapist Assistant    Dayami Alfaro RN Registered Nurse                                 Physical Therapy Education       Title: PT OT SLP Therapies (In Progress)       Topic: Physical Therapy (Done)       Point: Mobility training (Done)       Learning Progress Summary            Patient Acceptance, E,TB,D, VU,NR by  at 1/15/2025 1332    Acceptance, E,TB,D, VU,NR by  at 1/13/2025 1636    Acceptance, E, VU,NR by  at 1/11/2025 1314                      Point: Home exercise program (Done)       Learning Progress Summary            Patient Acceptance, E,TB,D, VU,NR by  at 1/15/2025 1332    Acceptance, E,TB,D, VU,NR by  at 1/13/2025 1636    Acceptance, E, VU,NR by  at 1/11/2025 1314                      Point: Body mechanics (Done)       Learning Progress Summary            Patient Acceptance, E,TB,D, VU,NR by  at 1/15/2025 1332    Acceptance, E,TB,D, VU,NR by  at 1/13/2025 1636    Acceptance, E, VU,NR by  at 1/11/2025 1314                      Point: Precautions (Done)       Learning Progress Summary            Patient Acceptance, E,TB,D, VU,NR by  at 1/15/2025 1332    Acceptance, E,TB,D, VU,NR by  at 1/13/2025 1636    Acceptance, E, VU,NR by  at 1/11/2025 1314                                      User Key       Initials Effective Dates Name Provider Type Discipline     03/07/18 -  Gema Trevino PTA Physical Therapist Assistant PT     10/25/19 -  Sahara Brwon PT Physical Therapist PT                  PT Recommendation and Plan     Progress: improving  Outcome Evaluation: Pt tolerated treatment well this date. Required min A to stand, then CGA to ambulate. Pt ambulated 5ft x2 w/ Rw, limited d/t fatigue and SOA. Able to use  BSC while up, w/ nsg to assist as well. Pt also completed a few seated LE exercises once back in chair.     Time Calculation:         PT Charges       Row Name 01/15/25 1337             Time Calculation    Start Time 1016  -      Stop Time 1048  -      Time Calculation (min) 32 min  -      PT Received On 01/15/25  -      PT - Next Appointment 01/16/25  -                User Key  (r) = Recorded By, (t) = Taken By, (c) = Cosigned By      Initials Name Provider Type     Gema Trevino PTA Physical Therapist Assistant                  Therapy Charges for Today       Code Description Service Date Service Provider Modifiers Qty    92654896345 HC GAIT TRAINING EA 15 MIN 1/15/2025 Gema Trevino PTA GP 1    57046691987 HC PT THER PROC EA 15 MIN 1/15/2025 Gema Trevino PTA GP 1            PT G-Codes  Outcome Measure Options: AM-PAC 6 Clicks Basic Mobility (PT)  AM-PAC 6 Clicks Score (PT): 15  AM-PAC 6 Clicks Score (OT): 13  PT Discharge Summary  Anticipated Discharge Disposition (PT): skilled nursing facility    Gema Trevino PTA  1/15/2025

## 2025-01-15 NOTE — PROGRESS NOTES
"                                              LOS: 7 days   Patient Care Team:  Dorinda Hastings MD as PCP - General (Family Medicine)    Chief Complaint:  F/up respiratory failure and medical problems listed below.    Subjective   Interval History      Used BiPAP at night and tolerated well.  On 4 L oxygen.  Reported some nausea and heartburn when she drinks fluid.    REVIEW OF SYSTEMS:   CARDIOVASCULAR: No chest pain, chest pressure or chest discomfort. No palpitations but edema.   GASTROINTESTINAL: See above  CONSTITUTIONAL: No fever or chills.     Ventilator/Non-Invasive Ventilation Settings (From admission, onward)       Start     Ordered    01/08/25 2348  NIPPV (CPAP or BIPAP)  At Bedtime & As Needed-RT        Comments: With all sleep   Question Answer Comment   Indication Sleep Apnea    Type AVAPS-AE    Rate 12    VT (mL) 560    EPAP Min (cm H2O) 6    EPAP Max (cm H2O) 14    Max Pressure (cm H2O) 28    Pressure Support Min (cm H2O) 4    Pressure Support Max (cm H2O) 16    Titrate Oxygen for SpO2 90 - 95%        01/08/25 2349                          Physical Exam:     Vital Signs  Temp:  [97.8 °F (36.6 °C)-98.3 °F (36.8 °C)] 97.8 °F (36.6 °C)  Heart Rate:  [53-71] 58  Resp:  [16-24] 20  BP: (121-148)/(61-71) 138/71    Intake/Output Summary (Last 24 hours) at 1/15/2025 1053  Last data filed at 1/14/2025 2015  Gross per 24 hour   Intake 240 ml   Output 600 ml   Net -360 ml     Flowsheet Rows      Flowsheet Row First Filed Value   Admission Height 170.2 cm (67\") Documented at 01/08/2025 1053   Admission Weight 192 kg (423 lb 4.5 oz) Documented at 01/09/2025 0600            PPE used per hospital policy    General Appearance:   Alert, cooperative, in no acute distress   ENMT:  Mallampati score 4, moist mucous membrane   Eyes:  Pupils equal and reactive to light. EOMI   Neck:   Large. Trachea midline. No thyromegaly.   Lungs:   Equal but diminished air entry throughout.  No audible crackles or wheezing anterior " auscultation but exam is limited due to body habitus.  No change.    Heart:   Regular rhythm and normal rate, normal S1 and S2, no         murmur   Skin:   No rash or ecchymosis   Abdomen:    Obese. Soft. No tenderness. No HSM.   Neuro/psych:  Conscious, alert, oriented x3. Strength 5/5 in upper and lower  ext.  Appropriate mood and affect   Extremities:  No cyanosis, clubbing but edema in legs.  Warm extremities and well-perfused          Results Review:        Results from last 7 days   Lab Units 01/13/25  0850 01/12/25  0825 01/11/25  0328   SODIUM mmol/L 142 139 137   POTASSIUM mmol/L 4.3 4.1 4.2   CHLORIDE mmol/L 103 102 102   CO2 mmol/L 32.3* 29.0 29.3*   BUN mg/dL 13 12 15   CREATININE mg/dL 0.60 0.53* 0.59   GLUCOSE mg/dL 115* 111* 124*   CALCIUM mg/dL 8.3* 8.1* 7.9*         Results from last 7 days   Lab Units 01/14/25  0625 01/13/25  0850 01/12/25  0825   WBC 10*3/mm3 12.91* 13.33* 10.70   HEMOGLOBIN g/dL 12.6 12.8 12.1   HEMATOCRIT % 39.5 40.4 39.6   PLATELETS 10*3/mm3 235 242 214         Results from last 7 days   Lab Units 01/11/25  0328   PROBNP pg/mL 291.0                   Results from last 7 days   Lab Units 01/12/25  1412 01/12/25  1250 01/11/25  0746 01/10/25  1446 01/10/25  0853 01/09/25  2219 01/09/25  1700 01/09/25  1229 01/08/25  1943   PH, ARTERIAL pH units 7.352 7.288* 7.303* 7.304* 7.186* 7.208* 7.247* 7.276* 7.236*   PCO2, ARTERIAL mm Hg 61.4* 73.8* 65.6* 67.4* 88.4* 70.2* 70.1* 70.7* 70.6*   PO2 ART mm Hg 122.0* 73.9* 82.0 77.8* 75.4* 78.7* 108.8* 107.1* 159.9*   O2 SATURATION ART % 98.4 91.8* 94.3 93.3 89.7* 91.8* 97.0 97.1 99.0*   FLOW RATE lpm  --  3.0000 3.0000  --  5.0000 5.0000  --  4.0000 15.0000   MODALITY  BiPap Cannula Cannula BiPap Cannula Cannula BiPap Cannula NRB         I reviewed the patient's new clinical results.        Medication Review:   acetaminophen, 1,000 mg, Oral, TID  celecoxib, 100 mg, Oral, Q12H  cetirizine, 10 mg, Oral, Daily  cholestyramine light, 1 packet,  Oral, Daily  dapsone, 50 mg, Oral, BID  heparin (porcine), 5,000 Units, Subcutaneous, Q8H  metoprolol tartrate, 25 mg, Oral, Q12H  senna-docusate sodium, 2 tablet, Oral, Nightly        norepinephrine, 0.02-0.3 mcg/kg/min, Last Rate: Stopped (01/09/25 7544)        Diagnostic imaging:  I personally and independently reviewed the following images:  CXR 1/13/2025 and CT chest 10/28/2024:  Increased pulmonary vascular markings on the left on CXR.      CXR 1/15/2025: Improved pulmonary infiltrates/pulmonary vascular congestion.  Mild left pleural effusion.    Assessment     Acute on chronic hypercapnic respiratory failure, requiring NIV.  Estimated baseline pCO2 in the 60s-multifactorial.  Secondary to obesity mostly and resulting hypoventilation.  Contributing factors are hypoventilation from pain and the use of narcotics.  Acute hypoxic respiratory failure, improving  1.8 cm GG nodule (Adenocarcinoma) RUL s/p Rt upper lobectomy and hilar node dissection 1/8/2025 -> T1aN0 adenocarcinoma.   Pulmonary edema, improved  Mild left pleural effusion.  Metabolic alkalosis  OLIVERIO and suspected OHS  Super morbid obesity with BMI > 65  Leukocytosis, mild.  Nocturnal hypoxia suspicious for sleep apnea        Plan     NIPPV at night and when asleep during the day.  Current settings reviewed and seems to be appropriate and we will continue the same.  Minimize narcotics if possible.  DVT prophylaxis with heparin  Encouraged to use incentive spirometry  As needed bronchodilators  PRN diuresis  Consult case management for initiation of outpatient NIPPV.  Up to chair as tolerated and ambulate if okay with thoracic surgery.    NIPPV statement: Patient has chronic hypercapnic respiratory failure secondary to restrictive lung disease.  She benefits from long-term NIPPV therapy as reducing CO2 level has been shown to improve outcome, reduce hospitalizations and mortality.  Bilevel therapy was attempted but failed.  In addition, NIPPV as needed  to ensure optimal ventilation with the use of backup rate and consistent usage in the event of power outage.  Patient is unable to perform PFT as she has just had chest surgery.    Discussed with her .  Discussed with DME company.    Dispo: Skilled nursing facility.  No objections for discharge from pulmonary perspective.      Brandin Murillo MD  01/15/25  10:53 EST        This note was dictated utilizing Dragon dictation

## 2025-01-15 NOTE — PLAN OF CARE
Problem: Adult Inpatient Plan of Care  Goal: Plan of Care Review  Outcome: Progressing  Flowsheets (Taken 1/15/2025 1752)  Outcome Evaluation: Patient is alert and oriented. dressing changed. resting in the chair.  Goal: Patient-Specific Goal (Individualized)  Outcome: Progressing  Goal: Absence of Hospital-Acquired Illness or Injury  Outcome: Progressing  Intervention: Identify and Manage Fall Risk  Recent Flowsheet Documentation  Taken 1/15/2025 1605 by Dayami Khan RN  Safety Promotion/Fall Prevention:   clutter free environment maintained   activity supervised  Taken 1/15/2025 1410 by Dayami Khan RN  Safety Promotion/Fall Prevention:   clutter free environment maintained   activity supervised  Taken 1/15/2025 1200 by Dayami Khan RN  Safety Promotion/Fall Prevention:   clutter free environment maintained   activity supervised  Taken 1/15/2025 1159 by Dayami Khan RN  Safety Promotion/Fall Prevention:   clutter free environment maintained   activity supervised  Taken 1/15/2025 1020 by Dayami Khan RN  Safety Promotion/Fall Prevention:   clutter free environment maintained   activity supervised  Taken 1/15/2025 0858 by Dayami Khan RN  Safety Promotion/Fall Prevention:   clutter free environment maintained   activity supervised  Taken 1/15/2025 0855 by Dayami Khan RN  Safety Promotion/Fall Prevention:   clutter free environment maintained   activity supervised  Intervention: Prevent Infection  Recent Flowsheet Documentation  Taken 1/15/2025 1605 by Dayami Khan RN  Infection Prevention: single patient room provided  Taken 1/15/2025 1410 by Dayami Khan RN  Infection Prevention: single patient room provided  Taken 1/15/2025 1200 by Dayami Khan RN  Infection Prevention: single patient room provided  Taken 1/15/2025 1159 by Dayami Khan RN  Infection Prevention: single patient room provided  Taken 1/15/2025 1020 by Dayami Khan RN  Infection Prevention: single patient room provided  Taken  1/15/2025 0858 by Dayami Khan RN  Infection Prevention: single patient room provided  Taken 1/15/2025 0855 by Dayami Khan RN  Infection Prevention: single patient room provided  Goal: Optimal Comfort and Wellbeing  Outcome: Progressing  Intervention: Provide Person-Centered Care  Recent Flowsheet Documentation  Taken 1/15/2025 1410 by Dayami Khan RN  Trust Relationship/Rapport:   care explained   choices provided  Goal: Readiness for Transition of Care  Outcome: Progressing     Problem: Skin Injury Risk Increased  Goal: Skin Health and Integrity  Outcome: Progressing  Intervention: Optimize Skin Protection  Recent Flowsheet Documentation  Taken 1/15/2025 0800 by Dayami Khan RN  Activity Management: up in chair     Problem: Noninvasive Ventilation Acute  Goal: Effective Unassisted Ventilation and Oxygenation  Outcome: Progressing     Problem: Fall Injury Risk  Goal: Absence of Fall and Fall-Related Injury  Outcome: Progressing  Intervention: Identify and Manage Contributors  Recent Flowsheet Documentation  Taken 1/15/2025 1605 by Dayami Khan RN  Medication Review/Management: medications reviewed  Taken 1/15/2025 1410 by Dayami Khan RN  Medication Review/Management: medications reviewed  Taken 1/15/2025 1200 by Dayami Khan RN  Medication Review/Management: medications reviewed  Taken 1/15/2025 1159 by Dayami Khan RN  Medication Review/Management: medications reviewed  Taken 1/15/2025 1020 by Dayami Khan RN  Medication Review/Management: medications reviewed  Taken 1/15/2025 0858 by Dayami Khan RN  Medication Review/Management: medications reviewed  Taken 1/15/2025 0855 by Dayami Khan RN  Medication Review/Management: medications reviewed  Intervention: Promote Injury-Free Environment  Recent Flowsheet Documentation  Taken 1/15/2025 1605 by Dayami Khan RN  Safety Promotion/Fall Prevention:   clutter free environment maintained   activity supervised  Taken 1/15/2025 1410 by Bill  NELA Stock  Safety Promotion/Fall Prevention:   clutter free environment maintained   activity supervised  Taken 1/15/2025 1200 by Dayami Khan RN  Safety Promotion/Fall Prevention:   clutter free environment maintained   activity supervised  Taken 1/15/2025 1159 by Dayami Khan RN  Safety Promotion/Fall Prevention:   clutter free environment maintained   activity supervised  Taken 1/15/2025 1020 by Dayami Khan RN  Safety Promotion/Fall Prevention:   clutter free environment maintained   activity supervised  Taken 1/15/2025 0858 by Dayami Khan RN  Safety Promotion/Fall Prevention:   clutter free environment maintained   activity supervised  Taken 1/15/2025 0855 by Dayami Khan RN  Safety Promotion/Fall Prevention:   clutter free environment maintained   activity supervised     Problem: Gas Exchange Impaired  Goal: Optimal Gas Exchange  Outcome: Progressing     Problem: Lung Surgery  Goal: Absence of Bleeding  Outcome: Progressing  Goal: Effective Bowel Elimination  Outcome: Progressing  Goal: Fluid and Electrolyte Balance  Outcome: Progressing  Goal: Effective Tissue Perfusion  Outcome: Progressing  Goal: Absence of Infection Signs and Symptoms  Outcome: Progressing  Goal: Anesthesia/Sedation Recovery  Outcome: Progressing  Intervention: Optimize Anesthesia Recovery  Recent Flowsheet Documentation  Taken 1/15/2025 1605 by Dayami Khan RN  Safety Promotion/Fall Prevention:   clutter free environment maintained   activity supervised  Taken 1/15/2025 1410 by Dayami Khan RN  Safety Promotion/Fall Prevention:   clutter free environment maintained   activity supervised  Taken 1/15/2025 1400 by Dayami Khan RN  Administration (IS): self-administered  Taken 1/15/2025 1200 by Dayami Khan RN  Safety Promotion/Fall Prevention:   clutter free environment maintained   activity supervised  Taken 1/15/2025 1159 by Dayami Khan RN  Safety Promotion/Fall Prevention:   clutter free environment maintained    activity supervised  Taken 1/15/2025 1020 by Dayami Khan RN  Safety Promotion/Fall Prevention:   clutter free environment maintained   activity supervised  Taken 1/15/2025 0858 by Dayami Khan RN  Safety Promotion/Fall Prevention:   clutter free environment maintained   activity supervised  Taken 1/15/2025 0855 by Dayami Khan RN  Safety Promotion/Fall Prevention:   clutter free environment maintained   activity supervised  Administration (IS): self-administered  Level Incentive Spirometer (mL): 1250  Taken 1/15/2025 0800 by Dayami Khan RN  Administration (IS): self-administered  Goal: Pain Control and Function  Outcome: Progressing  Goal: Nausea and Vomiting Relief  Outcome: Progressing  Goal: Effective Urinary Elimination  Outcome: Progressing  Goal: Effective Oxygenation and Ventilation  Outcome: Progressing  Intervention: Optimize Oxygenation and Ventilation  Recent Flowsheet Documentation  Taken 1/15/2025 1400 by Dayami Khan RN  Administration (IS): self-administered  Taken 1/15/2025 0855 by Dayami Khan RN  Administration (IS): self-administered  Level Incentive Spirometer (mL): 1250  Taken 1/15/2025 0800 by Dayami Khan RN  Administration (IS): self-administered   Goal Outcome Evaluation:              Outcome Evaluation: Patient is alert and oriented. dressing changed. resting in the chair.

## 2025-01-15 NOTE — PLAN OF CARE
Goal Outcome Evaluation:   Vital signs stable.  Bradycardia noted.  Oxygen adjusted per nasal cannula and BIPAP.  Became more wakeful over course of the day.  Requires strong assist to get out of bed.  Plans by Pulmonary to discharge with home Bipap, care manager to address.  Still using incentive spirometer.  Plan of care discussed.  Estimated discharge date: 3 days.

## 2025-01-15 NOTE — PLAN OF CARE
Problem: Adult Inpatient Plan of Care  Goal: Plan of Care Review  Outcome: Progressing  Goal: Patient-Specific Goal (Individualized)  Outcome: Progressing  Goal: Absence of Hospital-Acquired Illness or Injury  Outcome: Progressing  Intervention: Identify and Manage Fall Risk  Recent Flowsheet Documentation  Taken 1/15/2025 0400 by Cassi Martini RN  Safety Promotion/Fall Prevention:   activity supervised   assistive device/personal items within reach   clutter free environment maintained   fall prevention program maintained   nonskid shoes/slippers when out of bed   room organization consistent   safety round/check completed  Taken 1/15/2025 0215 by Cassi Martini RN  Safety Promotion/Fall Prevention:   assistive device/personal items within reach   activity supervised   clutter free environment maintained   fall prevention program maintained   nonskid shoes/slippers when out of bed   room organization consistent   safety round/check completed  Taken 1/15/2025 0000 by Cassi Martini RN  Safety Promotion/Fall Prevention:   activity supervised   assistive device/personal items within reach   clutter free environment maintained   fall prevention program maintained   nonskid shoes/slippers when out of bed   safety round/check completed   room organization consistent  Taken 1/14/2025 2300 by Cassi Martini RN  Safety Promotion/Fall Prevention:   activity supervised   assistive device/personal items within reach   clutter free environment maintained   fall prevention program maintained   nonskid shoes/slippers when out of bed   room organization consistent   safety round/check completed  Taken 1/14/2025 2200 by Cassi Martini, RN  Safety Promotion/Fall Prevention:   activity supervised   assistive device/personal items within reach   clutter free environment maintained   fall prevention program maintained   nonskid shoes/slippers when out of bed   room organization consistent   safety round/check  completed  Taken 1/14/2025 2015 by Cassi Martini RN  Safety Promotion/Fall Prevention:   activity supervised   assistive device/personal items within reach   clutter free environment maintained   fall prevention program maintained   nonskid shoes/slippers when out of bed   room organization consistent   safety round/check completed  Intervention: Prevent Skin Injury  Recent Flowsheet Documentation  Taken 1/15/2025 0400 by Cassi Martini RN  Body Position:   right   turned  Taken 1/15/2025 0215 by Cassi Martini RN  Body Position:   supine   position changed independently  Taken 1/15/2025 0000 by Cassi Martini RN  Body Position:   left   side-lying   position changed independently  Taken 1/14/2025 2300 by Cassi Martini RN  Body Position:   left   side-lying   position changed independently  Taken 1/14/2025 2200 by Cassi Martini RN  Body Position:   weight shifting   position changed independently  Taken 1/14/2025 2015 by Cassi Martini RN  Body Position:   weight shifting   position changed independently  Intervention: Prevent and Manage VTE (Venous Thromboembolism) Risk  Recent Flowsheet Documentation  Taken 1/14/2025 2015 by Cassi Martini RN  VTE Prevention/Management: (SQ Heparin) other (see comments)  Intervention: Prevent Infection  Recent Flowsheet Documentation  Taken 1/15/2025 0400 by Cassi Martini RN  Infection Prevention:   cohorting utilized   environmental surveillance performed   hand hygiene promoted   rest/sleep promoted   single patient room provided  Taken 1/15/2025 0215 by Cassi Martini RN  Infection Prevention:   cohorting utilized   environmental surveillance performed   hand hygiene promoted   rest/sleep promoted   single patient room provided  Taken 1/15/2025 0000 by Cassi Martini RN  Infection Prevention:   environmental surveillance performed   cohorting utilized   hand hygiene promoted   rest/sleep promoted   single patient room provided  Taken 1/14/2025 2300 by  Cassi Martini RN  Infection Prevention:   cohorting utilized   environmental surveillance performed   hand hygiene promoted   rest/sleep promoted   single patient room provided  Taken 1/14/2025 2200 by Cassi Martini RN  Infection Prevention:   cohorting utilized   environmental surveillance performed   hand hygiene promoted   rest/sleep promoted   single patient room provided  Taken 1/14/2025 2015 by Cassi Martini RN  Infection Prevention:   cohorting utilized   environmental surveillance performed   hand hygiene promoted   rest/sleep promoted   single patient room provided  Goal: Optimal Comfort and Wellbeing  Outcome: Progressing  Intervention: Provide Person-Centered Care  Recent Flowsheet Documentation  Taken 1/15/2025 0215 by Cassi Martini RN  Trust Relationship/Rapport:   care explained   choices provided  Taken 1/14/2025 2015 by Cassi Martini RN  Trust Relationship/Rapport:   care explained   choices provided  Goal: Readiness for Transition of Care  Outcome: Progressing     Problem: Skin Injury Risk Increased  Goal: Skin Health and Integrity  Outcome: Progressing  Intervention: Optimize Skin Protection  Recent Flowsheet Documentation  Taken 1/15/2025 0400 by Cassi Martini RN  Activity Management: activity minimized  Head of Bed (HOB) Positioning: HOB at 20-30 degrees  Taken 1/15/2025 0215 by Cassi Martini RN  Activity Management: activity minimized  Head of Bed (HOB) Positioning: HOB at 30 degrees  Taken 1/15/2025 0000 by Cassi Martini RN  Activity Management: activity minimized  Head of Bed (HOB) Positioning: HOB at 20 degrees  Taken 1/14/2025 2300 by Cassi Martini RN  Activity Management: activity minimized  Head of Bed (HOB) Positioning: HOB at 20 degrees  Taken 1/14/2025 2240 by Cassi Martini RN  Activity Management: up to bedside commode  Taken 1/14/2025 2200 by Cassi Martini RN  Activity Management: up in chair  Taken 1/14/2025 2015 by Cassi Martini RN  Activity  Management: up in chair     Problem: Noninvasive Ventilation Acute  Goal: Effective Unassisted Ventilation and Oxygenation  Outcome: Progressing     Problem: Fall Injury Risk  Goal: Absence of Fall and Fall-Related Injury  Outcome: Progressing  Intervention: Identify and Manage Contributors  Recent Flowsheet Documentation  Taken 1/15/2025 0400 by Cassi Martini RN  Medication Review/Management: medications reviewed  Taken 1/15/2025 0215 by Cassi Martini RN  Medication Review/Management: medications reviewed  Taken 1/15/2025 0000 by Cassi Martini RN  Medication Review/Management: medications reviewed  Taken 1/14/2025 2300 by Cassi Martini RN  Medication Review/Management: medications reviewed  Taken 1/14/2025 2200 by Cassi Martini RN  Medication Review/Management: medications reviewed  Taken 1/14/2025 2015 by Cassi Martini RN  Medication Review/Management: medications reviewed  Intervention: Promote Injury-Free Environment  Recent Flowsheet Documentation  Taken 1/15/2025 0400 by Cassi Martini RN  Safety Promotion/Fall Prevention:   activity supervised   assistive device/personal items within reach   clutter free environment maintained   fall prevention program maintained   nonskid shoes/slippers when out of bed   room organization consistent   safety round/check completed  Taken 1/15/2025 0215 by Cassi Martini RN  Safety Promotion/Fall Prevention:   assistive device/personal items within reach   activity supervised   clutter free environment maintained   fall prevention program maintained   nonskid shoes/slippers when out of bed   room organization consistent   safety round/check completed  Taken 1/15/2025 0000 by Cassi Martini RN  Safety Promotion/Fall Prevention:   activity supervised   assistive device/personal items within reach   clutter free environment maintained   fall prevention program maintained   nonskid shoes/slippers when out of bed   safety round/check completed   room  organization consistent  Taken 1/14/2025 2300 by Cassi Martini RN  Safety Promotion/Fall Prevention:   activity supervised   assistive device/personal items within reach   clutter free environment maintained   fall prevention program maintained   nonskid shoes/slippers when out of bed   room organization consistent   safety round/check completed  Taken 1/14/2025 2200 by Cassi Martini RN  Safety Promotion/Fall Prevention:   activity supervised   assistive device/personal items within reach   clutter free environment maintained   fall prevention program maintained   nonskid shoes/slippers when out of bed   room organization consistent   safety round/check completed  Taken 1/14/2025 2015 by Cassi Martini RN  Safety Promotion/Fall Prevention:   activity supervised   assistive device/personal items within reach   clutter free environment maintained   fall prevention program maintained   nonskid shoes/slippers when out of bed   room organization consistent   safety round/check completed     Problem: Gas Exchange Impaired  Goal: Optimal Gas Exchange  Outcome: Progressing  Intervention: Optimize Oxygenation and Ventilation  Recent Flowsheet Documentation  Taken 1/15/2025 0400 by Cassi Martini RN  Head of Bed (HOB) Positioning: HOB at 20-30 degrees  Taken 1/15/2025 0215 by Cassi Martini RN  Head of Bed (HOB) Positioning: HOB at 30 degrees  Taken 1/15/2025 0000 by Cassi Martini RN  Head of Bed (HOB) Positioning: HOB at 20 degrees  Taken 1/14/2025 2300 by Cassi Martini RN  Head of Bed (HOB) Positioning: HOB at 20 degrees     Problem: Lung Surgery  Goal: Absence of Bleeding  Outcome: Progressing  Goal: Effective Bowel Elimination  Outcome: Progressing  Goal: Fluid and Electrolyte Balance  Outcome: Progressing  Goal: Effective Tissue Perfusion  Outcome: Progressing  Goal: Absence of Infection Signs and Symptoms  Outcome: Progressing  Goal: Anesthesia/Sedation Recovery  Outcome: Progressing  Intervention:  Optimize Anesthesia Recovery  Recent Flowsheet Documentation  Taken 1/15/2025 0400 by Cassi Martini RN  Safety Promotion/Fall Prevention:   activity supervised   assistive device/personal items within reach   clutter free environment maintained   fall prevention program maintained   nonskid shoes/slippers when out of bed   room organization consistent   safety round/check completed  Taken 1/15/2025 0215 by Cassi Martini RN  Safety Promotion/Fall Prevention:   assistive device/personal items within reach   activity supervised   clutter free environment maintained   fall prevention program maintained   nonskid shoes/slippers when out of bed   room organization consistent   safety round/check completed  Taken 1/15/2025 0000 by Cassi Martini RN  Safety Promotion/Fall Prevention:   activity supervised   assistive device/personal items within reach   clutter free environment maintained   fall prevention program maintained   nonskid shoes/slippers when out of bed   safety round/check completed   room organization consistent  Taken 1/14/2025 2300 by Cassi Martini RN  Safety Promotion/Fall Prevention:   activity supervised   assistive device/personal items within reach   clutter free environment maintained   fall prevention program maintained   nonskid shoes/slippers when out of bed   room organization consistent   safety round/check completed  Taken 1/14/2025 2200 by Cassi Martini RN  Safety Promotion/Fall Prevention:   activity supervised   assistive device/personal items within reach   clutter free environment maintained   fall prevention program maintained   nonskid shoes/slippers when out of bed   room organization consistent   safety round/check completed  Taken 1/14/2025 2158 by Cassi Martini RN  Patient Tolerance (IS): fair  Administration (IS): self-administered  Level Incentive Spirometer (mL): 1250  Number of Repetitions (IS): 10  Taken 1/14/2025 2015 by Cassi Martini RN  Safety Promotion/Fall  Prevention:   activity supervised   assistive device/personal items within reach   clutter free environment maintained   fall prevention program maintained   nonskid shoes/slippers when out of bed   room organization consistent   safety round/check completed  Goal: Pain Control and Function  Outcome: Progressing  Goal: Nausea and Vomiting Relief  Outcome: Progressing  Goal: Effective Urinary Elimination  Outcome: Progressing  Goal: Effective Oxygenation and Ventilation  Outcome: Progressing  Intervention: Optimize Oxygenation and Ventilation  Recent Flowsheet Documentation  Taken 1/15/2025 0400 by Cassi Martini RN  Head of Bed (HOB) Positioning: HOB at 20-30 degrees  Taken 1/15/2025 0215 by Cassi Martini RN  Head of Bed ([facility] [place] Positioning: HOB at 30 degrees  Taken 1/15/2025 0000 by Cassi Martini RN  Head of Bed (HOB) Positioning: HOB at 20 degrees  Taken 1/14/2025 2300 by Cassi Martini RN  Head of Bed ([facility] [place]) Positioning: HOB at 20 degrees  Taken 1/14/2025 2158 by Cassi Martini RN  Patient Tolerance (IS): fair  Administration (IS): self-administered  Level Incentive Spirometer (mL): 1250  Number of Repetitions (IS): 10   Goal Outcome Evaluation:

## 2025-01-15 NOTE — PLAN OF CARE
Goal Outcome Evaluation:  Plan of Care Reviewed With: patient        Progress: improving  Outcome Evaluation: Pt tolerated treatment well this date. Required min A to stand, then CGA to ambulate. Pt ambulated 5ft x2 w/ Rw, limited d/t fatigue and SOA. Able to use BSC while up, w/ nsg to assist as well. Pt also completed a few seated LE exercises once back in chair.    Anticipated Discharge Disposition (PT): skilled nursing facility

## 2025-01-15 NOTE — THERAPY TREATMENT NOTE
Patient Name: Kortney Charlton  : 1955    MRN: [id number]                              Today's Date: 1/15/2025       Admit Date: 2025    Visit Dx:     ICD-10-CM ICD-9-CM   1. Ground glass opacity present on imaging of lung  R91.8 793.19     Patient Active Problem List   Diagnosis    Morbid obesity    Elevated BP without diagnosis of hypertension    Venous stasis dermatitis of both lower extremities    History of total bilateral knee replacement (TKR)    Breast lump in female    Sneddon-Hanna disease    Abnormal TSH    Atypical chest pain    Medicare annual wellness visit, subsequent    Diarrhea in adult patient    Chronic diarrhea    Hypermetropia, bilateral    Vitreous degeneration of left eye    Prediabetes    Numbness and tingling in left hand    Bilateral carpal tunnel syndrome    Diarrhea following gastrointestinal surgery    Leg swelling    Encounter for lipid screening for cardiovascular disease    Body mass index (BMI) of 60.0 to 69.9 in adult    Immunization due    Age-related nuclear cataract of both eyes    Bilateral myopia    Presbyopia    Regular astigmatism of both eyes    History of pulmonary embolism    Ground glass opacity present on imaging of lung    Pneumothorax     Past Medical History:   Diagnosis Date    Allergic topical steroids    Anemia Episodic    At risk for central sleep apnea     STOP BANG 5    Breast lump in female 2019    Chronic nasopharyngitis     Elevated BP without diagnosis of hypertension     History of pulmonary embolism     Hypertension     Lesion of lung     Morbid obesity     Neuromuscular disorder     Osteoarthritis     Rash     UNDER LEFT BREAST    Sneddon-Hanna disease     Stasis dermatitis     Urinary tract infection      Past Surgical History:   Procedure Laterality Date    BRONCHOSCOPY WITH ION ROBOTIC ASSIST N/A 10/30/2024    Procedure: BRONCHOSCOPY WITH ION ROBOT, LAVAGE, FINE NEEDLE ASPIRATIONS AND BIOPSIES;  Surgeon: Radha Marcus  MD VALERIE;  Location: [facility] ENDOSCOPY;  Service: Robotics - Pulmonary;  Laterality: N/A;    CATARACT EXTRACTION Bilateral     COLONOSCOPY  2015    CYST REMOVAL      MULTIPLE    HYSTEROSCOPY ENDOMETRIAL ABLATION      JOINT REPLACEMENT  R-2013 L-2015    LAPAROSCOPIC CHOLECYSTECTOMY  10/2011    LOBECTOMY Right 1/8/2025    Procedure: BRONCHOSCOPY, RIGHT VIDEO ASSISTED THORACOSCOPY WITH DAVINCI ROBOT with wedge resection X2, COMPLETION RIGHT UPPER LOBECTOMY, MEDIASTINAL AND HILAR LYMPH NODE DISSECTION, INTERCOSTAL NERVE BLOCK;  Surgeon: Radha Marcus MD;  Location: [facility] OR;  Service: Robotics - DaVinci;  Laterality: Right;    OVARIAN CYST REMOVAL        General Information       Row Name 01/15/25 1547          OT Time and Intention    Document Type therapy note (daily note)  -     Mode of Treatment occupational therapy;individual therapy  -     Patient Effort adequate  -       Row Name 01/15/25 1547          General Information    Patient Profile Reviewed yes  -     Existing Precautions/Restrictions fall;oxygen therapy device and L/min  -       Row Name 01/15/25 1547          Cognition    Orientation Status (Cognition) oriented x 4  -       Row Name 01/15/25 1547          Safety Issues/Impairments Affecting Functional Mobility    Impairments Affecting Function (Mobility) endurance/activity tolerance;strength;shortness of breath;balance  -               User Key  (r) = Recorded By, (t) = Taken By, (c) = Cosigned By      Initials Name Provider Type     Gema Owen OT Occupational Therapist                     Mobility/ADL's       Row Name 01/15/25 1548          Bed Mobility    Comment, (Bed Mobility) up in chair  -       Row Name 01/15/25 1548          Sit-Stand Transfer    Sit-Stand [place] (Transfers) minimum assist (75% patient effort)  -     Assistive Device (Sit-Stand Transfers) walker, front-wheeled  -       Row Name 01/15/25 1548          Functional Mobility    Functional  Mobility- Ind. Level contact guard assist  -     Functional Mobility- Device walker, front-wheeled  -     Functional Mobility-Distance (Feet) --  10  -SM     Functional Mobility- Safety Issues supplemental O2  -               User Key  (r) = Recorded By, (t) = Taken By, (c) = Cosigned By      Initials Name Provider Type     Gema Owen OT Occupational Therapist                   Obj/Interventions       Row Name 01/15/25 1548          Shoulder (Therapeutic Exercise)    Shoulder (Therapeutic Exercise) AROM (active range of motion)  -     Shoulder AROM (Therapeutic Exercise) 10 repetitions;sitting  reaching over head and forward press  -       Row Name 01/15/25 1548          Motor Skills    Therapeutic Exercise shoulder  -       Row Name 01/15/25 1548          Balance    Static Standing Balance contact guard  -     Dynamic Standing Balance contact guard  -     Position/Device Used, Standing Balance supported;walker, rolling  -               User Key  (r) = Recorded By, (t) = Taken By, (c) = Cosigned By      Initials Name Provider Type     Gema Owen OT Occupational Therapist                   Goals/Plan    No documentation.                  Clinical Impression       Row Name 01/15/25 1549          Pain Assessment    Pretreatment Pain Rating 0/10 - no pain  -     Posttreatment Pain Rating 0/10 - no pain  -       Row Name 01/15/25 1549          Plan of Care Review    Plan of Care Reviewed With patient  -     Progress improving  -     Outcome Evaluation Pt tolerates short therapy session this afternoon. She is limited by endurance. ADLs not addressed this session. Pt does report she is getting up to use BSC (for BM only) and this was fatiquing eariler today. Session focused on engagement in brief UE exercises and she was able to mobilize 10 ft CGA with walker. Pt would benefit from continued OT to progress ADL engagement and tolerance post op.  -       Row Name 01/15/25 1549           Therapy Plan Review/Discharge Plan (OT)    Anticipated Discharge Disposition (OT) skilled nursing facility  -       Row Name 01/15/25 1549          Vital Signs    Pre SpO2 (%) 95  4L  -SM     O2 Delivery Pre Treatment supplemental O2  -SM     Post SpO2 (%) 90  -SM     O2 Delivery Post Treatment supplemental O2  -       Row Name 01/15/25 1549          Positioning and Restraints    Pre-Treatment Position sitting in chair/recliner  -SM     Post Treatment Position chair  -SM     In Chair reclined;call light within reach;encouraged to call for assist;notified nsg  -               User Key  (r) = Recorded By, (t) = Taken By, (c) = Cosigned By      Initials Name Provider Type    Gema Arriaga, PETRA Occupational Therapist                   Outcome Measures       Row Name 01/15/25 1550          How much help from another is currently needed...    Putting on and taking off regular lower body clothing? 1  -SM     Bathing (including washing, rinsing, and drying) 2  -SM     Toileting (which includes using toilet bed pan or urinal) 1  -SM     Putting on and taking off regular upper body clothing 3  -SM     Taking care of personal grooming (such as brushing teeth) 3  -SM     Eating meals 4  -SM     AM-PAC 6 Clicks Score (OT) 14  -SM       Row Name 01/15/25 1331 01/15/25 0855       How much help from another person do you currently need...    Turning from your back to your side while in flat bed without using bedrails? 3  -SMA 3  -KP    Moving from lying on back to sitting on the side of a flat bed without bedrails? 2  -SMA 3  -KP    Moving to and from a bed to a chair (including a wheelchair)? 3  -SMA 3  -KP    Standing up from a chair using your arms (e.g., wheelchair, bedside chair)? 3  -SMA 2  -KP    Climbing 3-5 steps with a railing? 1  -SMA 3  -KP    To walk in hospital room? 3  -SMA 2  -KP    AM-PAC 6 Clicks Score (PT) 15  -SMA 16  -KP    Highest Level of Mobility Goal 4 --> Transfer to chair/commode   -[facility] 5 --> Static standing  -      Row Name 01/15/25 1550 01/15/25 1331       Functional Assessment    Outcome Measure Options AM-PAC 6 Clicks Daily Activity (OT)  - AM-PAC 6 Clicks Basic Mobility (PT)  -[facility]              User Key  (r) = Recorded By, (t) = Taken By, (c) = Cosigned By      Initials Name Provider Type    [facility] Gema Trevino, PTA Physical Therapist Assistant     Gema Owen OT Occupational Therapist    Dayami Alfaro RN Registered Nurse                    Occupational Therapy Education       Title: PT OT SLP Therapies (In Progress)       Topic: Occupational Therapy (In Progress)       Point: ADL training (Done)       Description:   Instruct learner(s) on proper safety adaptation and remediation techniques during self care or transfers.   Instruct in proper use of assistive devices.                  Learning Progress Summary            Patient Acceptance, E,TB, VU,NR by BC at 1/14/2025 1152    Comment: role of OT at acute level, ADl  participation in future POC, Rec cont'd rehab, cont teaching                      Point: Home exercise program (Not Started)       Description:   Instruct learner(s) on appropriate technique for monitoring, assisting and/or progressing therapeutic exercises/activities.                  Learner Progress:  Not documented in this visit.              Point: Precautions (Not Started)       Description:   Instruct learner(s) on prescribed precautions during self-care and functional transfers.                  Learner Progress:  Not documented in this visit.              Point: Body mechanics (Not Started)       Description:   Instruct learner(s) on proper positioning and spine alignment during self-care, functional mobility activities and/or exercises.                  Learner Progress:  Not documented in this visit.                              User Key       Initials Effective Dates Name Provider Type [facility] 07/29/24 -  Fiona Diehl OT  Occupational Therapist OT                  OT Recommendation and Plan     Plan of Care Review  Plan of Care Reviewed With: patient  Progress: improving  Outcome Evaluation: Pt tolerates short therapy session this afternoon. She is limited by endurance. ADLs not addressed this session. Pt does report she is getting up to use BSC (for BM only) and this was fatiquing eariler today. Session focused on engagement in brief UE exercises and she was able to mobilize 10 ft CGA with walker. Pt would benefit from continued OT to progress ADL engagement and tolerance post op.     Time Calculation:         Time Calculation- OT       Row Name 01/15/25 1551             Time Calculation- OT    OT Start Time 1504  -SM      OT Stop Time 1516  -SM      OT Time Calculation (min) 12 min  -SM      OT Received On 01/15/25  -      OT - Next Appointment 01/16/25  -SM         Timed Charges    88446 - OT Therapeutic Activity Minutes 12  -SM         Total Minutes    Timed Charges Total Minutes 12  -SM       Total Minutes 12  -SM                User Key  (r) = Recorded By, (t) = Taken By, (c) = Cosigned By      Initials Name Provider Type     Gema Owen OT Occupational Therapist                  Therapy Charges for Today       Code Description Service Date Service Provider Modifiers Qty    [id number]  OT THERAPEUTIC ACT EA 15 MIN 1/15/2025 Gema Owen OT GO 1                 Gema Owen OT  1/15/2025

## 2025-01-16 ENCOUNTER — APPOINTMENT (OUTPATIENT)
Dept: GENERAL RADIOLOGY | Facility: HOSPITAL | Age: 70
DRG: 163 | End: 2025-01-16
Payer: MEDICARE

## 2025-01-16 PROCEDURE — 99024 POSTOP FOLLOW-UP VISIT: CPT

## 2025-01-16 PROCEDURE — 94799 UNLISTED PULMONARY SVC/PX: CPT

## 2025-01-16 PROCEDURE — 94761 N-INVAS EAR/PLS OXIMETRY MLT: CPT

## 2025-01-16 PROCEDURE — 25010000002 HEPARIN (PORCINE) PER 1000 UNITS: Performed by: THORACIC SURGERY (CARDIOTHORACIC VASCULAR SURGERY)

## 2025-01-16 PROCEDURE — 71045 X-RAY EXAM CHEST 1 VIEW: CPT

## 2025-01-16 RX ADMIN — METOPROLOL TARTRATE 25 MG: 25 TABLET, FILM COATED ORAL at 09:39

## 2025-01-16 RX ADMIN — METOPROLOL TARTRATE 25 MG: 25 TABLET, FILM COATED ORAL at 21:34

## 2025-01-16 RX ADMIN — CELECOXIB 100 MG: 100 CAPSULE ORAL at 21:34

## 2025-01-16 RX ADMIN — HEPARIN SODIUM 5000 UNITS: 5000 INJECTION INTRAVENOUS; SUBCUTANEOUS at 05:30

## 2025-01-16 RX ADMIN — DAPSONE 50 MG: 100 TABLET ORAL at 21:35

## 2025-01-16 RX ADMIN — ACETAMINOPHEN 1000 MG: 500 TABLET, FILM COATED ORAL at 09:39

## 2025-01-16 RX ADMIN — DAPSONE 50 MG: 100 TABLET ORAL at 09:40

## 2025-01-16 RX ADMIN — ACETAMINOPHEN 1000 MG: 500 TABLET, FILM COATED ORAL at 21:35

## 2025-01-16 RX ADMIN — CETIRIZINE HYDROCHLORIDE 10 MG: 10 TABLET, FILM COATED ORAL at 09:39

## 2025-01-16 RX ADMIN — HEPARIN SODIUM 5000 UNITS: 5000 INJECTION INTRAVENOUS; SUBCUTANEOUS at 21:35

## 2025-01-16 RX ADMIN — HEPARIN SODIUM 5000 UNITS: 5000 INJECTION INTRAVENOUS; SUBCUTANEOUS at 14:19

## 2025-01-16 RX ADMIN — CELECOXIB 100 MG: 100 CAPSULE ORAL at 09:39

## 2025-01-16 RX ADMIN — ACETAMINOPHEN 1000 MG: 500 TABLET, FILM COATED ORAL at 16:14

## 2025-01-16 NOTE — PLAN OF CARE
Goal Outcome Evaluation:      VSS. 4L nc while awake, bipap while sleeping. SB in 50's. Up in chair, x1 assist to bed. Old chest tube site changed.

## 2025-01-16 NOTE — PROGRESS NOTES
"    Chief Complaint: Ground glass opacity, NSCLC  S/P: Right upper lobectomy  POD # 8    Subjective:  Symptoms:  Improved.  She reports weakness (Improving).  No shortness of breath or cough.    Diet:  Adequate intake.  No nausea (Mild nausea.) or vomiting.    Activity level: Impaired due to weakness.    Pain:  She complains of pain that is mild.  She reports pain is improving.  Pain is well controlled.    Remains alert and in recliner.  Denies any pain or shortness of breath.  Feels better today.  Denies nausea.    Vital Signs:  Temp:  [97.3 °F (36.3 °C)-98.7 °F (37.1 °C)] 98.5 °F (36.9 °C)  Heart Rate:  [52-71] 58  Resp:  [16-18] 18  BP: (104-134)/(46-65) 104/46    Intake & Output (last day)         01/15 0701  01/16 0700 01/16 0701  01/17 0700    P.O. 480 360    Total Intake(mL/kg) 480 (2.6) 360 (2)    Urine (mL/kg/hr) 900 (0.2)     Stool 0     Total Output 900     Net -420 +360          Urine Unmeasured Occurrence 2 x 1 x    Stool Unmeasured Occurrence 1 x 1 x            Objective:  General Appearance:  In no acute distress, comfortable and not in pain.    Vital signs: (most recent): Blood pressure 104/46, pulse 58, temperature 98.5 °F (36.9 °C), temperature source Oral, resp. rate 18, height 170.2 cm (67\"), weight (!) 182 kg (400 lb 8 oz), SpO2 93%, not currently breastfeeding.  No fever.    HEENT: (2 L via nasal cannula)    Lungs:  Normal effort.  She is not in respiratory distress.  No decreased breath sounds.    Heart: Normal rate.  Regular rhythm.    Chest: Symmetric chest wall expansion. Chest wall tenderness (Incisional) present.    Abdomen: Abdomen is soft and non-distended.    Extremities: Decreased range of motion.    Neurological: Patient is alert and oriented to person, place and time.    Skin:  Warm and dry.  (Surgical incisions clean, dry, intact)              Results Review:     I reviewed the patient's new clinical results.  I reviewed the patient's new imaging results and agree with the " "interpretation.    Imaging Results (Last 24 Hours)       Procedure Component Value Units Date/Time    XR Chest 1 View [[id number]] Collected: 01/16/25 0919     Updated: 01/16/25 0926    Narrative:      XR CHEST 1 VW-        INDICATION: Chest tube management     COMPARISON: Chest radiograph January 15, 2025     TECHNIQUE: 1 view chest     FINDINGS:      Low lung volumes. Small bibasilar opacities. Stable right apical  capping. Blunting right costophrenic angle. Stable mediastinum.       Impression:         1. Stable chest.  2. Low lung volumes with some suspected atelectasis at the bases. Deep  inspiration would better evaluate the lung bases.  3. Suspect small right pleural effusion     This report was finalized on 1/16/2025 9:23 AM by Dr. Fredis Almaguer M.D on Workstation: [id number]               Lab Results:     Lab Results (last 24 hours)       ** No results found for the last 24 hours. **        2. Lung, Right Upper Lobe.  Received fresh for frozen section diagnosis, labeled \"right upper lobe wedge resection #2\" is a 4 g, 4.3 x 2.0 x 1.5 cm lung wedge resection with a 4.3 cm long staple line margin, which is inked orange.  The pleural surface is smooth tan-pink and is previously incised by the surgeon, conveying an obvious 1.8 x 1.5 x 1.3 cm solid tan-pink mass which is at the pleural surface and comes to within 0.1 cm of the parenchymal staple line margin.  The pleural surface is inked blue.  The remaining parenchyma is spongy tan-red.  Representative section of the mass is submitted to include the pleural surface and the parenchymal margin and the remainder of the specimen is entirely submitted as follows:  2 AFS- frozen section residue-mass to the pleural surface and parenchymal margin  2B-2C- remainder the mass to include the parenchymal margin and the pleural surface  2D- remainder of the specimen  jap/uso/swm  3. Lymph Node.  Received fresh and subsequently placed in formalin labeled with the " "patient's name and designated \"right level 8 lymph node is a gray-tan lymph node measuring 0.5 x 0.3 x 0.3 cm.  The specimen is submitted as received in cassette 3A.  hbt/uso/MEC  4. Lymph Node.  Received fresh and subsequently placed in formalin labeled with the patient's name and designated \"level 7 lymph node is a gray-black lymph node measuring 0.8 x 0.7 x 0.4 cm.  The specimen is submitted as received in cassette 4A.  hbt/uso/MEC  5. Lymph Node.  Received fresh and subsequently placed in formalin labeled with the patient's name and designated \"right level 4 lymph node is a 0.3 to 0.2 cm.  The specimen is filtered and submitted as received in cassette 5A.  hbt/uso/ MEC  6. Lymph Node.  Received fresh and subsequently placed in formalin labeled with the patient's name and designated \"right level 10 lymph node\" is a fatty yellow to tan tissue measuring 1.0 x 0.5 x 0.3 cm.  The specimen is submitted as received in cassette 6A.  hbt/uso/MEC  7. Lymph Node.  Received fresh and subsequently placed in formalin labeled with the patient's name and designated \" right level 11 lymph node\" is a tan-black tissue measuring 1.0 x 0.5 x 0.4 cm.  The specimen is submitted as received in cassette 7A.  hbt/uso/MEC  8. Lymph Node.  Received fresh and subsequently placed in formalin labeled with the patient's name and designated \"right level 12 lymph node\" is a gray-tan tissue measuring 1.0 x 0.5 x 0.4 cm.  The specimen is submitted as received in cassette 8A.  hbt/uso/MEC  9. Lymph Node.  Received fresh and subsequently placed in formalin labeled with the patient's name and designated \"right level 10 #2\" is a gray-black tissue measuring 0.9 x 0.7 x 0.3 cm.  The specimen is submitted as received in cassette 9A.  hbt/uso/MEC    Lab Results (last 24 hours)       ** No results found for the last 24 hours. **             Assessment & Plan       Ground glass opacity present on imaging of lung       Assessment & Plan  POD # 8 s/p right " upper lobectomy.  Continues to do well.  Up in chair and edge of bed today.  Denies any shortness of breath and is saturating well on 2 L via nasal cannula.  A.m. chest x-ray stable.  Her pain is well-controlled.     Continue to increase her activity, PT OT treatments.  Encourage pulmonary hygiene including use of I-S  Patient is at high risk for respiratory decompensation and pneumonia due to body habitus and shallow breathing  Nausea improved with scopolamine patch.  Appreciate pulmonology recommendations, plan for outpatient NIPPV  Plan for discharge to skilled nursing facility, pending placement per case management.    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    JOANN Mcwilliams  [facility]  01/16/25  13:09 EST

## 2025-01-16 NOTE — CASE MANAGEMENT/SOCIAL WORK
Continued Stay Note  [facility]     Patient Name: Kortney Charlton  MRN: [id number]  Today's Date: 1/16/2025    Admit Date: 1/8/2025    Plan: [facility]   Discharge Plan       Row Name 01/16/25 1739       Plan    Plan [facility]    Patient/Family in Agreement with Plan yes    Plan Comments Spoke with Milagros/Mayela on the phone. She stated that she has a bed at [facility] available for the patient on Friday 1/17. Met with pt and  Varinder in room. She confirmed the plan is for her to go to [facility] for rehab at discharge. She will need EMS transportation. Spoke with [facility]. Pt's home bipap has been approved by insurance and will be available when she discharges from rehab. Updated pt. Milagros stated that they have ordered the pt a bipap for use at the facility. Called Alicia/ EMS and scheduled the patient for 3pm transportation over to [facility] 1/17. Updated JOANN, RN, pt, , and Milagros. No further needs assessed at this time. CCP following. Mikal GUERRA RN                   Discharge Codes    No documentation.                 Expected Discharge Date and Time       Expected Discharge Date Expected Discharge Time    Jan 17, 2025               Mikal Stoner RN

## 2025-01-16 NOTE — PROGRESS NOTES
Assessment Date:  01/16/25    Summary: LOS  Pt is a 68 yo Female, presented with ground glass opacity present on imagine of lung- s/p thoracoscopy with right upper lobectomy (1/8). Pt stated appetite is not great, but to be expected at this time. No needs concerned.     CLINICAL NUTRITION ASSESSMENT      Reason for Assessment Length of Stay     Diagnosis/Problem   Ground Glass opacity present on imagine of lung- Scheduled bronchoscopy   Medical/Surgical History Past Medical History:   Diagnosis Date    Allergic topical steroids    Anemia Episodic    At risk for central sleep apnea     STOP BANG 5    Breast lump in female 01/14/2019    Chronic nasopharyngitis     Elevated BP without diagnosis of hypertension     History of pulmonary embolism     Hypertension     Lesion of lung     Morbid obesity     Neuromuscular disorder 2010    Osteoarthritis     Rash     UNDER LEFT BREAST    Sneddon-Hanna disease     Stasis dermatitis     Urinary tract infection 2022       Past Surgical History:   Procedure Laterality Date    BRONCHOSCOPY WITH ION ROBOTIC ASSIST N/A 10/30/2024    Procedure: BRONCHOSCOPY WITH ION ROBOT, LAVAGE, FINE NEEDLE ASPIRATIONS AND BIOPSIES;  Surgeon: Radha Marcus MD;  Location: [facility] ENDOSCOPY;  Service: Robotics - Pulmonary;  Laterality: N/A;    CATARACT EXTRACTION Bilateral     COLONOSCOPY  2015    CYST REMOVAL      MULTIPLE    HYSTEROSCOPY ENDOMETRIAL ABLATION      JOINT REPLACEMENT  R-2013 L-2015    LAPAROSCOPIC CHOLECYSTECTOMY  10/2011    LOBECTOMY Right 1/8/2025    Procedure: BRONCHOSCOPY, RIGHT VIDEO ASSISTED THORACOSCOPY WITH DAVINCI ROBOT with wedge resection X2, COMPLETION RIGHT UPPER LOBECTOMY, MEDIASTINAL AND HILAR LYMPH NODE DISSECTION, INTERCOSTAL NERVE BLOCK;  Surgeon: Radha Marcus MD;  Location: [facility];  Service: Robotics - DaVinci;  Laterality: Right;    OVARIAN CYST REMOVAL          Anthropometrics        Current Height  Current Weight  BMI kg/m2 Height: 170.2 cm  "(67\")  Weight: (!) 182 kg (400 lb 8 oz) (01/12/25 0251)  Body mass index is 62.73 kg/m².   Adjusted BMI (if applicable)    BMI Category Obese, Class III (40 or higher)   Ideal Body Weight (IBW) 135#   Usual Body Weight (UBW)    Weight Trend Loss, Gain   Weight History Wt Readings from Last 30 Encounters:   01/12/25 0251 (!) 182 kg (400 lb 8 oz)   01/09/25 0600 (!) 192 kg (423 lb 4.5 oz)   01/07/25 1131 (!) 190 kg (418 lb 14 oz)   01/07/25 1033 (!) 190 kg (419 lb)   11/07/24 0939 (!) 184 kg (405 lb 8 oz)   11/01/24 1659 (!) 186 kg (410 lb)   10/30/24 0717 (!) 187 kg (411 lb 12.8 oz)   10/21/24 1117 (!) 186 kg (411 lb)   10/16/24 1134 (!) 185 kg (407 lb 9.6 oz)   09/17/24 1950 (!) 181 kg (400 lb)   04/17/24 1134 (!) 184 kg (406 lb 9.6 oz)   03/26/24 1403 (!) 186 kg (410 lb)   03/12/24 1607 (!) 186 kg (410 lb)   02/20/24 1523 (!) 186 kg (410 lb)   12/09/23 1448 (!) 179 kg (395 lb)   11/16/23 1358 (!) 178 kg (393 lb)   11/06/23 1040 (!) 179 kg (393 lb 9.6 oz)   05/22/23 1431 (!) 176 kg (388 lb 8 oz)   04/12/23 1400 (!) 173 kg (381 lb)   10/12/22 1339 (!) 169 kg (372 lb)   03/22/22 1047 (!) 177 kg (391 lb 3.2 oz)   03/18/22 1307 (!) 175 kg (386 lb)   03/02/22 1145 (!) 175 kg (386 lb)   01/04/22 1000 (!) 181 kg (399 lb)   12/03/21 2223 (!) 175 kg (385 lb)   09/22/21 0759 (!) 181 kg (398 lb 6.4 oz)   03/12/21 0856 (!) 188 kg (413 lb 6.4 oz)   02/17/21 1108 (!) 188 kg (414 lb)   11/09/20 1527 (!) 181 kg (399 lb)   10/27/20 1512 (!) 179 kg (394 lb)   01/14/19 1146 (!) 177 kg (390 lb)      --  Labs       Pertinent Labs    Results from last 7 days   Lab Units 01/13/25  0850 01/12/25  0825 01/11/25  0328   SODIUM mmol/L 142 139 137   POTASSIUM mmol/L 4.3 4.1 4.2   CHLORIDE mmol/L 103 102 102   CO2 mmol/L 32.3* 29.0 29.3*   BUN mg/dL 13 12 15   CREATININE mg/dL 0.60 0.53* 0.59   CALCIUM mg/dL 8.3* 8.1* 7.9*   BILIRUBIN mg/dL  --   --  0.2   ALK PHOS U/L  --   --  67   ALT (SGPT) U/L  --   --  22   AST (SGOT) U/L  --   --  26 "   GLUCOSE mg/dL 115* 111* 124*     Results from last 7 days   Lab Units 01/14/25  0625 01/13/25  0850 01/12/25  0825 01/11/25  0328   MAGNESIUM mg/dL  --  2.3  --   --    HEMOGLOBIN g/dL 12.6 12.8   < > 11.7*   HEMATOCRIT % 39.5 40.4   < > 37.5   WBC 10*3/mm3 12.91* 13.33*   < > 10.89*   ALBUMIN g/dL  --   --   --  2.8*    < > = values in this interval not displayed.     Results from last 7 days   Lab Units 01/14/25  0625 01/13/25  0850 01/12/25  0825 01/11/25  0328 01/10/25  0514   PLATELETS 10*3/mm3 235 242 214 179 152     SARS-CoV-2, LETITIA   Date Value Ref Range Status   08/21/2020 Not Detected Not Detected Final     Comment:     This test was developed and its performance characteristics determined  by [facility]. This test has not been FDA cleared or  approved. This test has been authorized by FDA under an Emergency Use  Authorization (EUA). This test is only authorized for the duration of  time the declaration that circumstances exist justifying the  authorization of the emergency use of in vitro diagnostic tests for  detection of SARS-CoV-2 virus and/or diagnosis of COVID-19 infection  under section 564(b)(1) of the Act, 21 U.S.C. 360bbb-3(b)(1), unless  the authorization is terminated or revoked sooner.  When diagnostic testing is negative, the possibility of a false  negative result should be considered in the context of a patient's  recent exposures and the presence of clinical signs and symptoms  consistent with COVID-19. An individual without symptoms of COVID-19  and who is not shedding SARS-CoV-2 virus would expect to have a  negative (not detected) result in this assay.     Lab Results   Component Value Date    HGBA1C 5.30 04/17/2024          Medications           Scheduled Medications acetaminophen, 1,000 mg, Oral, TID  celecoxib, 100 mg, Oral, Q12H  cetirizine, 10 mg, Oral, Daily  cholestyramine light, 1 packet, Oral, Daily  dapsone, 50 mg, Oral, BID  heparin (porcine), 5,000 Units,  Subcutaneous, Q8H  metoprolol tartrate, 25 mg, Oral, Q12H  pantoprazole, 40 mg, Oral, Q AM  Scopolamine, 1 patch, Transdermal, Q72H  senna-docusate sodium, 2 tablet, Oral, Nightly       Infusions norepinephrine, 0.02-0.3 mcg/kg/min, Last Rate: Stopped (01/09/25 7051)       PRN Medications   bisacodyl    loperamide    magnesium hydroxide    nitroglycerin    nitroglycerin    ondansetron ODT **OR** ondansetron     Physical Findings          General Findings alert, oriented, overweight, on oxygen therapy, short of breath   Oral/Mouth Cavity tooth or teeth missing   Edema  3+ (moderate)   Gastrointestinal normoactive, last bowel movement: 1/15   Skin  skin intact   Tubes/Drains/Lines none   NFPE Not indicated at this time   --  Current Nutrition Orders & Evaluation of Intake       Oral Nutrition     Food Allergies NKFA   Current PO Diet Diet: Regular/House; Fluid Consistency: Thin (IDDSI 0)   Supplement Boost Plus, TID   PO Evaluation     % PO Intake 0%    Factors Affecting Intake: decreased appetite   --  PES STATEMENT / NUTRITION DIAGNOSIS      Nutrition Dx Problem  Problem: Predicted Suboptimal Intake  Etiology: Medical Diagnosis - upper lobectomy    Signs/Symptoms: PO intake, Report of Minimal PO Intake, and Report/Observation     NUTRITION INTERVENTION / PLAN OF CARE      Intervention Goal(s) Reduce/improve symptoms, Establish PO intake, and Increase intake         RD Intervention/Action Continue to monitor and Care plan reviewed   --      Prescription/Orders:       PO Diet       Supplements       Enteral Nutrition       Parenteral Nutrition    New Prescription Ordered? No changes at this time   --      Monitor/Evaluation Per protocol, PO intake, Supplement intake, Pertinent labs, Weight, GI status, Symptoms   Discharge Plan/Needs Pending clinical course   --    RD to follow per protocol.      Electronically signed by:   Gema Marti  01/16/25 10:26 EST

## 2025-01-17 PROCEDURE — 94761 N-INVAS EAR/PLS OXIMETRY MLT: CPT

## 2025-01-17 PROCEDURE — 97530 THERAPEUTIC ACTIVITIES: CPT

## 2025-01-17 PROCEDURE — 94799 UNLISTED PULMONARY SVC/PX: CPT

## 2025-01-17 PROCEDURE — 99024 POSTOP FOLLOW-UP VISIT: CPT

## 2025-01-17 PROCEDURE — 25010000002 HEPARIN (PORCINE) PER 1000 UNITS: Performed by: THORACIC SURGERY (CARDIOTHORACIC VASCULAR SURGERY)

## 2025-01-17 RX ORDER — CELECOXIB 100 MG/1
100 CAPSULE ORAL EVERY 12 HOURS SCHEDULED
Qty: 30 CAPSULE | Refills: 0 | Status: SHIPPED | OUTPATIENT
Start: 2025-01-17 | End: 2025-02-01

## 2025-01-17 RX ORDER — ACETAMINOPHEN 500 MG
1000 TABLET ORAL 3 TIMES DAILY
Qty: 180 TABLET | Refills: 0 | Status: SHIPPED | OUTPATIENT
Start: 2025-01-17 | End: 2025-02-16

## 2025-01-17 RX ADMIN — HEPARIN SODIUM 5000 UNITS: 5000 INJECTION INTRAVENOUS; SUBCUTANEOUS at 06:19

## 2025-01-17 RX ADMIN — DAPSONE 50 MG: 100 TABLET ORAL at 10:55

## 2025-01-17 RX ADMIN — ACETAMINOPHEN 1000 MG: 500 TABLET, FILM COATED ORAL at 21:05

## 2025-01-17 RX ADMIN — CELECOXIB 100 MG: 100 CAPSULE ORAL at 08:58

## 2025-01-17 RX ADMIN — METOPROLOL TARTRATE 25 MG: 25 TABLET, FILM COATED ORAL at 21:05

## 2025-01-17 RX ADMIN — HEPARIN SODIUM 5000 UNITS: 5000 INJECTION INTRAVENOUS; SUBCUTANEOUS at 14:43

## 2025-01-17 RX ADMIN — CETIRIZINE HYDROCHLORIDE 10 MG: 10 TABLET, FILM COATED ORAL at 08:58

## 2025-01-17 RX ADMIN — PANTOPRAZOLE SODIUM 40 MG: 40 TABLET, DELAYED RELEASE ORAL at 06:19

## 2025-01-17 RX ADMIN — CELECOXIB 100 MG: 100 CAPSULE ORAL at 21:05

## 2025-01-17 RX ADMIN — DAPSONE 50 MG: 100 TABLET ORAL at 21:05

## 2025-01-17 RX ADMIN — HEPARIN SODIUM 5000 UNITS: 5000 INJECTION INTRAVENOUS; SUBCUTANEOUS at 21:05

## 2025-01-17 RX ADMIN — METOPROLOL TARTRATE 25 MG: 25 TABLET, FILM COATED ORAL at 08:58

## 2025-01-17 RX ADMIN — ACETAMINOPHEN 1000 MG: 500 TABLET, FILM COATED ORAL at 08:58

## 2025-01-17 RX ADMIN — LOPERAMIDE HYDROCHLORIDE 4 MG: 2 CAPSULE ORAL at 12:44

## 2025-01-17 NOTE — PLAN OF CARE
Goal Outcome Evaluation:  Plan of Care Reviewed With: patient        Progress: improving  Outcome Evaluation: Pt completed short ADL session standing to complete grooming and ~8 steps to BSC in room, but tx session ended when Pt requiring extended time to have a BM. Left w/ call light within reach and notified to ask for assistance to stand, Pt v/u.    Anticipated Discharge Disposition (OT): skilled nursing facility

## 2025-01-17 NOTE — DISCHARGE SUMMARY
Patient Care Team:  Dorinda Hastings MD as PCP - General (Family Medicine)    Date of Admission: 1/8/2025   Date of Discharge:  1/17/2025    Discharge Diagnosis: NSCLC    Presenting Problem  Ground glass opacity present on imaging of lung [R91.8]     History of Present Illness  Kortney Charlton is a 69 y.o. female who presented with groundglass opacity of the right upper lobe.  After preoperative evaluation, Dr. Marcus recommended resection and the patient agreed to proceed.    Hospital Course  Patient was admitted status post robot-assisted right upper lobectomy.  For further details, please refer to the operative note.  Prolonged postoperative course secondary to hypercarbia.  She has recovered well and her chest tube has been removed.  She will discharge today to skilled nursing facility with plans to follow-up with Dr. Marcus in 2 weeks with a chest x-ray.    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    Please refer to daily progress notes for further details.    Procedures Performed  Procedure(s):  BRONCHOSCOPY, RIGHT VIDEO ASSISTED THORACOSCOPY WITH DAVINCI ROBOT with wedge resection X2, COMPLETION RIGHT UPPER LOBECTOMY, MEDIASTINAL AND HILAR LYMPH NODE DISSECTION, INTERCOSTAL NERVE BLOCK       Consults:   Consults       Date and Time Order Name Status Description    1/8/2025  8:25 PM Inpatient Intensivist Consult              Pertinent Test Results:     Imaging Results (Last 24 Hours)       ** No results found for the last 24 hours. **            Lab Results (last 24 hours)       ** No results found for the last 24 hours. **              Condition on Discharge:  stable    Vital Signs  Temp:  [97.9 °F (36.6 °C)-98.9 °F (37.2 °C)] 98.4 °F (36.9 °C)  Heart Rate:  [55-65] 56  Resp:  [16-24] 20  BP: (109-133)/(55-64) 109/58    Physical Exam:    General Appearance:  Alert, cooperative, in no acute distress   Head:  Normocephalic, without obvious abnormality, atraumatic   Eyes:  Lids and lashes normal,  conjunctivae and sclerae normal, no icterus, no pallor, corneas clear, PERRLA   Ears:  Ears appear intact with no abnormalities noted   Throat:  No oral lesions, no thrush, oral mucosa moist   Neck:  No adenopathy, supple, trachea midline, no thyromegaly, no carotid bruit, no JVD   Back:  No kyphosis present, no scoliosis present, no skin lesions, erythema or scars, no tenderness to percussion, or palpation, range of motion normal   Lungs:  Clear to auscultation,respirations regular, even and unlabored    Heart:  Regular rhythm and normal rate, normal S1 and S2, no murmur, no gallop, no rub, no click   Breast Exam:  Deferred   Abdomen:  Normal bowel sounds, no masses, no organomegaly, soft non-tender, non-distended, no guarding, no rebound tenderness   Genitalia:  Deferred   Extremities:  Moves all extremities well, no edema, no cyanosis, no redness   Pulses:  Pulses palpable and equal bilaterally   Skin:  No bleeding, bruising or rash.  Operative incisions are well-approximated with Dermabond intact.   Lymph nodes:  No palpable adenopathy   Neurologic:  Cranial nerves 2 - 12 grossly intact, sensation intact, DTR present and equal bilaterally       Discharge Disposition  Rehab today    Discharge Medications     Discharge Medications        New Medications        Instructions Start Date   acetaminophen 500 MG tablet  Commonly known as: TYLENOL   1,000 mg, Oral, 3 Times Daily      celecoxib 100 MG capsule  Commonly known as: CeleBREX   100 mg, Oral, Every 12 Hours Scheduled             Changes to Medications        Instructions Start Date   furosemide 20 MG tablet  Commonly known as: LASIX  What changed: when to take this   TAKE 1 TABLET BY MOUTH EVERY DAY             Continue These Medications        Instructions Start Date   ALLEGRA ALLERGY PO   1 capsule, As Needed      colestipol 1 g tablet  Commonly known as: COLESTID   2 g, Oral, 2 Times Daily      dapsone 25 MG tablet   50 mg, 2 Times Daily      Dapsone 5 %  topical gel   APPLY TO FACE ONCE DAILY      loperamide 2 MG tablet  Commonly known as: IMODIUM A-D   1 tablet, As Needed      multivitamin with minerals tablet tablet   1 tablet, Daily             Stop These Medications      Advil 200 MG tablet  Generic drug: ibuprofen              Discharge Instructions:  No heavy lifting, pushing, pulling greater than 10 pounds.  No driving up until 2 weeks after surgery and no longer taking narcotics.  Resume home diet as tolerated.  Continue incentive spirometer at least 4 times per day.  Remove dressing from post chest tube site after 48 hours, may shower and clean surgical sites with antibacterial soap or hydrogen peroxide, and apply gauze dressing or band-aid as needed for any drainage.  No dressing needed once no longer draining.          Follow-up Appointments  Future Appointments   Date Time Provider Department Center   1/27/2025 10:15 AM Radha Marcus MD [facility]   4/23/2025 10:30 AM Dorinda Hastings MD [id number]     Additional Instructions for the Follow-ups that You Need to Schedule       XR Chest 2 View    Jan 29, 2025 (Approximate)      Exam reason: Post-op lung surgery   Release to patient: Routine Release                Test Results Pending at Discharge      For any questions regarding patient's stay, please refer to patient's chart.    Emma Salter DNP, APRN  [facility]  01/17/25  12:35 EST

## 2025-01-17 NOTE — PROGRESS NOTES
"    Chief Complaint: Ground glass opacity, NSCLC  S/P: Right upper lobectomy  POD # 9    Subjective:  Symptoms:  Improved.  She reports weakness (Improving).  No shortness of breath or cough.    Diet:  Adequate intake.  No nausea (Mild nausea.) or vomiting.    Activity level: Impaired due to weakness.    Pain:  She complains of pain that is mild.  She reports pain is improving.  Pain is well controlled.    In recliner. No new complaints.   Denies any pain or shortness of breath. Pain well controlled.     Vital Signs:  Temp:  [97.9 °F (36.6 °C)-98.9 °F (37.2 °C)] 98.4 °F (36.9 °C)  Heart Rate:  [55-65] 56  Resp:  [16-24] 20  BP: (109-133)/(55-64) 109/58    Intake & Output (last day)         01/16 0701  01/17 0700 01/17 0701  01/18 0700    P.O. 720 480    Total Intake(mL/kg) 720 (4) 480 (2.6)    Urine (mL/kg/hr) 900 (0.2)     Stool 0     Total Output 900     Net -180 +480          Urine Unmeasured Occurrence 3 x 1 x    Stool Unmeasured Occurrence 2 x 1 x            Objective:  General Appearance:  In no acute distress, comfortable and not in pain.    Vital signs: (most recent): Blood pressure 109/58, pulse 56, temperature 98.4 °F (36.9 °C), temperature source Oral, resp. rate 20, height 170.2 cm (67\"), weight (!) 182 kg (400 lb 8 oz), SpO2 93%, not currently breastfeeding.  No fever.    HEENT: (2 L via nasal cannula)    Lungs:  Normal effort.  She is not in respiratory distress.  No decreased breath sounds.    Heart: Normal rate.  Regular rhythm.    Chest: Symmetric chest wall expansion. Chest wall tenderness (Incisional) present.    Abdomen: Abdomen is soft and non-distended.    Extremities: Decreased range of motion.    Neurological: Patient is alert and oriented to person, place and time.    Skin:  Warm and dry.                Results Review:     I reviewed the patient's new clinical results.  I reviewed the patient's new imaging results and agree with the interpretation.    Imaging Results (Last 24 Hours)       ** " "No results found for the last 24 hours. **            Lab Results:     Lab Results (last 24 hours)       ** No results found for the last 24 hours. **        2. Lung, Right Upper Lobe.  Received fresh for frozen section diagnosis, labeled \"right upper lobe wedge resection #2\" is a 4 g, 4.3 x 2.0 x 1.5 cm lung wedge resection with a 4.3 cm long staple line margin, which is inked orange.  The pleural surface is smooth tan-pink and is previously incised by the surgeon, conveying an obvious 1.8 x 1.5 x 1.3 cm solid tan-pink mass which is at the pleural surface and comes to within 0.1 cm of the parenchymal staple line margin.  The pleural surface is inked blue.  The remaining parenchyma is spongy tan-red.  Representative section of the mass is submitted to include the pleural surface and the parenchymal margin and the remainder of the specimen is entirely submitted as follows:  2 AFS- frozen section residue-mass to the pleural surface and parenchymal margin  2B-2C- remainder the mass to include the parenchymal margin and the pleural surface  2D- remainder of the specimen  jap/uso/swm  3. Lymph Node.  Received fresh and subsequently placed in formalin labeled with the patient's name and designated \"right level 8 lymph node is a gray-tan lymph node measuring 0.5 x 0.3 x 0.3 cm.  The specimen is submitted as received in cassette 3A.  t/uso/[facility]  4. Lymph Node.  Received fresh and subsequently placed in formalin labeled with the patient's name and designated \"level 7 lymph node is a gray-black lymph node measuring 0.8 x 0.7 x 0.4 cm.  The specimen is submitted as received in cassette 4A.  t/uso/[facility]  5. Lymph Node.  Received fresh and subsequently placed in formalin labeled with the patient's name and designated \"right level 4 lymph node is a 0.3 to 0.2 cm.  The specimen is filtered and submitted as received in cassette 5A.  t/uso/ [facility]  6. Lymph Node.  Received fresh and subsequently placed in formalin labeled with the " "patient's name and designated \"right level 10 lymph node\" is a fatty yellow to tan tissue measuring 1.0 x 0.5 x 0.3 cm.  The specimen is submitted as received in cassette 6A.  hbt/uso/[facility]  7. Lymph Node.  Received fresh and subsequently placed in formalin labeled with the patient's name and designated \" right level 11 lymph node\" is a tan-black tissue measuring 1.0 x 0.5 x 0.4 cm.  The specimen is submitted as received in cassette 7A.  hbt/uso/[facility]  8. Lymph Node.  Received fresh and subsequently placed in formalin labeled with the patient's name and designated \"right level 12 lymph node\" is a gray-tan tissue measuring 1.0 x 0.5 x 0.4 cm.  The specimen is submitted as received in cassette 8A.  hbt/uso/[facility]  9. Lymph Node.  Received fresh and subsequently placed in formalin labeled with the patient's name and designated \"right level 10 #2\" is a gray-black tissue measuring 0.9 x 0.7 x 0.3 cm.  The specimen is submitted as received in cassette 9A.  t/uso/[facility]    Lab Results (last 24 hours)       ** No results found for the last 24 hours. **             Assessment & Plan       Ground glass opacity present on imaging of lung       Assessment & Plan  POD # 9 s/p right upper lobectomy.    She is overnight.  Continues to do well postoperatively.  Up to recliner today.  Denies any shortness of breath or uncontrolled pain.  Weaned to 2 L of oxygen via nasal cannula.    Continue to increase her activity, PT OT treatments.  Encourage pulmonary hygiene including use of I-S  Patient is at high risk for respiratory decompensation and pneumonia due to body habitus and shallow breathing  Nausea improved with scopolamine patch.  Appreciate pulmonology recommendations, plan for outpatient NIPPV  Plan for discharge to [facility].  Was notified by nursing services, pending delivery of BiPAP to facility. They are unable to accept patient at this time and would likely plan for discharge to SNF tomorrow.    Final pathology is a T1aN0 " adenocarcinoma.    JOANN Mcwilliams  [facility]  01/17/25  14:22 EST

## 2025-01-17 NOTE — CASE MANAGEMENT/SOCIAL WORK
"Physicians Statement of Medical Necessity for  Ambulance Transportation    GENERAL INFORMATION     Name: Kortney Charlton  YOB: 1955  Medicare #: [id number]  Transport Date: 1/17/25 (Valid for round trips this date, or for scheduled repetitive trips for 60 days from the date signed below.)  Origin: [facility]  Destination: [facility]  Is the Patient's stay covered under Medicare Part A (PPS/DRG?)Yes  Closest appropriate facility? Yes  If this a hosp-hosp transfer? No  Is this a hospice patient? No    MEDICAL NECESSITY QUESTIONAIRE    Ambulance Transportation is medically necessary only if other means of transportation are contraindicated or would be potentially harmful to the patient.  To meet this requirement, the patient must be either \"bed confined\" or suffer from a condition such that transport by means other than an ambulance is contraindicated by the patient's condition.  The following questions must be answered by the healthcare professional signing below for this form to be valid:     1) Describe the MEDICAL CONDITION (physical and/or mental) of this patient AT THE TIME OF AMBULANCE TRANSPORT that requires the patient to be transported in an ambulance, and why transport by other means is contraindicated by the patient's condition: assist of 2 required for standing  Past Medical History:   Diagnosis Date    Allergic topical steroids    Anemia Episodic    At risk for central sleep apnea     STOP BANG 5    Breast lump in female 01/14/2019    Chronic nasopharyngitis     Elevated BP without diagnosis of hypertension     History of pulmonary embolism     Hypertension     Lesion of lung     Morbid obesity     Neuromuscular disorder 2010    Osteoarthritis     Rash     UNDER LEFT BREAST    Sneddon-Hanna disease     Stasis dermatitis     Urinary tract infection 2022      Past Surgical History:   Procedure Laterality Date    BRONCHOSCOPY WITH ION ROBOTIC ASSIST N/A 10/30/2024    " "Procedure: BRONCHOSCOPY WITH ION ROBOT, LAVAGE, FINE NEEDLE ASPIRATIONS AND BIOPSIES;  Surgeon: Radha Marcus MD;  Location: [facility] ENDOSCOPY;  Service: Robotics - Pulmonary;  Laterality: N/A;    CATARACT EXTRACTION Bilateral     COLONOSCOPY  2015    CYST REMOVAL      MULTIPLE    HYSTEROSCOPY ENDOMETRIAL ABLATION      JOINT REPLACEMENT  R-2013 L-2015    LAPAROSCOPIC CHOLECYSTECTOMY  10/2011    LOBECTOMY Right 1/8/2025    Procedure: BRONCHOSCOPY, RIGHT VIDEO ASSISTED THORACOSCOPY WITH DAVINCI ROBOT with wedge resection X2, COMPLETION RIGHT UPPER LOBECTOMY, MEDIASTINAL AND HILAR LYMPH NODE DISSECTION, INTERCOSTAL NERVE BLOCK;  Surgeon: Radha Marcus MD;  Location: [facility] OR;  Service: Robotics - DaVinci;  Laterality: Right;    OVARIAN CYST REMOVAL        2) Is this patient \"bed confined\" as defined below?Yes   To be \"bed confined\" the patient must satisfy all three of the following criteria:  (1) unable to get up from bed without assistance; AND (2) unable to ambulate;  AND (3) unable to sit in a chair or wheelchair.  3) Can this patient safely be transported by car or wheelchair van (I.e., may safely sit during transport, without an attendant or monitoring?)No   4. In addition to completing questions 1-3 above, please check any of the following conditions that apply*:          *Note: supporting documentation for any boxes checked must be maintained in the patient's medical records Requires oxygen - unable to self administer, Unable to tolerate seated position for time needed to transport, and Morbid obesity requires additional personnel/equipment to safely handle patient      SIGNATURE OF PHYSICIAN OR OTHER AUTHORIZED HEALTHCARE PROFESSIONAL    I certify that the above information is true and correct based on my evaluation of this patient, and represent that the patient requires transport by ambulance and that other forms of transport are contraindicated.  I understand that this information will be used by " the Centers for Medicare and Medicaid Services (CMS) to support the determiniation of medical necessity for ambulance services, and I represent that I have personal knowledge of the patient's condition at the time of transport.       If this box is checked, I also certify that the patient is physically or mentally incapable of signing the ambulance service's claim form and that the institution with which I am affiliated has furnished care, services or assistance to the patient.  My signature below is made on behalf of the patient pursuant to 42 .36(b)(4). In accordance with 42 .37, the specific reason(s) that the patient is physically or mentally incapable of signing the claim for is as follows:     Signature of Physician or Healthcare Professional  Date/Time:   1/17/25     (For Scheduled repetitive transport, this form is not valid for transports performed more than 60 days after this date).                                                                                                                                            --------------------------------------------------------------------------------------------  Printed Name and Credentials of Physician or Authorized Healthcare Professional     *Form must be signed by patient's attending physician for scheduled, repetitive transports,.  For non-repetitive ambulance transports, if unable to obtain the signature of the attending physician, any of the following may sign (please select below):     Physician  Clinical Nurse Specialist  Registered Nurse     Physician Assistant  Discharge Planner  Licensed Practical Nurse     Nurse Practitioner

## 2025-01-17 NOTE — PLAN OF CARE
Goal Outcome Evaluation:      Patient will remain free from falls while on this unit and will use her call light when assistance is needed.

## 2025-01-17 NOTE — THERAPY TREATMENT NOTE
Patient Name: Kortney Charlton  : 1955    MRN: [id number]                              Today's Date: 2025       Admit Date: 2025    Visit Dx:     ICD-10-CM ICD-9-CM   1. Ground glass opacity present on imaging of lung  R91.8 793.19     Patient Active Problem List   Diagnosis    Morbid obesity    Elevated BP without diagnosis of hypertension    Venous stasis dermatitis of both lower extremities    History of total bilateral knee replacement (TKR)    Breast lump in female    Sneddon-Hanna disease    Abnormal TSH    Atypical chest pain    Medicare annual wellness visit, subsequent    Diarrhea in adult patient    Chronic diarrhea    Hypermetropia, bilateral    Vitreous degeneration of left eye    Prediabetes    Numbness and tingling in left hand    Bilateral carpal tunnel syndrome    Diarrhea following gastrointestinal surgery    Leg swelling    Encounter for lipid screening for cardiovascular disease    Body mass index (BMI) of 60.0 to 69.9 in adult    Immunization due    Age-related nuclear cataract of both eyes    Bilateral myopia    Presbyopia    Regular astigmatism of both eyes    History of pulmonary embolism    Ground glass opacity present on imaging of lung    Pneumothorax     Past Medical History:   Diagnosis Date    Allergic topical steroids    Anemia Episodic    At risk for central sleep apnea     STOP BANG 5    Breast lump in female 2019    Chronic nasopharyngitis     Elevated BP without diagnosis of hypertension     History of pulmonary embolism     Hypertension     Lesion of lung     Morbid obesity     Neuromuscular disorder     Osteoarthritis     Rash     UNDER LEFT BREAST    Sneddon-Hanan disease     Stasis dermatitis     Urinary tract infection      Past Surgical History:   Procedure Laterality Date    BRONCHOSCOPY WITH ION ROBOTIC ASSIST N/A 10/30/2024    Procedure: BRONCHOSCOPY WITH ION ROBOT, LAVAGE, FINE NEEDLE ASPIRATIONS AND BIOPSIES;  Surgeon: Radha Marcus  MD VALERIE;  Location: [facility] ENDOSCOPY;  Service: Robotics - Pulmonary;  Laterality: N/A;    CATARACT EXTRACTION Bilateral     COLONOSCOPY  2015    CYST REMOVAL      MULTIPLE    HYSTEROSCOPY ENDOMETRIAL ABLATION      JOINT REPLACEMENT  R-2013 L-2015    LAPAROSCOPIC CHOLECYSTECTOMY  10/2011    LOBECTOMY Right 1/8/2025    Procedure: BRONCHOSCOPY, RIGHT VIDEO ASSISTED THORACOSCOPY WITH DAVINCI ROBOT with wedge resection X2, COMPLETION RIGHT UPPER LOBECTOMY, MEDIASTINAL AND HILAR LYMPH NODE DISSECTION, INTERCOSTAL NERVE BLOCK;  Surgeon: Radha Marcus MD;  Location: [facility] OR;  Service: Robotics - DaVinci;  Laterality: Right;    OVARIAN CYST REMOVAL        General Information       Row Name 01/17/25 1056          OT Time and Intention    Document Type therapy note (daily note)  -BC     Mode of Treatment individual therapy;occupational therapy  -BC     Patient Effort adequate  -BC       Row Name 01/17/25 1056          General Information    Patient Profile Reviewed yes  -BC     Existing Precautions/Restrictions fall;oxygen therapy device and L/min  -BC       Row Name 01/17/25 1056          Cognition    Orientation Status (Cognition) oriented x 4  -BC       Row Name 01/17/25 1056          Safety Issues/Impairments Affecting Functional Mobility    Impairments Affecting Function (Mobility) endurance/activity tolerance;strength;shortness of breath;balance  -BC               User Key  (r) = Recorded By, (t) = Taken By, (c) = Cosigned By      Initials Name Provider Type    BC Fiona Diehl OT Occupational Therapist                     Mobility/ADL's       Row Name 01/17/25 1057          Bed Mobility    Comment, (Bed Mobility) NT  -BC       Row Name 01/17/25 1057          Transfers    Transfers sit-stand transfer;stand-sit transfer;toilet transfer  -BC       Row Name 01/17/25 1057          Sit-Stand Transfer    Sit-Stand [place] (Transfers) verbal cues;supervision  -BC     Comment, (Sit-Stand Transfer) Sup A to stand  at tray table for grooming routine. Cont'd w/ mobility w/o AD due to urgency w/ toileting needs ~8 feet to BSC w/ Close SBA/CGA only.  -BC       Row Name 01/17/25 1057          Stand-Sit Transfer    Stand-Sit [place] (Transfers) supervision  -BC       Row Name 01/17/25 1057          Toilet Transfer    Type (Toilet Transfer) sit-stand;stand-sit  -BC     [place] Level (Toilet Transfer) contact guard  -BC       Row Name 01/17/25 1057          Functional Mobility    Functional Mobility- Ind. Level contact guard assist;verbal cues required;supervision required  -BC     Patient was able to Ambulate yes  -BC       Row Name 01/17/25 1057          Activities of Daily Living    BADL Assessment/Intervention toileting;grooming  -BC       Row Name 01/17/25 1057          Grooming Assessment/Training    [place] Level (Grooming) wash face, hands;oral care regimen;supervision  -BC     Position (Grooming) sink side;supported standing  -BC     Comment, (Grooming) standing from chair at tray table to increased ADL tolerance > 1.5 mins  -BC       Row Name 01/17/25 1057          Toileting Assessment/Training    [place] Level (Toileting) maximum assist (25% patient effort)  -BC     Comment, (Toileting) help w brief to pull down in standing, Pt requiring mulitple mins to have BM, left w/ call light within reach, nursing staff notified seated on BSC.  -BC               User Key  (r) = Recorded By, (t) = Taken By, (c) = Cosigned By      Initials Name Provider Type    Fiona Duron OT Occupational Therapist                   Obj/Interventions       Row Name 01/17/25 1059          Balance    Balance Assessment sitting static balance  -BC     Static Sitting Balance independent  -BC     Dynamic Sitting Balance modified independence  -BC     Position, Sitting Balance unsupported  -BC     Static Standing Balance supervision  -BC     Dynamic Standing Balance contact guard  -BC     Comment, Balance no LOB or safety concerns  while seated On BSC. Provided distant sup A for safety but does not indicate constant sup A for toileting needs. CGA to CLose SBA w/ standing  -BC               User Key  (r) = Recorded By, (t) = Taken By, (c) = Cosigned By      Initials Name Provider Type    Fiona Duron, PETRA Occupational Therapist                   Goals/Plan    No documentation.                  Clinical Impression       Row Name 01/17/25 1059          Pain Assessment    Pretreatment Pain Rating 0/10 - no pain  -BC       Row Name 01/17/25 1059          Plan of Care Review    Plan of Care Reviewed With patient  -BC     Progress improving  -BC     Outcome Evaluation Pt completed short ADL session standing to complete grooming and ~8 steps to BSC in room, but tx session ended when Pt requiring extended time to have a BM. Left w/ call light within reach and notified to ask for assistance to stand, Pt v/u.  -BC       Row Name 01/17/25 1059          Therapy Plan Review/Discharge Plan (OT)    Anticipated Discharge Disposition (OT) [facility] nursing [facility]  -BC       Row Name 01/17/25 1059          Vital Signs    Pre SpO2 (%) 93  -BC     O2 Delivery Pre Treatment supplemental O2  -BC     O2 Delivery Intra Treatment supplemental O2  -BC     Post SpO2 (%) 92  -BC     O2 Delivery Post Treatment supplemental O2  -BC     Pre Patient Position Sitting  -BC     Intra Patient Position Standing  -BC     Post Patient Position Sitting  -BC       Row Name 01/17/25 1059          Positioning and Restraints    Pre-Treatment Position sitting in chair/recliner  seated in chair w/o chair alarm, has been using call light as needed  -BC     Post Treatment Position bsc  -BC     On BS commode notified nsg;sitting;call light within reach;encouraged to call for assist  -BC               User Key  (r) = Recorded By, (t) = Taken By, (c) = Cosigned By      Initials Name Provider Type    Fiona Duron, PETRA Occupational Therapist                   Outcome Measures       Row  Name 01/17/25 1101          How much help from another is currently needed...    Putting on and taking off regular lower body clothing? 1  -BC     Bathing (including washing, rinsing, and drying) 2  -BC     Toileting (which includes using toilet bed pan or urinal) 2  -BC     Putting on and taking off regular upper body clothing 3  -BC     Taking care of personal grooming (such as brushing teeth) 3  -BC     Eating meals 4  -BC     AM-PAC 6 Clicks Score (OT) 15  -BC       Row Name 01/17/25 1101          Functional Assessment    Outcome Measure Options AM-PAC 6 Clicks Daily Activity (OT)  -BC               User Key  (r) = Recorded By, (t) = Taken By, (c) = Cosigned By      Initials Name Provider Type    BC Fiona Diehl OT Occupational Therapist                    Occupational Therapy Education       Title: PT OT SLP Therapies (In Progress)       Topic: Occupational Therapy (In Progress)       Point: ADL training (Done)       Description:   Instruct learner(s) on proper safety adaptation and remediation techniques during self care or transfers.   Instruct in proper use of assistive devices.                  Learning Progress Summary            Patient Acceptance, E,TB, VU,NR by BC at 1/14/2025 1152    Comment: role of OT at acute level, ADl  participation in future POC, Rec cont'd rehab, cont teaching                      Point: Home exercise program (Not Started)       Description:   Instruct learner(s) on appropriate technique for monitoring, assisting and/or progressing therapeutic exercises/activities.                  Learner Progress:  Not documented in this visit.              Point: Precautions (Not Started)       Description:   Instruct learner(s) on prescribed precautions during self-care and functional transfers.                  Learner Progress:  Not documented in this visit.              Point: Body mechanics (Not Started)       Description:   Instruct learner(s) on proper positioning and spine  alignment during self-care, functional mobility activities and/or exercises.                  Learner Progress:  Not documented in this visit.                              User Key       Initials Effective Dates Name Provider Type Discipline    BC 07/29/24 -  Fiona Diehl OT Occupational Therapist OT                  OT Recommendation and Plan  Planned Therapy Interventions (OT): activity tolerance training, BADL retraining, functional balance retraining, occupation/activity based interventions, patient/caregiver education/training, strengthening exercise, transfer/mobility retraining  Therapy Frequency (OT): 5 times/wk  Plan of Care Review  Plan of Care Reviewed With: patient  Progress: improving  Outcome Evaluation: Pt completed short ADL session standing to complete grooming and ~8 steps to BSC in room, but tx session ended when Pt requiring extended time to have a BM. Left w/ call light within reach and notified to ask for assistance to stand, Pt v/u.     Time Calculation:   Evaluation Complexity (OT)  Review Occupational Profile/Medical/Therapy History Complexity: expanded/moderate complexity  Assessment, Occupational Performance/Identification of Deficit Complexity: 3-5 performance deficits  Clinical Decision Making Complexity (OT): detailed assessment/moderate complexity  Overall Complexity of Evaluation (OT): moderate complexity     Time Calculation- OT       Row Name 01/17/25 1101             Time Calculation- OT    OT Start Time 0820  -BC      OT Stop Time 0830  -BC      OT Time Calculation (min) 10 min  -BC      Total Timed Code Minutes- OT 10 minute(s)  -BC      OT Received On 01/17/25  -BC      OT - Next Appointment 01/20/25  -BC                User Key  (r) = Recorded By, (t) = Taken By, (c) = Cosigned By      Initials Name Provider Type    BC Fiona Diehl OT Occupational Therapist                  Therapy Charges for Today       Code Description Service Date Service Provider Modifiers Qty     [id number]  OT THERAPEUTIC ACT EA 15 MIN 1/17/2025 Fiona Diehl OT GO 1                 Fiona Diehl OT  1/17/2025

## 2025-01-17 NOTE — PROGRESS NOTES
"                                              LOS: 9 days   Patient Care Team:  Dorinda Hastings MD as PCP - General (Family Medicine)    Chief Complaint:  F/up respiratory failure and medical problems listed below.    Subjective   Interval History      Liberated off the oxygen today and she seems to be satting around 90-92%.  She reported no cough no dyspnea at rest.  She is using the incentive spirometer.  She did not use the NIPPV last night because she hates that she said.      REVIEW OF SYSTEMS:     GASTROINTESTINAL: Acid reflux  CONSTITUTIONAL: No fever or chills.     Ventilator/Non-Invasive Ventilation Settings (From admission, onward)       Start     Ordered    01/08/25 2348  NIPPV (CPAP or BIPAP)  At Bedtime & As Needed-RT        Comments: With all sleep   Question Answer Comment   Indication Sleep Apnea    Type AVAPS-AE    Rate 12    VT (mL) 560    EPAP Min (cm H2O) 6    EPAP Max (cm H2O) 14    Max Pressure (cm H2O) 28    Pressure Support Min (cm H2O) 4    Pressure Support Max (cm H2O) 16    Titrate Oxygen for SpO2 90 - 95%        01/08/25 2349                          Physical Exam:     Vital Signs  Temp:  [97.9 °F (36.6 °C)-98.9 °F (37.2 °C)] 98.4 °F (36.9 °C)  Heart Rate:  [55-65] 56  Resp:  [16-24] 20  BP: (109-133)/(55-64) 109/58    Intake/Output Summary (Last 24 hours) at 1/17/2025 1433  Last data filed at 1/17/2025 1300  Gross per 24 hour   Intake 840 ml   Output 900 ml   Net -60 ml     Flowsheet Rows      Flowsheet Row First Filed Value   Admission Height 170.2 cm (67\") Documented at 01/08/2025 1053   Admission Weight 192 kg (423 lb 4.5 oz) Documented at 01/09/2025 0600            PPE used per hospital policy    General Appearance:   Alert, cooperative, in no acute distress   ENMT:  Mallampati score 4, moist mucous membrane   Eyes:  Pupils equal and reactive to light. EOMI   Neck:   Large. Trachea midline. No thyromegaly.   Lungs:   Equal but diminished air entry throughout.  Bilateral crackles  " at the bases.  No wheezing or use of accessory muscles.      Heart:   Regular rhythm and normal rate, normal S1 and S2, no         murmur   Skin:   No rash or ecchymosis   Abdomen:    Obese. Soft. No tenderness. No HSM.   Neuro/psych:  Conscious, alert, oriented x3. Strength 5/5 in upper and lower  ext.  Appropriate mood and affect   Extremities:  No cyanosis, clubbing but edema in legs.  Warm extremities and well-perfused          Results Review:        Results from last 7 days   Lab Units 01/13/25  0850 01/12/25  0825 01/11/25  0328   SODIUM mmol/L 142 139 137   POTASSIUM mmol/L 4.3 4.1 4.2   CHLORIDE mmol/L 103 102 102   CO2 mmol/L 32.3* 29.0 29.3*   BUN mg/dL 13 12 15   CREATININE mg/dL 0.60 0.53* 0.59   GLUCOSE mg/dL 115* 111* 124*   CALCIUM mg/dL 8.3* 8.1* 7.9*         Results from last 7 days   Lab Units 01/14/25  0625 01/13/25  0850 01/12/25  0825   WBC 10*3/mm3 12.91* 13.33* 10.70   HEMOGLOBIN g/dL 12.6 12.8 12.1   HEMATOCRIT % 39.5 40.4 39.6   PLATELETS 10*3/mm3 235 242 214         Results from last 7 days   Lab Units 01/11/25  0328   PROBNP pg/mL 291.0                   Results from last 7 days   Lab Units 01/12/25  1412 01/12/25  1250 01/11/25  0746 01/10/25  1446   PH, ARTERIAL pH units 7.352 7.288* 7.303* 7.304*   PCO2, ARTERIAL mm Hg 61.4* 73.8* 65.6* 67.4*   PO2 ART mm Hg 122.0* 73.9* 82.0 77.8*   O2 SATURATION ART % 98.4 91.8* 94.3 93.3   FLOW RATE lpm  --  3.0000 3.0000  --    MODALITY  BiPap Cannula Cannula BiPap         I reviewed the patient's new clinical results.        Medication Review:   acetaminophen, 1,000 mg, Oral, TID  celecoxib, 100 mg, Oral, Q12H  cetirizine, 10 mg, Oral, Daily  cholestyramine light, 1 packet, Oral, Daily  dapsone, 50 mg, Oral, BID  heparin (porcine), 5,000 Units, Subcutaneous, Q8H  metoprolol tartrate, 25 mg, Oral, Q12H  pantoprazole, 40 mg, Oral, Q AM  Scopolamine, 1 patch, Transdermal, Q72H  senna-docusate sodium, 2 tablet, Oral, Nightly        norepinephrine,  0.02-0.3 mcg/kg/min, Last Rate: Stopped (01/09/25 6729)        Diagnostic imaging:  I personally and independently reviewed the following images:  CXR 1/13/2025 and CT chest 10/28/2024:  Increased pulmonary vascular markings on the left on CXR.        CXR 1/16/2025: Low lung volumes.  Atelectasis at the bases more prominent on the right.    Assessment     Acute on chronic hypercapnic respiratory failure, requiring NIV.  Estimated baseline pCO2 in the 60s-multifactorial.  Secondary to obesity mostly and resulting hypoventilation.  Contributing factors are hypoventilation from pain and the use of narcotics.  Acute hypoxic respiratory failure, improving  1.8 cm GG nodule (Adenocarcinoma) RUL s/p Rt upper lobectomy and hilar node dissection 1/8/2025 -> T1aN0 adenocarcinoma.   Pulmonary edema, improved  Mild left pleural effusion.  Metabolic alkalosis  OLIVERIO and suspected OHS  Super morbid obesity with BMI > 65  Leukocytosis, mild.  Nocturnal hypoxia suspicious for sleep apnea        Plan     NIPPV at night and when asleep during the day.  Outpatient NIPPV was arranged and will be initiated when patient is discharged from rehab facility  Minimize narcotics if possible.  Heparin 5000 units SQ 3 times daily for DVT prophylaxis  PRN DuoNeb to improve mucus clearance  Incentive spirometry  Flutter  As needed bronchodilators  PRN diuresis  Pantoprazole 40 mg daily  Up to chair as tolerated and ambulate if okay with thoracic surgery.  O2 by NC and titrate to keep SpO2 89-92%-titrated off today.  May require oxygen on ambulation but this will be assessed when patient is about to be discharged from the rehab facility.            Dispo: okay to discharge.  Will follow as outpatient.      Brandin Murillo MD  01/17/25  14:33 EST        This note was dictated utilizing Dragon dictation

## 2025-01-17 NOTE — CASE MANAGEMENT/SOCIAL WORK
Continued Stay Note  [facility]     Patient Name: Kortney Charlton  MRN: [id number]  Today's Date: 1/17/2025    Admit Date: 1/8/2025    Plan: [facility]   Discharge Plan       Row Name 01/17/25 1353       Plan    Plan [facility]    Patient/Family in Agreement with Plan yes    Plan Comments Spoke with Milagros/Mayela. They do not have a functioning Bipap machine at [facility] currently. They have contacted [facility] and a new Bipap machine will arrive at [facility] tomorrow. They are unable to accept the pt at the facility until this arrives. Called Alicia/ EMS and cancelled the transportation for today. Transportation rescheduled for 11am tomorrow 1/18/25. Updated pt, primary RN, and APRN. No further needs assessed at this time. CCP following. Mikal GUERRA RN                   Discharge Codes    No documentation.                 Expected Discharge Date and Time       Expected Discharge Date Expected Discharge Time    Jan 17, 2025               Mikal Stoner RN

## 2025-01-17 NOTE — PLAN OF CARE
Goal Outcome Evaluation:  Plan of Care Reviewed With: patient        Progress: improving  Outcome Evaluation: A/Ox4, SB/SR, RA/2L nc, Assist x1. Plans to d/c to [facility] tomorrow at 1100 via EMS.

## 2025-01-18 ENCOUNTER — READMISSION MANAGEMENT (OUTPATIENT)
Dept: CALL CENTER | Facility: HOSPITAL | Age: 70
End: 2025-01-18
Payer: MEDICARE

## 2025-01-18 VITALS
TEMPERATURE: 98.1 F | HEIGHT: 67 IN | RESPIRATION RATE: 16 BRPM | WEIGHT: 293 LBS | OXYGEN SATURATION: 94 % | SYSTOLIC BLOOD PRESSURE: 123 MMHG | HEART RATE: 70 BPM | DIASTOLIC BLOOD PRESSURE: 54 MMHG | BODY MASS INDEX: 45.99 KG/M2

## 2025-01-18 PROCEDURE — 25010000002 HEPARIN (PORCINE) PER 1000 UNITS: Performed by: THORACIC SURGERY (CARDIOTHORACIC VASCULAR SURGERY)

## 2025-01-18 RX ADMIN — CELECOXIB 100 MG: 100 CAPSULE ORAL at 08:12

## 2025-01-18 RX ADMIN — METOPROLOL TARTRATE 25 MG: 25 TABLET, FILM COATED ORAL at 08:12

## 2025-01-18 RX ADMIN — SCOPOLAMINE 1 PATCH: 1.5 PATCH, EXTENDED RELEASE TRANSDERMAL at 08:17

## 2025-01-18 RX ADMIN — PANTOPRAZOLE SODIUM 40 MG: 40 TABLET, DELAYED RELEASE ORAL at 06:36

## 2025-01-18 RX ADMIN — LOPERAMIDE HYDROCHLORIDE 4 MG: 2 CAPSULE ORAL at 08:16

## 2025-01-18 RX ADMIN — ACETAMINOPHEN 1000 MG: 500 TABLET, FILM COATED ORAL at 08:12

## 2025-01-18 RX ADMIN — DAPSONE 50 MG: 100 TABLET ORAL at 08:12

## 2025-01-18 RX ADMIN — HEPARIN SODIUM 5000 UNITS: 5000 INJECTION INTRAVENOUS; SUBCUTANEOUS at 06:36

## 2025-01-18 RX ADMIN — CETIRIZINE HYDROCHLORIDE 10 MG: 10 TABLET, FILM COATED ORAL at 08:12

## 2025-01-18 NOTE — SIGNIFICANT NOTE
Patient refused to go on NIV.  Stated she was doing better and she could not sleep with all that air blowing in her face and against her face.   I encouraged use and explained she was on NIV for OLIVERIO and Hypercapnia and NIV was to blow off the CO2 and allow her to remain more awake, alert and breathe better as well as not putting so much stress on her heart, and that she would have one at the rehab facility she was going to discharge too.     She still refused.  Encouraged patient to call if she changed her mind .

## 2025-01-18 NOTE — OUTREACH NOTE
Prep Survey      Flowsheet Row Responses   [facility] patient discharged from? [place]   Is LACE score < 7 ? No   Eligibility [facility]   Date of Admission 01/08/25   Date of Discharge 01/18/25   Discharge Disposition Home or Self Care   Discharge diagnosis s post robot-assisted right upper lobectomy.   Does the patient have one of the following disease processes/diagnoses(primary or secondary)? General Surgery   Does the patient have Home health ordered? No   Is there a DME ordered? Yes   What DME was ordered? [facility] [place]   Comments regarding appointments [facility]   Prep survey completed? Yes            AKUA VELEZ - Registered Nurse

## 2025-01-18 NOTE — PLAN OF CARE
Goal Outcome Evaluation:      VSS. On 2-4L nc, refusing bipap at night. A/o x4. Up in chair. Plan to d/c to rehab today.

## 2025-01-19 NOTE — OP NOTE
THORACOSCOPY WITH LOBECTOMY WITH DAVINCI ROBOT  Procedure Report    Patient Name:  Kortney Charlton  YOB: 1955    Date of Surgery:  1/8/2025     Indications: Enlarging groundglass opacity right upper lobe    Pre-op Diagnosis:   Ground glass opacity present on imaging of lung [R91.8]       Post-Op Diagnosis Codes:     * Ground glass opacity present on imaging of lung [R91.8]    Procedure/CPT® Codes:      Procedure(s):  BRONCHOSCOPY, RIGHT VIDEO ASSISTED THORACOSCOPY WITH DAVINCI ROBOT with wedge resection X2, COMPLETION RIGHT UPPER LOBECTOMY, MEDIASTINAL AND HILAR LYMPH NODE DISSECTION, INTERCOSTAL NERVE BLOCK    Staff:  Surgeon(s):  Radha Marcus MD    Assistant: Blaine Gtz CSA; Annia Flynn CSA was responsible for performing the following activities: Retraction, Suction, Closing, Placing Dressing, and Held/Positioned Camera and their skilled assistance was necessary for the success of this case.     Anesthesia: General with Block    Estimated Blood Loss: 200ml    Implants:    Implant Name Type Inv. Item Serial No.  Lot No. LRB No. Used Action   RELOAD STPLR SUREFORM 45 DAVINCI/X/XI 6ROW 4.3 GRN 1P/U - [id number] Implant RELOAD STPLR SUREFORM 45 DAVINCI/X/XI 6ROW 4.3 GRN 1P/U  INTUITIVE SURGICAL [id number] Right 9 Implanted   HEMOST ABS SURGICEL ORIG 2X14IN STRL - [id number] Implant HEMOST ABS SURGICEL ORIG 2X14IN STRL  [employer]  DIV OF J AND J [id number] Right 1 Implanted   RELOAD STPLR [id number] CRV/TP 1FIRING 4ROW 2.5X8MM WHT 1P/U - [id number] Implant RELOAD STPLR [id number] CRV/TP 1FIRING 4ROW 2.5X8MM WHT 1P/U  INTUITIVE SURGICAL [id number] Right 4 Implanted   CLIP LIGAT VASC HORIZON TI SM/WD RD 6CT - [id number] Implant CLIP LIGAT VASC HORIZON TI SM/WD RD 6CT  [employer] [id number] Right 2 Implanted   RELOAD STPLR SUREFORM 45 DAVINCI/X/XI 6ROW 4.3 GRN 1P/U - [id number] Implant RELOAD STPLR SUREFORM 45 DAVINCI/X/XI 6ROW 4.3 GRN 1P/U  INTUITIVE SURGICAL [id number]  Right 1 Implanted   RELOAD STPLR SUREFORM 45 DAVINCI/X/XI 6ROW 4.3 GRN 1P/U - [id number] Implant RELOAD STPLR SUREFORM 45 DAVINCI/X/XI 6ROW 4.3 GRN 1P/U  INTUITIVE SURGICAL [id number] Right 3 Implanted   RELOAD STPLR [id number] CRV/TP 1FIRING 4ROW 2.5X8MM WHT 1P/U - [id number] Implant RELOAD STPLR [id number] CRV/TP 1FIRING 4ROW 2.5X8MM WHT 1P/U  INTUITIVE SURGICAL [id number] Right 4 Implanted   RELOAD STPLR SUREFORM 45 DAVINCI/X/XI 6ROW 4.3 GRN 1P/U - [id number] Implant RELOAD STPLR SUREFORM 45 DAVINCI/X/XI 6ROW 4.3 GRN 1P/U  INTUITIVE SURGICAL [id number] Right 1 Implanted   RELOAD STPLR SUREFORM 45 DAVINCI/X/XI 6ROW 4.3 GRN 1P/U - [id number] Implant RELOAD STPLR SUREFORM 45 DAVINCI/X/XI 6ROW 4.3 GRN 1P/U  INTUITIVE SURGICAL [id number] Right 1 Implanted   RELOAD STPLR [id number] CRV/TP 1FIRING 4ROW 2.5X8MM WHT 1P/U - [id number] Implant RELOAD STPLR [id number] CRV/TP 1FIRING 4ROW 2.5X8MM WHT 1P/U  INTUITIVE SURGICAL [id number] Right 1 Implanted       Specimen:          Specimens       ID Source Type Tests Collected By Collected At Frozen?    A Lung, Right Upper Lobe Tissue TISSUE PATHOLOGY EXAM   Radha Marcus MD 1/8/25 1505 Yes    Description: RIGHT UPPER LOBE WEDGE RESECTION FOR FROZEN PLEASE CALL OR24     B Lymph Node Tissue TISSUE PATHOLOGY EXAM   Radha Marcus MD 1/8/25 1521 No    Description: RIGHT LEVEL 8; FRESH FOR PERMAMENT    C Lymph Node Tissue TISSUE PATHOLOGY EXAM   Radha Marcus MD 1/8/25 1525 No    Description: LEVEL 7; FRESH FOR PERMAMENT    D Lymph Node Tissue TISSUE PATHOLOGY EXAM   Radha Marcus MD 1/8/25 1534 No    Description: RIGHT LEVEL 4; FRESH FOR PERMAMENT    E Lung, Right Upper Lobe Tissue TISSUE PATHOLOGY EXAM   Radha Marcus MD 1/8/25 1550 Yes    Description: RIGHT UPPER LOBE WEDGE RESECTION #2 FOR FROZEN PLEASE CALL OR24     F Lymph Node Tissue TISSUE PATHOLOGY EXAM   Radha Marcus MD 1/8/25 1621 No    Description: RIGHT LEVEL 10; FRESH FOR  PERMAMENT    G Lymph Node Tissue TISSUE PATHOLOGY EXAM   Radha Marcus MD 1/8/25 1716 No    Description: RIGHT LEVEL 11; FRESH FOR PERMAMENT    H Lymph Node Tissue TISSUE PATHOLOGY EXAM   Radha Marcus MD 1/8/25 1719 No    Description: RIGHT LEVEL 12; FRESH FOR PERMAMENT    I Lymph Node Tissue TISSUE PATHOLOGY EXAM   Radha Marcus MD 1/8/25 1751 No    Description: RIGHT LEVEL 10 #2; FRESH FOR PERMAMENT    J Lung, Right Upper Lobe Tissue TISSUE PATHOLOGY EXAM   Radha Marcus MD 1/8/25 1815 No    Description: RIGHT UPPER LOBECTOMY FRESH FOR PERMANENT; STITCH MARKS PRIOR CANCER STAPLE LINE              Findings: Frozen section consistent with adenocarcinoma    Complications: None apparent      Description of Procedure: Kortney Charlton was identified in the preoperative holding area and again her consent for the procedure was verified.  The patient was transported to the operating room and placed on the operating room table in supine position.  A general anesthetic was successfully administered and She was intubated with a double lumen endotracheal tube without difficulty.  Placement of the double lumen was confirmed with a video bronchoscope examination. A time out was performed to verify correct patient, correct procedure and the correct administration of IV antibiotics per [facility] protocol.    The patient was placed in left lateral decubitus position, right side up and all pressure points were padded.  The patient was prepped and draped in standard sterile fashion.  Robotic trocars were placed in the seventh intercostal space in standard fashion.  Insufflation was begun.  The robot was brought onto the field and docked in standard fashion.  An assistant port was placed in the 10th intercostal space posterior axillary line.  Instruments were passed into the chest cavity under direct vision.  My assistant remained at the bedside to manipulate and pass instruments and I proceeded to the robotic console.  This  was a very difficult operation secondary to body habitus.    The right upper lobe was examined and there was an abnormal area in the right upper lobe which was elevated.  A wedge resection was performed using multiple sequential fires of a robotic tissue stapler.  This was placed in an Endo Catch bag and removed from the chest cavity.  This was sent for frozen section evaluation which did not demonstrate our target lesion.  The lung was reexamined and adjacent to that prior staple line was another abnormal area of the lung, this was elevated and a wedge resection was performed with multiple sequential firings of a robotic tissue stapler.  This was placed in Endo Catch bag and removed from the chest cavity without difficulty.  This was sent for frozen section evaluation which confirmed adenocarcinoma, however, the margins were positive.  I was unable to perform another wedge resection and in order to get negative margins I elected to proceed with right upper lobectomy.    The inferior pulmonary ligament was mobilized using bipolar cautery and inferior pulmonary ligament lymph node was not present.  A paraesophageal lymph node was identified and retrieved using bipolar cautery.  The lung was retracted anteriorly and the subcarinal space was opened.  A subcarinal lymph node was retrieved.  The lung was retracted inferiorly and the right paratracheal space was opened and a right paratracheal lymph node was retrieved.  The pleura overlying the hilum was taken down and the airway was exposed.  The upper lobe bronchus was then encircled and divided using a robotic tissue stapler.  Prior to division a bronchoscopy was performed to verify that the bronchus intermedius was not narrowed.  There were multiple lymph nodes removed during this dissection.  The fissures were near complete.  The fissural tissue overlying the pulmonary artery was dissected free using bipolar cautery.  The posterior aspect of the major fissure was  encircled and divided with multiple firings of a robotic tissue stapler.  There were 4 branches of the pulmonary artery to the upper lobe.  These were encircled and divided sequentially.  Multiple lymph nodes were removed during this dissection.  Once the pulmonary arterial branches were divided, the lung was retracted posteriorly and the superior pulmonary vein was identified.  The superior pulmonary vein was encircled and divided using a robotic vascular stapler.  Care was taken to not narrow the middle lobe vein.  The remaining structure was the minor fissure.  This was taken with multiple sequential fires of a robotic tissue stapler.  The right upper lobe was placed in an Endo Catch bag and removed from the chest cavity without difficulty.  This was a very difficult procedure secondary to body habitus.     A rib block was performed using Marcaine and Exparel.  A 24 Montserratian chest tube was placed in the 8th intercostal space incision and the lung was reexpanded under direct visualization.     The incisions were closed using deep vicryl sutures and Monocryl for the skin in standard fashion.  The chest tube was secured to the skin.      The patient tolerated this procedure well and was extubated and transported to the recovery room in stable condition.

## 2025-01-19 NOTE — CASE MANAGEMENT/SOCIAL WORK
Case Management Discharge Note      Final Note: [facility]         Selected Continued Care - Discharged on 1/18/2025 Admission date: 1/8/2025 - Discharge disposition: Home or Self Care      Destination Coordination complete.      Service Provider Services Address Phone Fax Patient Preferred    [facility] [address] [place] [id number] [phone] --              Durable Medical Equipment    No services have been selected for the patient.                Dialysis/Infusion    No services have been selected for the patient.                Home Medical Care    No services have been selected for the patient.                Therapy    No services have been selected for the patient.                Community Resources    No services have been selected for the patient.                Community & DME    No services have been selected for the patient.                         Final Discharge Disposition Code: 03 - skilled nursing facility (SNF)

## 2025-01-20 ENCOUNTER — TRANSITIONAL CARE MANAGEMENT TELEPHONE ENCOUNTER (OUTPATIENT)
Dept: CALL CENTER | Facility: HOSPITAL | Age: 70
End: 2025-01-20
Payer: MEDICARE

## 2025-01-20 NOTE — OUTREACH NOTE
Call Center TCM Note      Flowsheet Row Responses   [facility] patient discharged from? [place]   Does the patient have one of the following disease processes/diagnoses(primary or secondary)? General Surgery   TCM attempt successful? No   Unsuccessful attempts Attempt 1            Esme Mcdaniels MA    1/20/2025, 13:27 EST

## 2025-01-20 NOTE — OUTREACH NOTE
Call Center TCM Note      Flowsheet Row Responses   [facility] patient discharged from? [place]   Does the patient have one of the following disease processes/diagnoses(primary or secondary)? General Surgery   TCM attempt successful? No   Unsuccessful attempts Attempt 2            Esme Mcdaniels MA    1/20/2025, 16:27 EST

## 2025-01-21 ENCOUNTER — PATIENT OUTREACH (OUTPATIENT)
Dept: OTHER | Facility: HOSPITAL | Age: 70
End: 2025-01-21
Payer: MEDICARE

## 2025-01-21 ENCOUNTER — TRANSITIONAL CARE MANAGEMENT TELEPHONE ENCOUNTER (OUTPATIENT)
Dept: CALL CENTER | Facility: HOSPITAL | Age: 70
End: 2025-01-21
Payer: MEDICARE

## 2025-01-21 DIAGNOSIS — R91.8 GROUND GLASS OPACITY PRESENT ON IMAGING OF LUNG: Primary | ICD-10-CM

## 2025-01-21 NOTE — OUTREACH NOTE
Call Center TCM Note      Flowsheet Row Responses   [facility] patient discharged from? [place]   Does the patient have one of the following disease processes/diagnoses(primary or secondary)? General Surgery   TCM attempt successful? Yes   Revoked Reason Other  [Pt discharged to rehab, not home]            Esme Mcdaniels MA    1/21/2025, 13:31 EST

## 2025-01-27 ENCOUNTER — PATIENT OUTREACH (OUTPATIENT)
Dept: CASE MANAGEMENT | Facility: OTHER | Age: 70
End: 2025-01-27
Payer: MEDICARE

## 2025-01-27 ENCOUNTER — OFFICE VISIT (OUTPATIENT)
Dept: SURGERY | Facility: CLINIC | Age: 70
End: 2025-01-27
Payer: MEDICARE

## 2025-01-27 ENCOUNTER — PATIENT OUTREACH (OUTPATIENT)
Dept: OTHER | Facility: HOSPITAL | Age: 70
End: 2025-01-27
Payer: MEDICARE

## 2025-01-27 ENCOUNTER — HOSPITAL ENCOUNTER (OUTPATIENT)
Dept: GENERAL RADIOLOGY | Facility: HOSPITAL | Age: 70
Discharge: HOME | End: 2025-01-27
Payer: MEDICARE

## 2025-01-27 VITALS — HEART RATE: 64 BPM | OXYGEN SATURATION: 94 %

## 2025-01-27 DIAGNOSIS — R03.0 ELEVATED BP WITHOUT DIAGNOSIS OF HYPERTENSION: Primary | ICD-10-CM

## 2025-01-27 DIAGNOSIS — C34.10 NSCLC OF UPPER LOBE: Primary | ICD-10-CM

## 2025-01-27 DIAGNOSIS — R91.8 GROUND GLASS OPACITY PRESENT ON IMAGING OF LUNG: ICD-10-CM

## 2025-01-27 PROCEDURE — 71046 X-RAY EXAM CHEST 2 VIEWS: CPT

## 2025-01-27 NOTE — PROGRESS NOTES
Referral received from Dr. Marcus.  I accompanied patient and  to Dr. Marcus's post-op appt today.  Introduced myself and explained navigational services.     The patient was admitted 1/8-1/17/25, status post robot-assisted right upper lobectomy. She had a prolonged postoperative course secondary to acute on chronic hypercapnic respiratory failure, requiring NIV. The patient discharged 1/17/25 to skilled nursing facility with OP follow up with Dr. Marcus today.  She required a Bipap at discharge.     Dr. Marcus discussed the patient's pathology results which revealed pT1a pN0 adenocarcinoma, acinar type with peripheral lepidic pattern and chest xray results from today.   The patient's incisions were healing well.  The patient remains in rehab; her estimated length of stay is unknown. Dr. Marcus referred the patient to bariatrics to discuss potential weight loss measures.  Dr. Marcus answered all questions.  The patient will meet with Dr. Marcus on 5/19/25 with a CT chest 1-2 weeks prior to this appt.     We discussed common post-op symptoms and average duration of these. The patient verbalized understanding.  The patient has a good understanding of her pathology and was able to teach back her plan of care.    The patient is alert and oriented. Prior to her admission, she was independent with ADLs. She is currently requiring assistance with ADLs and is in a wheelchair today. The patient reports she is able to ambulate with a rolling walker. She is wearing supplemental oxygen today.  The patient is temporarily in rehab and plans to discharge home with her  in a three-level home in [place] once her strength and endurance have improved.      The patient is a never smoker.    The patient has Medicare and Drain. She denies any current BHE claim issues. We discussed financial navigation, cost estimates and possible financial assistance if needed in the future.     The patient denies transportation, financial or  other resource needs at this time.     The patient has been eating well and denies weight loss.    The patient does not have an ACP on file although has the paperwork to complete.  Requested copy for chart once finalized.     The patient has a family history of bladder cancer (mother) and prostate cancer (father).     The patient is processing through her diagnosis and prolonged recovery.  Provided active listening and emotional support, validating and normalizing patient's feelings. We discussed OSW and Behavioral oncology services.  Patient expressed gratitude for my support and denied any additional needs at this time. We also discussed [employer] [employer] and [employer]. The patient will let me know if she would like referrals sent to either organization in the future.    We discussed integrative therapies and other services at the [facility].  She received a navigation folder with the following information at her appointment today: [facility] Support [facility], Cancer and Restorative Exercise - CARE, [facility] exercise program, Living with a Diagnosis of Lung Cancer, Lung Cancer [place] Support Services, Cancer Resource [place], Message Therapy, Reiki Therapy, [facility], Cancer Nutrition, Smoking Cessation and Survivorship Clinic.      Navigation will continue to follow; provided my name and number and encouraged patient to call if any needs arise.

## 2025-01-27 NOTE — OUTREACH NOTE
AMBULATORY CASE MANAGEMENT NOTE    Names and Relationships of Patient/Support Persons: Contact: [facility]; Relationship:  -     Care Coordination    Called [facility] and spoke to DC planner/Social work to follow up on DC plans. Per discharge planner no DC date yet as of this time. CCM to continue to follow.     Education Documentation  No documentation found.        Teresa MCWILLIAMS  Ambulatory Case Management    1/27/2025, 15:46 EST

## 2025-02-03 ENCOUNTER — TELEPHONE (OUTPATIENT)
Dept: CASE MANAGEMENT | Facility: OTHER | Age: 70
End: 2025-02-03
Payer: MEDICARE

## 2025-02-03 ENCOUNTER — PATIENT OUTREACH (OUTPATIENT)
Dept: CASE MANAGEMENT | Facility: OTHER | Age: 70
End: 2025-02-03
Payer: MEDICARE

## 2025-02-03 DIAGNOSIS — R03.0 ELEVATED BP WITHOUT DIAGNOSIS OF HYPERTENSION: Primary | ICD-10-CM

## 2025-02-03 NOTE — TELEPHONE ENCOUNTER
Called [facility]. Confirmed patient is still admitted in their facility in Rehab. Call transferred to DC planner - no answer. [facility].

## 2025-02-03 NOTE — OUTREACH NOTE
AMBULATORY CASE MANAGEMENT NOTE    Names and Relationships of Patient/Support Persons: Contact: [facility]; Relationship:  -     Care Coordination    Received incoming call from [facility] DC planner. No DC plan date set yet but plan is to go home with home health. CCM will continue to follow DC plans.    Education Documentation  No documentation found.        Teresa MCWILLIAMS  Ambulatory Case Management    2/3/2025, 11:09 EST

## 2025-02-10 ENCOUNTER — TELEPHONE (OUTPATIENT)
Dept: CASE MANAGEMENT | Facility: OTHER | Age: 70
End: 2025-02-10
Payer: MEDICARE

## 2025-02-10 NOTE — TELEPHONE ENCOUNTER
Called [facility] and spoke to clinical staff, confirmed patient is admitted in rehab. Call attempted to be transferred to DC planner but was in meeting during the call. Clinical staff took note that I was calling for DC plans and informed of my role with PCP and patient and will pass to DC planner request for call back for care coordination.

## 2025-02-12 ENCOUNTER — PATIENT OUTREACH (OUTPATIENT)
Dept: OTHER | Facility: HOSPITAL | Age: 70
End: 2025-02-12
Payer: MEDICARE

## 2025-02-12 NOTE — PROGRESS NOTES
Reviewed chart    Called patient. She is still in rehab although states she is progressing. The patient is ambulating with a walker.  She continues to report dyspnea and is wearing oxygen.  The patient states she has been using her IS. She is not sure when she will discharge home.    We discussed her CT scan and f/u appt with Dr. Marcus in May.      The patient denies any questions/concerns or ongoing resource needs. Navigation will continue to follow; encouraged patient to call as needed.

## 2025-02-12 NOTE — PROGRESS NOTES
"Chief Complaint  Lung Cancer (BRONCH 1/8 cxr )    Subjective        Kortney Charlton presents to [facility] THORACIC SURGERY  History of Present Illness  Ms. Charlton is a pleasant 69-year-old lady who presents in follow-up after undergoing resection of a non-small cell lung cancer with a right upper lobectomy.  This is her first postoperative visit.  She struggled postoperatively secondary to her body habitus and difficulty breathing.  She continues to require oxygen as well as be significantly short of breath.  Objective   Vital Signs:  Pulse 64   SpO2 94%   Estimated body mass index is 62.73 kg/m² as calculated from the following:    Height as of 1/12/25: 170.2 cm (67\").    Weight as of 1/12/25: 182 kg (400 lb 8 oz).            Physical Exam  Vitals and nursing note reviewed.   Constitutional:       Appearance: She is well-developed.   HENT:      Head: Normocephalic and atraumatic.      Nose: Nose normal.   Eyes:      Conjunctiva/sclera: Conjunctivae normal.   Cardiovascular:      Rate and Rhythm: Normal rate.   Pulmonary:      Effort: Pulmonary effort is normal.   Abdominal:      Palpations: Abdomen is soft.   Musculoskeletal:      Cervical back: Neck supple.   Skin:     General: Skin is warm and dry.   Neurological:      Mental Status: She is alert and oriented to person, place, and time.   Psychiatric:         Behavior: Behavior normal.         Thought Content: Thought content normal.         Judgment: Judgment normal.        Result Review :          I have independently reviewed the chest x-ray performed today which demonstrates good expansion bilateral lungs with a small right-sided pleural effusion there is bilateral atelectasis.     Assessment and Plan   Diagnoses and all orders for this visit:    1. NSCLC of upper lobe (Primary)  -     CT Chest Without Contrast; Future  -     Ambulatory Referral to Bariatric Surgery    Ms. tipton is a pleasant 69-year-old lady who presents after " resection of a T1 a N0 adenocarcinoma the right upper lobe.  She has no evidence of high risk features or lymph node disease.  She will need no further treatment for this stage Ia lung cancer.  She will need to start lung cancer surveillance with a CT of the chest in 4 months and a follow-up visit.    Her obesity is a limiting her ability to recover from this procedure.  We had a long talk today regarding referral to bariatric surgery for discussion of surgical and nonsurgical weight loss options.  She will continue pulmonary toilet for now.  She will remain on oxygen for now.         Follow Up   No follow-ups on file.  Patient was given instructions and counseling regarding her condition or for health maintenance advice. Please see specific information pulled into the AVS if appropriate.

## 2025-02-17 ENCOUNTER — PATIENT OUTREACH (OUTPATIENT)
Dept: CASE MANAGEMENT | Facility: OTHER | Age: 70
End: 2025-02-17
Payer: MEDICARE

## 2025-02-17 DIAGNOSIS — R03.0 ELEVATED BP WITHOUT DIAGNOSIS OF HYPERTENSION: Primary | ICD-10-CM

## 2025-02-17 NOTE — OUTREACH NOTE
AMBULATORY CASE MANAGEMENT NOTE    Names and Relationships of Patient/Support Persons: Contact: [facility]; Relationship:   Contact: [facility] (DC planner); Relationship:  -     Care Coordination    Called and spoke to [facility] DC planner. Confirmed patient is still admitted in rehab. No DC plan or date yet. CCM to continue following up with DC plans.     Education Documentation  No documentation found.        Teresa MCWILLIAMS  Ambulatory Case Management    2/17/2025, 11:35 EST

## 2025-02-24 ENCOUNTER — TELEPHONE (OUTPATIENT)
Dept: CASE MANAGEMENT | Facility: OTHER | Age: 70
End: 2025-02-24
Payer: MEDICARE

## 2025-02-24 NOTE — TELEPHONE ENCOUNTER
Called [facility] to follow up on DC plans. Confirmed patient is still admitted in rehab. Call to DC planner, no answer LM for care coordination.

## 2025-03-03 ENCOUNTER — READMISSION MANAGEMENT (OUTPATIENT)
Dept: CALL CENTER | Facility: HOSPITAL | Age: 70
End: 2025-03-03
Payer: MEDICARE

## 2025-03-03 ENCOUNTER — PATIENT OUTREACH (OUTPATIENT)
Dept: CASE MANAGEMENT | Facility: OTHER | Age: 70
End: 2025-03-03
Payer: MEDICARE

## 2025-03-03 DIAGNOSIS — R03.0 ELEVATED BP WITHOUT DIAGNOSIS OF HYPERTENSION: Primary | ICD-10-CM

## 2025-03-03 NOTE — OUTREACH NOTE
AMBULATORY CASE MANAGEMENT NOTE    Names and Relationships of Patient/Support Persons: Contact: [facility]; Relationship:  -     Care Coordination    Called [facility] and spoke to clinical staff. Patient to DC to home tomorrow 3/4/2025. Coordination to TCM.     Education Documentation  No documentation found.        Teresa MCWILLIAMS  Ambulatory Case Management    3/3/2025, 09:53 EST

## 2025-03-03 NOTE — OUTREACH NOTE
Prep Survey      Flowsheet Row Responses   [place] facility patient discharged from? Non-BH   Is LACE score < 7 ? Non-BH Discharge   Eligibility [facility]   Date of Discharge 03/04/25   Discharge diagnosis s/p robot-assisted right upper lobectomy   Does the patient have one of the following disease processes/diagnoses(primary or secondary)? General Surgery   Prep survey completed? Yes            Estela VO - Registered Nurse

## 2025-03-05 ENCOUNTER — TRANSITIONAL CARE MANAGEMENT TELEPHONE ENCOUNTER (OUTPATIENT)
Dept: CALL CENTER | Facility: HOSPITAL | Age: 70
End: 2025-03-05
Payer: MEDICARE

## 2025-03-05 NOTE — OUTREACH NOTE
Call Center TCM Note      Flowsheet Row Responses   [facility] [place] patient [facility]? [facility]  [facility]   Does the patient have one of the following disease processes/diagnoses(primary or secondary)? Other   TCM attempt successful? Yes   Call start time 0939   Call end time 0945   Discharge diagnosis s/p robot-assisted right upper lobectomy   Meds reviewed with patient/caregiver? Yes   Is the patient having any side effects they believe may be caused by any medication additions or changes? No   Does the patient have all medications ordered at discharge? Yes   Is the patient taking all medications as directed (includes completed medication regime)? Yes   Medication comments change the colestipol  changed to a powder CHOLESTYRAMINE   Comments HOSP DC FU appt 3/7/25 1015 am   Does the patient have an appointment with their PCP within 7-14 days of discharge? Yes   Has home health visited the patient within 72 hours of discharge? Call prior to 72 hours   Psychosocial issues? No   Did the patient receive a copy of their discharge instructions? Yes   Nursing interventions Reviewed instructions with patient   What is the patient's perception of their health status since discharge? Improving   Is the patient/caregiver able to teach back signs and symptoms related to disease process for when to call PCP? Yes   Is the patient/caregiver able to teach back signs and symptoms related to disease process for when to call 911? Yes   Is the patient/caregiver able to teach back the hierarchy of who to call/visit for symptoms/problems? PCP, Specialist, Home health nurse, Urgent Care, ED, 911 Yes   TCM call completed? Yes   Wrap up additional comments Pt reports surgicl site healed. Pt has improved , No needs.   Call end time 0945            Sarah Cabrera RN    3/5/2025, 09:45 EST

## 2025-03-07 ENCOUNTER — OFFICE VISIT (OUTPATIENT)
Dept: FAMILY MEDICINE CLINIC | Facility: CLINIC | Age: 70
End: 2025-03-07
Payer: MEDICARE

## 2025-03-07 ENCOUNTER — TELEPHONE (OUTPATIENT)
Dept: FAMILY MEDICINE CLINIC | Facility: CLINIC | Age: 70
End: 2025-03-07

## 2025-03-07 VITALS
WEIGHT: 293 LBS | BODY MASS INDEX: 45.99 KG/M2 | OXYGEN SATURATION: 94 % | TEMPERATURE: 97.7 F | HEIGHT: 67 IN | HEART RATE: 62 BPM | SYSTOLIC BLOOD PRESSURE: 128 MMHG | DIASTOLIC BLOOD PRESSURE: 80 MMHG

## 2025-03-07 DIAGNOSIS — K52.9 CHRONIC DIARRHEA: ICD-10-CM

## 2025-03-07 DIAGNOSIS — Z90.2 STATUS POST LOBECTOMY OF LUNG: ICD-10-CM

## 2025-03-07 DIAGNOSIS — G47.33 OSA TREATED WITH BIPAP: ICD-10-CM

## 2025-03-07 DIAGNOSIS — D72.829 LEUKOCYTOSIS, UNSPECIFIED TYPE: ICD-10-CM

## 2025-03-07 DIAGNOSIS — E66.01 MORBID OBESITY: ICD-10-CM

## 2025-03-07 DIAGNOSIS — R91.8 GROUND GLASS OPACITY PRESENT ON IMAGING OF LUNG: ICD-10-CM

## 2025-03-07 DIAGNOSIS — Z09 HOSPITAL DISCHARGE FOLLOW-UP: Primary | ICD-10-CM

## 2025-03-07 PROBLEM — H52.4 PRESBYOPIA: Status: ACTIVE | Noted: 2022-11-22

## 2025-03-07 PROBLEM — Z98.41 HISTORY OF BILATERAL CATARACT EXTRACTION: Status: ACTIVE | Noted: 2024-12-02

## 2025-03-07 PROBLEM — Z98.42 HISTORY OF BILATERAL CATARACT EXTRACTION: Status: ACTIVE | Noted: 2024-12-02

## 2025-03-07 RX ORDER — CHOLESTYRAMINE 4 G/9G
2 POWDER, FOR SUSPENSION ORAL
COMMUNITY
End: 2025-03-10 | Stop reason: SDUPTHER

## 2025-03-07 NOTE — TELEPHONE ENCOUNTER
Called to inform her that she did not owe anything on a flu shot.  She expressed understanding.    She also has the name of the physical therapist.  [facility].  She said you wanted to know who it was.  When she is done with home PT, she said you would do the order for it.

## 2025-03-07 NOTE — PATIENT INSTRUCTIONS
JOANN looking into the $400 flu shot with Medicare billing.  Manager will follow-up with patient.  Patient to follow-up with bariatrics, phone number is [phone].  Patient able to wean down oxygen as tolerated, goal would be to keep O2 levels 88-90% or higher without oxygen.  Home health may titrate down as needed while doing PT/OT.  Start wearing external ventilator at home at night time.  Patient should go to [facility] travel clinic for all vaccination and information prior to traveling to [facility] in July.  Once patient completes home health PT/OT she would like referral for outpatient PT OT.  May reach out to provider and we will place referral.

## 2025-03-07 NOTE — PROGRESS NOTES
Transitional Care Follow Up Visit  Subjective     Kortney Charlton is a 69 y.o. female who presents for a transitional care management visit.    Within 48 business hours after discharge our office contacted her via telephone to coordinate her care and needs.      I reviewed and discussed the details of that call along with the discharge summary, hospital problems, inpatient lab results, inpatient diagnostic studies, and consultation reports with Kortney.     Current outpatient and discharge medications have been reconciled for the patient.  Reviewed by: JOANN De Leon          3/3/2025    10:23 AM   Date of TCM Phone Call   [facility]   Date of Discharge 3/4/2025       History of Present Illness   Course During Hospital Stay:  01/08/25-01/18/25  Kortney Charlton is a 69 y.o. female who presented with groundglass opacity of the right upper lobe.  After preoperative evaluation, Dr. Marcus recommended resection and the patient agreed to proceed.  Hospital Course  Patient was admitted status post robot-assisted right upper lobectomy.  For further details, please refer to the operative note.  Prolonged postoperative course secondary to hypercarbia.  She has recovered well and her chest tube has been removed.  She will discharge today to skilled nursing facility with plans to follow-up with Dr. Marcus in 2 weeks with a chest x-ray. 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  Please refer to daily progress notes for further details.  Patient stayed an extra day in the hospital due to bed availability at rehab facility.    Rehab; [facility] rehab; 01/18/25-03/04/25 for  PT/OT    F/u  Bariatrics; needs appt still   Dr. Marcus 2 weeks 01/27/25  Ms. tipton is a pleasant 69-year-old lady who presents after resection of a T1 a N0 adenocarcinoma the right upper lobe.  She has no evidence of high risk features or lymph node disease.  She will need no further treatment for this stage Ia lung  cancer.  She will need to start lung cancer surveillance with a CT of the chest in 4 months and a follow-up visit.  Her obesity is a limiting her ability to recover from this procedure.  We had a long talk today regarding referral to bariatric surgery for discussion of surgical and nonsurgical weight loss options.  She will continue pulmonary toilet for now.  She will remain on oxygen for now.         Wearing O2 2.5-3L continuously.   Doing okay since she has been home from rehab.   Working on getting her strength back still.  SOB doing okay as long as she is wearing the oxygen.      Reports her and her  have plans to go to [facility]  in July; would like to be weaned off o2 by then.  Home health is supposed to come in and do PT/OT.  Would like referral  for PT/OT outpatient once home health is done. Has a specific place in mind.  Questran 8 g tid and is tolerating well and has not needed to use additional immodium since starting it in rehab.  The colestid was helping minimally and still requiring immodium prn.   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     Used a Bipap in the hospital. Now requiring external ventilator for home use at bedtime.         The following portions of the patient's history were reviewed and updated as appropriate: She  has a past medical history of Allergic (topical steroids), Anemia (Episodic), At risk for central sleep apnea, Breast lump in female (01/14/2019), Chronic nasopharyngitis, Deep vein thrombosis (March 2024), Elevated BP without diagnosis of hypertension, History of pulmonary embolism, Hypertension, Lesion of lung, Morbid obesity, Neuromuscular disorder (2010), OLIVERIO treated with BiPAP (3/9/2025), Osteoarthritis, Pulmonary embolism, Rash, Sneddon-Hanna disease, Stasis dermatitis, and Urinary tract infection (2022).  She does not have any pertinent problems on file.  She  has  "a past surgical history that includes Laparoscopic cholecystectomy (10/2011); Ovarian cyst removal; hysteroscopy endometrial ablation; Joint replacement (R-2013 L-2015); Colonoscopy (2015); BRONCHOSCOPY WITH ION ROBOTIC ASSIST (N/A, 10/30/2024); Cyst Removal; Cataract extraction (Bilateral); lobectomy (Right, 01/08/2025); and Ablation of dysrhythmic focus.  Her family history includes Arthritis in her father, maternal aunt, and mother; Atrial fibrillation in her mother; COPD in her mother; Cancer in her father and mother; Dementia in her mother; Heart attack in her father; Heart disease in her father and mother; Heart failure in her father; Hyperlipidemia in her mother; Hypertension in her father, mother, and sister; Migraines in her sister; Prostate cancer in her father; Stroke in her mother; Thyroid disease in her mother.  She  reports that she has never smoked. She has never been exposed to tobacco smoke. She has never used smokeless tobacco. She reports current alcohol use. She reports that she does not use drugs.  Current Outpatient Medications   Medication Sig Dispense Refill    dapsone 25 MG tablet Take 2 tablets by mouth 2 (Two) Times a Day.      Dapsone 5 % topical gel APPLY TO FACE ONCE DAILY      Fexofenadine HCl (ALLEGRA ALLERGY PO) Take 1 capsule by mouth As Needed.      Multiple Vitamins-Minerals (MULTIVITAMIN WITH MINERALS) tablet tablet Take 1 tablet by mouth Daily. HOLD PRIOR TO SURG      cholestyramine (QUESTRAN) 4 g packet Take 2 packets by mouth 3 (Three) Times a Day With Meals.       No current facility-administered medications for this visit.     She is allergic to zoster vac recomb adjuvanted, compazine [prochlorperazine edisylate], corticosteroids, and other..        Objective   /80   Pulse 62   Temp 97.7 °F (36.5 °C) (Temporal)   Ht 170.3 cm (67.06\")   Wt (!) 178 kg (393 lb)   SpO2 94%   BMI 61.45 kg/m²   Physical Exam  Vitals reviewed.   Constitutional:       General: She is not " in acute distress.     Appearance: Normal appearance. She is obese.   HENT:      Head: Normocephalic and atraumatic.      Nose: Nose normal. No congestion.      Mouth/Throat:      Mouth: Mucous membranes are moist.      Pharynx: No oropharyngeal exudate or posterior oropharyngeal erythema.   Eyes:      Conjunctiva/sclera: Conjunctivae normal.      Pupils: Pupils are equal, round, and reactive to light.   Cardiovascular:      Rate and Rhythm: Normal rate and regular rhythm.      Pulses: Normal pulses.      Heart sounds: No murmur heard.     No gallop.   Pulmonary:      Effort: Pulmonary effort is normal. No respiratory distress.      Breath sounds: Normal breath sounds. No wheezing.   Abdominal:      General: Bowel sounds are normal. There is no distension.      Palpations: Abdomen is soft.      Tenderness: There is no abdominal tenderness.   Musculoskeletal:         General: Normal range of motion.      Cervical back: Normal range of motion and neck supple. No tenderness.      Right lower leg: Edema present.      Left lower leg: Edema present.   Skin:     General: Skin is warm and dry.   Neurological:      Mental Status: She is alert and oriented to person, place, and time. Mental status is at baseline.   Psychiatric:         Mood and Affect: Mood normal.         Assessment & Plan   Problems Addressed this Visit       Morbid obesity    Chronic diarrhea    Relevant Orders    Comprehensive metabolic panel (Completed)    Body mass index (BMI) of 60.0 to 69.9 in adult    Ground glass opacity present on imaging of lung    Leukocytosis    Relevant Orders    CBC w AUTO Differential (Completed)    Status post lobectomy of lung    Hospital discharge follow-up - Primary    OLIVERIO treated with BiPAP     Diagnoses         Codes Comments      Hospital discharge follow-up    -  Primary ICD-10-CM: Z09  ICD-9-CM: V67.59       Leukocytosis, unspecified type     ICD-10-CM: D72.829  ICD-9-CM: 288.60       Chronic diarrhea     ICD-10-CM:  K52.9  ICD-9-CM: 787.91       Ground glass opacity present on imaging of lung     ICD-10-CM: R91.8  ICD-9-CM: 793.19       Morbid obesity     ICD-10-CM: E66.01  ICD-9-CM: 278.01       Body mass index (BMI) of 60.0 to 69.9 in adult     ICD-10-CM: Z68.44  ICD-9-CM: V85.44       Status post lobectomy of lung     ICD-10-CM: Z90.2  ICD-9-CM: V45.89       OLIVERIO treated with BiPAP     ICD-10-CM: G47.33  ICD-9-CM: 327.23             Will follow up with lab results.  APRN looking into the $400 flu shot with Medicare billing.  Manager will follow-up with patient.  Patient to follow-up with bariatrics, phone number is [phone].  Patient able to wean down oxygen as tolerated, goal would be to keep O2 levels 88-90% or higher without oxygen while walking.  Home health may titrate down O2 as needed while doing PT/OT.  Start wearing external ventilator at home at night time.  Patient should go to [facility] travel clinic for all vaccination and information prior to traveling to [facility] in July.  Discussed this trip could be dangerous given patient's recent surgery/diagnoses, o2 requirements, and external ventilator. Re-evaluate with PCP in April at next appt.  Once patient completes home health PT/OT she would like referral for outpatient PT OT.  May reach out to provider and we will place referral. [facility].

## 2025-03-08 LAB
ALBUMIN SERPL-MCNC: 3.6 G/DL (ref 3.5–5.2)
ALBUMIN/GLOB SERPL: 1.2 G/DL
ALP SERPL-CCNC: 81 U/L (ref 39–117)
ALT SERPL-CCNC: 24 U/L (ref 1–33)
AST SERPL-CCNC: 27 U/L (ref 1–32)
BASOPHILS # BLD AUTO: 0.04 10*3/MM3 (ref 0–0.2)
BASOPHILS NFR BLD AUTO: 0.6 % (ref 0–1.5)
BILIRUB SERPL-MCNC: 0.3 MG/DL (ref 0–1.2)
BUN SERPL-MCNC: 10 MG/DL (ref 8–23)
BUN/CREAT SERPL: 14.5 (ref 7–25)
CALCIUM SERPL-MCNC: 9.2 MG/DL (ref 8.6–10.5)
CHLORIDE SERPL-SCNC: 105 MMOL/L (ref 98–107)
CO2 SERPL-SCNC: 27.4 MMOL/L (ref 22–29)
CREAT SERPL-MCNC: 0.69 MG/DL (ref 0.57–1)
EGFRCR SERPLBLD CKD-EPI 2021: 92.9 ML/MIN/1.73
EOSINOPHIL # BLD AUTO: 0.08 10*3/MM3 (ref 0–0.4)
EOSINOPHIL NFR BLD AUTO: 1.2 % (ref 0.3–6.2)
ERYTHROCYTE [DISTWIDTH] IN BLOOD BY AUTOMATED COUNT: 14.6 % (ref 12.3–15.4)
GLOBULIN SER CALC-MCNC: 3.1 GM/DL
GLUCOSE SERPL-MCNC: 97 MG/DL (ref 65–99)
HCT VFR BLD AUTO: 38.5 % (ref 34–46.6)
HGB BLD-MCNC: 11.7 G/DL (ref 12–15.9)
IMM GRANULOCYTES # BLD AUTO: 0.07 10*3/MM3 (ref 0–0.05)
IMM GRANULOCYTES NFR BLD AUTO: 1 % (ref 0–0.5)
LYMPHOCYTES # BLD AUTO: 1.03 10*3/MM3 (ref 0.7–3.1)
LYMPHOCYTES NFR BLD AUTO: 14.8 % (ref 19.6–45.3)
MCH RBC QN AUTO: 27.3 PG (ref 26.6–33)
MCHC RBC AUTO-ENTMCNC: 30.4 G/DL (ref 31.5–35.7)
MCV RBC AUTO: 90 FL (ref 79–97)
MONOCYTES # BLD AUTO: 0.58 10*3/MM3 (ref 0.1–0.9)
MONOCYTES NFR BLD AUTO: 8.4 % (ref 5–12)
NEUTROPHILS # BLD AUTO: 5.14 10*3/MM3 (ref 1.7–7)
NEUTROPHILS NFR BLD AUTO: 74 % (ref 42.7–76)
NRBC BLD AUTO-RTO: 0 /100 WBC (ref 0–0.2)
PLATELET # BLD AUTO: 252 10*3/MM3 (ref 140–450)
POTASSIUM SERPL-SCNC: 4.4 MMOL/L (ref 3.5–5.2)
PROT SERPL-MCNC: 6.7 G/DL (ref 6–8.5)
RBC # BLD AUTO: 4.28 10*6/MM3 (ref 3.77–5.28)
SODIUM SERPL-SCNC: 142 MMOL/L (ref 136–145)
WBC # BLD AUTO: 6.94 10*3/MM3 (ref 3.4–10.8)

## 2025-03-09 PROBLEM — D72.829 LEUKOCYTOSIS: Status: ACTIVE | Noted: 2025-03-09

## 2025-03-09 PROBLEM — G47.33 OSA TREATED WITH BIPAP: Status: ACTIVE | Noted: 2025-03-09

## 2025-03-09 PROBLEM — Z09 HOSPITAL DISCHARGE FOLLOW-UP: Status: ACTIVE | Noted: 2025-03-09

## 2025-03-09 PROBLEM — Z90.2 STATUS POST LOBECTOMY OF LUNG: Status: ACTIVE | Noted: 2025-03-09

## 2025-03-10 ENCOUNTER — PATIENT OUTREACH (OUTPATIENT)
Dept: CASE MANAGEMENT | Facility: OTHER | Age: 70
End: 2025-03-10
Payer: MEDICARE

## 2025-03-10 RX ORDER — CHOLESTYRAMINE 4 G/9G
2 POWDER, FOR SUSPENSION ORAL
Qty: 120 PACKET | Refills: 2 | Status: SHIPPED | OUTPATIENT
Start: 2025-03-10

## 2025-03-10 NOTE — TELEPHONE ENCOUNTER
Caller: Kortney Charlton    Relationship: Self    Requested Prescriptions:   Requested Prescriptions     Pending Prescriptions Disp Refills    cholestyramine (QUESTRAN) 4 g packet       Sig: Take 2 packets by mouth 3 (Three) Times a Day With Meals.      Pharmacy where request should be sent: [facility]/PHARMACY #[id number] - [facility] [place] - [address] AT [facility] [phone]  - [phone] FX     Last office visit with prescribing clinician: 4/17/2024   Last telemedicine visit with prescribing clinician: Visit date not found   Next office visit with prescribing clinician: 4/23/2025     Additional details provided by patient: PATIENT STATES THAT HUYEN WAS TO CALL THIS IN LAST WEEK WHEN THE PATIENT WAS IN THE OFFICE.  PATIENT WILL BE OUT TODAY.      Does the patient have less than a 3 day supply:  [x] Yes  [] No    Would you like a call back once the refill request has been completed: [] Yes [x] No    If the office needs to give you a call back, can they leave a voicemail: [] Yes [x] No    Miguel Tidwell Rep   03/10/25 12:12 EDT

## 2025-03-10 NOTE — OUTREACH NOTE
AMBULATORY CASE MANAGEMENT NOTE    Names and Relationships of Patient/Support Persons: Contact: Kortney Charlton VALERIE; Relationship: Self -     CCM Interim Update    Called and spoke to patient for CCM services. Introduced self, role and reason for the call. Patient now home with home health. Seen at PCP office 3/7 for Hospital DC - rehab follow up. She inquired regarding Cholestyramine, notified that this was sent to her pharmacy. She had questions regarding her recent labs Hgb. Notified and relayed per Senait DAVID - Hemoglobin is a little low, may take OTC iron supplement.  Kidney, liver, and electrolytes all normal.    Patient reported she is taking multivitamins OTC.   She denies further needs at this time.   She is being followed by Nurse Navigator.     Education Documentation  No documentation found.        Teresa MCWILLIAMS  Ambulatory Case Management    3/10/2025, 13:25 EDT

## 2025-03-12 ENCOUNTER — TELEPHONE (OUTPATIENT)
Dept: FAMILY MEDICINE CLINIC | Facility: CLINIC | Age: 70
End: 2025-03-12

## 2025-03-12 ENCOUNTER — TELEPHONE (OUTPATIENT)
Dept: FAMILY MEDICINE CLINIC | Facility: CLINIC | Age: 70
End: 2025-03-12
Payer: MEDICARE

## 2025-03-12 DIAGNOSIS — R53.1 WEAKNESS: Primary | ICD-10-CM

## 2025-03-12 NOTE — TELEPHONE ENCOUNTER
Hector from [facility] called and states they did a patient evaluation only on Ms Charlton and he thinks she would benefit from some outpatient therapy.

## 2025-03-12 NOTE — TELEPHONE ENCOUNTER
Caller: Kortney Charlton    Relationship to patient: Self    Patient is needing: PATIENT STATES THAT HER HOME PHYSICAL AND OCCUPATIONAL THERAPISTS CAME TO HER HOME TODAY AND STATED PATIENT IS DOING TOO WELL FOR THEIR SERVICES AND SHE SHOULD GO TO OUTPATIENT PHYSICAL THERAPY INSTEAD.  PATIENT IS ASKING FOR THE ORDER FOR OUTPATIENT PHYSICAL THERAPY BE SENT TO [facility], [phone], FAX: [phone], AT [address], [phone].

## 2025-03-13 NOTE — TELEPHONE ENCOUNTER
Name: Kortney Charlton VALERIE    Relationship: Self    HUB PROVIDED THE RELAY MESSAGE FROM THE OFFICE   PATIENT VOICED UNDERSTANDING AND HAS NO FURTHER QUESTIONS AT THIS TIME    ADDITIONAL INFORMATION:  PATIENT  STATES THAT SHE WAS IN THE HOSPITAL AND IN REHAB FOR 8 WEEKS AND SHE IS WANTING TO GET WEANED OFF OF OXYGEN AND GET HER STRENGTH BACK WHICH IS WHY SHE IS WANTING TO DO THE OUTPATIENT PHYSICAL THERAPY,.   SHE STATES YESTERDAY HER HOME HEALTH PHYSICAL THERAPIST TOLD HER SHE SHOULD HAVE HER PCP PUT IN AN ORDER TO OUTPATIENT PHYSICAL THERAPY BECAUSE THEY WOULD BE ABLE TO GET HER BACK TO WHERE SHE NEEDS TO BE QUICKER.

## 2025-03-13 NOTE — TELEPHONE ENCOUNTER
It looks like this patient is going to need a refferal in order to start outpatient therapy.  AG    Please advise

## 2025-03-13 NOTE — TELEPHONE ENCOUNTER
I called the patient and left a voicemail to callback with a response. She is to explain what kind of therapy she is doing so  can place an order in.   AG    Ok for hub to relay

## 2025-03-17 ENCOUNTER — TELEPHONE (OUTPATIENT)
Dept: FAMILY MEDICINE CLINIC | Facility: CLINIC | Age: 70
End: 2025-03-17

## 2025-03-17 ENCOUNTER — OUTSIDE FACILITY SERVICE (OUTPATIENT)
Dept: FAMILY MEDICINE CLINIC | Facility: CLINIC | Age: 70
End: 2025-03-17
Payer: MEDICARE

## 2025-03-17 NOTE — TELEPHONE ENCOUNTER
Caller: Kortney Charlton    Relationship: Self    Best call back number: [phone]     What was the call regarding: PATIENT STATES THAT SUSAN WAS SENDING NOTES FROM HER CARE AND SHE IS CHECKING TO SEE IF RECEIVED. IT WAS TO BE FAXED 3/13 OR 3/14.    PLEASE CALL.

## 2025-03-21 ENCOUNTER — READMISSION MANAGEMENT (OUTPATIENT)
Dept: CALL CENTER | Facility: HOSPITAL | Age: 70
End: 2025-03-21
Payer: MEDICARE

## 2025-03-21 NOTE — OUTREACH NOTE
Prep Survey      Flowsheet Row Responses   [facility] facility patient discharged from? Non-BH   Is LACE score < 7 ? Non-BH Discharge   Eligibility Not Eligible   What are the reasons patient is not eligible? Other  [no recent hospital admissions detected]   Does the patient have one of the following disease processes/diagnoses(primary or secondary)? Other   Prep survey completed? Yes            Yulia VO - Registered Nurse

## 2025-04-02 ENCOUNTER — TELEPHONE (OUTPATIENT)
Dept: SURGERY | Facility: CLINIC | Age: 70
End: 2025-04-02
Payer: MEDICARE

## 2025-04-02 NOTE — TELEPHONE ENCOUNTER
Patient called to ask if she can continue her biologic medication, per the provider I informed her that it was ok to resume. Patient voiced understanding.

## 2025-04-17 ENCOUNTER — TELEPHONE (OUTPATIENT)
Dept: FAMILY MEDICINE CLINIC | Facility: CLINIC | Age: 70
End: 2025-04-17
Payer: MEDICARE

## 2025-04-17 NOTE — TELEPHONE ENCOUNTER
Spoke w pharmacy and there is no order required.  Pt notified and voiced understanding. [facility]

## 2025-04-17 NOTE — TELEPHONE ENCOUNTER
PATIENT CALLED REQUESTING A HEP B VACCINE PRESCRIPTION. WILL BE TRAVELING TO [facility] IN JULY    PLEASE FAX TO    [facility]/pharmacy #[id number] - [place], [place] - [address] AT [facility] - [phone] [facility] [phone]  [phone]     SHE HAS AN APPOINTMENT TODAY AT 3:00  FOR VACCINE  CALL BACK NUMBER [phone]

## 2025-04-23 ENCOUNTER — OFFICE VISIT (OUTPATIENT)
Dept: FAMILY MEDICINE CLINIC | Facility: CLINIC | Age: 70
End: 2025-04-23
Payer: MEDICARE

## 2025-04-23 VITALS
DIASTOLIC BLOOD PRESSURE: 78 MMHG | BODY MASS INDEX: 45.99 KG/M2 | OXYGEN SATURATION: 96 % | WEIGHT: 293 LBS | HEART RATE: 66 BPM | TEMPERATURE: 98.2 F | HEIGHT: 67 IN | SYSTOLIC BLOOD PRESSURE: 124 MMHG

## 2025-04-23 DIAGNOSIS — Z12.31 VISIT FOR SCREENING MAMMOGRAM: ICD-10-CM

## 2025-04-23 DIAGNOSIS — R10.11 ABDOMINAL WALL PAIN IN BOTH UPPER QUADRANTS: ICD-10-CM

## 2025-04-23 DIAGNOSIS — Z12.11 COLON CANCER SCREENING: ICD-10-CM

## 2025-04-23 DIAGNOSIS — L13.1 SNEDDON-WILKINSON DISEASE: ICD-10-CM

## 2025-04-23 DIAGNOSIS — Z00.00 MEDICARE ANNUAL WELLNESS VISIT, SUBSEQUENT: Primary | ICD-10-CM

## 2025-04-23 DIAGNOSIS — R73.03 PREDIABETES: ICD-10-CM

## 2025-04-23 DIAGNOSIS — Z13.6 ENCOUNTER FOR LIPID SCREENING FOR CARDIOVASCULAR DISEASE: ICD-10-CM

## 2025-04-23 DIAGNOSIS — Z23 IMMUNIZATION DUE: ICD-10-CM

## 2025-04-23 DIAGNOSIS — Z78.0 POST-MENOPAUSAL: ICD-10-CM

## 2025-04-23 DIAGNOSIS — Z78.9 RECENT FOREIGN TRAVEL: ICD-10-CM

## 2025-04-23 DIAGNOSIS — R10.12 ABDOMINAL WALL PAIN IN BOTH UPPER QUADRANTS: ICD-10-CM

## 2025-04-23 DIAGNOSIS — Z90.2 STATUS POST LOBECTOMY OF LUNG: ICD-10-CM

## 2025-04-23 DIAGNOSIS — Z13.220 ENCOUNTER FOR LIPID SCREENING FOR CARDIOVASCULAR DISEASE: ICD-10-CM

## 2025-04-23 DIAGNOSIS — K52.9 CHRONIC DIARRHEA: ICD-10-CM

## 2025-04-23 DIAGNOSIS — R91.8 GROUND GLASS OPACITY PRESENT ON IMAGING OF LUNG: ICD-10-CM

## 2025-04-23 RX ORDER — FUROSEMIDE 20 MG/1
20 TABLET ORAL 2 TIMES DAILY
COMMUNITY

## 2025-04-23 RX ORDER — CHOLESTYRAMINE 4 G/9G
4 POWDER, FOR SUSPENSION ORAL
Qty: 378 G | Refills: 1 | Status: SHIPPED | OUTPATIENT
Start: 2025-04-23

## 2025-04-23 RX ORDER — GABAPENTIN 300 MG/1
300 CAPSULE ORAL 3 TIMES DAILY
Qty: 90 CAPSULE | Refills: 0 | Status: SHIPPED | OUTPATIENT
Start: 2025-04-23

## 2025-04-23 NOTE — ASSESSMENT & PLAN NOTE
V2.0    [facility] Progress Note      Name:  Cezar Abarca /Age/Sex: 1942  (82 y.o. male)   MRN & CSN:  [id number] & [id number] Encounter Date/Time: 2024 12:41 PM EDT   Location:  [id number] PCP: Manolo Bolton MD     Attending:Bart Sánchez MD       Hospital Day: 2    Assessment and Recommendations   Cezar Abarca is a 82 y.o. male who presents with Syncope and collapse      Plan:       Lightheadedness and dizziness with short syncopal episode, likely vasovagal, CT noted, telemetry monitoring IV fluid  Multiple myeloma follow-up as outpatient  Generalized weakness improved  COPD without acute exacerbation  Essential hypertension.  Medication  Minimally elevated troponin 27 and 28 not significant with no chest pain  Anemia, appears chronic no signs of bleeding hemoglobin 11.5 this morning  Thrombocytopenia, platelet 111      Diet ADULT DIET; Regular   DVT Prophylaxis [] Lovenox, []  Heparin, [] SCDs, [] Ambulation,  [] Eliquis, [] Xarelto  [] Coumadin   Code Status Full Code             Personally reviewed Lab Studies and Imaging   Telemetry strip reviewed by myself no ST elevation  Medical Decision Making:  The following items were considered in medical decision making:  Discussion of patient care with other providers  Reviewed clinical lab tests  Reviewed radiology tests  Reviewed other diagnostic tests/interventions  Independent review of radiologic images  Microbiology cultures and other micro tests reviewed      Subjective:     Chief Complaint: Syncope    Cezar Abarca is a 82 y.o. male who presents with syncope lightheadedness overall improved no fever chills nausea or vomiting no family member at bedside      Review of Systems:      Pertinent positives and negatives discussed in HPI    Objective:     Intake/Output Summary (Last 24 hours) at 2024 1241  Last data filed at 2024 0459  Gross per 24 hour   Intake 782.43 ml   Output --   Net 782.43 ml      Vitals:   Vitals:    Orders:    cholestyramine (QUESTRAN) 4 GM/DOSE powder; Take 1 packet by mouth 3 (Three) Times a Day With Meals.     PM    PHUR 5.0 04/23/2024 04:23 PM    WBCUA 2 04/23/2024 04:23 PM    RBCUA 2 04/23/2024 04:23 PM    MUCUS 4+ 05/03/2023 02:22 AM    BACTERIA None Seen 04/23/2024 04:23 PM    CLARITYU Clear 04/23/2024 04:23 PM    SPECGRAV 1.047 04/23/2024 04:23 PM    LEUKOCYTESUR Negative 04/23/2024 04:23 PM    UROBILINOGEN 0.2 04/23/2024 04:23 PM    BILIRUBINUR Negative 04/23/2024 04:23 PM    BILIRUBINUR NEGATIVE 02/23/2012 01:25 AM    BLOODU TRACE 04/23/2024 04:23 PM    GLUCOSEU Negative 04/23/2024 04:23 PM    GLUCOSEU NEGATIVE 02/23/2012 01:25 AM    KETUA Negative 04/23/2024 04:23 PM     Urine Cultures: No results found for: \"LABURIN\"  Blood Cultures:   Lab Results   Component Value Date/Time    BC No Growth after 4 days of incubation. 07/12/2023 01:56 AM     Lab Results   Component Value Date/Time    BLOODCULT2 No Growth after 4 days of incubation. 07/11/2023 10:52 PM     Organism:   Lab Results   Component Value Date/Time    ORG Staph aureus MRSA 09/30/2023 01:01 PM         Electronically signed by Bart Sánchez MD on 4/24/2024 at 12:41 PM  Comment: Please note this report has been produced using speech recognition software and may contain errors related to that system including errors in grammar, punctuation, and spelling, as well as words and phrases that may be inappropriate. If there are any questions or concerns, please feel free to contact the dictating provider for clarification.

## 2025-04-23 NOTE — PROGRESS NOTES
Subjective   The ABCs of the Annual Wellness Visit  Medicare Wellness Visit      Kortney Charlton is a 69 y.o. patient who presents for a Medicare Wellness Visit.    The following portions of the patient's history were reviewed and   updated as appropriate: allergies, current medications, past family history, past medical history, past social history, past surgical history, and problem list.    Compared to one year ago, the patient's physical   health is worse.  Compared to one year ago, the patient's mental   health is the same.    Recent Hospitalizations:  This patient has had a [facility] admission record on file within the last 365 days.  Current Medical Providers:  Patient Care Team:  Dorinda Hastings MD as PCP - General (Family Medicine)  Michelle Aquino RN as Nurse Navigator    Outpatient Medications Prior to Visit   Medication Sig Dispense Refill    dapsone 25 MG tablet Take 2 tablets by mouth 2 (Two) Times a Day.      Dapsone 5 % topical gel APPLY TO FACE ONCE DAILY      Fexofenadine HCl (ALLEGRA ALLERGY PO) Take 1 capsule by mouth As Needed.      furosemide (LASIX) 20 MG tablet Take 1 tablet by mouth 2 (Two) Times a Day.      Multiple Vitamins-Minerals (MULTIVITAMIN WITH MINERALS) tablet tablet Take 1 tablet by mouth Daily. HOLD PRIOR TO SURG      cholestyramine (QUESTRAN) 4 g packet Take 2 packets by mouth 3 (Three) Times a Day With Meals. 120 packet 2     No facility-administered medications prior to visit.     No opioid medication identified on active medication list. I have reviewed chart for other potential  high risk medication/s and harmful drug interactions in the elderly.      Aspirin is not on active medication list.  Aspirin use is not indicated based on review of current medical condition/s. Risk of harm outweighs potential benefits.  .    Patient Active Problem List   Diagnosis    Morbid obesity    Elevated BP without diagnosis of hypertension    Venous stasis dermatitis of both  "lower extremities    History of total bilateral knee replacement (TKR)    Breast lump in female    Sneddon-Hanna disease    Abnormal TSH    Atypical chest pain    Medicare annual wellness visit, subsequent    Diarrhea in adult patient    Chronic diarrhea    Hypermetropia, bilateral    Vitreous degeneration of left eye    Prediabetes    Numbness and tingling in left hand    Bilateral carpal tunnel syndrome    Diarrhea following gastrointestinal surgery    Leg swelling    Encounter for lipid screening for cardiovascular disease    Body mass index (BMI) of 60.0 to 69.9 in adult    Immunization due    Age-related nuclear cataract of both eyes    Bilateral myopia    Presbyopia    Regular astigmatism of both eyes    History of pulmonary embolism    Ground glass opacity present on imaging of lung    Pneumothorax    History of bilateral cataract extraction    Presbyopia    Leukocytosis    Status post lobectomy of lung    Hospital discharge follow-up    OLIVERIO treated with BiPAP     Advance Care Planning Advance Directive is not on file.  ACP discussion was held with the patient during this visit. Patient has an advance directive (not in EMR), copy requested.            Objective   Vitals:    04/23/25 1052   BP: 124/78   BP Location: Left arm   Patient Position: Sitting   Cuff Size: Adult   Pulse: 66   Temp: 98.2 °F (36.8 °C)   SpO2: 96%   Weight: (!) 173 kg (382 lb)   Height: 170.3 cm (67.05\")   PainSc: 0-No pain       Estimated body mass index is 59.75 kg/m² as calculated from the following:    Height as of this encounter: 170.3 cm (67.05\").    Weight as of this encounter: 173 kg (382 lb).                Does the patient have evidence of cognitive impairment? No                                                                                                Health  Risk Assessment    Smoking Status:  Social History     Tobacco Use   Smoking Status Never    Passive exposure: Never   Smokeless Tobacco Never     Alcohol " Consumption:  Social History     Substance and Sexual Activity   Alcohol Use Yes    Comment: maybe 2x year       Fall Risk Screen  NINI Fall Risk Assessment was completed, and patient is at MODERATE risk for falls. Assessment completed on:2025    Depression Screening   Little interest or pleasure in doing things? Not at all   Feeling down, depressed, or hopeless? Not at all   PHQ-2 Total Score 0      Health Habits and Functional and Cognitive Screenin/22/2025    11:28 AM   Functional & Cognitive Status   Do you have difficulty preparing food and eating? No   Do you have difficulty bathing yourself, getting dressed or grooming yourself? No   Do you have difficulty using the toilet? No   Do you have difficulty moving around from place to place? No   Do you have trouble with steps or getting out of a bed or a chair? No   Current Diet Other   Dental Exam Up to date   Eye Exam Up to date   Exercise (times per week) 4 times per week   Current Exercises Include Home Exercise Program (TV, Computer[employer] Etc.);[place] [place];Walking   Do you need help using the phone?  No   Are you deaf or do you have serious difficulty hearing?  No   Do you need help to go to places out of walking distance? No   Do you need help shopping? No   Do you need help preparing meals?  No   Do you need help with housework?  No   Do you need help with laundry? No   Do you need help taking your medications? No   Do you need help managing money? No   Do you ever drive or ride in a car without wearing a seat belt? No   Have you felt unusual stress, anger or loneliness in the last month? No   Who do you live with? Spouse   If you need help, do you have trouble finding someone available to you? No   Have you been bothered in the last four weeks by sexual problems? No   Do you have difficulty concentrating, remembering or making decisions? No           Age-appropriate Screening Schedule:  Refer to the list below for future screening  recommendations based on patient's age, sex and/or medical conditions. Orders for these recommended tests are listed in the plan section. The patient has been provided with a written plan.    Health Maintenance List  Health Maintenance   Topic Date Due    DIABETIC FOOT EXAM  Never done    URINE MICROALBUMIN-CREATININE RATIO (uACR)  Never done    DIABETIC EYE EXAM  11/22/2023    COVID-19 Vaccine (4 - 2024-25 season) 09/01/2024    HEMOGLOBIN A1C  10/17/2024    COLORECTAL CANCER SCREENING  12/18/2024    DXA SCAN  01/13/2025    ANNUAL WELLNESS VISIT  04/17/2025    INFLUENZA VACCINE  07/01/2025    MAMMOGRAM  09/26/2025    TDAP/TD VACCINES (2 - Td or Tdap) 06/26/2028    HEPATITIS C SCREENING  Completed    Pneumococcal Vaccine 50+  Completed                                                                                                                                                CMS Preventative Services Quick Reference  Risk Factors Identified During Encounter  None Identified    The above risks/problems have been discussed with the patient.  Pertinent information has been shared with the patient in the After Visit Summary.  An After Visit Summary and PPPS were made available to the patient.    Follow Up:   Next Medicare Wellness visit to be scheduled in 1 year.         Additional E&M Note during same encounter follows:  Patient has additional, significant, and separately identifiable condition(s)/problem(s) that require work above and beyond the Medicare Wellness Visit     Chief Complaint  Medicare Wellness-subsequent (Is having a tight feeling thats uncomfortable in her lower stomach)    Subjective   HPI  Kortney is also being seen today for additional medical problem/s.    Review of Systems   Respiratory:  Negative for shortness of breath.    Cardiovascular:  Negative for chest pain.      Having some upper abdominal tightness.  Has recently had a UTI and was treated with Macrobid.This is resolved. It has been going on  "since her surgery. Nausea resolved. No heartburn. Pt does have some diarrhea from having her gallbladder removed. Pt wanting her cholestyramine in a bigger can. She has been dieting hard. Not having a bulge/hernia. Was given Neurontin for this after surgery and it really helped but them her symptoms returned after she stopped thisd. Ia also working out her abd wall muscles more.     Obesity-still working hard on diet and exercise. Gained a little weight over easter.       Swelling in legs much better. On meds, due labs     Sneddon-barragan disease- follows with derm, is a psoriatic disease. 2-3 flairs a year.     Patient has been losing weight, has been working hard on this.     Patient has recently had a lobectomy for groundglass opacity on imaging of the lung. It was adenocarcinoma but they think they got it all per pt and she has f/u with them in 2 months for more imaging.     Patient has been erroneously marked as diabetic. Based on the available clinical information, she does not have diabetes and should therefore be excluded from diabetic health maintenance and quality measures for the remainder of the reporting period.      Objective   Vital Signs:  /78 (BP Location: Left arm, Patient Position: Sitting, Cuff Size: Adult)   Pulse 66   Temp 98.2 °F (36.8 °C)   Ht 170.3 cm (67.05\")   Wt (!) 173 kg (382 lb)   SpO2 96%   BMI 59.75 kg/m²   Physical Exam  Constitutional:       Appearance: Normal appearance. She is well-developed.   Cardiovascular:      Rate and Rhythm: Normal rate and regular rhythm.      Heart sounds: Normal heart sounds.   Pulmonary:      Effort: Pulmonary effort is normal.      Breath sounds: Normal breath sounds.   Musculoskeletal:         General: No swelling. Normal range of motion.   Skin:     General: Skin is warm and dry.      Findings: No rash.   Neurological:      General: No focal deficit present.      Mental Status: She is alert and oriented to person, place, and time. "   Psychiatric:         Mood and Affect: Mood normal.         Behavior: Behavior normal.                    Assessment and Plan      Medicare annual wellness visit, subsequent         Encounter for lipid screening for cardiovascular disease    Orders:    Comprehensive Metabolic Panel    Lipid Panel    Body mass index (BMI) of 60.0 to 69.9 in adult         Prediabetes    Orders:    Hemoglobin A1c    Ground glass opacity present on imaging of lung         Status post lobectomy of lung         Sneddon-Hanna disease         Chronic diarrhea    Orders:    cholestyramine (QUESTRAN) 4 GM/DOSE powder; Take 1 packet by mouth 3 (Three) Times a Day With Meals.    Recent foreign travel    Orders:    Poliovirus Vaccine IPV Subcutaneous / IM    Immunization due    Orders:    Poliovirus Vaccine IPV Subcutaneous / IM    Abdominal wall pain in both upper quadrants    Orders:    gabapentin (NEURONTIN) 300 MG capsule; Take 1 capsule by mouth 3 (Three) Times a Day.    Colon cancer screening    Orders:    Ambulatory Referral For Screening Colonoscopy    Visit for screening mammogram    Orders:    Mammo Screening Digital Tomosynthesis Bilateral With CAD; Future    Post-menopausal    Orders:    DEXA Bone Density Axial; Future            Follow Up   Return in about 6 months (around 10/23/2025) for Recheck.  Patient was given instructions and counseling regarding her condition or for health maintenance advice. Please see specific information pulled into the AVS if appropriate.      Cont meds, is in PT right now, f/u in 6 months. F/U if worse or no better and discussed risks and benefits and added in gabapentin for the short term.

## 2025-04-24 LAB
ALBUMIN SERPL-MCNC: 3.7 G/DL (ref 3.5–5.2)
ALBUMIN/GLOB SERPL: 1.3 G/DL
ALP SERPL-CCNC: 98 U/L (ref 39–117)
ALT SERPL-CCNC: 22 U/L (ref 1–33)
AST SERPL-CCNC: 22 U/L (ref 1–32)
BILIRUB SERPL-MCNC: 0.2 MG/DL (ref 0–1.2)
BUN SERPL-MCNC: 10 MG/DL (ref 8–23)
BUN/CREAT SERPL: 14.9 (ref 7–25)
CALCIUM SERPL-MCNC: 9.2 MG/DL (ref 8.6–10.5)
CHLORIDE SERPL-SCNC: 107 MMOL/L (ref 98–107)
CHOLEST SERPL-MCNC: 143 MG/DL (ref 0–200)
CO2 SERPL-SCNC: 26.7 MMOL/L (ref 22–29)
CREAT SERPL-MCNC: 0.67 MG/DL (ref 0.57–1)
EGFRCR SERPLBLD CKD-EPI 2021: 93.6 ML/MIN/1.73
GLOBULIN SER CALC-MCNC: 2.8 GM/DL
GLUCOSE SERPL-MCNC: 114 MG/DL (ref 65–99)
HBA1C MFR BLD: 5 % (ref 4.8–5.6)
HDLC SERPL-MCNC: 40 MG/DL (ref 40–60)
LDLC SERPL CALC-MCNC: 68 MG/DL (ref 0–100)
POTASSIUM SERPL-SCNC: 4.2 MMOL/L (ref 3.5–5.2)
PROT SERPL-MCNC: 6.5 G/DL (ref 6–8.5)
SODIUM SERPL-SCNC: 144 MMOL/L (ref 136–145)
TRIGL SERPL-MCNC: 214 MG/DL (ref 0–150)
VLDLC SERPL CALC-MCNC: 35 MG/DL (ref 5–40)

## 2025-05-12 ENCOUNTER — HOSPITAL ENCOUNTER (OUTPATIENT)
Dept: CT IMAGING | Facility: HOSPITAL | Age: 70
Discharge: HOME | End: 2025-05-12
Admitting: THORACIC SURGERY (CARDIOTHORACIC VASCULAR SURGERY)
Payer: MEDICARE

## 2025-05-12 DIAGNOSIS — C34.10 NSCLC OF UPPER LOBE: ICD-10-CM

## 2025-05-12 PROCEDURE — 71250 CT THORAX DX C-: CPT

## 2025-05-19 ENCOUNTER — OFFICE VISIT (OUTPATIENT)
Dept: SURGERY | Facility: CLINIC | Age: 70
End: 2025-05-19
Payer: MEDICARE

## 2025-05-19 ENCOUNTER — PATIENT OUTREACH (OUTPATIENT)
Dept: OTHER | Facility: HOSPITAL | Age: 70
End: 2025-05-19
Payer: MEDICARE

## 2025-05-19 VITALS
HEIGHT: 67 IN | SYSTOLIC BLOOD PRESSURE: 126 MMHG | WEIGHT: 293 LBS | DIASTOLIC BLOOD PRESSURE: 62 MMHG | HEART RATE: 76 BPM | BODY MASS INDEX: 45.99 KG/M2 | OXYGEN SATURATION: 95 %

## 2025-05-19 DIAGNOSIS — R91.1 LUNG NODULE: ICD-10-CM

## 2025-05-19 DIAGNOSIS — C34.91 NSCLC OF RIGHT LUNG: Primary | ICD-10-CM

## 2025-05-19 DIAGNOSIS — Z85.118 HISTORY OF LUNG CANCER: ICD-10-CM

## 2025-05-19 NOTE — LETTER
May 21, 2025     Dorinda Hastings MD  [address] [address]  [address]  [place] [address]    Patient: Kortney Charlton   YOB: 1955   Date of Visit: 5/19/2025     Dear Dorinda Hastings MD:       Thank you for referring Kortney Charlton to me for evaluation. Below are the relevant portions of my assessment and plan of care.    If you have questions, please do not hesitate to call me. I look forward to following Kortney along with you.         Sincerely,        Radha Marcus MD        CC: No Recipients    Radha Marcus MD  05/21/25 1047  Sign when Signing Visit  Chief Complaint  Follow-up (Lung CA Ground glass opacity, 4 MONTH F/U, CT 5/12)    Subjective       History of Present Illness  The patient presents for follow-up. The primary reason for this visit is surveillance after a right upper lobectomy for stage I non-small cell lung cancer, which was resected in 01/2025.    She reports an improvement in her overall health status. Rehabilitation sessions continue three times a week. Preparation is underway for a trip to [place] in 07/2025, which will include a 3-day safari. Gradual reduction in oxygen dependence is noted, with oxygen saturation levels dropping to approximately 90 during therapy sessions without supplemental oxygen. Plans to bring an oxygen concentrator on the upcoming trip are in place as a precautionary measure. Oxygen saturation level was recorded at 95 during this visit. Mild dyspnea occurs when walking long distances. During a recent 6-minute walk test, oxygen saturation level decreased to 90 but quickly recovered within a minute. Occasional sensations of tightness or stiffness are reported.    PAST SURGICAL HISTORY:  Right upper lobectomy for stage I non-small cell lung cancer in 01/2025.    SOCIAL HISTORY  Marital Status:   Exercise: Rehabilitation 3 days a week    History of Present Illness    Objective  Vital Signs:  /62 (BP Location: Left arm, Patient  "Position: Sitting)   Pulse 76   Ht 170.3 cm (67.05\")   Wt (!) 169 kg (372 lb)   SpO2 95%   BMI 58.18 kg/m²   Estimated body mass index is 58.18 kg/m² as calculated from the following:    Height as of this encounter: 170.3 cm (67.05\").    Weight as of this encounter: 169 kg (372 lb).            Physical Exam  Vitals and nursing note reviewed.   Constitutional:       Appearance: She is well-developed.   HENT:      Head: Normocephalic and atraumatic.      Nose: Nose normal.   Eyes:      Conjunctiva/sclera: Conjunctivae normal.   Cardiovascular:      Rate and Rhythm: Normal rate.   Pulmonary:      Effort: Pulmonary effort is normal.   Abdominal:      Palpations: Abdomen is soft.   Musculoskeletal:      Cervical back: Neck supple.   Skin:     General: Skin is warm and dry.   Neurological:      Mental Status: She is alert and oriented to person, place, and time.   Psychiatric:         Behavior: Behavior normal.         Thought Content: Thought content normal.         Judgment: Judgment normal.          Physical Exam  General: Pleasant 69-year-old female, no acute distress.  Skin: Skin appears healthy with no signs of infection or inflammation.    Result Review:           Results  The following results are independently reviewed and interpreted by myself.    Imaging   - CT of the chest: 05/12/2025, Scarring consistent with right upper lobectomy. There is a tiny, new pleural based nodule measuring 3 mm in the right lower lobe. No new other nodules, no mediastinal or hilar lymphadenopathy, no pleural pericardial effusion.           Assessment and Plan   Diagnoses and all orders for this visit:    1. NSCLC of right lung (Primary)  -     CT Chest Without Contrast; Future    2. History of lung cancer    3. Lung nodule        Assessment & Plan  1. Post-lobectomy status for T1aN0 adenocarcinoma of the right upper lobe.  She underwent resection in 01/2025. The CT scan from 05/12/2025 shows scarring consistent with the surgery " and a 3 mm pleural-based nodule in the right lower lobe. No new nodules, mediastinal or hilar lymphadenopathy, or pleural pericardial effusion were noted. The small nodule and linear scarring are expected post-surgery findings. Follow-up scans will be conducted every 4 months for the first year, every 6 months up to 5 years, and annually up to 10 years.    2. Oxygen therapy.  She is advised to continue her current rehabilitation regimen and to take her oxygen concentrator and saturation monitor on her upcoming trip to [facility]. A letter will be provided for [employer] to allow her to carry the oxygen concentrator on board.    Follow-up  The patient will follow up in 4 months with a CT of the chest.         Follow Up   No follow-ups on file.  Patient was given instructions and counseling regarding her condition or for health maintenance advice. Please see specific information pulled into the AVS if appropriate.     Patient or patient representative verbalized consent for the use of Ambient Listening during the visit with  Radha Marcus MD for chart documentation. 5/21/2025  10:47 EDT

## 2025-05-19 NOTE — PROGRESS NOTES
Reviewed chart.  Patient is status post robot-assisted right upper lobectomy 1/8/25 for pT1a pN0 adenocarcinoma.    I accompanied patient to Dr. Marcus's follow up visit in her office today.  Dr. Marcus reviewed her 5/12/25 chest CT scan results. The patient will continue CT surveillance with her next scan in 4 months.    The patient denies any questions/concerns or ongoing resource needs. Since she is stable with no active cancer treatment and no ongoing resource needs, I will close her case to navigation. Discussed NCCN recommendations for all cancer survivors. The patient declined the need for the survivorship packet to be mailed to her.  Encouraged patient to call in the future if the need arises. Patient verbalized understanding.

## 2025-05-21 NOTE — PROGRESS NOTES
"Chief Complaint  Follow-up (Lung CA Ground glass opacity, 4 MONTH F/U, CT 5/12)    Subjective        History of Present Illness  The patient presents for follow-up. The primary reason for this visit is surveillance after a right upper lobectomy for stage I non-small cell lung cancer, which was resected in 01/2025.    She reports an improvement in her overall health status. Rehabilitation sessions continue three times a week. Preparation is underway for a trip to [place] in 07/2025, which will include a 3-day safari. Gradual reduction in oxygen dependence is noted, with oxygen saturation levels dropping to approximately 90 during therapy sessions without supplemental oxygen. Plans to bring an oxygen concentrator on the upcoming trip are in place as a precautionary measure. Oxygen saturation level was recorded at 95 during this visit. Mild dyspnea occurs when walking long distances. During a recent 6-minute walk test, oxygen saturation level decreased to 90 but quickly recovered within a minute. Occasional sensations of tightness or stiffness are reported.    PAST SURGICAL HISTORY:  Right upper lobectomy for stage I non-small cell lung cancer in 01/2025.    SOCIAL HISTORY  Marital Status:   Exercise: Rehabilitation 3 days a week    History of Present Illness    Objective   Vital Signs:  /62 (BP Location: Left arm, Patient Position: Sitting)   Pulse 76   Ht 170.3 cm (67.05\")   Wt (!) 169 kg (372 lb)   SpO2 95%   BMI 58.18 kg/m²   Estimated body mass index is 58.18 kg/m² as calculated from the following:    Height as of this encounter: 170.3 cm (67.05\").    Weight as of this encounter: 169 kg (372 lb).            Physical Exam  Vitals and nursing note reviewed.   Constitutional:       Appearance: She is well-developed.   HENT:      Head: Normocephalic and atraumatic.      Nose: Nose normal.   Eyes:      Conjunctiva/sclera: Conjunctivae normal.   Cardiovascular:      Rate and Rhythm: Normal rate. "   Pulmonary:      Effort: Pulmonary effort is normal.   Abdominal:      Palpations: Abdomen is soft.   Musculoskeletal:      Cervical back: Neck supple.   Skin:     General: Skin is warm and dry.   Neurological:      Mental Status: She is alert and oriented to person, place, and time.   Psychiatric:         Behavior: Behavior normal.         Thought Content: Thought content normal.         Judgment: Judgment normal.          Physical Exam  General: Pleasant 69-year-old female, no acute distress.  Skin: Skin appears healthy with no signs of infection or inflammation.    Result Review :           Results  The following results are independently reviewed and interpreted by myself.    Imaging   - CT of the chest: 05/12/2025, Scarring consistent with right upper lobectomy. There is a tiny, new pleural based nodule measuring 3 mm in the right lower lobe. No new other nodules, no mediastinal or hilar lymphadenopathy, no pleural pericardial effusion.           Assessment and Plan   Diagnoses and all orders for this visit:    1. NSCLC of right lung (Primary)  -     CT Chest Without Contrast; Future    2. History of lung cancer    3. Lung nodule        Assessment & Plan  1. Post-lobectomy status for T1aN0 adenocarcinoma of the right upper lobe.  She underwent resection in 01/2025. The CT scan from 05/12/2025 shows scarring consistent with the surgery and a 3 mm pleural-based nodule in the right lower lobe. No new nodules, mediastinal or hilar lymphadenopathy, or pleural pericardial effusion were noted. The small nodule and linear scarring are expected post-surgery findings. Follow-up scans will be conducted every 4 months for the first year, every 6 months up to 5 years, and annually up to 10 years.    2. Oxygen therapy.  She is advised to continue her current rehabilitation regimen and to take her oxygen concentrator and saturation monitor on her upcoming trip to [facility]. A letter will be provided for [facility] to allow her  to carry the oxygen concentrator on board.    Follow-up  The patient will follow up in 4 months with a CT of the chest.         Follow Up   No follow-ups on file.  Patient was given instructions and counseling regarding her condition or for health maintenance advice. Please see specific information pulled into the AVS if appropriate.     Patient or patient representative verbalized consent for the use of Ambient Listening during the visit with  Radha Marcus MD for chart documentation. 5/21/2025  10:47 EDT

## 2025-05-22 ENCOUNTER — HOSPITAL ENCOUNTER (OUTPATIENT)
Dept: BONE DENSITY | Facility: HOSPITAL | Age: 70
Discharge: HOME | End: 2025-05-22
Payer: MEDICARE

## 2025-05-22 ENCOUNTER — HOSPITAL ENCOUNTER (OUTPATIENT)
Dept: MAMMOGRAPHY | Facility: HOSPITAL | Age: 70
Discharge: HOME | End: 2025-05-22
Payer: MEDICARE

## 2025-05-22 DIAGNOSIS — Z12.31 VISIT FOR SCREENING MAMMOGRAM: ICD-10-CM

## 2025-05-22 DIAGNOSIS — Z78.0 POST-MENOPAUSAL: ICD-10-CM

## 2025-05-22 PROCEDURE — 77063 BREAST TOMOSYNTHESIS BI: CPT

## 2025-05-22 PROCEDURE — 77067 SCR MAMMO BI INCL CAD: CPT

## 2025-05-22 PROCEDURE — 77080 DXA BONE DENSITY AXIAL: CPT

## 2025-06-09 ENCOUNTER — TELEPHONE (OUTPATIENT)
Dept: FAMILY MEDICINE CLINIC | Facility: CLINIC | Age: 70
End: 2025-06-09

## 2025-06-09 DIAGNOSIS — K52.9 CHRONIC DIARRHEA: ICD-10-CM

## 2025-06-09 RX ORDER — CHOLESTYRAMINE 4 G/9G
8 POWDER, FOR SUSPENSION ORAL
Qty: 180 PACKET | Refills: 1 | Status: SHIPPED | OUTPATIENT
Start: 2025-06-09

## 2025-06-09 NOTE — TELEPHONE ENCOUNTER
Caller: Kortney Charlton    Relationship: Self    Best call back number: [phone]     Which medication are you concerned about:     cholestyramine (QUESTRAN) 4 GM/DOSE powder       Who prescribed you this medication: DR OLIVIA        What are your concerns: PATIENT IS CALLING TO STATE SHE WAS INITIALLY ON 2 PACKET , 3 TIMES A DAY FOR THE ABOVE MEDICATION.  SHE STATES THE LAST PRESCRIPTION WAS IN A JAR RATHER IN PACKETS.  SHE STATES THE INSTRUCTIONS WERE TO TAKE 1 PACKET 3 TIMES PER DAY.  SHE STATES SHE HAS BEEN TAKING THE 2 SCOOPS/PACKET, 3 TIMES PER DAY AND HAS RUN OUT.  PHARMACY WILL NOT REFILL BECAUSE IT IS TOO EARLY.  SHE IS REQUESTING A NEW PRESCRIPTION WITH REFILLS TO REFLECT THE 2 SCOOP, 3 TIMES A DAY DOSAGE.  ALSO, SHE IS GOING TO [facility] AND WILL RUN OUT OF THE MEDICATION WHILE GONE.  SHE IS WANTING TO KNOW IF DR OLIVIA WOULD ALLOW HER TO GET 2 JARS.        [facility]/pharmacy #[id number] - [facility] [address], [place] - [address] AT [facility] - [phone] [facility] [phone]  [phone]     PLEASE ADVISE.

## 2025-07-01 DIAGNOSIS — K52.9 CHRONIC DIARRHEA: ICD-10-CM

## 2025-07-01 RX ORDER — CHOLESTYRAMINE 4 G/9G
POWDER, FOR SUSPENSION ORAL
Qty: 378 G | Refills: 1 | Status: SHIPPED | OUTPATIENT
Start: 2025-07-01

## (undated) DEVICE — BLADELESS OBTURATOR, LONG: Brand: WECK VISTA

## (undated) DEVICE — CATHETER: Brand: ION

## (undated) DEVICE — LP VESL MAXI 2.5X1MM RED 2PK

## (undated) DEVICE — Device: Brand: ION

## (undated) DEVICE — Device: Brand: TISSUE RETRIEVAL SYSTEM

## (undated) DEVICE — SEAL

## (undated) DEVICE — SOL NACL 0.9PCT 1000ML

## (undated) DEVICE — BIOPSY NEEDLE, 21G: Brand: FLEXISION

## (undated) DEVICE — SUT MNCRYL PLS ANTIB UD 4/0 PS2 18IN

## (undated) DEVICE — 2, DISPOSABLE SUCTION/IRRIGATOR WITH DISPOSABLE TIP: Brand: STRYKEFLOW

## (undated) DEVICE — ADHS SKIN SURG TISS VISC PREMIERPRO EXOFIN HI/VISC 1ML

## (undated) DEVICE — CONTAINER,SPECIMEN,OR STERILE,4OZ: Brand: MEDLINE

## (undated) DEVICE — BLADELESS OBTURATOR: Brand: WECK VISTA

## (undated) DEVICE — ST TBG AIRSEAL BIF FLTR W/ACT/CHARCOAL/FLTR

## (undated) DEVICE — SUT SILK 0 PSL 18IN 580H

## (undated) DEVICE — CATHETER,URETHRAL,REDRUBBER,STRL,12FR: Brand: MEDLINE INDUSTRIES, INC.

## (undated) DEVICE — KT CLN CLEANOR SCPE

## (undated) DEVICE — PENCL ES MEGADINE EZ/CLEAN BUTN W/HOLSTR 10FT

## (undated) DEVICE — THE STERILE LIGHT HANDLE COVER IS USED WITH STERIS SURGICAL LIGHTING AND VISUALIZATION SYSTEMS.

## (undated) DEVICE — ANTIBACTERIAL UNDYED BRAIDED (POLYGLACTIN 910), SYNTHETIC ABSORBABLE SUTURE: Brand: COATED VICRYL

## (undated) DEVICE — LOU THORACIC: Brand: MEDLINE INDUSTRIES, INC.

## (undated) DEVICE — BAPTIST FLOYD BRONCHOSCOPY: Brand: MEDLINE INDUSTRIES, INC.

## (undated) DEVICE — KIT,ANTI FOG,W/SPONGE & FLUID,SOFT PACK: Brand: MEDLINE

## (undated) DEVICE — VISION PROBE ADAPTER AND SUCTION ADAPTER

## (undated) DEVICE — AIRSEAL 8 MM CANNULA CAP AND OBTURATOR WITH BLADELESS OPTICAL TIP COMPATIBLE WITH INTUITIVE DA VINCI XI AND DA VINCI X 8 MM INSTRUMENT CANNULA, LONG LENGTH: Brand: ARS LONG

## (undated) DEVICE — 1LYRTR 16FR10ML100%SIL UMS SNP: Brand: MEDLINE INDUSTRIES, INC.

## (undated) DEVICE — GLV SURG BIOGEL LTX PF 6

## (undated) DEVICE — SYR LUERLOK 20CC BX/50

## (undated) DEVICE — 8MM STAPLER: Brand: SUREFORM

## (undated) DEVICE — ENDOPATH XCEL BLADELESS TROCARS WITH STABILITY SLEEVES: Brand: ENDOPATH XCEL

## (undated) DEVICE — CATHETER GUIDE

## (undated) DEVICE — SUREFORM 45 CURVED-TIP: Brand: SUREFORM

## (undated) DEVICE — VISION PROBE: Brand: ION

## (undated) DEVICE — ELECTRD BLD EZ CLN MOD XLNG 2.75IN

## (undated) DEVICE — 3M™ STERI-DRAPE™ INSTRUMENT POUCH 1018L: Brand: STERI-DRAPE™

## (undated) DEVICE — SUT SILK 0 TIES 30IN A306H

## (undated) DEVICE — SUT VIC 0 CT1 36IN J946H

## (undated) DEVICE — COLUMN DRAPE

## (undated) DEVICE — NDL INJ UROL WILLIAMS CYSTO 3.7F 23G 8MM

## (undated) DEVICE — APPL CHLORAPREP HI/LITE 26ML ORNG

## (undated) DEVICE — THE STERILE CAMERA HANDLE COVER IS FOR USE WITH THE STERIS SURGICAL LIGHTING AND VISUALIZATION SYSTEMS.

## (undated) DEVICE — MARKR SKIN W/RULR 2TP

## (undated) DEVICE — OASIS DRAIN, SINGLE, INLINE & ATS COMPATIBLE: Brand: OASIS

## (undated) DEVICE — SOL ANTISTICK CAUTRY ELECTROLUBE LF

## (undated) DEVICE — SUCTION IRRIGATOR: Brand: ENDOWRIST

## (undated) DEVICE — Device: Brand: ERBE

## (undated) DEVICE — TROC BLADLES AIRSEAL/OPTI THRD 8X120MM 1P/U

## (undated) DEVICE — SWIVEL CONNECTOR

## (undated) DEVICE — SPNG LAP CIGARETTE KITTNER 5MM STRL PK/5

## (undated) DEVICE — EXTENSION SET, MALE LUER LOCK ADAPTER WITH RETRACTABLE COLLAR

## (undated) DEVICE — ARM DRAPE

## (undated) DEVICE — PATIENT RETURN ELECTRODE, SINGLE-USE, CONTACT QUALITY MONITORING, ADULT, WITH 9FT CORD, FOR PATIENTS WEIGING OVER 33LBS. (15KG): Brand: MEGADYNE